# Patient Record
Sex: FEMALE | Race: WHITE | Employment: OTHER | URBAN - METROPOLITAN AREA
[De-identification: names, ages, dates, MRNs, and addresses within clinical notes are randomized per-mention and may not be internally consistent; named-entity substitution may affect disease eponyms.]

---

## 2017-01-11 ENCOUNTER — APPOINTMENT (OUTPATIENT)
Dept: CT IMAGING | Age: 82
End: 2017-01-11
Attending: EMERGENCY MEDICINE
Payer: MEDICARE

## 2017-01-11 ENCOUNTER — APPOINTMENT (OUTPATIENT)
Dept: GENERAL RADIOLOGY | Age: 82
End: 2017-01-11
Attending: EMERGENCY MEDICINE
Payer: MEDICARE

## 2017-01-11 ENCOUNTER — HOSPITAL ENCOUNTER (EMERGENCY)
Age: 82
Discharge: HOME | End: 2017-01-12
Attending: EMERGENCY MEDICINE | Admitting: EMERGENCY MEDICINE
Payer: MEDICARE
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
## 2017-01-11 DIAGNOSIS — S50.312A ABRASION OF LEFT ELBOW, INITIAL ENCOUNTER: ICD-10-CM

## 2017-01-11 DIAGNOSIS — S70.12XA CONTUSION OF LEFT HIP AND THIGH, INITIAL ENCOUNTER: ICD-10-CM

## 2017-01-11 DIAGNOSIS — S70.02XA CONTUSION OF LEFT HIP AND THIGH, INITIAL ENCOUNTER: ICD-10-CM

## 2017-01-11 DIAGNOSIS — S09.90XA HEAD INJURY, INITIAL ENCOUNTER: ICD-10-CM

## 2017-01-11 LAB
ALBUMIN SERPL BCP-MCNC: 3.4 G/DL (ref 3.5–5)
ALBUMIN/GLOB SERPL: 1 {RATIO} (ref 1.1–2.2)
ALP SERPL-CCNC: 95 U/L (ref 45–117)
ALT SERPL-CCNC: 24 U/L (ref 12–78)
ANION GAP BLD CALC-SCNC: 12 MMOL/L (ref 5–15)
APPEARANCE UR: CLEAR
APTT PPP: 23.8 SEC (ref 22.1–32.5)
AST SERPL W P-5'-P-CCNC: 13 U/L (ref 15–37)
BACTERIA URNS QL MICRO: NEGATIVE /HPF
BASOPHILS # BLD AUTO: 0 K/UL (ref 0–0.1)
BASOPHILS # BLD: 0 % (ref 0–1)
BILIRUB SERPL-MCNC: 0.4 MG/DL (ref 0.2–1)
BILIRUB UR QL: NEGATIVE
BUN SERPL-MCNC: 28 MG/DL (ref 6–20)
BUN/CREAT SERPL: 28 (ref 12–20)
CALCIUM SERPL-MCNC: 9 MG/DL (ref 8.5–10.1)
CHLORIDE SERPL-SCNC: 102 MMOL/L (ref 97–108)
CK MB CFR SERPL CALC: 3.3 % (ref 0–2.5)
CK MB SERPL-MCNC: 1.9 NG/ML (ref 5–25)
CK SERPL-CCNC: 57 U/L (ref 26–192)
CO2 SERPL-SCNC: 26 MMOL/L (ref 21–32)
COLOR UR: NORMAL
CREAT SERPL-MCNC: 0.99 MG/DL (ref 0.55–1.02)
EOSINOPHIL # BLD: 0.2 K/UL (ref 0–0.4)
EOSINOPHIL NFR BLD: 1 % (ref 0–7)
EPITH CASTS URNS QL MICRO: NORMAL /LPF
ERYTHROCYTE [DISTWIDTH] IN BLOOD BY AUTOMATED COUNT: 14.8 % (ref 11.5–14.5)
GLOBULIN SER CALC-MCNC: 3.3 G/DL (ref 2–4)
GLUCOSE SERPL-MCNC: 144 MG/DL (ref 65–100)
GLUCOSE UR STRIP.AUTO-MCNC: NEGATIVE MG/DL
HCT VFR BLD AUTO: 47.4 % (ref 35–47)
HGB BLD-MCNC: 15.5 G/DL (ref 11.5–16)
HGB UR QL STRIP: NEGATIVE
HYALINE CASTS URNS QL MICRO: NORMAL /LPF (ref 0–5)
INR PPP: 1 (ref 0.9–1.1)
KETONES UR QL STRIP.AUTO: NEGATIVE MG/DL
LEUKOCYTE ESTERASE UR QL STRIP.AUTO: NEGATIVE
LYMPHOCYTES # BLD AUTO: 15 % (ref 12–49)
LYMPHOCYTES # BLD: 1.5 K/UL (ref 0.8–3.5)
MAGNESIUM SERPL-MCNC: 2.2 MG/DL (ref 1.6–2.4)
MCH RBC QN AUTO: 29.1 PG (ref 26–34)
MCHC RBC AUTO-ENTMCNC: 32.7 G/DL (ref 30–36.5)
MCV RBC AUTO: 88.9 FL (ref 80–99)
MONOCYTES # BLD: 0.9 K/UL (ref 0–1)
MONOCYTES NFR BLD AUTO: 9 % (ref 5–13)
NEUTS SEG # BLD: 7.8 K/UL (ref 1.8–8)
NEUTS SEG NFR BLD AUTO: 75 % (ref 32–75)
NITRITE UR QL STRIP.AUTO: NEGATIVE
PH UR STRIP: 5.5 [PH] (ref 5–8)
PHOSPHATE SERPL-MCNC: 3 MG/DL (ref 2.6–4.7)
PLATELET # BLD AUTO: 276 K/UL (ref 150–400)
POTASSIUM SERPL-SCNC: 4.4 MMOL/L (ref 3.5–5.1)
PROT SERPL-MCNC: 6.7 G/DL (ref 6.4–8.2)
PROT UR STRIP-MCNC: NEGATIVE MG/DL
PROTHROMBIN TIME: 9.9 SEC (ref 9–11.1)
RBC # BLD AUTO: 5.33 M/UL (ref 3.8–5.2)
RBC #/AREA URNS HPF: NORMAL /HPF (ref 0–5)
SODIUM SERPL-SCNC: 140 MMOL/L (ref 136–145)
SP GR UR REFRACTOMETRY: 1.02 (ref 1–1.03)
THERAPEUTIC RANGE,PTTT: NORMAL SECS (ref 58–77)
UA: UC IF INDICATED,UAUC: NORMAL
UROBILINOGEN UR QL STRIP.AUTO: 0.2 EU/DL (ref 0.2–1)
WBC # BLD AUTO: 10.4 K/UL (ref 3.6–11)
WBC URNS QL MICRO: NORMAL /HPF (ref 0–4)

## 2017-01-11 PROCEDURE — 74011000250 HC RX REV CODE- 250: Performed by: EMERGENCY MEDICINE

## 2017-01-11 PROCEDURE — 87040 BLOOD CULTURE FOR BACTERIA: CPT | Performed by: EMERGENCY MEDICINE

## 2017-01-11 PROCEDURE — 99285 EMERGENCY DEPT VISIT HI MDM: CPT

## 2017-01-11 PROCEDURE — 71020 XR CHEST PA LAT: CPT

## 2017-01-11 PROCEDURE — 82550 ASSAY OF CK (CPK): CPT | Performed by: EMERGENCY MEDICINE

## 2017-01-11 PROCEDURE — 84100 ASSAY OF PHOSPHORUS: CPT | Performed by: EMERGENCY MEDICINE

## 2017-01-11 PROCEDURE — 80053 COMPREHEN METABOLIC PANEL: CPT | Performed by: EMERGENCY MEDICINE

## 2017-01-11 PROCEDURE — 51701 INSERT BLADDER CATHETER: CPT

## 2017-01-11 PROCEDURE — 81001 URINALYSIS AUTO W/SCOPE: CPT | Performed by: EMERGENCY MEDICINE

## 2017-01-11 PROCEDURE — 83735 ASSAY OF MAGNESIUM: CPT | Performed by: EMERGENCY MEDICINE

## 2017-01-11 PROCEDURE — 72125 CT NECK SPINE W/O DYE: CPT

## 2017-01-11 PROCEDURE — 36415 COLL VENOUS BLD VENIPUNCTURE: CPT | Performed by: EMERGENCY MEDICINE

## 2017-01-11 PROCEDURE — 93005 ELECTROCARDIOGRAM TRACING: CPT

## 2017-01-11 PROCEDURE — 70450 CT HEAD/BRAIN W/O DYE: CPT

## 2017-01-11 PROCEDURE — 74011250637 HC RX REV CODE- 250/637: Performed by: EMERGENCY MEDICINE

## 2017-01-11 PROCEDURE — 85730 THROMBOPLASTIN TIME PARTIAL: CPT | Performed by: EMERGENCY MEDICINE

## 2017-01-11 PROCEDURE — 85025 COMPLETE CBC W/AUTO DIFF WBC: CPT | Performed by: EMERGENCY MEDICINE

## 2017-01-11 PROCEDURE — 94762 N-INVAS EAR/PLS OXIMTRY CONT: CPT

## 2017-01-11 PROCEDURE — 73502 X-RAY EXAM HIP UNI 2-3 VIEWS: CPT

## 2017-01-11 PROCEDURE — 85610 PROTHROMBIN TIME: CPT | Performed by: EMERGENCY MEDICINE

## 2017-01-11 PROCEDURE — 77030011943

## 2017-01-11 PROCEDURE — 72192 CT PELVIS W/O DYE: CPT

## 2017-01-11 RX ORDER — CEPHALEXIN 500 MG/1
500 CAPSULE ORAL AS NEEDED
COMMUNITY
End: 2018-08-22

## 2017-01-11 RX ORDER — BISMUTH SUBSALICYLATE 262 MG
1 TABLET,CHEWABLE ORAL DAILY
COMMUNITY

## 2017-01-11 RX ORDER — LOSARTAN POTASSIUM 50 MG/1
50 TABLET ORAL DAILY
COMMUNITY

## 2017-01-11 RX ORDER — MEMANTINE HYDROCHLORIDE 10 MG/1
10 TABLET ORAL 2 TIMES DAILY
COMMUNITY

## 2017-01-11 RX ORDER — BACITRACIN 500 UNIT/G
1 PACKET (EA) TOPICAL
Status: COMPLETED | OUTPATIENT
Start: 2017-01-11 | End: 2017-01-11

## 2017-01-11 RX ORDER — METHYLPREDNISOLONE 4 MG/1
4 TABLET ORAL
COMMUNITY
End: 2018-08-22

## 2017-01-11 RX ORDER — GLUCOSAMINE SULFATE 1500 MG
2000 POWDER IN PACKET (EA) ORAL DAILY
COMMUNITY
End: 2020-06-09

## 2017-01-11 RX ORDER — ACETAMINOPHEN 500 MG
1000 TABLET ORAL
Status: COMPLETED | OUTPATIENT
Start: 2017-01-11 | End: 2017-01-11

## 2017-01-11 RX ORDER — FERROUS SULFATE, DRIED 160(50) MG
1 TABLET, EXTENDED RELEASE ORAL
COMMUNITY

## 2017-01-11 RX ADMIN — BACITRACIN ZINC 1 PACKET: 500 OINTMENT TOPICAL at 22:51

## 2017-01-11 RX ADMIN — ACETAMINOPHEN 1000 MG: 500 TABLET, FILM COATED ORAL at 22:51

## 2017-01-12 VITALS
RESPIRATION RATE: 15 BRPM | SYSTOLIC BLOOD PRESSURE: 138 MMHG | OXYGEN SATURATION: 99 % | DIASTOLIC BLOOD PRESSURE: 76 MMHG | HEIGHT: 64 IN | TEMPERATURE: 98 F | WEIGHT: 150 LBS | BODY MASS INDEX: 25.61 KG/M2 | HEART RATE: 98 BPM

## 2017-01-12 LAB
ATRIAL RATE: 92 BPM
CALCULATED P AXIS, ECG09: 52 DEGREES
CALCULATED R AXIS, ECG10: -10 DEGREES
CALCULATED T AXIS, ECG11: 58 DEGREES
DIAGNOSIS, 93000: NORMAL
P-R INTERVAL, ECG05: 156 MS
Q-T INTERVAL, ECG07: 362 MS
QRS DURATION, ECG06: 80 MS
QTC CALCULATION (BEZET), ECG08: 447 MS
VENTRICULAR RATE, ECG03: 92 BPM

## 2017-01-12 RX ORDER — ACETAMINOPHEN 500 MG
1000 TABLET ORAL
Qty: 30 TAB | Refills: 0 | Status: SHIPPED | OUTPATIENT
Start: 2017-01-12 | End: 2018-08-22

## 2017-01-12 NOTE — ED NOTES
Patient discharged by MD. Results and discharge instructions reviewed with the patient by MD.  Patient verbalizes understanding. Patient discharged home, stable, ambulatory, in no acute distress.

## 2017-01-12 NOTE — ED PROVIDER NOTES
HPI Comments: 80 y.o. female with past medical history significant for hypercholesterolemia, osteoporosis, IBS, OA, L-THR, falls, and dementia who presents from [facility] assisted living via EMS with chief complaint of GLF. Pt was brought to the ED via EMS after a nurse found her on the floor at her living facility ~30-40 minutes ago. Pt was on the floor for an unknown period of time. Pt presents with a L-elbow laceration, L-hip pain, and a rash over both arms. Pt normally walks with a cane, but she reports that she is able to walk without any assistance at times. Per daughter, pt is currently taking Prednisone. Pt's daughter states that she also had a fall in 09/2016, at which point she cracked a rib and began using a cane intermittently. Pt denies experiencing any HA, back pain, neck pain, CP, abdominal pain, nausea, or vomiting. There are no other acute medical concerns at this time. PCP: Carlos Canas MD    Note written by Brain Oglesby, as dictated by Yazmin Kemp MD 10:24 PM      The history is provided by the patient, a relative and a caregiver. Past Medical History:   Diagnosis Date    Hypercholesteremia 5/27/2010    IBS (irritable bowel syndrome) 5/27/2010    OA (osteoarthritis) 5/27/2010    Osteoporosis 5/27/2010       History reviewed. No pertinent past surgical history. History reviewed. No pertinent family history. Social History     Social History    Marital status:      Spouse name: N/A    Number of children: N/A    Years of education: N/A     Occupational History    Not on file.      Social History Main Topics    Smoking status: Not on file    Smokeless tobacco: Not on file    Alcohol use Not on file    Drug use: Not on file    Sexual activity: Not on file     Other Topics Concern    Not on file     Social History Narrative         ALLERGIES: Aricept [donepezil] and Flexeril [cyclobenzaprine]    Review of Systems   Cardiovascular: Negative for chest pain. Gastrointestinal: Negative for abdominal pain, nausea and vomiting. Musculoskeletal: Negative for back pain and neck pain. +L-hip pain   Skin: Positive for rash (Both arms) and wound (L-elbow laceration). Neurological: Negative for headaches. All other systems reviewed and are negative. Vitals:    01/11/17 2222   Weight: 68 kg (150 lb)   Height: 5' 4\" (1.626 m)            Physical Exam   Nursing note and vitals reviewed. CONSTITUTIONAL: Well-appearing; well-nourished; in no apparent distress  HEAD: Normocephalic; atraumatic  EYES: PERRL; EOM intact; conjunctiva and sclera are clear bilaterally. ENT: No rhinorrhea; normal pharynx with no tonsillar hypertrophy; mucous membranes pink/moist, no erythema, no exudate. NECK: Supple; non-tender; no cervical lymphadenopathy  CARD: Normal S1, S2; no murmurs, rubs, or gallops. Regular rate and rhythm. RESP: Normal respiratory effort; breath sounds clear and equal bilaterally; no wheezes, rhonchi, or rales. ABD: Normal bowel sounds; non-distended; non-tender; no palpable organomegaly, no masses, no bruits. Back Exam: Normal inspection; no vertebral point tenderness, no CVA tenderness. Normal range of motion. EXT: Normal ROM in all four extremities; Left hip tenderness to palpation; no swelling or deformity; distal pulses are normal, no edema. SKIN: Warm; dry; no rash. NEURO:Alert and oriented x 3, coherent, PRISCILLA-XII grossly intact, sensory and motor are non-focal.        MDM  Number of Diagnoses or Management Options  Abrasion of left elbow, initial encounter:   Contusion of left hip and thigh, initial encounter:   Fall, initial encounter:   Head injury, initial encounter:   Diagnosis management comments: Assessment: elderly female, with fall versus syncope. Rule out head injury,ACS, cardiac dysrhythmia, electrolyte abnormality, infection, abrasion left elbow,  head and left hip injury.       Plan: EKG/ lab/ IV fluid/ chest x-ray/ left hip x-ray/ CT scan of the head, cervical spine, and pelvis/ analgesia/ Monitor and Reevaluate. Amount and/or Complexity of Data Reviewed  Clinical lab tests: ordered and reviewed  Tests in the radiology section of CPT®: ordered and reviewed  Tests in the medicine section of CPT®: reviewed and ordered  Discussion of test results with the performing providers: yes  Decide to obtain previous medical records or to obtain history from someone other than the patient: yes  Obtain history from someone other than the patient: yes  Review and summarize past medical records: yes  Discuss the patient with other providers: yes  Independent visualization of images, tracings, or specimens: yes    Risk of Complications, Morbidity, and/or Mortality  Presenting problems: moderate  Diagnostic procedures: moderate  Management options: moderate      ED Course       Procedures     ED EKG interpretation:  Rhythm: normal sinus rhythm; and regular . Rate (approx.): 92; Axis: left axis deviation; P wave: normal; QRS interval: normal ; ST/T wave: non-specific changes; in  Lead: Diffusely; Other findings: abnormal ekg. This EKG was interpreted by Nataly Orozco MD,ED Provider. XRAY INTERPRETATION (ED MD)  Chest Xray  No acute process seen. Normal heart size. No bony abnormalities. No infiltrate. Nataly Orozco MD 11:06 PM      XRAY INTERPRETATION (ED MD)  Xray of Left hip shows left sacral fracture with possible left parasymphyseal  superior and inferior ramus fractures; No subluxation/dislocation. No STS. Nataly Orozco MD 11:06 PM    Progress Note:   Pt has been reexamined by Nataly Orozco MD. Pt is feeling much better. Symptoms have improved. All available results have been reviewed with pt and any available family. The findings of possible sacral and pelvic fracture identified on the x-ray was not evaluated by the CT. Patient has no acute fracture at this time. The patient ambulated without difficulty.  She denies any further discomfort. Pt understands sx, dx, and tx in ED. Care plan has been outlined and questions have been answered. Pt is ready to go home. Will send home on fall/ right hip contusion/ head injury/ left elbow abrasion instruction. Outpatient referral with PCP as needed. Written by Ludy Koroma MD,8:06 AM    .   .

## 2017-01-12 NOTE — DISCHARGE INSTRUCTIONS
Scrapes (Abrasions): Care Instructions  Your Care Instructions  Scrapes (abrasions) are wounds where your skin has been rubbed or torn off. Most scrapes do not go deep into the skin, but some may remove several layers of skin. Scrapes usually don't bleed much, but they may ooze pinkish fluid. Scrapes on the head or face may appear worse than they are. They may bleed a lot because of the good blood supply to this area. Most scrapes heal well and may not need a bandage. They usually heal within 3 to 7 days. A large, deep scrape may take 1 to 2 weeks or longer to heal. A scab may form on some scrapes. Follow-up care is a key part of your treatment and safety. Be sure to make and go to all appointments, and call your doctor if you are having problems. It's also a good idea to know your test results and keep a list of the medicines you take. How can you care for yourself at home? · If your doctor told you how to care for your wound, follow your doctor's instructions. If you did not get instructions, follow this general advice:  ¨ Wash the scrape with clean water 2 times a day. Don't use hydrogen peroxide or alcohol, which can slow healing. ¨ You may cover the scrape with a thin layer of petroleum jelly, such as Vaseline, and a nonstick bandage. ¨ Apply more petroleum jelly and replace the bandage as needed. · Prop up the injured area on a pillow anytime you sit or lie down during the next 3 days. Try to keep it above the level of your heart. This will help reduce swelling. · Be safe with medicines. Take pain medicines exactly as directed. ¨ If the doctor gave you a prescription medicine for pain, take it as prescribed. ¨ If you are not taking a prescription pain medicine, ask your doctor if you can take an over-the-counter medicine. When should you call for help?   Call your doctor now or seek immediate medical care if:  · You have signs of infection, such as:  ¨ Increased pain, swelling, warmth, or redness around the scrape. ¨ Red streaks leading from the scrape. ¨ Pus draining from the scrape. ¨ A fever. · The scrape starts to bleed, and blood soaks through the bandage. Oozing small amounts of blood is normal.  Watch closely for changes in your health, and be sure to contact your doctor if the scrape is not getting better each day. Where can you learn more? Go to http://kamryn-mamadou.info/. Enter A374 in the search box to learn more about \"Scrapes (Abrasions): Care Instructions. \"  Current as of: May 27, 2016  Content Version: 11.1  © 3704-5991 [employer]. Care instructions adapted under license by [employer] (which disclaims liability or warranty for this information). If you have questions about a medical condition or this instruction, always ask your healthcare professional. [place] [address] any warranty or liability for your use of this information. We hope that we have addressed all of your medical concerns. The examination and treatment you received in the Emergency Department were for an emergent problem and were not intended as complete care. It is important that you follow up with your healthcare provider(s) for ongoing care. If your symptoms worsen or do not improve as expected, and you are unable to reach your usual health care provider(s), you should return to the Emergency Department. Today's healthcare is undergoing tremendous change, and patient satisfaction surveys are one of the many tools to assess the quality of medical care. You may receive a survey from the [employer] organization regarding your experience in the Emergency Department. I hope that your experience has been completely positive, particularly the medical care that I provided. As such, please participate in the survey; anything less than excellent does not meet my expectations or intentions.         [address] [facility] participate in nationally recognized quality of care measures. If your blood pressure is greater than 120/80, as reported below, we urge that you seek medical care to address the potential of high blood pressure, commonly known as hypertension. Hypertension can be hereditary or can be caused by certain medical conditions, pain, stress, or \"white coat syndrome. \"       Please make an appointment with your health care provider(s) for follow up of your Emergency Department visit. VITALS:   Patient Vitals for the past 8 hrs:   Temp Pulse Resp BP SpO2   01/11/17 2222 97.5 °F (36.4 °C) 94 18 140/89 96 %          Thank you for allowing us to provide you with medical care today. We realize that you have many choices for your emergency care needs. Please choose us in the future for any continued health care needs. Hi Brooks MD    [address] [facility].   Office: [phone]            Recent Results (from the past 24 hour(s))   CK W/ CKMB & INDEX    Collection Time: 01/11/17 11:07 PM   Result Value Ref Range    CK 57 26 - 192 U/L    CK - MB 1.9 <3.6 NG/ML    CK-MB Index 3.3 (H) 0 - 2.5     METABOLIC PANEL, COMPREHENSIVE    Collection Time: 01/11/17 11:07 PM   Result Value Ref Range    Sodium 140 136 - 145 mmol/L    Potassium 4.4 3.5 - 5.1 mmol/L    Chloride 102 97 - 108 mmol/L    CO2 26 21 - 32 mmol/L    Anion gap 12 5 - 15 mmol/L    Glucose 144 (H) 65 - 100 mg/dL    BUN 28 (H) 6 - 20 MG/DL    Creatinine 0.99 0.55 - 1.02 MG/DL    BUN/Creatinine ratio 28 (H) 12 - 20      GFR est AA >60 >60 ml/min/1.73m2    GFR est non-AA 53 (L) >60 ml/min/1.73m2    Calcium 9.0 8.5 - 10.1 MG/DL    Bilirubin, total 0.4 0.2 - 1.0 MG/DL    ALT 24 12 - 78 U/L    AST 13 (L) 15 - 37 U/L    Alk.  phosphatase 95 45 - 117 U/L    Protein, total 6.7 6.4 - 8.2 g/dL    Albumin 3.4 (L) 3.5 - 5.0 g/dL    Globulin 3.3 2.0 - 4.0 g/dL    A-G Ratio 1.0 (L) 1.1 - 2.2     CBC WITH AUTOMATED DIFF    Collection Time: 01/11/17 11:07 PM   Result Value Ref Range    WBC 10.4 3.6 - 11.0 K/uL    RBC 5.33 (H) 3.80 - 5.20 M/uL    HGB 15.5 11.5 - 16.0 g/dL    HCT 47.4 (H) 35.0 - 47.0 %    MCV 88.9 80.0 - 99.0 FL    MCH 29.1 26.0 - 34.0 PG    MCHC 32.7 30.0 - 36.5 g/dL    RDW 14.8 (H) 11.5 - 14.5 %    PLATELET 486 122 - 209 K/uL    NEUTROPHILS 75 32 - 75 %    LYMPHOCYTES 15 12 - 49 %    MONOCYTES 9 5 - 13 %    EOSINOPHILS 1 0 - 7 %    BASOPHILS 0 0 - 1 %    ABS. NEUTROPHILS 7.8 1.8 - 8.0 K/UL    ABS. LYMPHOCYTES 1.5 0.8 - 3.5 K/UL    ABS. MONOCYTES 0.9 0.0 - 1.0 K/UL    ABS. EOSINOPHILS 0.2 0.0 - 0.4 K/UL    ABS. BASOPHILS 0.0 0.0 - 0.1 K/UL   PROTHROMBIN TIME + INR    Collection Time: 01/11/17 11:07 PM   Result Value Ref Range    INR 1.0 0.9 - 1.1      Prothrombin time 9.9 9.0 - 11.1 sec   PTT    Collection Time: 01/11/17 11:07 PM   Result Value Ref Range    aPTT 23.8 22.1 - 32.5 sec    aPTT, therapeutic range     58.0 - 77.0 SECS   MAGNESIUM    Collection Time: 01/11/17 11:07 PM   Result Value Ref Range    Magnesium 2.2 1.6 - 2.4 mg/dL   PHOSPHORUS    Collection Time: 01/11/17 11:07 PM   Result Value Ref Range    Phosphorus 3.0 2.6 - 4.7 MG/DL   URINALYSIS W/ REFLEX CULTURE    Collection Time: 01/11/17 11:07 PM   Result Value Ref Range    Color YELLOW/STRAW      Appearance CLEAR CLEAR      Specific gravity 1.024 1.003 - 1.030      pH (UA) 5.5 5.0 - 8.0      Protein NEGATIVE  NEG mg/dL    Glucose NEGATIVE  NEG mg/dL    Ketone NEGATIVE  NEG mg/dL    Bilirubin NEGATIVE  NEG      Blood NEGATIVE  NEG      Urobilinogen 0.2 0.2 - 1.0 EU/dL    Nitrites NEGATIVE  NEG      Leukocyte Esterase NEGATIVE  NEG      WBC 0-4 0 - 4 /hpf    RBC 0-5 0 - 5 /hpf    Epithelial cells FEW FEW /lpf    Bacteria NEGATIVE  NEG /hpf    UA:UC IF INDICATED CULTURE NOT INDICATED BY UA RESULT CNI      Hyaline Cast 0-2 0 - 5 /lpf       Xr Chest Pa Lat    Result Date: 1/11/2017  EXAM:  XR CHEST PA LAT INDICATION:  Trauma.   Unwitnessed GLF and left hip pain. Pt was able to ambulate on scene with some difficulty bearing weight. Pt denies LOC COMPARISON:  None. FINDINGS: A portable AP radiograph of the chest was obtained at 22:44 hours. The patient is on a cardiac monitor. The lungs are clear with no pneumothorax. The cardiac and mediastinal contours and pulmonary vascularity are normal. Atherosclerotic calcifications affect the aortic arch and the thoracic aorta is tortuous. The chest wall structures and visualized upper abdomen show no acute findings with incidental note of degenerative spine and shoulder changes as well as diffuse osteopenia. IMPRESSION: No acute findings. Xr Hip Lt W Or Wo Pelv 2-3 Vws    Result Date: 1/11/2017  EXAM:  XR HIP LT W OR WO PELV 2-3 VWS INDICATION:   Trauma. Unwitnessed GLF and left hip pain. Pt was able to ambulate on scene with some difficulty bearing weight. Pt denies LOC COMPARISON: None. FINDINGS: An AP view of the pelvis and a frogleg lateral view of the left hip demonstrate suspected left sacral fracture and possible left parasymphyseal superior and inferior pubic rami fractures. A left hip arthroplasty prosthesis is in position and appears intact, align, and well-positioned relative to bone. No evident right sided hip fractures are seen. There is diffuse osteopenia. Lower lumbar degenerative spine changes are noted. Soft tissues appear grossly unremarkable. IMPRESSION:  Suspect left sacral fracture with possible left parasymphyseal superior and inferior ramus fractures. Consider CT for further evaluation. Ct Head Wo Cont    Result Date: 1/11/2017  EXAM:  CT head without contrast INDICATION: Fall. COMPARISON: None. TECHNIQUE: Axial noncontrast head CT from foramen magnum to vertex. Coronal and sagittal reformatted images were obtained. CT dose reduction was achieved through use of a standardized protocol tailored for this examination and automatic exposure control for dose modulation. Adaptive statistical iterative reconstruction (ASIR) was utilized. FINDINGS:  There is diffuse age-related parenchymal volume loss. The ventricles and sulci are age-appropriate without hydrocephalus. There is no mass effect or midline shift. There is no intracranial hemorrhage or extra-axial fluid collection. Areas of low attenuation in the periventricular white matter most likely represent age-indeterminate microvascular ischemic changes. The basal cisterns are patent. There is intracranial atherosclerosis. The osseous structures are intact. The visualized paranasal sinuses and mastoid air cells are clear. IMPRESSION: 1. There is no acute intracranial hemorrhage. 2. There is age-indeterminate microvascular ischemic disease in the periventricular white matter. Ct Spine Cerv Wo Cont    Result Date: 1/11/2017  EXAM:  CT C-spine without contrast INDICATION: Fall. COMPARISON: None. TECHNIQUE: Thin section axial noncontrast CT of the cervical spine with coronal and sagittal reformats. CT dose reduction was achieved through use of a standardized protocol tailored for this examination and automatic exposure control for dose modulation. FINDINGS: There is no acute fracture or subluxation. Vertebral body heights are maintained. There is multilevel intervertebral disc space narrowing with bony ankylosis of the anterior and posterior elements at C3-4. There is no spinal canal stenosis. There is bilateral foraminal stenosis that is most prominent at C4-5 and C5-6. The paraspinal soft tissues are unremarkable. A multinodular goiter is noted. The visualized lung apices are unremarkable. IMPRESSION: There is no acute fracture or subluxation. Multilevel degenerative changes. Ct Pelv Wo Cont    Result Date: 1/11/2017  EXAM:  CT pelvis without contrast INDICATION: Fall. COMPARISON: None. TECHNIQUE: Helical CT of the pelvis  without contrast. Coronal and sagittal reformats are performed.  CT dose reduction was achieved through use of a standardized protocol tailored for this examination and automatic exposure control for dose modulation. Adaptive statistical iterative reconstruction (ASIR) was utilized. FINDINGS: There is no acute fracture. There is chronic deformity of the left inferior pubic ramus. A left hip prosthesis is partially visualized but intact with normal alignment. There is diffuse osteopenia. The urinary bladder is grossly normal. There is no pelvic mass. There is no free fluid or free air. There are no dilated bowel loops. There is aortoiliac atherosclerosis. IMPRESSION: There is no acute fracture. Learning About a Closed Head Injury  What is a closed head injury? A closed head injury happens when your head gets hit hard. The strong force of the blow causes your brain to shake in your skull. This movement can cause the brain to bruise, swell, or tear. Sometimes nerves or blood vessels also get damaged. This can cause bleeding in or around the brain. A concussion is a type of closed head injury. What are the symptoms? If you have a mild concussion, you may have a mild headache or feel \"not quite right. \" These symptoms are common. They usually go away over a few days to 4 weeks. But sometimes after a concussion, you feel like you can't function as well as before the injury. And you have new symptoms. This is called postconcussive syndrome. You may:  · Find it harder to solve problems, think, concentrate, or remember. · Have headaches. · Have changes in your sleep patterns, such as not being able to sleep or sleeping all the time. · Have changes in your personality. · Not be interested in your usual activities. · Feel angry or anxious without a clear reason. · Lose your sense of taste or smell. · Be dizzy, lightheaded, or unsteady. It may be hard to stand or walk. How is a closed head injury treated? Any person who may have a concussion needs to see a doctor.  Some people have to stay in the hospital to be watched. Others can go home safely. If you go home, follow your doctor's instructions. He or she will tell you if you need someone to watch you closely for the next 24 hours or longer. Rest is the best treatment. Get plenty of sleep at night. And try to rest during the day. · Avoid activities that are physically or mentally demanding. These include housework, exercise, and schoolwork. And don't play video games, send text messages, or use the computer. You may need to change your school or work schedule to be able to avoid these activities. · Ask your doctor when it's okay to drive, ride a bike, or operate machinery. · Take an over-the-counter pain medicine, such as acetaminophen (Tylenol), ibuprofen (Advil, Motrin), or naproxen (Aleve). Be safe with medicines. Read and follow all instructions on the label. · Check with your doctor before you use any other medicines for pain. · Do not drink alcohol or use illegal drugs. They can slow recovery. They can also increase your risk of getting a second head injury. Follow-up care is a key part of your treatment and safety. Be sure to make and go to all appointments, and call your doctor if you are having problems. It's also a good idea to know your test results and keep a list of the medicines you take. Where can you learn more? Go to http://kamryn-mamadou.info/. Enter E235 in the search box to learn more about \"Learning About a Closed Head Injury. \"  Current as of: April 22, 2016  Content Version: 11.1  © 5433-7162 [employer]. Care instructions adapted under license by [employer] (which disclaims liability or warranty for this information). If you have questions about a medical condition or this instruction, always ask your healthcare professional. [address] any warranty or liability for your use of this information.

## 2017-01-12 NOTE — ED TRIAGE NOTES
TRIAGE: Pt arrives via EMS from [facility] with c/o unwitnessed GLF and left hip pain. Pt was able to ambulate on scene with some difficulty bearing weight.  Pt denies LOC

## 2017-01-12 NOTE — ED NOTES
Bedside verbal shift change report received from Abril Mulligan, [facility]. Reviewed patient status, lab results and medical plan. Will continue to monitor patient.

## 2017-01-13 NOTE — CALL BACK NOTE
[facility] Emergency Department Follow Up Call Record    Discharged to : Home/Family Home/Home Health/Skilled Facility/Rehab/Assisted Living/Other__Krystian Merino_____  1) Did you receive your discharge instructions? YES   Nursing reports they did receive the discharge instructions from ED. 2) Do you understand them? YES         3) Are you able to follow them? YES          If NO, what can I clarify for you? 4) Do you understand your diagnosis? YES         5) Do you know which symptoms should prompt you to call the doctor? YES  Nursing reports they have contacted this patients PCP because of patient complaint of not being able to weight bear on Left hip. She typically uses a cane to ambulate. Plans from the facility to have her come back to ED for re-evaluation of the Left hip. 6) Were you able to fill and  any medications that were prescribed? YES     7) You were prescribed __tylenol_________for ___pain_________________. Common side effects of this medication are____rash________________. This is not a complete list so please review the forms given from the pharmacy for a complete list.      8) Are there any questions about your medications? NO            Have you scheduled any recommended doctors appointments (specialty, PCP)YES  If NO, what barriers are you encountering (transportation/lost contact info/cost/  didnt think necessary/no PCP  9) If discharged with Home Health, has the agency contacted you to schedule visit? NO  10) Is there anyone available to help you at home (meals, errands, transportation    monitoring) (adult children, neighbors, private duty companions) YES    11) Are you on a special diet? NO         If YES, do you understand the requirements for this diet? Education provided? 12) If presented with cough, bronchitis, COPD, asthma, is it ok to ask that the   respiratory disease management educator call you?  NOT APPLICABLE      13)  A) If presented with fall, were you issued an assistive device in the ED    Are you using? YES   Patient utilizes cane. B) If given RX for device, have you obtained? NOT APPLICABLE       If NO, barriers? C) Therapist recommended: NO   Are you able to implement the suggestions? NO        If NO, barriers to implementation? D) Are you having any difficulties with mobility inside your home?     (steps, bed, tub)YES   If YES, ask if the [facility] PT can contact patient and good time and number?  14)  At the end of your discharge instructions, there is information about accessing [facility] SERVICES, have you had a chance to review those? NO         Do you have any questions about signing up for this service? We encourage our patients to be active participants in their healthcare and this site is one of the ways to do that. It will allow you to access parts of your medical record, email your doctors office, schedule appointments, and request medications refills . 15) Are there any other questions that I can answer for you regarding    your Emergency department visit?  NO             Estimated Call Time:____10:12 AM  _______________ Date/Time:_______________

## 2017-01-17 LAB
BACTERIA SPEC CULT: NORMAL
SERVICE CMNT-IMP: NORMAL

## 2018-08-22 ENCOUNTER — APPOINTMENT (OUTPATIENT)
Dept: CT IMAGING | Age: 83
DRG: 206 | End: 2018-08-22
Attending: NURSE PRACTITIONER
Payer: MEDICARE

## 2018-08-22 ENCOUNTER — APPOINTMENT (OUTPATIENT)
Dept: GENERAL RADIOLOGY | Age: 83
DRG: 206 | End: 2018-08-22
Attending: NURSE PRACTITIONER
Payer: MEDICARE

## 2018-08-22 ENCOUNTER — HOSPITAL ENCOUNTER (INPATIENT)
Age: 83
LOS: 3 days | Discharge: HOME HEALTH | DRG: 206 | End: 2018-08-26
Attending: EMERGENCY MEDICINE | Admitting: HOSPITALIST
Payer: MEDICARE
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
## 2018-08-22 DIAGNOSIS — R09.02 HYPOXIA: Primary | ICD-10-CM

## 2018-08-22 PROBLEM — R29.6 UNWITNESSED FALL: Status: ACTIVE | Noted: 2018-08-22

## 2018-08-22 LAB
ALBUMIN SERPL-MCNC: 3.2 G/DL (ref 3.5–5)
ALBUMIN/GLOB SERPL: 0.8 {RATIO} (ref 1.1–2.2)
ALP SERPL-CCNC: 125 U/L (ref 45–117)
ALT SERPL-CCNC: 24 U/L (ref 12–78)
ANION GAP SERPL CALC-SCNC: 10 MMOL/L (ref 5–15)
APPEARANCE UR: ABNORMAL
AST SERPL-CCNC: 21 U/L (ref 15–37)
ATRIAL RATE: 100 BPM
BACTERIA URNS QL MICRO: ABNORMAL /HPF
BASOPHILS # BLD: 0 K/UL (ref 0–0.1)
BASOPHILS NFR BLD: 0 % (ref 0–1)
BILIRUB SERPL-MCNC: 0.7 MG/DL (ref 0.2–1)
BILIRUB UR QL: NEGATIVE
BUN SERPL-MCNC: 25 MG/DL (ref 6–20)
BUN/CREAT SERPL: 19 (ref 12–20)
CALCIUM SERPL-MCNC: 9.9 MG/DL (ref 8.5–10.1)
CALCULATED P AXIS, ECG09: 39 DEGREES
CALCULATED R AXIS, ECG10: -41 DEGREES
CALCULATED T AXIS, ECG11: 28 DEGREES
CHLORIDE SERPL-SCNC: 103 MMOL/L (ref 97–108)
CK SERPL-CCNC: 185 U/L (ref 26–192)
CO2 SERPL-SCNC: 23 MMOL/L (ref 21–32)
COLOR UR: ABNORMAL
COMMENT, HOLDF: NORMAL
CREAT SERPL-MCNC: 1.29 MG/DL (ref 0.55–1.02)
DIAGNOSIS, 93000: NORMAL
DIFFERENTIAL METHOD BLD: ABNORMAL
EOSINOPHIL # BLD: 0 K/UL (ref 0–0.4)
EOSINOPHIL NFR BLD: 0 % (ref 0–7)
EPITH CASTS URNS QL MICRO: ABNORMAL /LPF
ERYTHROCYTE [DISTWIDTH] IN BLOOD BY AUTOMATED COUNT: 13.6 % (ref 11.5–14.5)
GLOBULIN SER CALC-MCNC: 3.9 G/DL (ref 2–4)
GLUCOSE SERPL-MCNC: 134 MG/DL (ref 65–100)
GLUCOSE UR STRIP.AUTO-MCNC: NEGATIVE MG/DL
HCT VFR BLD AUTO: 45.2 % (ref 35–47)
HGB BLD-MCNC: 14.5 G/DL (ref 11.5–16)
HGB UR QL STRIP: NEGATIVE
IMM GRANULOCYTES # BLD: 0.1 K/UL (ref 0–0.04)
IMM GRANULOCYTES NFR BLD AUTO: 1 % (ref 0–0.5)
KETONES UR QL STRIP.AUTO: NEGATIVE MG/DL
LACTATE SERPL-SCNC: 0.8 MMOL/L (ref 0.4–2)
LEUKOCYTE ESTERASE UR QL STRIP.AUTO: ABNORMAL
LYMPHOCYTES # BLD: 2 K/UL (ref 0.8–3.5)
LYMPHOCYTES NFR BLD: 14 % (ref 12–49)
MAGNESIUM SERPL-MCNC: 2 MG/DL (ref 1.6–2.4)
MCH RBC QN AUTO: 30.2 PG (ref 26–34)
MCHC RBC AUTO-ENTMCNC: 32.1 G/DL (ref 30–36.5)
MCV RBC AUTO: 94.2 FL (ref 80–99)
MONOCYTES # BLD: 1.4 K/UL (ref 0–1)
MONOCYTES NFR BLD: 10 % (ref 5–13)
NEUTS SEG # BLD: 10.5 K/UL (ref 1.8–8)
NEUTS SEG NFR BLD: 75 % (ref 32–75)
NITRITE UR QL STRIP.AUTO: POSITIVE
NRBC # BLD: 0 K/UL (ref 0–0.01)
NRBC BLD-RTO: 0 PER 100 WBC
P-R INTERVAL, ECG05: 152 MS
PH UR STRIP: 6 [PH] (ref 5–8)
PLATELET # BLD AUTO: 232 K/UL (ref 150–400)
PMV BLD AUTO: 12.1 FL (ref 8.9–12.9)
POTASSIUM SERPL-SCNC: 4.5 MMOL/L (ref 3.5–5.1)
PROT SERPL-MCNC: 7.1 G/DL (ref 6.4–8.2)
PROT UR STRIP-MCNC: NEGATIVE MG/DL
Q-T INTERVAL, ECG07: 350 MS
QRS DURATION, ECG06: 82 MS
QTC CALCULATION (BEZET), ECG08: 451 MS
RBC # BLD AUTO: 4.8 M/UL (ref 3.8–5.2)
RBC #/AREA URNS HPF: ABNORMAL /HPF (ref 0–5)
SAMPLES BEING HELD,HOLD: NORMAL
SODIUM SERPL-SCNC: 136 MMOL/L (ref 136–145)
SP GR UR REFRACTOMETRY: 1.02 (ref 1–1.03)
TROPONIN I SERPL-MCNC: <0.05 NG/ML
UR CULT HOLD, URHOLD: NORMAL
UROBILINOGEN UR QL STRIP.AUTO: 0.2 EU/DL (ref 0.2–1)
VENTRICULAR RATE, ECG03: 100 BPM
WBC # BLD AUTO: 14 K/UL (ref 3.6–11)
WBC URNS QL MICRO: ABNORMAL /HPF (ref 0–4)

## 2018-08-22 PROCEDURE — 73590 X-RAY EXAM OF LOWER LEG: CPT

## 2018-08-22 PROCEDURE — 72125 CT NECK SPINE W/O DYE: CPT

## 2018-08-22 PROCEDURE — G8979 MOBILITY GOAL STATUS: HCPCS

## 2018-08-22 PROCEDURE — 74011250637 HC RX REV CODE- 250/637: Performed by: HOSPITALIST

## 2018-08-22 PROCEDURE — 71046 X-RAY EXAM CHEST 2 VIEWS: CPT

## 2018-08-22 PROCEDURE — 80053 COMPREHEN METABOLIC PANEL: CPT | Performed by: NURSE PRACTITIONER

## 2018-08-22 PROCEDURE — 71250 CT THORAX DX C-: CPT

## 2018-08-22 PROCEDURE — G8978 MOBILITY CURRENT STATUS: HCPCS

## 2018-08-22 PROCEDURE — 82550 ASSAY OF CK (CPK): CPT | Performed by: NURSE PRACTITIONER

## 2018-08-22 PROCEDURE — 83605 ASSAY OF LACTIC ACID: CPT | Performed by: NURSE PRACTITIONER

## 2018-08-22 PROCEDURE — 83735 ASSAY OF MAGNESIUM: CPT | Performed by: NURSE PRACTITIONER

## 2018-08-22 PROCEDURE — 87086 URINE CULTURE/COLONY COUNT: CPT | Performed by: HOSPITALIST

## 2018-08-22 PROCEDURE — 70450 CT HEAD/BRAIN W/O DYE: CPT

## 2018-08-22 PROCEDURE — 73521 X-RAY EXAM HIPS BI 2 VIEWS: CPT

## 2018-08-22 PROCEDURE — 99285 EMERGENCY DEPT VISIT HI MDM: CPT

## 2018-08-22 PROCEDURE — 36415 COLL VENOUS BLD VENIPUNCTURE: CPT | Performed by: NURSE PRACTITIONER

## 2018-08-22 PROCEDURE — 96360 HYDRATION IV INFUSION INIT: CPT

## 2018-08-22 PROCEDURE — 87186 SC STD MICRODIL/AGAR DIL: CPT | Performed by: HOSPITALIST

## 2018-08-22 PROCEDURE — 87077 CULTURE AEROBIC IDENTIFY: CPT | Performed by: HOSPITALIST

## 2018-08-22 PROCEDURE — 74011250636 HC RX REV CODE- 250/636: Performed by: NURSE PRACTITIONER

## 2018-08-22 PROCEDURE — 99218 HC RM OBSERVATION: CPT

## 2018-08-22 PROCEDURE — 97116 GAIT TRAINING THERAPY: CPT

## 2018-08-22 PROCEDURE — 81001 URINALYSIS AUTO W/SCOPE: CPT | Performed by: NURSE PRACTITIONER

## 2018-08-22 PROCEDURE — 84484 ASSAY OF TROPONIN QUANT: CPT | Performed by: NURSE PRACTITIONER

## 2018-08-22 PROCEDURE — 74011250636 HC RX REV CODE- 250/636: Performed by: HOSPITALIST

## 2018-08-22 PROCEDURE — 93005 ELECTROCARDIOGRAM TRACING: CPT

## 2018-08-22 PROCEDURE — 97161 PT EVAL LOW COMPLEX 20 MIN: CPT

## 2018-08-22 PROCEDURE — 85025 COMPLETE CBC W/AUTO DIFF WBC: CPT | Performed by: NURSE PRACTITIONER

## 2018-08-22 PROCEDURE — 97530 THERAPEUTIC ACTIVITIES: CPT

## 2018-08-22 PROCEDURE — G8980 MOBILITY D/C STATUS: HCPCS

## 2018-08-22 RX ORDER — ACETAMINOPHEN 325 MG/1
650 TABLET ORAL 2 TIMES DAILY
COMMUNITY

## 2018-08-22 RX ORDER — ACETAMINOPHEN 500 MG
1000 TABLET ORAL
Status: DISCONTINUED | OUTPATIENT
Start: 2018-08-22 | End: 2018-08-26 | Stop reason: HOSPADM

## 2018-08-22 RX ORDER — MEMANTINE HYDROCHLORIDE 10 MG/1
10 TABLET ORAL 2 TIMES DAILY
Status: DISCONTINUED | OUTPATIENT
Start: 2018-08-22 | End: 2018-08-26 | Stop reason: HOSPADM

## 2018-08-22 RX ORDER — HEPARIN SODIUM 5000 [USP'U]/ML
5000 INJECTION, SOLUTION INTRAVENOUS; SUBCUTANEOUS EVERY 8 HOURS
Status: DISCONTINUED | OUTPATIENT
Start: 2018-08-22 | End: 2018-08-26 | Stop reason: HOSPADM

## 2018-08-22 RX ORDER — GUAIFENESIN/DEXTROMETHORPHAN 100-10MG/5
5 SYRUP ORAL
Status: DISCONTINUED | OUTPATIENT
Start: 2018-08-22 | End: 2018-08-26 | Stop reason: HOSPADM

## 2018-08-22 RX ORDER — SODIUM CHLORIDE 9 MG/ML
75 INJECTION, SOLUTION INTRAVENOUS CONTINUOUS
Status: DISCONTINUED | OUTPATIENT
Start: 2018-08-22 | End: 2018-08-23

## 2018-08-22 RX ORDER — SODIUM CHLORIDE 0.9 % (FLUSH) 0.9 %
5-10 SYRINGE (ML) INJECTION AS NEEDED
Status: DISCONTINUED | OUTPATIENT
Start: 2018-08-22 | End: 2018-08-26 | Stop reason: HOSPADM

## 2018-08-22 RX ORDER — SODIUM CHLORIDE 0.9 % (FLUSH) 0.9 %
5-10 SYRINGE (ML) INJECTION EVERY 8 HOURS
Status: DISCONTINUED | OUTPATIENT
Start: 2018-08-22 | End: 2018-08-26 | Stop reason: HOSPADM

## 2018-08-22 RX ADMIN — HEPARIN SODIUM 5000 UNITS: 5000 INJECTION, SOLUTION INTRAVENOUS; SUBCUTANEOUS at 21:55

## 2018-08-22 RX ADMIN — Medication 10 ML: at 21:56

## 2018-08-22 RX ADMIN — SODIUM CHLORIDE 500 ML: 900 INJECTION, SOLUTION INTRAVENOUS at 18:04

## 2018-08-22 RX ADMIN — MEMANTINE 10 MG: 10 TABLET ORAL at 21:55

## 2018-08-22 RX ADMIN — SODIUM CHLORIDE 75 ML/HR: 900 INJECTION, SOLUTION INTRAVENOUS at 21:57

## 2018-08-22 NOTE — PROGRESS NOTES
TRANSFER - IN REPORT:    Verbal report received from Barry(name) on Sidanne mariee Sailors  being received from ED(unit) for routine progression of care      Report consisted of patients Situation, Background, Assessment and   Recommendations(SBAR). Information from the following report(s) ED Summary was reviewed with the receiving nurse. Opportunity for questions and clarification was provided. Assessment completed upon patients arrival to unit and care assumed.

## 2018-08-22 NOTE — IP AVS SNAPSHOT
1111 [facility] [address] [place] [address] 
[phone] Patient: Jian Khalil MRN: [id number] :10/14/1929 A check gurpreet indicates which time of day the medication should be taken. My Medications CONTINUE taking these medications Instructions Each Dose to Equal  
 Morning Noon Evening Bedtime  
 acetaminophen 325 mg tablet Commonly known as:  TYLENOL Your last dose was: Your next dose is: Take 650 mg by mouth daily. @1700  
 650 mg  
    
   
   
   
  
 losartan 50 mg tablet Commonly known as:  COZAAR Your last dose was: Your next dose is: Take 50 mg by mouth daily. 50 mg  
    
   
   
   
  
 multivitamin tablet Commonly known as:  ONE A DAY Your last dose was: Your next dose is: Take 1 Tab by mouth daily. 1 Tab NAMENDA 10 mg tablet Generic drug:  memantine Your last dose was: Your next dose is: Take 10 mg by mouth two (2) times a day. 10 mg Os-Sriram 500+D 500 mg(1,250mg) -200 unit per tablet Generic drug:  calcium-vitamin D Your last dose was: Your next dose is: Take 1 Tab by mouth daily (with breakfast). 1 Tab VITAMIN D3 1,000 unit Cap Generic drug:  cholecalciferol Your last dose was: Your next dose is: Take 2,000 Units by mouth daily. 2000 Units

## 2018-08-22 NOTE — SENIOR SERVICES NOTE
physical Therapy Emergency Department EVALUATION/DISCHARGE  Patient: Marky Smith (17 y.o. female)  Date: 8/22/2018  Primary Diagnosis: Post Fall        Precautions: Fall     ASSESSMENT :  Based on the objective data described below, the patient presents with baseline confusion/dementia, generalized weakness and impaired balance. Patient tolerated ambulation with RW well. Required assistance for task due to memory deficits and intermittent assist with RW. Increased assistance for commode transfer secondary to low height surface. Patients daughter present and reports patient with difficulty on low sitting surfaces at baseline. Daughter reports patient has supervision and assistance at [facility] assisted living. Her daughter states that she has assistance for ADLs and mobility and states she appears to be at baseline during mobility with PT. Did note patient to have a cough and mild wheezing when instructed to inhale deeply. No dyspnea noted during ambulation. Difficult to obtain SpO2 reading, inconsistent reading and poor pleth, attempted multiple fingers, ear and devices. Unsure of supplemental O2 needs. From a mobility standpoint, patient is safe to return home, continued assistance/supervision from staff and with no additional therapy needs indicated. Attempted to obtain urine sample for nursing but sample contaminated with stool. Nurse informed. Rounded with ED Nurse Practitioner, Sophie Blanton NP about above findings. Further acute physical therapy is not indicated at this time.      PLAN :  Discharge Recommendations:   [x]   Home to [address] ([facility])  []   Skilled nursing facility  []   Admission to hospital with rehab likely needed  []   Inpatient rehab referral  []   Outpatient physical therapy referral  []   Other:    Further Equipment Recommendations for Discharge: None, patient has needed equipment   []   Rolling walker with 5\" wheels  []   Crutches   []   Cane   [] Wheelchair   []   Other:     COMMUNICATION/EDUCATION:   Communication/Collaboration:  [x]   Fall prevention education was provided and the patient/caregiver indicated understanding. [x]   Patient/family have participated as able and agree with findings and recommendations. []   Patient is unable to participate in plan of care at this time. Findings and recommendations were discussed with: ED Nurse Practicioner and Registered Nurse     SUBJECTIVE:   Patient stated I would like to go home.     OBJECTIVE DATA SUMMARY:   HISTORY:    Past Medical History:   Diagnosis Date    Hypercholesteremia 5/27/2010    IBS (irritable bowel syndrome) 5/27/2010    OA (osteoarthritis) 5/27/2010    Osteoporosis 5/27/2010   History reviewed. No pertinent surgical history. Prior Level of Function/Home Situation: Prior supervision to assistance for all mobility and ADLs, use of RW at baseline. Lives in assisted living memory care unit. Supportive and involved daughter. EXAMINATION/PRESENTATION/DECISION MAKING:   Critical Behavior:   Alert     Hearing: Auditory  Auditory Impairment: None    Range Of Motion:  AROM: Within functional limits                       Strength:    Strength: Generally decreased, functional                    Tone & Sensation:   Tone: Normal              Sensation: Intact     Functional Mobility:  Bed Mobility:  Rolling: Supervision; Additional time  Supine to Sit: Contact guard assistance; Additional time  Sit to Supine: Minimum assistance; Additional time  Scooting: Supervision; Additional time  Transfers:  Sit to Stand: Contact guard assistance;Minimum assistance (Min A from lower commode surface)  Stand to Sit: Contact guard assistance;Minimum assistance (Min A to lower commode surface)                       Balance:   Sitting: Intact  Standing: Impaired  Standing - Static: Good  Standing - Dynamic : Fair (good with support of RW and supervision due to confusion)  Ambulation/Gait Training:  Distance (ft): 85 Feet (ft)  Assistive Device: Walker, rolling;Gait belt  Ambulation - Level of Assistance: Contact guard assistance;Minimal assistance (occasional Min A for walker management)        Gait Abnormalities: Decreased step clearance; Path deviations (initermittent confusion with path/task during ambulation)              Speed/Casandra: Pace decreased (<100 feet/min)  Step Length: Left shortened;Right shortened                  Slow, required intermittent assist with walker management and reorientation to task. Special Tests:  Barthel Index:    Bathin  Bladder: 5  Bowels: 10  Groomin  Dressin  Feeding: 10  Mobility: 10  Stairs: 5  Toilet Use: 5  Transfer (Bed to Chair and Back): 10  Total: 60     Barthel and G-code impairment scale:  Percentage of impairment CH  0% CI  1-19% CJ  20-39% CK  40-59% CL  60-79% CM  80-99% CN  100%   Barthel Score 0-100 100 99-80 79-60 59-40 20-39 1-19   0   Barthel Score 0-20 20 17-19 13-16 9-12 5-8 1-4 0      The Barthel ADL Index: Guidelines  1. The index should be used as a record of what a patient does, not as a record of what a patient could do. 2. The main aim is to establish degree of independence from any help, physical or verbal, however minor and for whatever reason. 3. The need for supervision renders the patient not independent. 4. A patient's performance should be established using the best available evidence. Asking the patient, friends/relatives and nurses are the usual sources, but direct observation and common sense are also important. However direct testing is not needed. 5. Usually the patient's performance over the preceding 24-48 hours is important, but occasionally longer periods will be relevant. 6. Middle categories imply that the patient supplies over 50 per cent of the effort. 7. Use of aids to be independent is allowed. Lauryn Ascencio., Barthel, D.W. ([id number]). Functional evaluation: the Barthel Index. [address] [facility] (14)2.   [facility], TAMERA LAWRENCE. Ron Garcia., Tre Lemons., Vaibhav Barr (1999). Measuring the change indisability after inpatient rehabilitation; comparison of the responsiveness of the Barthel Index and Functional [place] Measure. Journal of Neurology, Neurosurgery, and Psychiatry, 66(4), 800-899. MORRO De Dios, BENJA Colin, & Alanis Fitzpatrick M.A. (2004.) Assessment of post-stroke quality of life in cost-effectiveness studies: The usefulness of the Barthel Index and the EuroQoL-5D. Quality of Life Research, 13, 427-43        In compliance with CMSs Claims Based Outcome Reporting, the following G-code set was chosen for this patient based on their primary functional limitation being treated: The outcome measure chosen to determine the severity of the functional limitation was the Barthel Index with a score of 60/100 which was correlated with the impairment scale.     ? Mobility - Walking and Moving Around:     - CURRENT STATUS: CJ - 20%-39% impaired, limited or restricted    - GOAL STATUS: CJ - 20%-39% impaired, limited or restricted    - D/C STATUS:  CJ - 20%-39% impaired, limited or restricted    Physical Therapy Evaluation Charge Determination   History Examination Presentation Decision-Making   LOW Complexity : Zero comorbidities / personal factors that will impact the outcome / POC LOW Complexity : 1-2 Standardized tests and measures addressing body structure, function, activity limitation and / or participation in recreation  LOW Complexity : Stable, uncomplicated  Other outcome measures Barthel Index  LOW       Based on the above components, the patient evaluation is determined to be of the following complexity level: LOW     Pain:  Pain Scale 1: Numeric (0 - 10)  Pain Intensity 1: 0              Activity Tolerance:   Unable to obtain accurate SpO2 reading   After treatment:   []         Patient left in no apparent distress sitting up in chair  [x]         Patient left in no apparent distress in bed  [x]         Call bell left within reach  [x]         Nursing notified  [x]         Caregiver present  []         Bed alarm activated    Thank you for this referral.  Dedrick Garcia, PT, DPT   Time Calculation: 34 mins

## 2018-08-22 NOTE — IP AVS SNAPSHOT
1111 [facility] [address] [place], [place] [id number] Patient: Puja Kelly MRN: [id number] :10/14/1929 About your hospitalization You were admitted on:  2018 You last received care in the:  [facility] 2N MED SURG You were discharged on:  2018 Why you were hospitalized Your primary diagnosis was:  Unwitnessed Fall Your diagnoses also included:  Hypoxia, Fall Follow-up Information Follow up With Details Comments Contact Info Regan Goodson MD In 1 week discharge follow up  [address] [place] [address] [facility] [address] 
[phone] Discharge Orders None A check gurpreet indicates which time of day the medication should be taken. My Medications CONTINUE taking these medications Instructions Each Dose to Equal  
 Morning Noon Evening Bedtime  
 acetaminophen 325 mg tablet Commonly known as:  TYLENOL Your last dose was: Your next dose is: Take 650 mg by mouth daily. @1700  
 650 mg  
    
   
   
   
  
 losartan 50 mg tablet Commonly known as:  COZAAR Your last dose was: Your next dose is: Take 50 mg by mouth daily. 50 mg  
    
   
   
   
  
 multivitamin tablet Commonly known as:  ONE A DAY Your last dose was: Your next dose is: Take 1 Tab by mouth daily. 1 Tab NAMENDA 10 mg tablet Generic drug:  memantine Your last dose was: Your next dose is: Take 10 mg by mouth two (2) times a day. 10 mg Os-Sriram 500+D 500 mg(1,250mg) -200 unit per tablet Generic drug:  calcium-vitamin D Your last dose was: Your next dose is: Take 1 Tab by mouth daily (with breakfast). 1 Tab VITAMIN D3 1,000 unit Cap Generic drug:  cholecalciferol Your last dose was: Your next dose is: Take 2,000 Units by mouth daily. 2000 Units Discharge Instructions Please bring this form with you to show your primary care provider at your follow-up appointment. Primary care provider:  Dr. Rick Roper MD 
 
Discharging provider:  Raphael Swanson MD 
 
You have been admitted to the hospital with the following diagnoses: · Hypoxia · Fall FOLLOW-UP CARE RECOMMENDATIONS: 
 
APPOINTMENTS: 
Follow-up Information Follow up With Details Comments Contact Info Rick Roper MD In 1 week discharge follow up  [address] [place] [address] [id number] [phone] FOLLOW-UP TESTS recommended: none PENDING TEST RESULTS: 
At the time of your discharge the following test results are still pending: none Please make sure you review these results with your outpatient follow-up provider(s). SYMPTOMS to watch for: chest pain, shortness of breath, fever, chills, nausea, vomiting, diarrhea, change in mentation, falling, weakness, bleeding. DIET/what to eat:  Resume previous diet ACTIVITY:  Activity as tolerated and PT/OT per Home Health 
 
WOUND CARE: NONE 
 
EQUIPMENT needed:  NONE What to do if new or unexpected symptoms occur? If you experience any of the above symptoms (or should other concerns or questions arise after discharge) please call your primary care physician. Return to the emergency room if you cannot get hold of your doctor. · It is very important that you keep your follow-up appointment(s). · Please bring discharge papers, medication list (and/or medication bottles) to your follow-up appointments for review by your outpatient provider(s). · Please check the list of medications and be sure it includes every medication (even non-prescription medications) that your provider wants you to take. · It is important that you take the medication exactly as they are prescribed. · Keep your medication in the bottles provided by the pharmacist and keep a list of the medication names, dosages, and times to be taken in your wallet. · Do not take other medications without consulting your doctor. · If you have any questions about your medications or other instructions, please talk to your nurse or care provider before you leave the hospital. 
 
I understand that if any problems occur once I am at home I am to contact my physician. These instructions were explained to me and I had the opportunity to ask questions. ACO Transitions of Care Introducing [employer] [address] offers a voluntary care coordination program to provide high quality service and care to [facility] fee-for-service beneficiaries. Alfred Dyer was designed to help you enhance your health and well-being through the following services: ? Transitions of Care  support for individuals who are transitioning from one care setting to another (example: Hospital to home). ? Chronic and Complex Care Coordination  support for individuals and caregivers of those with serious or chronic illnesses or with more than one chronic (ongoing) condition and those who take a number of different medications. If you meet specific medical criteria, a [address] [place] [address] Rd may call you directly to coordinate your care with your primary care physician and your other care providers. For questions about the [facility] programs, please, contact your physicians office. For general questions or additional information about Accountable Care Organizations: 
Please visit www.medicare.gov/acos. html or call 1-800-MEDICARE (6-904.664.2462) TTY users should call 4-202.654.2747. Jonah Announcement  We are excited to announce that we are making your provider's discharge notes available to you in [employer]. You will see these notes when they are completed and signed by the physician that discharged you from your recent hospital stay. If you have any questions or concerns about any information you see in [employer], please call the Health Information Department where you were seen or reach out to your Primary Care Provider for more information about your plan of care. Introducing hospitals & HEALTH SERVICES! Emily Calixto introduces [employer] patient portal. Now you can access parts of your medical record, email your doctor's office, and request medication refills online. 1. In your internet browser, go to [email or web address] 2. Click on the First Time User? Click Here link in the Sign In box. You will see the New Member Sign Up page. 3. Enter your [employer] Access Code exactly as it appears below. You will not need to use this code after youve completed the sign-up process. If you do not sign up before the expiration date, you must request a new code. · [employer] Access Code: [email or web address] Expires: 11/20/2018 11:54 AM 
 
4. Enter the last four digits of your Social Security Number (xxxx) and Date of Birth (mm/dd/yyyy) as indicated and click Submit. You will be taken to the next sign-up page. 5. Create a [employer] ID. This will be your [employer] login ID and cannot be changed, so think of one that is secure and easy to remember. 6. Create a [employer] password. You can change your password at any time. 7. Enter your Password Reset Question and Answer. This can be used at a later time if you forget your password. 8. Enter your e-mail address. You will receive e-mail notification when new information is available in [address] 9. Click Sign Up. You can now view and download portions of your medical record. 10. Click the Download Summary menu link to download a portable copy of your medical information. If you have questions, please visit the Frequently Asked Questions section of the MyChart website. Remember, [employer] is NOT to be used for urgent needs. For medical emergencies, dial 911. Now available from your iPhone and Android! Introducing Wilfrid Millan As a [employer] patient, I wanted to make you aware of our electronic visit tool called Wilfrid Millan. [employer] 24/7 allows you to connect within minutes with a medical provider 24 hours a day, seven days a week via a mobile device or tablet or logging into a secure website from your computer. You can access Wilfrid Millan from anywhere in the [place]. A virtual visit might be right for you when you have a simple condition and feel like you just dont want to get out of bed, or cant get away from work for an appointment, when your regular [employer] provider is not available (evenings, weekends or holidays), or when youre out of town and need minor care. Electronic visits cost only $49 and if the [employer] 24/7 provider determines a prescription is needed to treat your condition, one can be electronically transmitted to a nearby pharmacy*. Please take a moment to enroll today if you have not already done so. The enrollment process is free and takes just a few minutes. To enroll, please download the [employer] 24/7 rigo to your tablet or phone, or visit [email or web address] to enroll on your computer. And, as an [address] [place] [address] patient with a [employer] account, the results of your visits will be scanned into your electronic medical record and your primary care provider will be able to view the scanned results. We urge you to continue to see your regular [employer] provider for your ongoing medical care.   And while your primary care provider may not be the one available when you seek a Wilfrid Millan virtual visit, the peace of mind you get from getting a real diagnosis real time can be priceless. For more information on Wilfrid Millan, view our Frequently Asked Questions (FAQs) at [email or web address]. Sincerely, 
 
Hayde Arizmendi MD 
Chief Medical Officer [address] *:  certain medications cannot be prescribed via Wilfrid Millan Providers Seen During Your Hospitalization Provider Specialty Primary office phone Yue Cooper MD Emergency Medicine [phone] Namrata Petty MD Internal Medicine [phone] Rayne Araiza MD Internal Medicine [phone] Your Primary Care Physician (PCP) Primary Care Physician Office Phone Office Fax Krissy Mesa [phone] You are allergic to the following Allergen Reactions Aricept (Donepezil) Other (comments) GI upset Flexeril (Cyclobenzaprine) Other (comments) delirium Recent Documentation OB Status Postmenopausal       
  
  
Emergency Contacts Name Discharge Info Relation Home Work Mobile Jacinda Karimi DISCHARGE CAREGIVER [3] Child [2] [phone] Patient Belongings The following personal items are in your possession at time of discharge: 
  Dental Appliances: None  Visual Aid: None      Home Medications: None   Jewelry: None  Clothing: At bedside    Other Valuables: None Please provide this summary of care documentation to your next provider. Signatures-by signing, you are acknowledging that this After Visit Summary has been reviewed with you and you have received a copy. Patient Signature:  ____________________________________________________________ Date:  ____________________________________________________________  
  
Yair Bradley Provider Signature:  ____________________________________________________________ Date:  ____________________________________________________________

## 2018-08-22 NOTE — PROGRESS NOTES
Admission Medication Reconciliation:    Information obtained from:  [address]    Comments/Recommendations: Updated PTA meds/reviewed patient's allergies. 1)  Spoke with family, who states that the patient's medications are administered by RN at [facility]. Obtained medication information from [facility], who states that patient was given her morning medications today. 2)  Medication changes (since last review): Added  -None    Adjusted  -Tylenol 500mg 2 tabs Q6h prn pain --> 650mg once daily @1700    Removed  -Cephalexin 500mg (2 entries)  -Medrol darrick 4mg       Allergies:  Aricept [donepezil] and Flexeril [cyclobenzaprine]    Significant PMH/Disease States:   Past Medical History:   Diagnosis Date    Hypercholesteremia 5/27/2010    IBS (irritable bowel syndrome) 5/27/2010    OA (osteoarthritis) 5/27/2010    Osteoporosis 5/27/2010       Chief Complaint for this Admission:    Chief Complaint   Patient presents with   Manda Fuentes       Prior to Admission Medications:   Prior to Admission Medications   Prescriptions Last Dose Informant Patient Reported? Taking?   acetaminophen (TYLENOL) 325 mg tablet   Yes Yes   Sig: Take 650 mg by mouth daily. Duong@Coub   calcium-vitamin D (OS-LUCÍA 500+D) 500 mg(1,250mg) -200 unit per tablet 8/22/2018 at 0900  Yes Yes   Sig: Take 1 Tab by mouth daily (with breakfast). cholecalciferol (VITAMIN D3) 1,000 unit cap 8/22/2018 at 0900  Yes Yes   Sig: Take 2,000 Units by mouth daily. losartan (COZAAR) 50 mg tablet 8/22/2018 at 0900  Yes Yes   Sig: Take 50 mg by mouth daily. memantine (NAMENDA) 10 mg tablet 8/22/2018 at 0900  Yes Yes   Sig: Take 10 mg by mouth two (2) times a day. multivitamin (ONE A DAY) tablet 8/22/2018 at 0900  Yes Yes   Sig: Take 1 Tab by mouth daily.       Facility-Administered Medications: None

## 2018-08-22 NOTE — ED PROVIDER NOTES
HPI Comments: 80 y.o. female with past medical history significant for IBS and dementia who presents from [facility] via EMS s/p GLF with chief complaint of cough. Pt's daughter reports pt was found at 0600 this morning near the end of her bed on the floor. Fall was unwitnessed, and pt's daughter states that no one is sure of how long she was down for. Pt states she does not remembering falling. Pt's daughter states that after fall this morning, pt was experiencing RUDOLPH and was coughing. Pt's daughter reports pt uses walker with ambulation. Pt's daughter reports that pt's last fall was \"4 or 5 months ago\" and that pt is not on blood thinners. Pt's daughter reports pt's last BP reading was 100/70. Pt's daughter reports pt has had R hip replaced. Pt's daughter reports history of dementia. Pt denies any chest pain, abdominal pain, or back pain. There are no other acute medical concerns at this time. Full history, physical exam, and ROS unable to be obtained due to:  dementia. PCP: Chintan Mathis MD    Note written by Ashanti Goodrich, as dictated by Gurmeet Moore NP 1:32 PM    The history is provided by the patient and a relative (Daughter). No  was used. Past Medical History:   Diagnosis Date    Hypercholesteremia 5/27/2010    IBS (irritable bowel syndrome) 5/27/2010    OA (osteoarthritis) 5/27/2010    Osteoporosis 5/27/2010       History reviewed. No pertinent surgical history. History reviewed. No pertinent family history. Social History     Social History    Marital status:      Spouse name: N/A    Number of children: N/A    Years of education: N/A     Occupational History    Not on file.      Social History Main Topics    Smoking status: Not on file    Smokeless tobacco: Not on file    Alcohol use Not on file    Drug use: Not on file    Sexual activity: Not on file     Other Topics Concern    Not on file     Social History Narrative ALLERGIES: Aricept [donepezil] and Flexeril [cyclobenzaprine]    Review of Systems   Unable to perform ROS: Dementia       Vitals:    08/22/18 1154 08/22/18 1157   BP: 121/70    Pulse: 96    Resp: 16    Temp: 97.6 °F (36.4 °C)    SpO2: 90% 95%            Physical Exam   Constitutional: She appears well-developed and well-nourished. No distress. HENT:   Head: Normocephalic and atraumatic. Right Ear: External ear normal. No hemotympanum. Left Ear: External ear normal. No hemotympanum. Nose: Nose normal.   No hemotympanum. Eyes: Conjunctivae and EOM are normal. Pupils are equal, round, and reactive to light. Neck: Normal range of motion. Neck supple. No thyromegaly present. Cardiovascular: Normal rate, regular rhythm, normal heart sounds and intact distal pulses. No murmur heard. Pulmonary/Chest: Effort normal and breath sounds normal. No respiratory distress. She exhibits no tenderness. Abdominal: Soft. Bowel sounds are normal. She exhibits no distension and no mass. There is no tenderness. Musculoskeletal: Normal range of motion. She exhibits no edema. Cervical back: She exhibits no deformity. Thoracic back: She exhibits no deformity. Lumbar back: She exhibits no deformity. Left lower leg: She exhibits tenderness. Left tib fib tenderness. Full ROM in both hips; pt can abduct and adduct. 5/5 strength in upper extremities. No midline tenderness. No step offs or deformities to the spine. Lymphadenopathy:     She has no cervical adenopathy. Neurological: She is alert. She has normal strength. No sensory deficit. Pt at neurological baseline. 5/5 sensation in upper extremities. Skin: Skin is warm and dry. No ecchymosis and no rash noted. No erythema. Psychiatric: She has a normal mood and affect. Her behavior is normal. Judgment and thought content normal.   Nursing note and vitals reviewed.      Note written by Ashanti Estrada, as dictated by Jobe Essex, NP 1:32 PM    MDM  Number of Diagnoses or Management Options  Fall, initial encounter:   Hypoxia:   Diagnosis management comments: Patient is a non-toxic appearing patient in no acute distress. Presents for an unwitnessed fall with an unknown down time. Patient with intermittent hypoxia and oxygen saturations going down around 88%. Requiring supplemental oxygen     CBC, CMP, lactic acid, CK, UA, magnesium, CT head, CT cervical spine, CXR, EKG, XR left tib/fib  CT chest w/o contrast Cardiomegaly, chronic pleural thickening left side. Vessel tortuosity. Bibasilar chronic infiltrates. Attending Abraham Campos MD examined the patient  Plan: admission to the hospital         ED Course   Attending Abraham Campos MD examined the patient   Jobe Essex, NP    Patient found to have oxygen saturation of 89% on room with pulse ox her her finger. Fingers feel cool, pulse ox moved to ear and pulse oximetry on ear is 94-95% on room air   Jobe Essex, NP    ED EKG interpretation:  Rhythm: normal sinus rhythm and right atrial enlargement; and regular . Rate (approx.): 100; Axis: left axis deviation; P wave: normal; QRS interval: normal ; ST/T wave: non-specific changes  Christine Hernandez NP      Procedures    6:14 PM  Pt's sats dropped to 87% on room air while in bed after ambulation. Plan to admit. CONSULT NOTE:  6:15 PM Jobe Essex, NP communicated with Dr. Zachary Kenny, Consult for Hospitalist via [facility] Text. Discussed available diagnostic tests and clinical findings. He will see and admit the pt. Chest CT with cardiomegaly, chronic pleural thickening left side. Vessel tortuosity. Bibasilar chronic infiltrates.     Recent Results (from the past 12 hour(s))   EKG, 12 LEAD, INITIAL    Collection Time: 08/22/18 12:27 PM   Result Value Ref Range    Ventricular Rate 100 BPM    Atrial Rate 100 BPM    P-R Interval 152 ms    QRS Duration 82 ms    Q-T Interval 350 ms    QTC Calculation (Bezet) 451 ms    Calculated P Axis 39 degrees    Calculated R Axis -41 degrees    Calculated T Axis 28 degrees    Diagnosis       Normal sinus rhythm  Right atrial enlargement  Left axis deviation  Inferior infarct , age undetermined  Anterolateral infarct , age undetermined  When compared with ECG of 11-JAN-2017 22:58,  Anterior infarct is now present  Anterolateral infarct is now present  Inferior infarct is now present  Confirmed by Malachi Prescott MD, Richie Aldana ([id number]) on 8/22/2018 1:41:02 PM     CBC WITH AUTOMATED DIFF    Collection Time: 08/22/18 12:38 PM   Result Value Ref Range    WBC 14.0 (H) 3.6 - 11.0 K/uL    RBC 4.80 3.80 - 5.20 M/uL    HGB 14.5 11.5 - 16.0 g/dL    HCT 45.2 35.0 - 47.0 %    MCV 94.2 80.0 - 99.0 FL    MCH 30.2 26.0 - 34.0 PG    MCHC 32.1 30.0 - 36.5 g/dL    RDW 13.6 11.5 - 14.5 %    PLATELET 686 483 - 639 K/uL    MPV 12.1 8.9 - 12.9 FL    NRBC 0.0 0  WBC    ABSOLUTE NRBC 0.00 0.00 - 0.01 K/uL    NEUTROPHILS 75 32 - 75 %    LYMPHOCYTES 14 12 - 49 %    MONOCYTES 10 5 - 13 %    EOSINOPHILS 0 0 - 7 %    BASOPHILS 0 0 - 1 %    IMMATURE GRANULOCYTES 1 (H) 0.0 - 0.5 %    ABS. NEUTROPHILS 10.5 (H) 1.8 - 8.0 K/UL    ABS. LYMPHOCYTES 2.0 0.8 - 3.5 K/UL    ABS. MONOCYTES 1.4 (H) 0.0 - 1.0 K/UL    ABS. EOSINOPHILS 0.0 0.0 - 0.4 K/UL    ABS. BASOPHILS 0.0 0.0 - 0.1 K/UL    ABS. IMM. GRANS. 0.1 (H) 0.00 - 0.04 K/UL    DF AUTOMATED     METABOLIC PANEL, COMPREHENSIVE    Collection Time: 08/22/18 12:38 PM   Result Value Ref Range    Sodium 136 136 - 145 mmol/L    Potassium 4.5 3.5 - 5.1 mmol/L    Chloride 103 97 - 108 mmol/L    CO2 23 21 - 32 mmol/L    Anion gap 10 5 - 15 mmol/L    Glucose 134 (H) 65 - 100 mg/dL    BUN 25 (H) 6 - 20 MG/DL    Creatinine 1.29 (H) 0.55 - 1.02 MG/DL    BUN/Creatinine ratio 19 12 - 20      GFR est AA 47 (L) >60 ml/min/1.73m2    GFR est non-AA 39 (L) >60 ml/min/1.73m2    Calcium 9.9 8.5 - 10.1 MG/DL    Bilirubin, total 0.7 0.2 - 1.0 MG/DL    ALT (SGPT) 24 12 - 78 U/L    AST (SGOT) 21 15 - 37 U/L    Alk. phosphatase 125 (H) 45 - 117 U/L    Protein, total 7.1 6.4 - 8.2 g/dL    Albumin 3.2 (L) 3.5 - 5.0 g/dL    Globulin 3.9 2.0 - 4.0 g/dL    A-G Ratio 0.8 (L) 1.1 - 2.2     MAGNESIUM    Collection Time: 08/22/18 12:38 PM   Result Value Ref Range    Magnesium 2.0 1.6 - 2.4 mg/dL   SAMPLES BEING HELD    Collection Time: 08/22/18 12:38 PM   Result Value Ref Range    SAMPLES BEING HELD 1RED,1BLUE     COMMENT        Add-on orders for these samples will be processed based on acceptable specimen integrity and analyte stability, which may vary by analyte. TROPONIN I    Collection Time: 08/22/18 12:38 PM   Result Value Ref Range    Troponin-I, Qt. <0.05 <0.05 ng/mL   CK    Collection Time: 08/22/18 12:38 PM   Result Value Ref Range     26 - 192 U/L   LACTIC ACID    Collection Time: 08/22/18  4:08 PM   Result Value Ref Range    Lactic acid 0.8 0.4 - 2.0 MMOL/L   URINALYSIS W/MICROSCOPIC    Collection Time: 08/22/18  5:41 PM   Result Value Ref Range    Color YELLOW/STRAW      Appearance CLOUDY (A) CLEAR      Specific gravity 1.018 1.003 - 1.030      pH (UA) 6.0 5.0 - 8.0      Protein NEGATIVE  NEG mg/dL    Glucose NEGATIVE  NEG mg/dL    Ketone NEGATIVE  NEG mg/dL    Bilirubin NEGATIVE  NEG      Blood NEGATIVE  NEG      Urobilinogen 0.2 0.2 - 1.0 EU/dL    Nitrites POSITIVE (A) NEG      Leukocyte Esterase MODERATE (A) NEG      WBC 10-20 0 - 4 /hpf    RBC 0-5 0 - 5 /hpf    Epithelial cells FEW FEW /lpf    Bacteria 1+ (A) NEG /hpf   URINE CULTURE HOLD SAMPLE    Collection Time: 08/22/18  5:41 PM   Result Value Ref Range    Urine culture hold        URINE ON HOLD IN MICROBIOLOGY DEPT FOR 3 DAYS. IF UNPRESERVED URINE IS SUBMITTED, IT CANNOT BE USED FOR ADDITIONAL TESTING AFTER 24 HRS, RECOLLECTION WILL BE REQUIRED.

## 2018-08-22 NOTE — ED NOTES
TRANSFER - OUT REPORT:    Verbal report given to Alva(name) on Nichole Aldana  being transferred to [facility](unit) for routine progression of care       Report consisted of patients Situation, Background, Assessment and   Recommendations(SBAR). Information from the following report(s) SBAR was reviewed with the receiving nurse. Lines:   Peripheral IV 08/22/18 Right Wrist (Active)        Opportunity for questions and clarification was provided.       Patient transported with:   [facility]

## 2018-08-22 NOTE — ED TRIAGE NOTES
Pt arrived via EMS from [facility] for unwitnessed fall, O2 sats 87% on RA- improved to 99% on 4L NC, denies SOB. Pt denies pain, does not remember fall. Hx of Alzheimer's, daughter reports that patient is at baseline.

## 2018-08-22 NOTE — ED NOTES
Attempted to call report twice to 2N. Was told they will call back when ready for report. Pt resting comfortably.

## 2018-08-22 NOTE — ED NOTES
Attempted to obtain urine sample.   Patient was able to urinate, but also had bowel movement and sample was contaminated

## 2018-08-22 NOTE — ED NOTES
11:51 AM  I have evaluated the patient as the Provider in Triage. I have reviewed Her vital signs and the triage nurse assessment. I have talked with the patient and any available family and advised that I am the provider in triage and have ordered the appropriate study to initiate their work up based on the clinical presentation during my assessment. I have advised that the patient will be accommodated in the Main ED as soon as possible. I have also requested to contact the triage nurse or myself immediately if the patient experiences any changes in their condition during this brief waiting period. CC: Fall/found down at memory care at [facility]. Last known well time was yesterday evening. Patient doesn't recall falling. O2 sats 87% after fall. Facility placed her on 4L O2 before EMS arrived. She was 99% and EMS dropped her to 2L. Daughter states she is at her baseline mental status.      Will obtain CT Head, labs, EKG and CXR    Gwendolyn Hernandez NP

## 2018-08-22 NOTE — PROGRESS NOTES
Senior Services Consult/Case Management    Reason for Admission:   [facility]          5/0/1 [address] for utilizing home health:      Anticipate discharge back to [facility]. Likelihood of Readmission:  Low                         Transition of Care Plan:                  Wiliam Orozco is an 80year old female to [facility] [place] after fall at [facility]. Baseline dementia, daughter at bedside. ED workup:  CXR, CT of head, hip xray, tib/fib xray. Senior Services Physical Therapist evaluated patient. CT of chest ordered - CXR in definitive about possible lesion. Verified face sheet and demographics. [facility]    NOKValetrish Karimi/daughter [phone]    ACP:  On File/POA    Care Management Interventions  PCP Verified by CM: Yes  Palliative Care Criteria Met (RRAT>21 & CHF Dx)?: No  Transition of Care Consult (CM Consult): Discharge Planning ([address] Score >=2, fall at facility, ground level fall)  MyChart Signup: No  Discharge Durable Medical Equipment: No (Rolling Walker at baseline)  Health Maintenance Reviewed: Yes  Physical Therapy Consult: Yes (Senior Services Evaluated patient.)  Occupational Therapy Consult: No  Speech Therapy Consult: No  Current Support Network: Assisted Living [facility]/Memory Care - daughter at bedside.)  Plan discussed with Pt/Family/Caregiver: Yes (Met with patient and daughter in ED. Agreeable with discharge plan back to [address] after testing completed Ms. Jeffrey Grove will be discharged back to [facility]. Ms. Jeffrey Grove was alert but unable to participate in much of the conversation due to cognition. Daughter stated on multiple occasions she was at her baseline and does much better in familiar environment.       Isiah Santos, RN, BSN, [place]  ED Care Management  [phone]
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
## 2018-08-23 LAB
ANION GAP SERPL CALC-SCNC: 10 MMOL/L (ref 5–15)
BUN SERPL-MCNC: 19 MG/DL (ref 6–20)
BUN/CREAT SERPL: 22 (ref 12–20)
CALCIUM SERPL-MCNC: 8.6 MG/DL (ref 8.5–10.1)
CHLORIDE SERPL-SCNC: 107 MMOL/L (ref 97–108)
CO2 SERPL-SCNC: 22 MMOL/L (ref 21–32)
CREAT SERPL-MCNC: 0.86 MG/DL (ref 0.55–1.02)
GLUCOSE SERPL-MCNC: 116 MG/DL (ref 65–100)
POTASSIUM SERPL-SCNC: 4.3 MMOL/L (ref 3.5–5.1)
SODIUM SERPL-SCNC: 139 MMOL/L (ref 136–145)

## 2018-08-23 PROCEDURE — 97530 THERAPEUTIC ACTIVITIES: CPT

## 2018-08-23 PROCEDURE — 65270000029 HC RM PRIVATE

## 2018-08-23 PROCEDURE — 99218 HC RM OBSERVATION: CPT

## 2018-08-23 PROCEDURE — 80048 BASIC METABOLIC PNL TOTAL CA: CPT | Performed by: HOSPITALIST

## 2018-08-23 PROCEDURE — C8929 TTE W OR WO FOL WCON,DOPPLER: HCPCS

## 2018-08-23 PROCEDURE — 74011000258 HC RX REV CODE- 258: Performed by: HOSPITALIST

## 2018-08-23 PROCEDURE — 97164 PT RE-EVAL EST PLAN CARE: CPT

## 2018-08-23 PROCEDURE — 97116 GAIT TRAINING THERAPY: CPT

## 2018-08-23 PROCEDURE — 74011000250 HC RX REV CODE- 250: Performed by: HOSPITALIST

## 2018-08-23 PROCEDURE — 74011250636 HC RX REV CODE- 250/636: Performed by: HOSPITALIST

## 2018-08-23 PROCEDURE — 74011250637 HC RX REV CODE- 250/637: Performed by: HOSPITALIST

## 2018-08-23 PROCEDURE — 36415 COLL VENOUS BLD VENIPUNCTURE: CPT | Performed by: HOSPITALIST

## 2018-08-23 RX ORDER — LOSARTAN POTASSIUM 50 MG/1
50 TABLET ORAL DAILY
Status: DISCONTINUED | OUTPATIENT
Start: 2018-08-23 | End: 2018-08-26 | Stop reason: HOSPADM

## 2018-08-23 RX ORDER — SODIUM CHLORIDE 0.9 % (FLUSH) 0.9 %
20 SYRINGE (ML) INJECTION
Status: COMPLETED | OUTPATIENT
Start: 2018-08-23 | End: 2018-08-23

## 2018-08-23 RX ADMIN — Medication 10 ML: at 05:50

## 2018-08-23 RX ADMIN — Medication 20 ML: at 17:05

## 2018-08-23 RX ADMIN — HEPARIN SODIUM 5000 UNITS: 5000 INJECTION, SOLUTION INTRAVENOUS; SUBCUTANEOUS at 05:49

## 2018-08-23 RX ADMIN — LOSARTAN POTASSIUM 50 MG: 50 TABLET ORAL at 12:01

## 2018-08-23 RX ADMIN — SODIUM CHLORIDE 1.5 ML: 9 INJECTION INTRAMUSCULAR; INTRAVENOUS; SUBCUTANEOUS at 17:04

## 2018-08-23 RX ADMIN — HEPARIN SODIUM 5000 UNITS: 5000 INJECTION, SOLUTION INTRAVENOUS; SUBCUTANEOUS at 15:47

## 2018-08-23 RX ADMIN — CEFTRIAXONE 1 G: 1 INJECTION, POWDER, FOR SOLUTION INTRAMUSCULAR; INTRAVENOUS at 12:07

## 2018-08-23 RX ADMIN — MEMANTINE 10 MG: 10 TABLET ORAL at 09:52

## 2018-08-23 RX ADMIN — MEMANTINE 10 MG: 10 TABLET ORAL at 18:27

## 2018-08-23 NOTE — PROGRESS NOTES
Problem: Mobility Impaired (Adult and Pediatric)  Goal: *Acute Goals and Plan of Care (Insert Text)  Physical Therapy Goals  Initiated 8/23/2018  1. Patient will move from supine to sit and sit to supine  and roll side to side in bed with modified independence within 7 day(s). 2.  Patient will transfer from bed to chair and chair to bed with supervision/set-up using the least restrictive device within 7 day(s). 3.  Patient will perform sit to stand with supervision/set-up within 7 day(s). 4.  Patient will ambulate with supervision/set-up for 200 feet with the least restrictive device within 7 day(s). physical Therapy TREATMENT  Patient: Kierra Velasquez (21 y.o. female)  Date: 8/23/2018  Diagnosis: Hypoxia Unwitnessed fall       Precautions:    Chart, physical therapy assessment, plan of care and goals were reviewed. ASSESSMENT:  Pt cleared by RN and received supine in bed. Of note pt was eval and discharged from the ED yesterday however admitted for observation. Pt from memory unit and confused yet very pleasant. Required orientation multiple times during session. Overall pt requiring Min A - CGA for functional mobility. Ambulated on RA and pt desaturated to 83% with HR 112bpm. No s/s of distress however audile wheezing and increased RR. Replaced 2L/min and saturations back to 90%. Left up in chair at end of session with breakfast tray and chair alarm activated. Pt remains appropriate for discharge back to [facility] with supervision for all mobility. Pulse Oximetry Assessment    90% at rest on room air  83% while ambulating on room air  93% at rest on 2LPM  90% while ambulating on 2LPM    Progression toward goals:  [x]    Improving appropriately and progressing toward goals  []    Improving slowly and progressing toward goals  []    Not making progress toward goals and plan of care will be adjusted     PLAN:  Patient continues to benefit from skilled intervention to address the above impairments.   Continue treatment per established plan of care. Discharge Recommendations:  KERRI  Further Equipment Recommendations for Discharge:  None at this time     SUBJECTIVE:   Patient stated My  is on the other side of the room. (he was not in the room)    OBJECTIVE DATA SUMMARY:   Critical Behavior:  Neurologic State: Alert, Confused  Orientation Level: Disoriented to place, Disoriented to situation, Disoriented to time        Functional Mobility Training:  Bed Mobility:  Rolling: Contact guard assistance  Supine to Sit: Contact guard assistance              Transfers:  Sit to Stand: Minimum assistance  Stand to Sit: Contact guard assistance                             Balance:  Sitting: Intact  Standing: Impaired  Standing - Static: Good  Standing - Dynamic : Fair  Ambulation/Gait Training:  Distance (ft): 115 Feet (ft)  Assistive Device: Walker, rolling;Gait belt  Ambulation - Level of Assistance: Contact guard assistance        Gait Abnormalities: Decreased step clearance; Path deviations              Speed/Casandra: Pace decreased (<100 feet/min)  Step Length: Right shortened;Left shortened                       Activity Tolerance:   Good. O2 dropping on RA  Please refer to the flowsheet for vital signs taken during this treatment.   After treatment:   [x]    Patient left in no apparent distress sitting up in chair  []    Patient left in no apparent distress in bed  [x]    Call bell left within reach  [x]    Nursing notified  []    Caregiver present  []    Bed alarm activated    COMMUNICATION/COLLABORATION:   The patients plan of care was discussed with: Registered Nurse    Christal Guzmán, PT   Time Calculation: 30 mins

## 2018-08-23 NOTE — PHYSICIAN ADVISORY
Letter of Status Determination:   Recommend hospitalization status upgraded from   OBSERVATION  to INPATIENT  Status     Pt Name:  Everett Fitzgerald   MR#   [address] [facility] # [id number] /  [id number]  Payor:  Solders / Plan: [address] / Product Type: Medicare /    ELOISA#  [id number]   [address] [place] [address]  210/01  @ [facility]   Hospitalization date  8/22/2018 11:38 AM   Current Attending Physician  Sujatha Brannon MD   Principal diagnosis  Unwitnessed fall      Clinicals  80 y.o. y.o  female hospitalized with above diagnosis   The pt suffers from dementia and other chronic illnesses. She is noted to have persistent hypoxia with activities. Etiology not totally clear. It appears that she is going to need further workup of her abnormal lung imaging. She is receiving appropriate care and supportive therapies. [place] ([facility] criteria   Does  NOT apply    STATUS DETERMINATION  This patient is at high risk of adverse events and deterioration based on documented clinical data, comorbid conditions and current acute care course. Ms. Everett Fitzgerald is expected to meet Inpatient Admission status criteria in accordance with CMS regulation Section 43 .3. Specifically, due to medical necessity the patient's stay is expected to exceed Two Midnights. It is our recommendation that this patient's hospitalization status should be upgraded from  OBSERVATION to INPATIENT status. The final decision of the patient's hospitalization status depends on the attending physician's judgment.          Additional comments     Payor: Fer [employer] / Plan: [address] / Product Type: Medicare /         Nan Catalan MD MPH [id number]   President Medical Staff, [address] [facility]  [phone]        [phone]    .

## 2018-08-23 NOTE — PROGRESS NOTES
Problem: Falls - Risk of  Goal: *Absence of Falls  Document Giancarlo Fall Risk and appropriate interventions in the flowsheet. Outcome: Progressing Towards Goal  Fall Risk Interventions:  Mobility Interventions: Patient to call before getting OOB    Mentation Interventions:  Toileting rounds         Elimination Interventions: Call light in reach    History of Falls Interventions: Room close to nurse's station

## 2018-08-23 NOTE — PROGRESS NOTES
Hospitalist Progress Note              Casper Wall MD.                                                             Cell: [phone]                               NAME:  Wiliam Orozco  :  10/14/1929  MRN:  [id number]  Date of Service:  2018    Summary: 80 y.o. female who presented on 2018 with fall and hypoxia       Assessment/Plan:  Presumed mechanical fall:   Head CT scan neg. Continue PT/OT    Hypoxemia: Pulse oximetry assessment by PT  90% at rest on room air  83% while ambulating on room air  93% at rest on 2LPM  90% while ambulating on 2LPM  - Chronic pleural thickening left side with bibasilar chronic infiltrates on CT scan of the chest.  - obtain 2 d echo to evaluate EF. Less likely due to PE. Wells score is low   - Incentive spirometry. Abnormal urinalysis: positive nitrites, leukocyte est, pyuria and bacteriuria. She is not a reliable historian. Daughter states patient has been more sleepy than usual. We will empirically treat as a UTI/Cystitis. F/u on urine cx    Renal insufficiency with cr on admission of 1.29. Baseline 0.9  Hydrated with NS IVF overnight. D/c IVF and encourage generous fluid hydration  Can resume losartan       Dementia without behavioral disturbance  On namenda    Essential HTN; benign  Resume home losartan    Leucocytosis:   Cough: Probably due to bronchitis  Started on rocephin. Continue robitussin prn  Repeat CBC     Code status: DNR  DVT prophylaxsis: heparin   Dispo: Interval History/Subjective:    F/u for possible fall and hypoxia. No acute overnight event    Worked with physical therapy. Oxygen assessment as listed above  No new complaints. Still coughing. Appears to be having coryza      Review of Systems:  Pertinent items are noted in HPI. Objective:     VITALS:   Last 24hrs VS reviewed since prior progress note.  Most recent are:  Visit Vitals    BP (!) 157/91 (BP 1 Location: Right arm, BP Patient Position: At rest)    Pulse 98    Temp 98 °F (36.7 °C)    Resp 16    SpO2 92%       Intake/Output Summary (Last 24 hours) at 08/23/18 1032  Last data filed at 08/23/18 1046   Gross per 24 hour   Intake              240 ml   Output                0 ml   Net              240 ml        PHYSICAL EXAM:  General: No acute distress, cooperative, pleasant   EENT: EOMI. Anicteric sclerae. Oral mucous moist, oropharynx benign  Resp: CTA bilaterally. Bibasal crackles. No accessory muscle use  CV: Regular rhythm, normal rate, no murmurs, gallops, rubs  GI: Soft, non distended, non tender. normoactive bowel sounds, no hepatosplenomegaly Extremities: No edema, warm, 2+ pulses throughout  Neurologic: Moves all extremities. Awake and Alert. Oriented to name, CN II-XII grossly intact  Psych: Good insight. Not anxious nor agitated. Skin: Good Turgor, no rashes or ulcers    Lab Data Personally Reviewed: (see below)     Medications list Personally Reviewed:  x YES  NO     _______________________________________________________________________  Care Plan discussed with:  Patient/Family and Nurse    Total NON critical care TIME:  30 minutes    Evalina Castleman, MD     Procedures: see electronic medical records for all procedures/Xrays and details which were not copied into this note but were reviewed prior to creation of Plan. LABS:  Recent Labs      08/22/18   1238   WBC  14.0*   HGB  14.5   HCT  45.2   PLT  232     Recent Labs      08/22/18   1238   NA  136   K  4.5   CL  103   CO2  23   BUN  25*   CREA  1.29*   GLU  134*   CA  9.9   MG  2.0     Recent Labs      08/22/18   1238   SGOT  21   ALT  24   AP  125*   TBILI  0.7   TP  7.1   ALB  3.2*   GLOB  3.9     No results for input(s): INR, PTP, APTT in the last 72 hours. No lab exists for component: INREXT   No results for input(s): FE, TIBC, PSAT, FERR in the last 72 hours.    No results found for: FOL, RBCF   No results for input(s): PH, PCO2, PO2 in the last 72 hours.   Recent Labs      08/22/18   1238   CPK  185   TROIQ  <0.05     Lab Results   Component Value Date/Time    Cholesterol, total 153 02/23/2010 08:30 AM    HDL Cholesterol 54 02/23/2010 08:30 AM    LDL, calculated 88 02/23/2010 08:30 AM    Triglyceride 55 02/23/2010 08:30 AM    CHOL/HDL Ratio 2.8 02/23/2010 08:30 AM     No results found for: [facility]  Lab Results   Component Value Date/Time    Color YELLOW/STRAW 08/22/2018 05:41 PM    Appearance CLOUDY (A) 08/22/2018 05:41 PM    Specific gravity 1.018 08/22/2018 05:41 PM    pH (UA) 6.0 08/22/2018 05:41 PM    Protein NEGATIVE  08/22/2018 05:41 PM    Glucose NEGATIVE  08/22/2018 05:41 PM    Ketone NEGATIVE  08/22/2018 05:41 PM    Bilirubin NEGATIVE  08/22/2018 05:41 PM    Urobilinogen 0.2 08/22/2018 05:41 PM    Nitrites POSITIVE (A) 08/22/2018 05:41 PM    Leukocyte Esterase MODERATE (A) 08/22/2018 05:41 PM    Epithelial cells FEW 08/22/2018 05:41 PM    Bacteria 1+ (A) 08/22/2018 05:41 PM    WBC 10-20 08/22/2018 05:41 PM    RBC 0-5 08/22/2018 05:41 PM

## 2018-08-23 NOTE — PROGRESS NOTES
Problem: Mobility Impaired (Adult and Pediatric)  Goal: *Acute Goals and Plan of Care (Insert Text)  Physical Therapy Goals  Initiated 8/23/2018  1. Patient will move from supine to sit and sit to supine  and roll side to side in bed with modified independence within 7 day(s). 2.  Patient will transfer from bed to chair and chair to bed with supervision/set-up using the least restrictive device within 7 day(s). 3.  Patient will perform sit to stand with supervision/set-up within 7 day(s). 4.  Patient will ambulate with supervision/set-up for 200 feet with the least restrictive device within 7 day(s). physical Therapy REEVALUATION  Patient: Harini Salmon (48 y.o. female)  Date: 8/23/2018  Primary Diagnosis: Hypoxia        Precautions:      Chart, physical therapy assessment, plan of care and goals were reviewed. ASSESSMENT :  Pt cleared by RN and received supine in bed. Of note pt was eval and discharged from the ED yesterday however admitted for observation. Pt from memory unit and confused yet very pleasant. Required orientation multiple times during session. Overall pt requiring Min A - CGA for functional mobility. Ambulated on RA and pt desaturated to 83% with HR 112bpm. No s/s of distress however audile wheezing and increased RR. Replaced 2L/min and saturations back to 90%. Left up in chair at end of session with breakfast tray and chair alarm activated. Pt remains appropriate for discharge back to [facility] with supervision for all mobility.     Pulse Oximetry Assessment     90% at rest on room air  83% while ambulating on room air  93% at rest on 2LPM  90% while ambulating on 2LPM.    Patient will benefit from skilled intervention to address the above impairments.   Patients rehabilitation potential is considered to be Good  Factors which may influence rehabilitation potential include:   []           None noted  [x]           Mental ability/status  [x]           Medical condition  [] Home/family situation and support systems  []           Safety awareness  []           Pain tolerance/management  []           Other:      PLAN :  Recommendations and Planned Interventions:  [x]             Bed Mobility Training             [x]      Neuromuscular Re-Education  [x]             Transfer Training                   []      Orthotic/Prosthetic Training  [x]             Gait Training                         []      Modalities  [x]             Therapeutic Exercises           []      Edema Management/Control  [x]             Therapeutic Activities            [x]      Patient and Family Training/Education  []             Other (comment):  Frequency/Duration: Patient will be followed by physical therapy 5 times a week to address goals. Discharge Recommendations: intermediate  Further Equipment Recommendations for Discharge: None     SUBJECTIVE:   Patient stated My  is behind the curtain. ( is not present)    OBJECTIVE DATA SUMMARY:     Past Medical History:   Diagnosis Date    Hypercholesteremia 5/27/2010    IBS (irritable bowel syndrome) 5/27/2010    OA (osteoarthritis) 5/27/2010    Osteoporosis 5/27/2010   History reviewed. No pertinent surgical history.   Hospital course since last seen and reason for reevaluation: eval and discharge from the ED and then admitted to the hospital  Critical Behavior:  Neurologic State: Alert, Confused  Orientation Level: Disoriented to place, Disoriented to situation, Disoriented to time        Skin:    Strength:    Strength: Generally decreased, functional                      Tone & Sensation:   Tone: Normal              Sensation: Intact               Range Of Motion:  AROM: Within functional limits                       Coordination:  Coordination: Generally decreased, functional    Functional Mobility:  Bed Mobility:  Rolling: Contact guard assistance  Supine to Sit: Contact guard assistance        Transfers:  Sit to Stand: Minimum assistance  Stand to Sit: Contact guard assistance                    Balance:   Sitting: Intact  Standing: Impaired  Standing - Static: Good  Standing - Dynamic : Fair  Ambulation/Gait Training:  Distance (ft): 115 Feet (ft)  Assistive Device: Walker, rolling;Gait belt  Ambulation - Level of Assistance: Contact guard assistance        Gait Abnormalities: Decreased step clearance; Path deviations              Speed/Casandra: Pace decreased (<100 feet/min)  Step Length: Right shortened;Left shortened                    Therapeutic Exercises:       Functional Measure:  Barthel Index:    Bathin  Bladder: 5  Bowels: 10  Groomin  Dressin  Feeding: 10  Mobility: 10  Stairs: 5  Toilet Use: 5  Transfer (Bed to Chair and Back): 10  Total: 60       Barthel and G-code impairment scale:  Percentage of impairment CH  0% CI  1-19% CJ  20-39% CK  40-59% CL  60-79% CM  80-99% CN  100%   Barthel Score 0-100 100 99-80 79-60 59-40 20-39 1-19   0   Barthel Score 0-20 20 17-19 13-16 9-12 5-8 1-4 0      The Barthel ADL Index: Guidelines  1. The index should be used as a record of what a patient does, not as a record of what a patient could do. 2. The main aim is to establish degree of independence from any help, physical or verbal, however minor and for whatever reason. 3. The need for supervision renders the patient not independent. 4. A patient's performance should be established using the best available evidence. Asking the patient, friends/relatives and nurses are the usual sources, but direct observation and common sense are also important. However direct testing is not needed. 5. Usually the patient's performance over the preceding 24-48 hours is important, but occasionally longer periods will be relevant. 6. Middle categories imply that the patient supplies over 50 per cent of the effort. 7. Use of aids to be independent is allowed. Kyle Mai., Barthel, D.W. (1210). Functional evaluation: the Barthel Index. [facility] (14)2.   Mamadou Alicia anshu Rhoderick Romberg, J.J.M.F, Baljit Vargas., Hellen Eddy., Sarah Anderson. (1999). Measuring the change indisability after inpatient rehabilitation; comparison of the responsiveness of the Barthel Index and Functional [place] Measure. Journal of Neurology, Neurosurgery, and Psychiatry, 66(4), 408-721. MORRO Cote, BENJA Colin, & Nunu Alexander M.A. (2004.) Assessment of post-stroke quality of life in cost-effectiveness studies: The usefulness of the Barthel Index and the EuroQoL-5D. Quality of Life Research, 13, 305-37         G codes: In compliance with CMSs Claims Based Outcome Reporting, the following G-code set was chosen for this patient based on their primary functional limitation being treated: The outcome measure chosen to determine the severity of the functional limitation was the Barhtel with a score of 60/100 which was correlated with the impairment scale. ? Mobility - Walking and Moving Around:     - CURRENT STATUS: CJ - 20%-39% impaired, limited or restricted    - GOAL STATUS: CI - 1%-19% impaired, limited or restricted    - D/C STATUS:  ---------------To be determined---------------     Pain:                    Activity Tolerance:   Good. Drop in O2  Please refer to the flowsheet for vital signs taken during this treatment. After treatment:   [x]  Patient left in no apparent distress sitting up in chair  []  Patient left in no apparent distress in bed  [x]  Call bell left within reach  [x]  Nursing notified  []  Caregiver present  [x]  Bed alarm activated    COMMUNICATION/EDUCATION:   The patients plan of care was discussed with: Registered Nurse. [x]  Fall prevention education was provided and the patient/caregiver indicated understanding. [x]  Patient/family have participated as able in goal setting and plan of care. [x]  Patient/family agree to work toward stated goals and plan of care.   []  Patient understands intent and goals of therapy, but is neutral about his/her participation. []  Patient is unable to participate in goal setting and plan of care.     Thank you for this referral.  Charley Velez, PT   Time Calculation: 30 mins

## 2018-08-23 NOTE — H&P
[address]   HISTORY AND PHYSICAL      Patricia Soto  MR#: [id number]  : 10/14/1929  ACCOUNT #: [de-identified]   ADMIT DATE: 2018    CHIEF COMPLAINT:  Unable to obtain. SOURCE OF INFORMATION:  Documentation and daughter. HISTORY OF PRESENT ILLNESS:  The patient is an 25-year-old  female who is a resident of [facility] assisted living facility at the memory care unit. She presented to the emergency department via EMS on account of an unwitnessed fall. At about 6:00 p.m. this evening, she was found sitting at the edge of the bed. The patient cannot tell me the reason why she is in the Emergency Department due to underlying dementia. Her daughter states that she was called and told that the patient was found on the floor. She was also found to have low oxygen saturations in the upper 80s on room air. So based on this, she was sent to the Emergency Department to be checked out. Patient currently does not have any complaints, but she is noted to be coughing. As per daughter, she says she has been coughing some and today, the cough seems to have been worse. The cough is nonproductive. There is no associated fever, chills or rigors. No nausea, vomiting or abdominal pain. No shortness of breath. Here in the emergency department, the patient's oxygen saturation is stable at 98% on room air. She also had a CT scan of the chest without contrast done which showed cardiomegaly, chronic pleural thickening left side, vessel tortuosity, bibasilar chronic infiltrates. She had a CT scan of the head, which shows no acute changes. CT cervical spine was also unremarkable. RECENT HOSPITALIZATION:  None. PAST MEDICAL HISTORY:  History of Alzheimer dementia. HOME MEDICATIONS:  She is on calcium.   She is on acetaminophen 650 mg daily, calcium, vitamin D 1 tablet by mouth daily, cholecalciferol 2000 units by mouth daily, losartan 50 mg by mouth daily, memantine 10 mg by mouth 2 times a day, multivitamin 1 tablet by mouth daily. ALLERGIES:  ARICEPT, GI UPSET; FLEXERIL, REACTION DELIRIUM. FAMILY HISTORY:  Unable to obtain. SOCIAL HISTORY:  She is , current resident of  [facility] living facility at the memory care unit. She has 1 daughter who is the medical power of . Her name is Shaista Das with phone #[phone]. Patient is a DO NOT RESUSCITATE. REVIEW OF SYSTEMS:  A 12-point review of system was obtained and this was found to be negative apart from what was stated in the body of the history and physical.    PHYSICAL EXAMINATION:  TRIAGE VITAL SIGNS:  Temperature is 97.6, pulse 96 beats per minute, blood pressure 121/70, respirations 16, oxygen saturation 98% on room air. GENERAL:  She is lying on the bed. She does not appear to be in any distress. She looks her stated age. HEENT:  Atraumatic, normocephalic. Pupils are equal, round and reactive to light and accommodation. Extraocular muscles are intact. Oral mucosa is moist.  NECK:  Supple, no jugular venous distention. RESPIRATION:  She has good air entry bilaterally with some fine crackling sounds in the left lower lungs zone, but otherwise no wheezing or rales heard. CARDIOVASCULAR:  S1 and S2 had a regular rate and rhythm. No gallops or murmurs. ABDOMEN:  Soft, nontender, nondistended. EXTREMITIES:  No pitting pedal edema, +2 dorsalis pedal pulses. NEUROLOGIC:  She is alert, awake and oriented to her name, but not to place or to time. She moves all extremities. LABORATORY EVALUATION:  WBC is 14.0, hemoglobin 14.5, platelets 887. Urinalysis is positive for nitrites, leukocyte esterase moderate. The rest of the urinalysis is pending. Chemistry:  Sodium of 136, potassium 4.5, chloride 103, bicarbonate 23, glucose 134, BUN 25, creatinine 1.29, troponin less than 0.05. CT scan of the chest as mentioned above.   Chest x-ray shows possible new pulmonary parenchymal process at the left lung base. ASSESSMENT AND PLAN:  The patient is an 77-year-old female who was brought to the emergency department on account of weakness, fall. 1.  Weakness, fall. She does not appear to have any bruising or trauma. Head CT scan is unremarkable. EKG also shows normal sinus rhythm with right atrial enlargement with nonspecific T-wave changes. Troponin is negative. She appears stable. Suspect due to mechanical fall. We will offer supportive care for now. We will consult PT, OT for evaluation. 2.  Acute kidney injury with creatinine of 1.29, baseline around 0.9. Suspect due to prerenal azotemia. We will gently hydrate with normal saline intravenously and repeat basic metabolic panel in a.m. 3.  Cough. This is nonproductive. No leukocytosis or any evidence of infiltrates on CT scan of the chest.  The CT scan of the chest shows bibasilar chronic infiltrates. We will hold any antibiotics. We will order and try anti tussive as needed for cough. We will monitor this closely. 4.  Alzheimer's dementia without any behavioral disturbance. She is on Namenda, which we will resume. 5.  We will resume home medications as appropriate. 6.  Deep venous thrombosis prophylaxis, heparin. DISPOSITION:  The patient will be admitted to the medical floor on observation for routine progression of care.       MD JAEVD Maier/MN  D: 08/22/2018 18:46     T: 08/22/2018 22:07  JOB #: [id number]

## 2018-08-23 NOTE — PROGRESS NOTES
Primary Nurse Severo Holm, RN and Eliz Lowry RN performed a dual skin assessment on this patient No impairment noted  Karl score is 16

## 2018-08-23 NOTE — PROGRESS NOTES
CM talked to [phone], [phone], at [facility]-( [phone]) where patient resides in the memory unit [phone] regarding patient returning to the facility when discharged. Patient can return per [facility]-- Cm explained that patient is requiring 2 liters 02. The provider there is Chela Gil [phone]    As requested, CM faxed ( [phone])  H/P  Therapy evaluation and progress note today indicating that patient will need home 02 and SATS. [facility] will let Cm know if order is needed for home 02 prior to patient being discharged. Patient may be discharged tomorrow. CM will secure home 02 order with diagnosis if [facility] needs it. Patient will need ambulance transport back to [facility] with 2 liters 02. [facility] will be at work all day Friday 8/24/18. Contact is patient's daughter-- Aroldo Matute [phone] 3:40 pm   Phoebe called CM-- 02 order will be needed for home 02 .prior to patient being discharged. The order and SATS (done today) and progress note indicating patient in agreement with home 02 needs to be faxed to [facility] at [phone]. She will take it to the memory unit and 02 will be ordered from Chela Gil    If patient returns over the weekend-- CM needs to call the memory unit-- [phone] and fax the order, SATS and progress note to the nurse [phone]. Discharge date not determined. .. The SATS will need to be done again after two days so if patient goes home tomorrow or Saturday the SATS are valid but if she is discharged Sunday, they will have to be done again.

## 2018-08-23 NOTE — PROGRESS NOTES
Problem: Falls - Risk of  Goal: *Absence of Falls  Document Giancarlo Fall Risk and appropriate interventions in the flowsheet.    Outcome: Progressing Towards Goal  Fall Risk Interventions:  Mobility Interventions: Bed/chair exit alarm    Mentation Interventions: Bed/chair exit alarm         Elimination Interventions: Bed/chair exit alarm    History of Falls Interventions: Bed/chair exit alarm

## 2018-08-23 NOTE — PROGRESS NOTES
Spiritual Care Partner Volunteer visited patient in room 210/01 on 8.23.18. Documented by: : Rev. Kvng Cazares.  Mickey Christianson; [facility], to contact [phone] call: [phone]

## 2018-08-24 LAB
ANION GAP SERPL CALC-SCNC: 10 MMOL/L (ref 5–15)
BASOPHILS # BLD: 0 K/UL (ref 0–0.1)
BASOPHILS NFR BLD: 1 % (ref 0–1)
BUN SERPL-MCNC: 13 MG/DL (ref 6–20)
BUN/CREAT SERPL: 15 (ref 12–20)
CALCIUM SERPL-MCNC: 9 MG/DL (ref 8.5–10.1)
CHLORIDE SERPL-SCNC: 108 MMOL/L (ref 97–108)
CO2 SERPL-SCNC: 23 MMOL/L (ref 21–32)
CREAT SERPL-MCNC: 0.84 MG/DL (ref 0.55–1.02)
DIFFERENTIAL METHOD BLD: NORMAL
EOSINOPHIL # BLD: 0.4 K/UL (ref 0–0.4)
EOSINOPHIL NFR BLD: 5 % (ref 0–7)
ERYTHROCYTE [DISTWIDTH] IN BLOOD BY AUTOMATED COUNT: 13.4 % (ref 11.5–14.5)
GLUCOSE SERPL-MCNC: 121 MG/DL (ref 65–100)
HCT VFR BLD AUTO: 40.3 % (ref 35–47)
HGB BLD-MCNC: 12.9 G/DL (ref 11.5–16)
IMM GRANULOCYTES # BLD: 0 K/UL (ref 0–0.04)
IMM GRANULOCYTES NFR BLD AUTO: 0 % (ref 0–0.5)
LYMPHOCYTES # BLD: 1.5 K/UL (ref 0.8–3.5)
LYMPHOCYTES NFR BLD: 17 % (ref 12–49)
MCH RBC QN AUTO: 30.1 PG (ref 26–34)
MCHC RBC AUTO-ENTMCNC: 32 G/DL (ref 30–36.5)
MCV RBC AUTO: 94.2 FL (ref 80–99)
MONOCYTES # BLD: 0.7 K/UL (ref 0–1)
MONOCYTES NFR BLD: 8 % (ref 5–13)
NEUTS SEG # BLD: 6.1 K/UL (ref 1.8–8)
NEUTS SEG NFR BLD: 70 % (ref 32–75)
NRBC # BLD: 0 K/UL (ref 0–0.01)
NRBC BLD-RTO: 0 PER 100 WBC
PLATELET # BLD AUTO: 237 K/UL (ref 150–400)
PMV BLD AUTO: 11.6 FL (ref 8.9–12.9)
POTASSIUM SERPL-SCNC: 4.2 MMOL/L (ref 3.5–5.1)
RBC # BLD AUTO: 4.28 M/UL (ref 3.8–5.2)
SODIUM SERPL-SCNC: 141 MMOL/L (ref 136–145)
WBC # BLD AUTO: 8.8 K/UL (ref 3.6–11)

## 2018-08-24 PROCEDURE — 74011250637 HC RX REV CODE- 250/637: Performed by: HOSPITALIST

## 2018-08-24 PROCEDURE — 36415 COLL VENOUS BLD VENIPUNCTURE: CPT | Performed by: HOSPITALIST

## 2018-08-24 PROCEDURE — 74011250636 HC RX REV CODE- 250/636: Performed by: HOSPITALIST

## 2018-08-24 PROCEDURE — 80048 BASIC METABOLIC PNL TOTAL CA: CPT | Performed by: HOSPITALIST

## 2018-08-24 PROCEDURE — 74011000258 HC RX REV CODE- 258: Performed by: HOSPITALIST

## 2018-08-24 PROCEDURE — 77010033678 HC OXYGEN DAILY

## 2018-08-24 PROCEDURE — 97116 GAIT TRAINING THERAPY: CPT

## 2018-08-24 PROCEDURE — 65270000029 HC RM PRIVATE

## 2018-08-24 PROCEDURE — 85025 COMPLETE CBC W/AUTO DIFF WBC: CPT | Performed by: HOSPITALIST

## 2018-08-24 RX ADMIN — CEFTRIAXONE 1 G: 1 INJECTION, POWDER, FOR SOLUTION INTRAMUSCULAR; INTRAVENOUS at 11:52

## 2018-08-24 RX ADMIN — Medication 10 ML: at 00:29

## 2018-08-24 RX ADMIN — MEMANTINE 10 MG: 10 TABLET ORAL at 08:49

## 2018-08-24 RX ADMIN — Medication 10 ML: at 06:59

## 2018-08-24 RX ADMIN — HEPARIN SODIUM 5000 UNITS: 5000 INJECTION, SOLUTION INTRAVENOUS; SUBCUTANEOUS at 21:39

## 2018-08-24 RX ADMIN — Medication 10 ML: at 21:39

## 2018-08-24 RX ADMIN — HEPARIN SODIUM 5000 UNITS: 5000 INJECTION, SOLUTION INTRAVENOUS; SUBCUTANEOUS at 14:30

## 2018-08-24 RX ADMIN — MEMANTINE 10 MG: 10 TABLET ORAL at 17:26

## 2018-08-24 RX ADMIN — Medication 10 ML: at 14:30

## 2018-08-24 RX ADMIN — LOSARTAN POTASSIUM 50 MG: 50 TABLET ORAL at 08:49

## 2018-08-24 RX ADMIN — HEPARIN SODIUM 5000 UNITS: 5000 INJECTION, SOLUTION INTRAVENOUS; SUBCUTANEOUS at 06:59

## 2018-08-24 NOTE — PROGRESS NOTES
Hospitalist Progress Note              Trip Pablo MD.                                                             Cell: [phone]                               NAME:  Tyson Kimble  :  10/14/1929  MRN:  [id number]  Date of Service:  2018    Summary: 80 y.o. female who presented on 2018 with fall and hypoxia       Assessment/Plan:  Presumed mechanical fall:   Head CT scan neg. Continue PT/OT     Hypoxemia: She is still requiring 2 liters of oxygen to maintain sat > 90% on ambulation. Continue incentive spirometry  Oxygen sat stable at rest.  Echo normal    Abnormal urinalysis: positive nitrites, leukocyte est, pyuria and bacteriuria. She is not a reliable historian. Daughter states patient has been more sleepy than usual. We will empirically treat as a UTI/Cystitis. Urine cx growing GNR > 100,000. Continue rocephin    Renal insufficiency with cr on admission of 1.29. Baseline 0.9  Hydrated with NS IVF overnight. D/c IVF and encourage generous fluid hydration  Can resume losartan  Resolved. Dementia without behavioral disturbance  On namenda    Essential HTN; benign  Continue losartan. BP stable    Leucocytosis:   Cough: Probably due to bronchitis  Started on rocephin. Continue robitussin prn  Resolving. Wbc now wnl. Code status: DNR  DVT prophylaxsis: heparin   Dispo: Likely back to [facility] over the next 1-2 days. Interval History/Subjective:    F/u for possible fall and hypoxia. No acute overnight event  No new compalints      Review of Systems:  Pertinent items are noted in HPI. Objective:     VITALS:   Last 24hrs VS reviewed since prior progress note.  Most recent are:  Visit Vitals    /77 (BP 1 Location: Right arm, BP Patient Position: At rest)    Pulse 84    Temp 98.3 °F (36.8 °C)    Resp 18    SpO2 96%       Intake/Output Summary (Last 24 hours) at 18 1501  Last data filed at 18 1255   Gross per 24 hour   Intake              720 ml   Output                0 ml   Net              720 ml        PHYSICAL EXAM:  General: No acute distress, cooperative, pleasant   EENT: EOMI. Anicteric sclerae. Oral mucous moist, oropharynx benign  Resp: CTA bilaterally. Bibasal crackles. No accessory muscle use  CV: Regular rhythm, normal rate, no murmurs, gallops, rubs  GI: Soft, non distended, non tender. normoactive bowel sounds, no hepatosplenomegaly Extremities: No edema, warm, 2+ pulses throughout  Neurologic: Moves all extremities. Awake and Alert. Oriented to name, CN II-XII grossly intact  Psych: Good insight. Not anxious nor agitated. Skin: Good Turgor, no rashes or ulcers    Lab Data Personally Reviewed: (see below)     Medications list Personally Reviewed:  x YES  NO     _______________________________________________________________________  Care Plan discussed with:  Patient/Family and Nurse    Total NON critical care TIME:  30 minutes    Mansoor Hawley MD     Procedures: see electronic medical records for all procedures/Xrays and details which were not copied into this note but were reviewed prior to creation of Plan. LABS:  Recent Labs      08/24/18   0429  08/22/18   1238   WBC  8.8  14.0*   HGB  12.9  14.5   HCT  40.3  45.2   PLT  237  232     Recent Labs      08/24/18   0429  08/23/18   1551  08/22/18   1238   NA  141  139  136   K  4.2  4.3  4.5   CL  108  107  103   CO2  23  22  23   BUN  13  19  25*   CREA  0.84  0.86  1.29*   GLU  121*  116*  134*   CA  9.0  8.6  9.9   MG   --    --   2.0     Recent Labs      08/22/18   1238   SGOT  21   ALT  24   AP  125*   TBILI  0.7   TP  7.1   ALB  3.2*   GLOB  3.9     No results for input(s): INR, PTP, APTT in the last 72 hours. No lab exists for component: INREXT, INREXT   No results for input(s): FE, TIBC, PSAT, FERR in the last 72 hours. No results found for: FOL, RBCF   No results for input(s): PH, PCO2, PO2 in the last 72 hours.   Recent Labs 08/22/18   1238   CPK  185   TROIQ  <0.05     Lab Results   Component Value Date/Time    Cholesterol, total 153 02/23/2010 08:30 AM    HDL Cholesterol 54 02/23/2010 08:30 AM    LDL, calculated 88 02/23/2010 08:30 AM    Triglyceride 55 02/23/2010 08:30 AM    CHOL/HDL Ratio 2.8 02/23/2010 08:30 AM     No results found for: Mónica Garcia  Lab Results   Component Value Date/Time    Color YELLOW/STRAW 08/22/2018 05:41 PM    Appearance CLOUDY (A) 08/22/2018 05:41 PM    Specific gravity 1.018 08/22/2018 05:41 PM    pH (UA) 6.0 08/22/2018 05:41 PM    Protein NEGATIVE  08/22/2018 05:41 PM    Glucose NEGATIVE  08/22/2018 05:41 PM    Ketone NEGATIVE  08/22/2018 05:41 PM    Bilirubin NEGATIVE  08/22/2018 05:41 PM    Urobilinogen 0.2 08/22/2018 05:41 PM    Nitrites POSITIVE (A) 08/22/2018 05:41 PM    Leukocyte Esterase MODERATE (A) 08/22/2018 05:41 PM    Epithelial cells FEW 08/22/2018 05:41 PM    Bacteria 1+ (A) 08/22/2018 05:41 PM    WBC 10-20 08/22/2018 05:41 PM    RBC 0-5 08/22/2018 05:41 PM

## 2018-08-24 NOTE — PROGRESS NOTES
NUTRITION       Nutrition screening referral was triggered based on results obtained during nursing admission assessment for \"unsure wt loss. \"    The patient's chart was reviewed and nutrition assessment is not indicated at this time. No measured weight available this admit, not reports of wt loss or poor appetite PTA. Appetite has been good per documentation. Patient Vitals for the past 100 hrs:   % Diet Eaten   08/23/18 1913 75 %   08/23/18 0924 95 %     Will liberalize diet based on age and will add Ensure Compact TID (660kcal, 27g protein) just to maximize intake. Plan to see patient for rescreen as indicated. Thank you.      Bette Ray RD

## 2018-08-24 NOTE — PROGRESS NOTES
Problem: Mobility Impaired (Adult and Pediatric)  Goal: *Acute Goals and Plan of Care (Insert Text)  Physical Therapy Goals  Initiated 8/23/2018  1. Patient will move from supine to sit and sit to supine  and roll side to side in bed with modified independence within 7 day(s). 2.  Patient will transfer from bed to chair and chair to bed with supervision/set-up using the least restrictive device within 7 day(s). 3.  Patient will perform sit to stand with supervision/set-up within 7 day(s). 4.  Patient will ambulate with supervision/set-up for 200 feet with the least restrictive device within 7 day(s). physical Therapy TREATMENT  Patient: Rocío Santos (25 y.o. female)  Date: 8/24/2018  Diagnosis: Hypoxia  Fall Unwitnessed fall       Precautions:    Chart, physical therapy assessment, plan of care and goals were reviewed. ASSESSMENT:  Pt received asleep in recliner chair w/ LE's reclined. Once pt awakened, pt agreeable to PT. Pt received on RA w/ NC on, Sats 94%. Pt SBA for sit<>stand transfers and completed gait x80 FT total ([address]). Pt O2 Sats decreasing to 86% on RA, 2L NC reapplied. O2 94% on 2L at rest w/ PLB. 93% SpO2 during amb. Rest breaks x2 during gait. Noted when pt inhaling through nose (PLB), pt beginning to cough (dry), onset of SOB upon completion of gait. Pt returned to chair at bedside w/ all items in reach. Remained on 2L/min (98% at rest). RN aware. Pulse Oximetry: At rest (RA): 94%  During Amb (RA): 86%    At rest (2L/Min): 94%  During Amb (2L): 93%    Post activity (at rest, 2 L/min): 98%    Progression toward goals:  []    Improving appropriately and progressing toward goals  [x]    Improving slowly and progressing toward goals  []    Not making progress toward goals and plan of care will be adjusted     PLAN:  Patient continues to benefit from skilled intervention to address the above impairments. Continue treatment per established plan of care.   Discharge Recommendations: [facility] w/ HH[facility]  Further Equipment Recommendations for Discharge:  None (per pt , she owns/uses cane; per CM note 8/22 pt uses RW at baseline)     SUBJECTIVE:   Patient stated I think I'll turn back now, I don't want to go any further and crack my head.     OBJECTIVE DATA SUMMARY:   Critical Behavior:  Neurologic State: Alert  Orientation Level: Oriented to person, Oriented to time, Disoriented to situation, Disoriented to place  Cognition: Follows commands, Memory loss, Poor safety awareness     Functional Mobility Training:  Bed Mobility:     Supine to Sit:  (NT- received in sitting in recliner chair,LE's reclined)  Sit to Supine:  (pt returned to chair at bedside)           Transfers:  Sit to Stand: Stand-by assistance  Stand to Sit: Stand-by assistance (minimal eccentric control (somewhat \"plopped\" when sit))                             Balance:  Sitting: Intact  Standing: Impaired  Standing - Static: Good  Standing - Dynamic : Fair  Ambulation/Gait Training:  Distance (ft): 80 Feet (ft)  Assistive Device: Gait belt;Walker, rolling  Ambulation - Level of Assistance: Contact guard assistance        Gait Abnormalities: Decreased step clearance;Shuffling gait; Step to gait              Speed/Casandra: Pace decreased (<100 feet/min); Shuffled  Step Length: Left shortened;Right shortened                         Therapeutic Exercises:   PLB  Pain:  Pain Scale 1: Numeric (0 - 10)  Pain Intensity 1: 0              Activity Tolerance:   Limited by fatigue and endurance. Please refer to the flowsheet for vital signs taken during this treatment.   After treatment:   [x]    Patient left in no apparent distress sitting up in chair  []    Patient left in no apparent distress in bed  [x]    Call bell left within reach  [x]    Nursing notified  []    Caregiver present  [x]    Chair alarm activated    COMMUNICATION/COLLABORATION:   The patients plan of care was discussed with: Registered Nurse and Care Management    Flynn Pugh Means,PTA   Time Calculation: 24 mins

## 2018-08-24 NOTE — PROGRESS NOTES
Therapy did new SATS today incase patient is discharged Sunday-- see 8/23/18 CM note (current stats valid through Saturday 8/25/18)   Dr Mary Avlarez will write order for 02 with diagnosis if patient still requires 02 at discharge. CM will fax new SATS  (if Sunday) ,  02 order and progress note indicating patient in agreement with home 02 to [facility] 2nd floor (memory unit)  Fax [phone]  Tel [phone] if patient is discharged over the weekend.

## 2018-08-24 NOTE — PROGRESS NOTES
Verbal shift change report given to Aileen Bishop RN (oncoming nurse) by Warren Haji RN (offgoing nurse). Report included the following information SBAR, Kardex, Intake/Output and MAR.

## 2018-08-25 LAB
BACTERIA SPEC CULT: ABNORMAL
CC UR VC: ABNORMAL
SERVICE CMNT-IMP: ABNORMAL

## 2018-08-25 PROCEDURE — 94760 N-INVAS EAR/PLS OXIMETRY 1: CPT

## 2018-08-25 PROCEDURE — 74011000258 HC RX REV CODE- 258: Performed by: HOSPITALIST

## 2018-08-25 PROCEDURE — 74011250636 HC RX REV CODE- 250/636: Performed by: HOSPITALIST

## 2018-08-25 PROCEDURE — 65270000029 HC RM PRIVATE

## 2018-08-25 PROCEDURE — 74011250637 HC RX REV CODE- 250/637: Performed by: HOSPITALIST

## 2018-08-25 RX ADMIN — MEMANTINE 10 MG: 10 TABLET ORAL at 17:00

## 2018-08-25 RX ADMIN — HEPARIN SODIUM 5000 UNITS: 5000 INJECTION, SOLUTION INTRAVENOUS; SUBCUTANEOUS at 06:45

## 2018-08-25 RX ADMIN — MEMANTINE 10 MG: 10 TABLET ORAL at 08:40

## 2018-08-25 RX ADMIN — HEPARIN SODIUM 5000 UNITS: 5000 INJECTION, SOLUTION INTRAVENOUS; SUBCUTANEOUS at 14:09

## 2018-08-25 RX ADMIN — CEFTRIAXONE 1 G: 1 INJECTION, POWDER, FOR SOLUTION INTRAMUSCULAR; INTRAVENOUS at 10:41

## 2018-08-25 RX ADMIN — LOSARTAN POTASSIUM 50 MG: 50 TABLET ORAL at 08:41

## 2018-08-25 RX ADMIN — Medication 10 ML: at 14:00

## 2018-08-25 RX ADMIN — HEPARIN SODIUM 5000 UNITS: 5000 INJECTION, SOLUTION INTRAVENOUS; SUBCUTANEOUS at 23:16

## 2018-08-25 NOTE — PROGRESS NOTES
Problem: Pressure Injury - Risk of  Goal: *Prevention of pressure injury  Document Karl Scale and appropriate interventions in the flowsheet.    Outcome: Progressing Towards Goal  Pressure Injury Interventions:  Sensory Interventions: Assess changes in LOC    Moisture Interventions: Absorbent underpads    Activity Interventions: Increase time out of bed, Pressure redistribution bed/mattress(bed type)    Mobility Interventions: Pressure redistribution bed/mattress (bed type)    Nutrition Interventions: Document food/fluid/supplement intake    Friction and Shear Interventions: Apply protective barrier, creams and emollients, Feet elevated on foot rest, Foam dressings/transparent film/skin sealants, HOB 30 degrees or less

## 2018-08-25 NOTE — PROGRESS NOTES
Faculty or Preceptor Review of Student Work    8/25/2018  - Shift times - 8730 to 1700    The student documentation of patient care for Marky Courser has been reviewed and approved. All medications have been administered under the direct supervision of the faculty or preceptor.     Dede Lopez RN

## 2018-08-25 NOTE — PROGRESS NOTES
Hospitalist Progress Note              Sylvain Ramirez MD.                                                             Cell: [phone]                               NAME:  Christiana Callchristopher  :  10/14/1929  MRN:  [id number]  Date of Service:  2018    Summary: 80 y.o. female who presented on 2018 with fall and hypoxia       Assessment/Plan:  Presumed mechanical fall:   Head CT scan neg. Continue PT/OT     Hypoxemia: She is still requiring 2 liters of oxygen to maintain sat > 90% on ambulation. Continue incentive spirometry  Oxygen sat stable at rest.  Echo normal    Abnormal urinalysis: positive nitrites, leukocyte est, pyuria and bacteriuria. She is not a reliable historian. Daughter states patient has been more sleepy than usual. We will empirically treat as a UTI/Cystitis. E coli UTI > 100,000. Continue rocephin last dose 27    Renal insufficiency with cr on admission of 1.29. Baseline 0.9  Hydrated with NS IVF overnight. D/c IVF and encourage generous fluid hydration  Can resume losartan  Resolved. Dementia without behavioral disturbance  On namenda. Pleasantly confused    Essential HTN; benign  Continue losartan. BP stable    Leucocytosis:   Cough: Probably due to bronchitis  Started on rocephin. Continue robitussin prn  Resolving. Wbc now wnl. Code status: DNR  DVT prophylaxsis: heparin   Dispo: Likely back to [facility]/prior living arrangement. Hopefully she will not need home oxygen. D/c on monday         Interval History/Subjective:    F/u for possible fall and hypoxia. No acute overnight event  No new compalints. Confused      Review of Systems:  Pertinent items are noted in HPI. Objective:     VITALS:   Last 24hrs VS reviewed since prior progress note.  Most recent are:  Visit Vitals    /81    Pulse 95    Temp 98.1 °F (36.7 °C)    Resp 22    SpO2 94%       Intake/Output Summary (Last 24 hours) at 18 1640  Last data filed at 08/25/18 1259   Gross per 24 hour   Intake              670 ml   Output                1 ml   Net              669 ml        PHYSICAL EXAM:  General: No acute distress, cooperative, pleasant   EENT: EOMI. Anicteric sclerae. Oral mucous moist, oropharynx benign  Resp: CTA bilaterally. Bibasal crackles. No accessory muscle use  CV: Regular rhythm, normal rate, no murmurs, gallops, rubs  GI: Soft, non distended, non tender. normoactive bowel sounds, no hepatosplenomegaly Extremities: No edema, warm, 2+ pulses throughout  Neurologic: Moves all extremities. Awake and Alert. Oriented to name, CN II-XII grossly intact  Psych: Good insight. Not anxious nor agitated. Skin: Good Turgor, no rashes or ulcers    Lab Data Personally Reviewed: (see below)     Medications list Personally Reviewed:  x YES  NO     _______________________________________________________________________  Care Plan discussed with:  Patient/Family and Nurse    Total NON critical care TIME:  30 minutes    Sherif Watson MD     Procedures: see electronic medical records for all procedures/Xrays and details which were not copied into this note but were reviewed prior to creation of Plan. LABS:  Recent Labs      08/24/18   0429   WBC  8.8   HGB  12.9   HCT  40.3   PLT  237     Recent Labs      08/24/18   0429  08/23/18   1551   NA  141  139   K  4.2  4.3   CL  108  107   CO2  23  22   BUN  13  19   CREA  0.84  0.86   GLU  121*  116*   CA  9.0  8.6     No results for input(s): SGOT, GPT, ALT, AP, TBIL, TBILI, TP, ALB, GLOB, GGT, AML, LPSE in the last 72 hours. No lab exists for component: AMYP, HLPSE  No results for input(s): INR, PTP, APTT in the last 72 hours. No lab exists for component: INREXT, INREXT   No results for input(s): FE, TIBC, PSAT, FERR in the last 72 hours. No results found for: FOL, RBCF   No results for input(s): PH, PCO2, PO2 in the last 72 hours.   No results for input(s): CPK, CKNDX, TROIQ in the last 72 hours.    No lab exists for component: CPKMB  Lab Results   Component Value Date/Time    Cholesterol, total 153 02/23/2010 08:30 AM    HDL Cholesterol 54 02/23/2010 08:30 AM    LDL, calculated 88 02/23/2010 08:30 AM    Triglyceride 55 02/23/2010 08:30 AM    CHOL/HDL Ratio 2.8 02/23/2010 08:30 AM     No results found for: Casey Graf  Lab Results   Component Value Date/Time    Color YELLOW/STRAW 08/22/2018 05:41 PM    Appearance CLOUDY (A) 08/22/2018 05:41 PM    Specific gravity 1.018 08/22/2018 05:41 PM    pH (UA) 6.0 08/22/2018 05:41 PM    Protein NEGATIVE  08/22/2018 05:41 PM    Glucose NEGATIVE  08/22/2018 05:41 PM    Ketone NEGATIVE  08/22/2018 05:41 PM    Bilirubin NEGATIVE  08/22/2018 05:41 PM    Urobilinogen 0.2 08/22/2018 05:41 PM    Nitrites POSITIVE (A) 08/22/2018 05:41 PM    Leukocyte Esterase MODERATE (A) 08/22/2018 05:41 PM    Epithelial cells FEW 08/22/2018 05:41 PM    Bacteria 1+ (A) 08/22/2018 05:41 PM    WBC 10-20 08/22/2018 05:41 PM    RBC 0-5 08/22/2018 05:41 PM

## 2018-08-25 NOTE — PROGRESS NOTES
08/25/18 1256 08/25/18 1259   Vital Signs   Pulse (Heart Rate) (!) 112  (While walking without O2.) 97  (After 5 mins of recovery)   O2 Sat (%) (!) 83 %  (While walking without O2.) 94 %  (After 5 mins of recovery)       Patient walked in hallway with nursing student. Patient's O2 SAT dropped to 83% on RA. O2 came back up to 94% after sitting back down on RA. Will continue to work with patient, using oxygen while walking.

## 2018-08-25 NOTE — PROGRESS NOTES
Bedside shift change report given to Eliana Restrepo (oncoming nurse) by Ashley Bucio RN (offgoing nurse). Report included the following information SBAR and Kardex.

## 2018-08-25 NOTE — PROGRESS NOTES
2:30 PM: Patient walked around hallway on RA. O2 Sat dropped to 88%. 2:35PM Put patient on 1L of O2 and O2 Sat came up to 91%. 2:40PM:Bumped patient's O2 to 2L and O2 came up to 94%. 2:45 PM: Patient sitting back in chair on RA. O2 at 97%.

## 2018-08-25 NOTE — PROGRESS NOTES
Problem: Falls - Risk of  Goal: *Absence of Falls  Document Giancarlo Fall Risk and appropriate interventions in the flowsheet.    Outcome: Progressing Towards Goal  Fall Risk Interventions:  Mobility Interventions: Bed/chair exit alarm, Communicate number of staff needed for ambulation/transfer, Patient to call before getting OOB, Utilize walker, cane, or other assistive device    Mentation Interventions: Bed/chair exit alarm, Door open when patient unattended, More frequent rounding, Reorient patient, Room close to nurse's station    Medication Interventions: Bed/chair exit alarm, Patient to call before getting OOB    Elimination Interventions: Call light in reach, Bed/chair exit alarm, Patient to call for help with toileting needs    History of Falls Interventions: Bed/chair exit alarm, Door open when patient unattended, Room close to nurse's station

## 2018-08-25 NOTE — PROGRESS NOTES
Problem: Falls - Risk of  Goal: *Absence of Falls  Document Giancarlo Fall Risk and appropriate interventions in the flowsheet.    Outcome: Progressing Towards Goal  Fall Risk Interventions:  Mobility Interventions: Patient to call before getting OOB    Mentation Interventions: Adequate sleep, hydration, pain control    Medication Interventions: Patient to call before getting OOB    Elimination Interventions: Call light in reach, Patient to call for help with toileting needs    History of Falls Interventions: Bed/chair exit alarm

## 2018-08-25 NOTE — PROGRESS NOTES
Bedside and Verbal shift change report given to Bull Be RN (oncoming nurse) by Seldon Shone, Rn (offgoing nurse). Report included the following information SBAR, Kardex, Intake/Output, MAR and Recent Results.

## 2018-08-26 VITALS
OXYGEN SATURATION: 93 % | TEMPERATURE: 98.6 F | BODY MASS INDEX: 28.27 KG/M2 | RESPIRATION RATE: 18 BRPM | HEART RATE: 91 BPM | SYSTOLIC BLOOD PRESSURE: 160 MMHG | WEIGHT: 164.68 LBS | DIASTOLIC BLOOD PRESSURE: 81 MMHG

## 2018-08-26 PROCEDURE — 74011250637 HC RX REV CODE- 250/637: Performed by: HOSPITALIST

## 2018-08-26 PROCEDURE — 74011000258 HC RX REV CODE- 258: Performed by: HOSPITALIST

## 2018-08-26 PROCEDURE — 74011250636 HC RX REV CODE- 250/636: Performed by: HOSPITALIST

## 2018-08-26 RX ADMIN — Medication 10 ML: at 06:51

## 2018-08-26 RX ADMIN — LOSARTAN POTASSIUM 50 MG: 50 TABLET ORAL at 09:00

## 2018-08-26 RX ADMIN — CEFTRIAXONE 1 G: 1 INJECTION, POWDER, FOR SOLUTION INTRAMUSCULAR; INTRAVENOUS at 10:58

## 2018-08-26 RX ADMIN — HEPARIN SODIUM 5000 UNITS: 5000 INJECTION, SOLUTION INTRAVENOUS; SUBCUTANEOUS at 13:09

## 2018-08-26 RX ADMIN — MEMANTINE 10 MG: 10 TABLET ORAL at 09:19

## 2018-08-26 RX ADMIN — Medication 10 ML: at 13:09

## 2018-08-26 RX ADMIN — HEPARIN SODIUM 5000 UNITS: 5000 INJECTION, SOLUTION INTRAVENOUS; SUBCUTANEOUS at 06:51

## 2018-08-26 NOTE — PROGRESS NOTES
Faculty or Preceptor Review of Student Work    8/26/2018  - Shift times - 0730 to 36    The student documentation of patient care for Tyson Kimble has been reviewed and approved. All medications have been administered under the direct supervision of the faculty or preceptor.     Any Goel RN

## 2018-08-26 NOTE — PROGRESS NOTES
Problem: Pressure Injury - Risk of  Goal: *Prevention of pressure injury  Document Karl Scale and appropriate interventions in the flowsheet. Outcome: Progressing Towards Goal  Pressure Injury Interventions:  Sensory Interventions: Assess changes in LOC, Turn and reposition approx.  every two hours (pillows and wedges if needed)    Moisture Interventions: Absorbent underpads    Activity Interventions: Increase time out of bed, Pressure redistribution bed/mattress(bed type)    Mobility Interventions: Pressure redistribution bed/mattress (bed type)    Nutrition Interventions: Document food/fluid/supplement intake    Friction and Shear Interventions: Apply protective barrier, creams and emollients, Feet elevated on foot rest, Foam dressings/transparent film/skin sealants, HOB 30 degrees or less

## 2018-08-26 NOTE — PROGRESS NOTES
Problem: Falls - Risk of  Goal: *Absence of Falls  Document Giancarlo Fall Risk and appropriate interventions in the flowsheet.    Outcome: Progressing Towards Goal  Fall Risk Interventions:  Mobility Interventions: Bed/chair exit alarm    Mentation Interventions: Bed/chair exit alarm    Medication Interventions: Bed/chair exit alarm    Elimination Interventions: Call light in reach    History of Falls Interventions: Bed/chair exit alarm

## 2018-08-26 NOTE — PROGRESS NOTES
Problem: Falls - Risk of  Goal: *Absence of Falls  Document Giancarlo Fall Risk and appropriate interventions in the flowsheet.    Outcome: Progressing Towards Goal  Fall Risk Interventions:  Mobility Interventions: Communicate number of staff needed for ambulation/transfer, Patient to call before getting OOB, Bed/chair exit alarm    Mentation Interventions: Bed/chair exit alarm, Door open when patient unattended, More frequent rounding, Room close to nurse's station    Medication Interventions: Bed/chair exit alarm    Elimination Interventions: Call light in reach, Bed/chair exit alarm, Patient to call for help with toileting needs    History of Falls Interventions: Bed/chair exit alarm, Door open when patient unattended, Room close to nurse's station

## 2018-08-26 NOTE — PROGRESS NOTES
Bedside shift change report given to Laila Monaco (oncoming nurse) by Amelia Trevino RN (offgoing nurse). Report included the following information SBAR and Kardex.

## 2018-08-26 NOTE — PROGRESS NOTES
Patient is ready for discharge back to [facility] today. Writer spoke with Nursing Supervisor, Venecia Reveles @ ext [phone] to advise of patients return. Writer also faxed 48 hour vitals and discharge summary. Patients bedside nurse also called and spoke with Venecia Reveles who had additional questions after receiving fax. Ambulance transportation will be arranged for this patient with [facility] due to patient being confused. Monique Chan LCSW [phone]    Addendum: 2:25 PM This CM arranged ambulance via [facility]. Requested first available. They will transport patient at 5:00 PM. Updated patient's nurse.    SHUN Merrill

## 2018-08-26 NOTE — PROGRESS NOTES
9:45AM: patient walked down hallway without O2 on and remained at 93% for the full 15 minutes ambulating.

## 2018-08-26 NOTE — PROGRESS NOTES
Bedside and Verbal shift change report given to [place] (oncoming nurse) by Marium Conn RN (offgoing nurse). Report included the following information SBAR, Kardex, Intake/Output, MAR and Recent Results.

## 2018-08-26 NOTE — PROGRESS NOTES
TRANSFER - OUT REPORT:    Verbal report given to [address] Sa[address]riley (name) on Giovannyshakira Linares  being transferred to [facility] DR. SHANE Stevens(unit) for routine progression of care       Report consisted of patients Situation, Background, Assessment and   Recommendations(SBAR). Information from the following report(s) SBAR, Kardex, Intake/Output, MAR and Recent Results was reviewed with the receiving nurse. Lines:       Opportunity for questions and clarification was provided.       Patient transported with:

## 2018-08-26 NOTE — DISCHARGE SUMMARY
Discharge Summary     Patient:  Melissa Valderrama       MRN: [id number]       YOB: 1929       Age: 80 y.o. Date of admission:  8/22/2018    Date of discharge:  8/26/2018    Primary care provider: Dr. Josse Luna MD    Admitting provider:  Zach Espinoza MD    Discharging provider:  Sasha Helm MD - [phone]  If unavailable, call [phone] and ask the  to page the triage hospitalist.    Consultations  · IP CONSULT TO HOSPITALIST    Procedures  · * No surgery found *    Discharge destination: HOME with [facility]. The patient is stable for discharge. Admission diagnosis  · Hypoxia  · Fall    Current Discharge Medication List      CONTINUE these medications which have NOT CHANGED    Details   acetaminophen (TYLENOL) 325 mg tablet Take 650 mg by mouth daily. @1700      calcium-vitamin D (OS-LUCÍA 500+D) 500 mg(1,250mg) -200 unit per tablet Take 1 Tab by mouth daily (with breakfast). multivitamin (ONE A DAY) tablet Take 1 Tab by mouth daily. losartan (COZAAR) 50 mg tablet Take 50 mg by mouth daily. cholecalciferol (VITAMIN D3) 1,000 unit cap Take 2,000 Units by mouth daily. memantine (NAMENDA) 10 mg tablet Take 10 mg by mouth two (2) times a day. Follow-up Information     Follow up With Details Comments [address], MD In 1 week discharge follow up  [address] [place], [place] [address]  [phone]            Final discharge diagnoses and brief hospital course  Please also refer to the admission H&P for details on the presenting problem. 80 y.o. female who presented on 8/22/2018 with fall and hypoxia    Presumed mechanical fall:   Head CT scan neg.    Home health PT     Hypoxemia   Likely related to Atelectasis   Continue incentive spirometry  Weaned off O2  Echo normal     Abnormal urinalysis: positive nitrites, leukocyte est, pyuria and bacteriuria. She is not a reliable historian. Daughter states patient has been more sleepy than usual. We will empirically treat as a UTI/Cystitis. E coli UTI > 100,000. Continue s/p Rocephin tx      Renal insufficiency with cr on admission of 1.29. Baseline 0.9  Hydrated with NS IVF overnight. D/c IVF and encourage generous fluid hydration  Resume Losartan  Resolved.      Dementia without behavioral disturbance  On namenda. Pleasantly confused     Essential HTN; benign  Continue losartan. BP stable     Leucocytosis:   Cough: Probably due to bronchitis  Completed Rocephin. Resolving. Wbc now wnl. FOLLOW-UP TESTS recommended: none     PENDING TEST RESULTS:  At the time of your discharge the following test results are still pending: none  Please make sure you review these results with your outpatient follow-up provider(s).     SYMPTOMS to watch for: chest pain, shortness of breath, fever, chills, nausea, vomiting, diarrhea, change in mentation, falling, weakness, bleeding.     DIET/what to eat:  Resume previous diet     ACTIVITY:  Activity as tolerated and PT/OT per Home Health     WOUND CARE: NONE     EQUIPMENT needed:  NONE       Physical examination at discharge  Visit Vitals    /81 (BP 1 Location: Right arm, BP Patient Position: At rest)    Pulse 81    Temp 98.9 °F (37.2 °C)    Resp 18    SpO2 93%     Pleasantly confused  PERRLA  EOMI  Lung: CTA  CVS: RRR  Abd: soft NT ND  Ext: no edema    Pertinent imaging studies:  CT chest:  IMPRESSION   impression: Cardiomegaly, chronic pleural thickening left side. Vessel  tortuosity. Bibasilar chronic infiltrates.       Recent Labs      08/24/18   0429   WBC  8.8   HGB  12.9   HCT  40.3   PLT  237     Recent Labs      08/24/18   0429  08/23/18   1551   NA  141  139   K  4.2  4.3   CL  108  107   CO2  23  22   BUN  13  19   CREA  0.84  0.86   GLU  121*  116*   CA  9.0  8.6     No results for input(s): SGOT, GPT, AP, TBIL, TP, ALB, GLOB, GGT, AML, LPSE in the last 72 hours. No lab exists for component: AMYP, HLPSE  No results for input(s): INR, PTP, APTT in the last 72 hours. No lab exists for component: INREXT   No results for input(s): FE, TIBC, PSAT, FERR in the last 72 hours. No results for input(s): PH, PCO2, PO2 in the last 72 hours. No results for input(s): CPK, CKMB in the last 72 hours. No lab exists for component: TROPONINI  No components found for: [place]    Chronic Diagnoses:    Problem List as of 8/26/2018  Date Reviewed: 12/21/2011          Codes Class Noted - Resolved    Fall ICD-10-CM: W19. Weyman Hamper  ICD-9-CM: E888.9  8/23/2018 - Present        * (Principal)Unwitnessed fall ICD-10-CM: R29.6  ICD-9-CM: Arian Chars  8/22/2018 - Present        Hypoxia ICD-10-CM: R09.02  ICD-9-CM: 799.02  8/22/2018 - Present        Hypercholesteremia ICD-10-CM: E78.00  ICD-9-CM: 272.0  5/27/2010 - Present    Overview Signed 5/27/2010 12:56 PM by Cristina Tuttle     1980's             Osteoporosis ICD-10-CM: M81.0  ICD-9-CM: 733.00  5/27/2010 - Present        IBS (irritable bowel syndrome) ICD-10-CM: K58.9  ICD-9-CM: 564.1  5/27/2010 - Present        OA (osteoarthritis) ICD-10-CM: M19.90  ICD-9-CM: 715.90  5/27/2010 - Present              Time spent on discharge related activities today greater than 30 minutes.       Signed:  Davin Preston MD                 Hospitalist, Internal Medicine      Cc: Gonsalo Mcfadden MD

## 2018-12-15 ENCOUNTER — APPOINTMENT (OUTPATIENT)
Dept: CT IMAGING | Age: 83
DRG: 175 | End: 2018-12-15
Attending: EMERGENCY MEDICINE
Payer: MEDICARE

## 2018-12-15 ENCOUNTER — APPOINTMENT (OUTPATIENT)
Dept: GENERAL RADIOLOGY | Age: 83
DRG: 175 | End: 2018-12-15
Attending: HOSPITALIST
Payer: MEDICARE

## 2018-12-15 ENCOUNTER — HOSPITAL ENCOUNTER (INPATIENT)
Age: 83
LOS: 6 days | Discharge: HOME | DRG: 175 | End: 2018-12-21
Attending: EMERGENCY MEDICINE | Admitting: HOSPITALIST
Payer: MEDICARE

## 2018-12-15 DIAGNOSIS — S09.90XA INJURY OF HEAD, INITIAL ENCOUNTER: ICD-10-CM

## 2018-12-15 DIAGNOSIS — N39.0 URINARY TRACT INFECTION WITHOUT HEMATURIA, SITE UNSPECIFIED: Primary | ICD-10-CM

## 2018-12-15 DIAGNOSIS — Z71.89 ENCOUNTER FOR ANTICOAGULATION DISCUSSION AND COUNSELING: ICD-10-CM

## 2018-12-15 DIAGNOSIS — G30.1 LATE ONSET ALZHEIMER'S DISEASE WITHOUT BEHAVIORAL DISTURBANCE (HCC): ICD-10-CM

## 2018-12-15 DIAGNOSIS — R09.02 HYPOXIA: ICD-10-CM
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
## 2018-12-15 DIAGNOSIS — Z71.89 GOALS OF CARE, COUNSELING/DISCUSSION: ICD-10-CM

## 2018-12-15 DIAGNOSIS — F05 DELIRIUM DUE TO ANOTHER MEDICAL CONDITION, ACUTE, HYPOACTIVE: ICD-10-CM

## 2018-12-15 DIAGNOSIS — F02.80 LATE ONSET ALZHEIMER'S DISEASE WITHOUT BEHAVIORAL DISTURBANCE (HCC): ICD-10-CM

## 2018-12-15 PROBLEM — R55 SYNCOPE: Status: ACTIVE | Noted: 2018-12-15

## 2018-12-15 PROBLEM — N17.9 AKI (ACUTE KIDNEY INJURY) (HCC): Status: ACTIVE | Noted: 2018-12-15

## 2018-12-15 LAB
ALBUMIN SERPL-MCNC: 3.3 G/DL (ref 3.5–5)
ALBUMIN/GLOB SERPL: 1 {RATIO} (ref 1.1–2.2)
ALP SERPL-CCNC: 116 U/L (ref 45–117)
ALT SERPL-CCNC: 25 U/L (ref 12–78)
ANION GAP SERPL CALC-SCNC: 8 MMOL/L (ref 5–15)
APPEARANCE UR: ABNORMAL
APTT PPP: 26.6 SEC (ref 22.1–32)
AST SERPL-CCNC: 18 U/L (ref 15–37)
BACTERIA URNS QL MICRO: ABNORMAL /HPF
BASOPHILS # BLD: 0 K/UL (ref 0–0.1)
BASOPHILS NFR BLD: 0 % (ref 0–1)
BILIRUB SERPL-MCNC: 0.5 MG/DL (ref 0.2–1)
BILIRUB UR QL: NEGATIVE
BUN SERPL-MCNC: 28 MG/DL (ref 6–20)
BUN/CREAT SERPL: 22 (ref 12–20)
CALCIUM SERPL-MCNC: 9.4 MG/DL (ref 8.5–10.1)
CHLORIDE SERPL-SCNC: 104 MMOL/L (ref 97–108)
CO2 SERPL-SCNC: 27 MMOL/L (ref 21–32)
COLOR UR: ABNORMAL
COMMENT, HOLDF: NORMAL
CREAT SERPL-MCNC: 1.27 MG/DL (ref 0.55–1.02)
DIFFERENTIAL METHOD BLD: ABNORMAL
EOSINOPHIL # BLD: 0.3 K/UL (ref 0–0.4)
EOSINOPHIL NFR BLD: 3 % (ref 0–7)
EPITH CASTS URNS QL MICRO: ABNORMAL /LPF
ERYTHROCYTE [DISTWIDTH] IN BLOOD BY AUTOMATED COUNT: 14.4 % (ref 11.5–14.5)
GLOBULIN SER CALC-MCNC: 3.4 G/DL (ref 2–4)
GLUCOSE SERPL-MCNC: 129 MG/DL (ref 65–100)
GLUCOSE UR STRIP.AUTO-MCNC: NEGATIVE MG/DL
HCT VFR BLD AUTO: 47.7 % (ref 35–47)
HGB BLD-MCNC: 14.7 G/DL (ref 11.5–16)
HGB UR QL STRIP: NEGATIVE
IMM GRANULOCYTES # BLD: 0.1 K/UL (ref 0–0.04)
IMM GRANULOCYTES NFR BLD AUTO: 1 % (ref 0–0.5)
INR PPP: 1 (ref 0.9–1.1)
KETONES UR QL STRIP.AUTO: NEGATIVE MG/DL
LACTATE BLD-SCNC: 2.03 MMOL/L (ref 0.4–2)
LACTATE SERPL-SCNC: 1.7 MMOL/L (ref 0.4–2)
LEUKOCYTE ESTERASE UR QL STRIP.AUTO: ABNORMAL
LYMPHOCYTES # BLD: 2.8 K/UL (ref 0.8–3.5)
LYMPHOCYTES NFR BLD: 29 % (ref 12–49)
MCH RBC QN AUTO: 29.1 PG (ref 26–34)
MCHC RBC AUTO-ENTMCNC: 30.8 G/DL (ref 30–36.5)
MCV RBC AUTO: 94.5 FL (ref 80–99)
MONOCYTES # BLD: 0.9 K/UL (ref 0–1)
MONOCYTES NFR BLD: 9 % (ref 5–13)
NEUTS SEG # BLD: 5.7 K/UL (ref 1.8–8)
NEUTS SEG NFR BLD: 59 % (ref 32–75)
NITRITE UR QL STRIP.AUTO: POSITIVE
NRBC # BLD: 0 K/UL (ref 0–0.01)
NRBC BLD-RTO: 0 PER 100 WBC
PH UR STRIP: 5 [PH] (ref 5–8)
PLATELET # BLD AUTO: 200 K/UL (ref 150–400)
PMV BLD AUTO: 11.9 FL (ref 8.9–12.9)
POTASSIUM SERPL-SCNC: 4.5 MMOL/L (ref 3.5–5.1)
PROT SERPL-MCNC: 6.7 G/DL (ref 6.4–8.2)
PROT UR STRIP-MCNC: NEGATIVE MG/DL
PROTHROMBIN TIME: 10.4 SEC (ref 9–11.1)
RBC # BLD AUTO: 5.05 M/UL (ref 3.8–5.2)
RBC #/AREA URNS HPF: ABNORMAL /HPF (ref 0–5)
SAMPLES BEING HELD,HOLD: NORMAL
SODIUM SERPL-SCNC: 139 MMOL/L (ref 136–145)
SP GR UR REFRACTOMETRY: 1.02 (ref 1–1.03)
THERAPEUTIC RANGE,PTTT: NORMAL SECS (ref 58–77)
UR CULT HOLD, URHOLD: NORMAL
UROBILINOGEN UR QL STRIP.AUTO: 0.2 EU/DL (ref 0.2–1)
WBC # BLD AUTO: 9.7 K/UL (ref 3.6–11)
WBC URNS QL MICRO: >100 /HPF (ref 0–4)

## 2018-12-15 PROCEDURE — 65660000000 HC RM CCU STEPDOWN

## 2018-12-15 PROCEDURE — 87077 CULTURE AEROBIC IDENTIFY: CPT

## 2018-12-15 PROCEDURE — 99285 EMERGENCY DEPT VISIT HI MDM: CPT

## 2018-12-15 PROCEDURE — 93005 ELECTROCARDIOGRAM TRACING: CPT

## 2018-12-15 PROCEDURE — 87040 BLOOD CULTURE FOR BACTERIA: CPT

## 2018-12-15 PROCEDURE — 85730 THROMBOPLASTIN TIME PARTIAL: CPT

## 2018-12-15 PROCEDURE — 74011250636 HC RX REV CODE- 250/636: Performed by: EMERGENCY MEDICINE

## 2018-12-15 PROCEDURE — 87086 URINE CULTURE/COLONY COUNT: CPT

## 2018-12-15 PROCEDURE — 36415 COLL VENOUS BLD VENIPUNCTURE: CPT

## 2018-12-15 PROCEDURE — 83605 ASSAY OF LACTIC ACID: CPT

## 2018-12-15 PROCEDURE — 74011000258 HC RX REV CODE- 258: Performed by: EMERGENCY MEDICINE

## 2018-12-15 PROCEDURE — 74011250637 HC RX REV CODE- 250/637: Performed by: HOSPITALIST

## 2018-12-15 PROCEDURE — 74011250636 HC RX REV CODE- 250/636: Performed by: HOSPITALIST

## 2018-12-15 PROCEDURE — 85025 COMPLETE CBC W/AUTO DIFF WBC: CPT

## 2018-12-15 PROCEDURE — 71046 X-RAY EXAM CHEST 2 VIEWS: CPT

## 2018-12-15 PROCEDURE — 85610 PROTHROMBIN TIME: CPT

## 2018-12-15 PROCEDURE — P9612 CATHETERIZE FOR URINE SPEC: HCPCS

## 2018-12-15 PROCEDURE — 80053 COMPREHEN METABOLIC PANEL: CPT

## 2018-12-15 PROCEDURE — 87186 SC STD MICRODIL/AGAR DIL: CPT

## 2018-12-15 PROCEDURE — 81001 URINALYSIS AUTO W/SCOPE: CPT

## 2018-12-15 PROCEDURE — 77030011943

## 2018-12-15 PROCEDURE — 70450 CT HEAD/BRAIN W/O DYE: CPT

## 2018-12-15 RX ORDER — MEMANTINE HYDROCHLORIDE 10 MG/1
10 TABLET ORAL 2 TIMES DAILY
Status: DISCONTINUED | OUTPATIENT
Start: 2018-12-15 | End: 2018-12-21 | Stop reason: HOSPADM

## 2018-12-15 RX ORDER — DOCUSATE SODIUM 100 MG/1
100 CAPSULE, LIQUID FILLED ORAL 2 TIMES DAILY
Status: DISCONTINUED | OUTPATIENT
Start: 2018-12-15 | End: 2018-12-21 | Stop reason: HOSPADM

## 2018-12-15 RX ORDER — SODIUM CHLORIDE 0.9 % (FLUSH) 0.9 %
5-10 SYRINGE (ML) INJECTION AS NEEDED
Status: DISCONTINUED | OUTPATIENT
Start: 2018-12-15 | End: 2018-12-21 | Stop reason: HOSPADM

## 2018-12-15 RX ORDER — ONDANSETRON 2 MG/ML
4 INJECTION INTRAMUSCULAR; INTRAVENOUS
Status: DISCONTINUED | OUTPATIENT
Start: 2018-12-15 | End: 2018-12-21 | Stop reason: HOSPADM

## 2018-12-15 RX ORDER — SODIUM CHLORIDE 9 MG/ML
75 INJECTION, SOLUTION INTRAVENOUS CONTINUOUS
Status: DISCONTINUED | OUTPATIENT
Start: 2018-12-15 | End: 2018-12-17

## 2018-12-15 RX ORDER — IPRATROPIUM BROMIDE AND ALBUTEROL SULFATE 2.5; .5 MG/3ML; MG/3ML
3 SOLUTION RESPIRATORY (INHALATION)
COMMUNITY
End: 2020-06-09

## 2018-12-15 RX ORDER — GUAIFENESIN 600 MG/1
600 TABLET, EXTENDED RELEASE ORAL
COMMUNITY

## 2018-12-15 RX ORDER — FERROUS SULFATE, DRIED 160(50) MG
1 TABLET, EXTENDED RELEASE ORAL
Status: DISCONTINUED | OUTPATIENT
Start: 2018-12-16 | End: 2018-12-21 | Stop reason: HOSPADM

## 2018-12-15 RX ORDER — LOSARTAN POTASSIUM 50 MG/1
50 TABLET ORAL DAILY
Status: DISCONTINUED | OUTPATIENT
Start: 2018-12-16 | End: 2018-12-21 | Stop reason: HOSPADM

## 2018-12-15 RX ORDER — ACETAMINOPHEN 325 MG/1
650 TABLET ORAL
Status: DISCONTINUED | OUTPATIENT
Start: 2018-12-15 | End: 2018-12-21 | Stop reason: HOSPADM

## 2018-12-15 RX ORDER — ACETAMINOPHEN 500 MG
500 TABLET ORAL
COMMUNITY

## 2018-12-15 RX ORDER — DIPHENHYDRAMINE HCL 25 MG
25 CAPSULE ORAL
Status: DISCONTINUED | OUTPATIENT
Start: 2018-12-15 | End: 2018-12-18

## 2018-12-15 RX ORDER — MELATONIN
1000 DAILY
Status: DISCONTINUED | OUTPATIENT
Start: 2018-12-16 | End: 2018-12-21 | Stop reason: HOSPADM

## 2018-12-15 RX ORDER — SODIUM CHLORIDE 9 MG/ML
500 INJECTION, SOLUTION INTRAVENOUS CONTINUOUS
Status: DISPENSED | OUTPATIENT
Start: 2018-12-15 | End: 2018-12-15

## 2018-12-15 RX ORDER — TRAMADOL HYDROCHLORIDE 50 MG/1
50 TABLET ORAL
COMMUNITY
End: 2020-06-09

## 2018-12-15 RX ORDER — SODIUM CHLORIDE 0.9 % (FLUSH) 0.9 %
5-10 SYRINGE (ML) INJECTION EVERY 8 HOURS
Status: DISCONTINUED | OUTPATIENT
Start: 2018-12-15 | End: 2018-12-21 | Stop reason: HOSPADM

## 2018-12-15 RX ADMIN — SODIUM CHLORIDE 500 ML/HR: 900 INJECTION, SOLUTION INTRAVENOUS at 17:15

## 2018-12-15 RX ADMIN — SODIUM CHLORIDE 75 ML/HR: 900 INJECTION, SOLUTION INTRAVENOUS at 15:41

## 2018-12-15 RX ADMIN — Medication 10 ML: at 22:00

## 2018-12-15 RX ADMIN — CEFTRIAXONE 1 G: 1 INJECTION, POWDER, FOR SOLUTION INTRAMUSCULAR; INTRAVENOUS at 16:27

## 2018-12-15 RX ADMIN — MEMANTINE 10 MG: 10 TABLET ORAL at 21:39

## 2018-12-15 NOTE — H&P
History & Physical    Primary Care Provider: Carlos Molina MD  Source of Information: Patient, ED note and daughter     History of Presenting Illness:   Mary Shin is a 80 y.o. female who presents with fall and hypoxia. 80 y.o. female with past medical history significant for osteoarthritis, IBS, and dementia who presents from [address] [place] [address] via EMS with chief complaint of a fall. Per EMS, pt had a witnessed syncopal fall and hit her head as a result. EMS reported the pt's SpO2 was in the low 80's on room air and improved to low 90's on 2L via NC. Pt states she does not remember falling or hitting her head, but suspects she may have slipped on something. Of note, according to facility notes, pt was recently diagnosed with hypoxemia and wears O2 PRN. Pt denies any fever, chills, nausea, vomiting, changes in appetite, dysuria, or frequency. denied chest pain        Review of Systems:  Limited due to her poor historian and dementia     Past Medical History:   Diagnosis Date    Hypercholesteremia 5/27/2010    IBS (irritable bowel syndrome) 5/27/2010    OA (osteoarthritis) 5/27/2010    Osteoporosis 5/27/2010      History reviewed. No pertinent surgical history. Prior to Admission medications    Medication Sig Start Date End Date Taking? Authorizing Provider   acetaminophen (TYLENOL) 325 mg tablet Take 650 mg by mouth daily. @3658    Provider, Historical   calcium-vitamin D (OS-LUCÍA 500+D) 500 mg(1,250mg) -200 unit per tablet Take 1 Tab by mouth daily (with breakfast). Kendall Ojeda MD   multivitamin (ONE A DAY) tablet Take 1 Tab by mouth daily. Kendall Ojeda MD   losartan (COZAAR) 50 mg tablet Take 50 mg by mouth daily. Kendall Ojeda MD   cholecalciferol (VITAMIN D3) 1,000 unit cap Take 2,000 Units by mouth daily. Kendall Ojeda MD   memantine (NAMENDA) 10 mg tablet Take 10 mg by mouth two (2) times a day.     Kendall Ojeda MD     Allergies Allergen Reactions    Aricept [Donepezil] Other (comments)     GI upset    Flexeril [Cyclobenzaprine] Other (comments)     delirium      History reviewed. No pertinent family history. SOCIAL HISTORY:  Patient resides:  Independently    Assisted Living x   SNF    With family care       Smoking history:   None x   Former    Chronic      Alcohol history:   None x   Social    Chronic      Ambulates:   Independently    w/cane x   w/walker    w/wc    CODE STATUS:  DNR x   Full    Other      Objective:     Physical Exam:     Visit Vitals  /75 (BP 1 Location: Left arm, BP Patient Position: At rest;Supine)   Pulse (!) 112   Temp 97.9 °F (36.6 °C)   SpO2 91%    O2 Flow Rate (L/min): 4 l/min O2 Device: Room air    General:  Alert, cooperative, no distress, appears stated age. Head:  Normocephalic, without obvious abnormality, atraumatic. Eyes:  Conjunctivae/corneas clear. PERRL, EOMs intact. Nose: Nares normal. Septum midline. Mucosa normal. No drainage or sinus tenderness. Throat: Lips, mucosa, and tongue normal. Teeth and gums normal.   Neck: Supple, symmetrical, trachea midline, no adenopathy, thyroid: no enlargement/tenderness/nodules, no carotid bruit and no JVD. Back:   Symmetric, no curvature. ROM normal. No CVA tenderness. Lungs:   Clear to auscultation bilaterally. Chest wall:  No tenderness or deformity. Heart:  Regular rate and rhythm, S1, S2 normal, no murmur, click, rub or gallop. Abdomen:   Soft, non-tender. Bowel sounds normal. No masses,  No organomegaly. Extremities: Extremities normal, atraumatic, no cyanosis or edema. no calf tenderness    Pulses: 2+ and symmetric all extremities. Skin: Skin color, texture, turgor normal. No rashes or lesions   Neurologic: CNII-XII intact. No focal deficits. O to name and place.           EKG:  Sinus tachycardia      Data Review:     Recent Days:  Recent Labs     12/15/18  1400   WBC 9.7   HGB 14.7   HCT 47.7*        Recent Labs 12/15/18  1400      K 4.5      CO2 27   *   BUN 28*   CREA 1.27*   CA 9.4   ALB 3.3*   SGOT 18   ALT 25   INR 1.0     No results for input(s): PH, PCO2, PO2, HCO3, FIO2 in the last 72 hours. 24 Hour Results:  Recent Results (from the past 24 hour(s))   EKG, 12 LEAD, INITIAL    Collection Time: 12/15/18  1:25 PM   Result Value Ref Range    Ventricular Rate 110 BPM    Atrial Rate 110 BPM    P-R Interval 176 ms    QRS Duration 134 ms    Q-T Interval 336 ms    QTC Calculation (Bezet) 454 ms    Calculated P Axis 44 degrees    Calculated R Axis -12 degrees    Calculated T Axis 1 degrees    Diagnosis       Sinus tachycardia  Right bundle branch block  Inferior infarct (cited on or before 22-AUG-2018)  Anterolateral infarct (cited on or before 22-AUG-2018)  When compared with ECG of 22-AUG-2018 12:27,  Right bundle branch block is now present  Questionable change in initial forces of Lateral leads     SAMPLES BEING HELD    Collection Time: 12/15/18  1:31 PM   Result Value Ref Range    SAMPLES BEING HELD 1RED,1BLUE,1LAV,1PST     COMMENT        Add-on orders for these samples will be processed based on acceptable specimen integrity and analyte stability, which may vary by analyte. CBC WITH AUTOMATED DIFF    Collection Time: 12/15/18  2:00 PM   Result Value Ref Range    WBC 9.7 3.6 - 11.0 K/uL    RBC 5.05 3.80 - 5.20 M/uL    HGB 14.7 11.5 - 16.0 g/dL    HCT 47.7 (H) 35.0 - 47.0 %    MCV 94.5 80.0 - 99.0 FL    MCH 29.1 26.0 - 34.0 PG    MCHC 30.8 30.0 - 36.5 g/dL    RDW 14.4 11.5 - 14.5 %    PLATELET 324 308 - 140 K/uL    MPV 11.9 8.9 - 12.9 FL    NRBC 0.0 0  WBC    ABSOLUTE NRBC 0.00 0.00 - 0.01 K/uL    NEUTROPHILS 59 32 - 75 %    LYMPHOCYTES 29 12 - 49 %    MONOCYTES 9 5 - 13 %    EOSINOPHILS 3 0 - 7 %    BASOPHILS 0 0 - 1 %    IMMATURE GRANULOCYTES 1 (H) 0.0 - 0.5 %    ABS. NEUTROPHILS 5.7 1.8 - 8.0 K/UL    ABS. LYMPHOCYTES 2.8 0.8 - 3.5 K/UL    ABS. MONOCYTES 0.9 0.0 - 1.0 K/UL    ABS. EOSINOPHILS 0.3 0.0 - 0.4 K/UL    ABS. BASOPHILS 0.0 0.0 - 0.1 K/UL    ABS. IMM. GRANS. 0.1 (H) 0.00 - 0.04 K/UL    DF AUTOMATED     METABOLIC PANEL, COMPREHENSIVE    Collection Time: 12/15/18  2:00 PM   Result Value Ref Range    Sodium 139 136 - 145 mmol/L    Potassium 4.5 3.5 - 5.1 mmol/L    Chloride 104 97 - 108 mmol/L    CO2 27 21 - 32 mmol/L    Anion gap 8 5 - 15 mmol/L    Glucose 129 (H) 65 - 100 mg/dL    BUN 28 (H) 6 - 20 MG/DL    Creatinine 1.27 (H) 0.55 - 1.02 MG/DL    BUN/Creatinine ratio 22 (H) 12 - 20      GFR est AA 48 (L) >60 ml/min/1.73m2    GFR est non-AA 40 (L) >60 ml/min/1.73m2    Calcium 9.4 8.5 - 10.1 MG/DL    Bilirubin, total 0.5 0.2 - 1.0 MG/DL    ALT (SGPT) 25 12 - 78 U/L    AST (SGOT) 18 15 - 37 U/L    Alk.  phosphatase 116 45 - 117 U/L    Protein, total 6.7 6.4 - 8.2 g/dL    Albumin 3.3 (L) 3.5 - 5.0 g/dL    Globulin 3.4 2.0 - 4.0 g/dL    A-G Ratio 1.0 (L) 1.1 - 2.2     PROTHROMBIN TIME + INR    Collection Time: 12/15/18  2:00 PM   Result Value Ref Range    INR 1.0 0.9 - 1.1      Prothrombin time 10.4 9.0 - 11.1 sec   PTT    Collection Time: 12/15/18  2:00 PM   Result Value Ref Range    aPTT 26.6 22.1 - 32.0 sec    aPTT, therapeutic range     58.0 - 77.0 SECS   URINALYSIS W/ RFLX MICROSCOPIC    Collection Time: 12/15/18  2:41 PM   Result Value Ref Range    Color YELLOW/STRAW      Appearance CLOUDY (A) CLEAR      Specific gravity 1.025 1.003 - 1.030      pH (UA) 5.0 5.0 - 8.0      Protein NEGATIVE  NEG mg/dL    Glucose NEGATIVE  NEG mg/dL    Ketone NEGATIVE  NEG mg/dL    Bilirubin NEGATIVE  NEG      Blood NEGATIVE  NEG      Urobilinogen 0.2 0.2 - 1.0 EU/dL    Nitrites POSITIVE (A) NEG      Leukocyte Esterase MODERATE (A) NEG      WBC >100 (H) 0 - 4 /hpf    RBC 0-5 0 - 5 /hpf    Epithelial cells FEW FEW /lpf    Bacteria 4+ (A) NEG /hpf   URINE CULTURE HOLD SAMPLE    Collection Time: 12/15/18  2:41 PM   Result Value Ref Range    Urine culture hold        URINE ON HOLD IN MICROBIOLOGY DEPT FOR 3 DAYS. IF UNPRESERVED URINE IS SUBMITTED, IT CANNOT BE USED FOR ADDITIONAL TESTING AFTER 24 HRS, RECOLLECTION WILL BE REQUIRED. Imaging:     Assessment:     Principal Problem:    Syncope (12/15/2018)    Active Problems:    Hypoxia (8/22/2018)      UTI (urinary tract infection) (12/15/2018)      KOLBY (acute kidney injury) ([facility] [address] (12/15/2018)           Plan:     1. Syncope: possible due to hypoxia, head CT no acute changes. May have component of dehydration, UTI. Would like to r/o PE since pt is hypoxia and sinus tachycardia. Will adm to tele, ivf, check orthostatic vitals, V/Q scan to r/o PE. Had Echo this year, normal EF. 2. Acute on Chronic hypoxia resp failure: pt was found to hypoxia and had work up including chest CT in Aug this year. Her acute hypoxia this time possible due to not wearing Ox. Her  Chest CT in 8/22: . There is some  pleural thickening on the left without definite pleural effusion no pericardial  effusion. . There is bibasilar chronic interstitial findings. Her hypoxia may be due to the chronic interstitial changes. But will follow V/Q scan and repeat CXR. She will need 24 hours O2.   3. Sinus tachycardia: monitor for now, start IVF. 4. UTI: rocephin and IVF. 5. CKD stage 3: Cr worse than baseline. Follow renal function after IVF.    6. Dementia: watch for acute delirium in hospital.        Signed By: Jolanta Crooks MD     December 15, 2018

## 2018-12-15 NOTE — PROGRESS NOTES
TRANSFER - IN REPORT:    Verbal report received from raciel rodriguez(name) on Kamilla Arteaga  being received from ed(unit) for routine progression of care      Report consisted of patients Situation, Background, Assessment and   Recommendations(SBAR). Information from the following report(s) Kardex, ED Summary, Procedure Summary, Intake/Output, MAR and Cardiac Rhythm st was reviewed with the receiving nurse. Opportunity for questions and clarification was provided. Assessment completed upon patients arrival to unit and care assumed.

## 2018-12-15 NOTE — ED PROVIDER NOTES
80 y.o. female with past medical history significant for osteoarthritis, IBS, and dementia who presents from [facility] via EMS with chief complaint of a fall. Per EMS, pt had a witnessed syncopal fall and hit her head as a result. EMS reported the pt's SpO2 was in the low 80's on room air and improved to low 90's on 2L via NC. Pt states she does not remember falling or hitting her head, but suspects she may have slipped on something. Of note, according to facility notes, pt was recently diagnosed with hypoxemia and wears O2 PRN at home. Pt denies any fever, chills, nausea, vomiting, changes in appetite, dysuria, or frequency. There are no other acute medical concerns at this time. Social hx - Tobacco use: none, Alcohol Use: none    PCP: Jessy Collet, MD    Note written by Ashanti Boyer, as dictated by Natalia Shin MD 1:21 PM.          The history is provided by the patient and the EMS personnel. No  was used. Past Medical History:   Diagnosis Date    Hypercholesteremia 5/27/2010    IBS (irritable bowel syndrome) 5/27/2010    OA (osteoarthritis) 5/27/2010    Osteoporosis 5/27/2010       History reviewed. No pertinent surgical history. History reviewed. No pertinent family history.     Social History     Socioeconomic History    Marital status:      Spouse name: Not on file    Number of children: Not on file    Years of education: Not on file    Highest education level: Not on file   Social Needs    Financial resource strain: Not on file    Food insecurity - worry: Not on file    Food insecurity - inability: Not on file    Transportation needs - medical: Not on file   [employer] needs - non-medical: Not on file   Occupational History    Not on file   Tobacco Use    Smoking status: Not on file   Substance and Sexual Activity    Alcohol use: Not on file    Drug use: Not on file    Sexual activity: Not on file   Other Topics Concern    Not on file   Social History Narrative    Not on file         ALLERGIES: Aricept [donepezil] and Flexeril [cyclobenzaprine]    Review of Systems   Constitutional: Negative for appetite change, chills and fever. Respiratory: Negative for shortness of breath. Gastrointestinal: Negative for nausea and vomiting. Genitourinary: Negative for dysuria and frequency. Neurological: Positive for syncope. All other systems reviewed and are negative. Vitals:    12/15/18 1320 12/15/18 1327   Pulse: (!) 109    Temp: 97.9 °F (36.6 °C)    SpO2: (!) 87% 91%            Physical Exam   Constitutional: She is oriented to person, place, and time. She appears well-developed and well-nourished. No distress. HENT:   Head: Normocephalic and atraumatic. Nose: Nose normal.   Mouth/Throat: Oropharynx is clear and moist.   No hematomas on the head. Eyes: Conjunctivae and EOM are normal. Pupils are equal, round, and reactive to light. No scleral icterus. Neck: Normal range of motion. Neck supple. No JVD present. No tracheal deviation present. No thyromegaly present. No carotid bruits noted. Cardiovascular: Normal rate, regular rhythm, normal heart sounds and intact distal pulses. Exam reveals no gallop and no friction rub. No murmur heard. Pulmonary/Chest: Effort normal and breath sounds normal. No respiratory distress. She has no wheezes. She has no rales. She exhibits no tenderness. Clear lung sounds, but O2 sats in the 80's. Abdominal: Soft. Bowel sounds are normal. She exhibits no distension and no mass. There is no tenderness. There is no rebound and no guarding. Musculoskeletal: Normal range of motion. She exhibits no edema or tenderness. No peripheral edema. Lymphadenopathy:     She has no cervical adenopathy. Neurological: She is alert and oriented to person, place, and time. She has normal reflexes. No cranial nerve deficit.  Coordination normal. Skin: Skin is warm and dry. No rash noted. No erythema. Psychiatric: She has a normal mood and affect. Her behavior is normal. Judgment and thought content normal.   Nursing note and vitals reviewed. Note written by Ashanti Christopher, as dictated by Blaine Gonzalez MD 1:21 PM.    MDM  Number of Diagnoses or Management Options  Fall, initial encounter: new and requires workup  Injury of head, initial encounter: new and requires workup  Urinary tract infection without hematuria, site unspecified: new and requires workup     Amount and/or Complexity of Data Reviewed  Clinical lab tests: ordered and reviewed  Tests in the radiology section of CPT®: ordered and reviewed  Decide to obtain previous medical records or to obtain history from someone other than the patient: yes  Obtain history from someone other than the patient: yes  Review and summarize past medical records: yes  Discuss the patient with other providers: yes  Independent visualization of images, tracings, or specimens: yes    Risk of Complications, Morbidity, and/or Mortality  Presenting problems: high  Diagnostic procedures: high  Management options: high    Patient Progress  Patient progress: stable         Procedures    The patient fell and was actually knocked unconscious. She does not remember her hitting the floor. She appears to have a urinary tract infection. We'll consider admission, IV antibiotics and fluids. Head CT did not show any acute process. No other acute abnormality is noted. ED MD EKG interpretation: NSR with rate 110 BPM. No ectopy noted. LAD noted. RBBB. No ectopy and no acute ischemic changes noted.  Elena Garnica MD

## 2018-12-15 NOTE — ED TRIAGE NOTES
Arrived via EMS from [facility]. Had a witnessed syncopal episode, hitting head. No obvious injury. Patient is POSITIVE for loss of consciousness,. ST low 100s. Patient in [de-identified] on room air, placed on 2L. Wears 02 PRN.

## 2018-12-15 NOTE — ED NOTES
Verbal report given to Jordan Fernandez RN(name) on Deanne Talley being transferred to [facility](unit) for routine progression of care    Report consisted of patient's Situation, Background, Assessment and Recommendations (SBAR)    Information from the following report(s)  SBAR, ED Summary, MAR, Recent Results and Cardiac Rhythm ST was reviewed with the receiving nurse. Opportunity for questions and clarification was provided.     Patient transported with:  Monitor  Registered Nurse    Last Filed Values:  Temp: 97.6 °F (36.4 °C) (12/15/18 1600)  Pulse (Heart Rate): (!) 115 (12/15/18 1600)  Resp Rate: 25 (12/15/18 1600)  O2 Sat (%): 92 % (12/15/18 1600)  BP: 147/80 (12/15/18 1600)  MAP (Monitor): 95 (12/15/18 1600)  MAP (Calculated): 93 (12/15/18 1427)  Level of Consciousness: Alert (12/15/18 1600)      Lab Results   Component Value Date/Time    WBC 9.7 12/15/2018 02:00 PM       Repeat LA:  Time Due 2019    Blood Cultures Drawn:  yes    Fluid Resuscitation:  Total needed 500cc, Status infusing    All Antibiotics Started:  yes, Dose Due NA    VS x 2 post-fluid resuscitation:   no  Fluids not done     Vasopressor Infusion:  no NO    Provider Reassessment needed and notified:  yes , Due 2019    Additional Interventions/Comments:

## 2018-12-15 NOTE — PROGRESS NOTES
Admission Medication Reconciliation:    Information obtained from:  [facility] medication list    Comments    Used medication list provided from [facility] to update. 1)  Medication changes (since last review): Added  -Duoneb  -Mucinex  -Tramadol  -benadryl cream         Allergies:  Aricept [donepezil] and Flexeril [cyclobenzaprine]    Significant PMH/Disease States:   Past Medical History:   Diagnosis Date    Hypercholesteremia 5/27/2010    IBS (irritable bowel syndrome) 5/27/2010    OA (osteoarthritis) 5/27/2010    Osteoporosis 5/27/2010       Chief Complaint for this Admission:    Chief Complaint   Patient presents with   Jaun Moody       Prior to Admission Medications:   Prior to Admission Medications   Prescriptions Last Dose Informant Patient Reported? Taking?   acetaminophen (TYLENOL) 325 mg tablet   Yes Yes   Sig: Take 650 mg by mouth two (2) times a day. acetaminophen (TYLENOL) 500 mg tablet   Yes Yes   Sig: Take 500 mg by mouth two (2) times daily as needed for Pain. albuterol-ipratropium (DUO-NEB) 2.5 mg-0.5 mg/3 ml nebu   Yes Yes   Sig: 3 mL by Nebulization route three (3) times daily as needed. calcium-vitamin D (OS-LUCÍA 500+D) 500 mg(1,250mg) -200 unit per tablet   Yes Yes   Sig: Take 1 Tab by mouth daily (with breakfast). cholecalciferol (VITAMIN D3) 1,000 unit cap   Yes Yes   Sig: Take 2,000 Units by mouth daily. diphenhydrAMINE-zinc acetate 1%-0.1% (BENADRYL ITCH STOPPING) topical cream   Yes Yes   Sig: Apply  to affected area daily as needed for Itching. guaiFENesin ER (MUCINEX) 600 mg ER tablet   Yes Yes   Sig: Take 600 mg by mouth two (2) times daily as needed for Congestion. losartan (COZAAR) 50 mg tablet   Yes Yes   Sig: Take 50 mg by mouth daily. memantine (NAMENDA) 10 mg tablet   Yes Yes   Sig: Take 10 mg by mouth two (2) times a day. multivitamin (ONE A DAY) tablet   Yes Yes   Sig: Take 1 Tab by mouth daily.    traMADol (ULTRAM) 50 mg tablet   Yes Yes   Sig: Take 50 mg by mouth every six (6) hours as needed for Pain.       Facility-Administered Medications: None

## 2018-12-16 ENCOUNTER — APPOINTMENT (OUTPATIENT)
Dept: CT IMAGING | Age: 83
DRG: 175 | End: 2018-12-16
Attending: HOSPITALIST
Payer: MEDICARE

## 2018-12-16 LAB
ALBUMIN SERPL-MCNC: 3 G/DL (ref 3.5–5)
ALBUMIN/GLOB SERPL: 0.9 {RATIO} (ref 1.1–2.2)
ALP SERPL-CCNC: 102 U/L (ref 45–117)
ALT SERPL-CCNC: 20 U/L (ref 12–78)
ANION GAP SERPL CALC-SCNC: 8 MMOL/L (ref 5–15)
AST SERPL-CCNC: 15 U/L (ref 15–37)
ATRIAL RATE: 110 BPM
BASOPHILS # BLD: 0 K/UL (ref 0–0.1)
BASOPHILS NFR BLD: 0 % (ref 0–1)
BILIRUB SERPL-MCNC: 0.5 MG/DL (ref 0.2–1)
BUN SERPL-MCNC: 28 MG/DL (ref 6–20)
BUN/CREAT SERPL: 24 (ref 12–20)
CALCIUM SERPL-MCNC: 9.4 MG/DL (ref 8.5–10.1)
CALCULATED P AXIS, ECG09: 44 DEGREES
CALCULATED R AXIS, ECG10: -12 DEGREES
CALCULATED T AXIS, ECG11: 1 DEGREES
CHLORIDE SERPL-SCNC: 109 MMOL/L (ref 97–108)
CO2 SERPL-SCNC: 21 MMOL/L (ref 21–32)
CREAT SERPL-MCNC: 1.16 MG/DL (ref 0.55–1.02)
DIAGNOSIS, 93000: NORMAL
DIFFERENTIAL METHOD BLD: ABNORMAL
EOSINOPHIL # BLD: 0 K/UL (ref 0–0.4)
EOSINOPHIL NFR BLD: 0 % (ref 0–7)
ERYTHROCYTE [DISTWIDTH] IN BLOOD BY AUTOMATED COUNT: 14.4 % (ref 11.5–14.5)
GLOBULIN SER CALC-MCNC: 3.3 G/DL (ref 2–4)
GLUCOSE SERPL-MCNC: 162 MG/DL (ref 65–100)
HCT VFR BLD AUTO: 42.2 % (ref 35–47)
HGB BLD-MCNC: 13.2 G/DL (ref 11.5–16)
IMM GRANULOCYTES # BLD: 0 K/UL (ref 0–0.04)
IMM GRANULOCYTES NFR BLD AUTO: 0 % (ref 0–0.5)
LYMPHOCYTES # BLD: 1.4 K/UL (ref 0.8–3.5)
LYMPHOCYTES NFR BLD: 13 % (ref 12–49)
MAGNESIUM SERPL-MCNC: 2.1 MG/DL (ref 1.6–2.4)
MCH RBC QN AUTO: 28.9 PG (ref 26–34)
MCHC RBC AUTO-ENTMCNC: 31.3 G/DL (ref 30–36.5)
MCV RBC AUTO: 92.5 FL (ref 80–99)
MONOCYTES # BLD: 0.5 K/UL (ref 0–1)
MONOCYTES NFR BLD: 5 % (ref 5–13)
NEUTS SEG # BLD: 9 K/UL (ref 1.8–8)
NEUTS SEG NFR BLD: 82 % (ref 32–75)
NRBC # BLD: 0 K/UL (ref 0–0.01)
NRBC BLD-RTO: 0 PER 100 WBC
P-R INTERVAL, ECG05: 176 MS
PHOSPHATE SERPL-MCNC: 3.1 MG/DL (ref 2.6–4.7)
PLATELET # BLD AUTO: 172 K/UL (ref 150–400)
PMV BLD AUTO: 11.2 FL (ref 8.9–12.9)
POTASSIUM SERPL-SCNC: 4.8 MMOL/L (ref 3.5–5.1)
PROT SERPL-MCNC: 6.3 G/DL (ref 6.4–8.2)
Q-T INTERVAL, ECG07: 336 MS
QRS DURATION, ECG06: 134 MS
QTC CALCULATION (BEZET), ECG08: 454 MS
RBC # BLD AUTO: 4.56 M/UL (ref 3.8–5.2)
SODIUM SERPL-SCNC: 138 MMOL/L (ref 136–145)
TSH SERPL DL<=0.05 MIU/L-ACNC: 0.33 UIU/ML (ref 0.36–3.74)
VENTRICULAR RATE, ECG03: 110 BPM
WBC # BLD AUTO: 11 K/UL (ref 3.6–11)

## 2018-12-16 PROCEDURE — 97116 GAIT TRAINING THERAPY: CPT

## 2018-12-16 PROCEDURE — 94760 N-INVAS EAR/PLS OXIMETRY 1: CPT

## 2018-12-16 PROCEDURE — 80053 COMPREHEN METABOLIC PANEL: CPT

## 2018-12-16 PROCEDURE — 83735 ASSAY OF MAGNESIUM: CPT

## 2018-12-16 PROCEDURE — 77010033678 HC OXYGEN DAILY

## 2018-12-16 PROCEDURE — 71275 CT ANGIOGRAPHY CHEST: CPT

## 2018-12-16 PROCEDURE — 74011000258 HC RX REV CODE- 258: Performed by: INTERNAL MEDICINE

## 2018-12-16 PROCEDURE — 36415 COLL VENOUS BLD VENIPUNCTURE: CPT

## 2018-12-16 PROCEDURE — 74011250637 HC RX REV CODE- 250/637: Performed by: HOSPITALIST

## 2018-12-16 PROCEDURE — 74011636320 HC RX REV CODE- 636/320: Performed by: INTERNAL MEDICINE

## 2018-12-16 PROCEDURE — 84443 ASSAY THYROID STIM HORMONE: CPT

## 2018-12-16 PROCEDURE — 74011000258 HC RX REV CODE- 258: Performed by: HOSPITALIST

## 2018-12-16 PROCEDURE — 65610000006 HC RM INTENSIVE CARE

## 2018-12-16 PROCEDURE — 74011250636 HC RX REV CODE- 250/636: Performed by: HOSPITALIST

## 2018-12-16 PROCEDURE — 97161 PT EVAL LOW COMPLEX 20 MIN: CPT

## 2018-12-16 PROCEDURE — 84100 ASSAY OF PHOSPHORUS: CPT

## 2018-12-16 PROCEDURE — 85025 COMPLETE CBC W/AUTO DIFF WBC: CPT

## 2018-12-16 RX ORDER — HEPARIN SODIUM 10000 [USP'U]/100ML
18-36 INJECTION, SOLUTION INTRAVENOUS
Status: DISCONTINUED | OUTPATIENT
Start: 2018-12-16 | End: 2018-12-16 | Stop reason: SDUPTHER

## 2018-12-16 RX ORDER — HEPARIN SODIUM 10000 [USP'U]/100ML
18-36 INJECTION, SOLUTION INTRAVENOUS
Status: DISCONTINUED | OUTPATIENT
Start: 2018-12-16 | End: 2018-12-20

## 2018-12-16 RX ORDER — HEPARIN SODIUM 5000 [USP'U]/ML
80 INJECTION, SOLUTION INTRAVENOUS; SUBCUTANEOUS ONCE
Status: COMPLETED | OUTPATIENT
Start: 2018-12-16 | End: 2018-12-16

## 2018-12-16 RX ORDER — SODIUM CHLORIDE 0.9 % (FLUSH) 0.9 %
10 SYRINGE (ML) INJECTION
Status: COMPLETED | OUTPATIENT
Start: 2018-12-16 | End: 2018-12-16

## 2018-12-16 RX ADMIN — IOPAMIDOL 100 ML: 755 INJECTION, SOLUTION INTRAVENOUS at 17:00

## 2018-12-16 RX ADMIN — CEFTRIAXONE 1 G: 1 INJECTION, POWDER, FOR SOLUTION INTRAMUSCULAR; INTRAVENOUS at 17:37

## 2018-12-16 RX ADMIN — LOSARTAN POTASSIUM 50 MG: 50 TABLET ORAL at 09:34

## 2018-12-16 RX ADMIN — SODIUM CHLORIDE 100 ML: 900 INJECTION, SOLUTION INTRAVENOUS at 17:00

## 2018-12-16 RX ADMIN — DOCUSATE SODIUM 100 MG: 100 CAPSULE, LIQUID FILLED ORAL at 17:38

## 2018-12-16 RX ADMIN — VITAMIN D, TAB 1000IU (100/BT) 1000 UNITS: 25 TAB at 09:34

## 2018-12-16 RX ADMIN — DOCUSATE SODIUM 100 MG: 100 CAPSULE, LIQUID FILLED ORAL at 09:34

## 2018-12-16 RX ADMIN — HEPARIN SODIUM AND DEXTROSE 18 UNITS/KG/HR: 10000; 5 INJECTION INTRAVENOUS at 18:22

## 2018-12-16 RX ADMIN — Medication 10 ML: at 14:00

## 2018-12-16 RX ADMIN — Medication 10 ML: at 22:19

## 2018-12-16 RX ADMIN — Medication 10 ML: at 17:00

## 2018-12-16 RX ADMIN — HEPARIN SODIUM 6500 UNITS: 5000 INJECTION INTRAVENOUS; SUBCUTANEOUS at 18:19

## 2018-12-16 RX ADMIN — CALCIUM CARBONATE-VITAMIN D TAB 500 MG-200 UNIT 1 TABLET: 500-200 TAB at 09:34

## 2018-12-16 RX ADMIN — MEMANTINE 10 MG: 10 TABLET ORAL at 17:45

## 2018-12-16 RX ADMIN — MEMANTINE 10 MG: 10 TABLET ORAL at 09:33

## 2018-12-16 RX ADMIN — DIPHENHYDRAMINE HYDROCHLORIDE 25 MG: 25 CAPSULE ORAL at 22:19

## 2018-12-16 NOTE — PROGRESS NOTES
Problem: Mobility Impaired (Adult and Pediatric)  Goal: *Acute Goals and Plan of Care (Insert Text)  Physical Therapy Goals  Initiated 12/16/2018  1. Patient will move from supine to sit and sit to supine  in bed with supervision/set-up within 7 day(s). 2.  Patient will transfer from bed to chair and chair to bed with supervision/set-up using the least restrictive device within 7 day(s). 3.  Patient will perform sit to stand with supervision/set-up within 7 day(s). 4.  Patient will ambulate with minimal assistance/contact guard assist for 100 feet with the least restrictive device within 7 day(s). physical Therapy EVALUATION (delayed entry)  Patient: Monica Mendoza (88 y.o. female)  Date: 12/16/2018  Primary Diagnosis: Hypoxia  Precautions:   Fall, Bed Alarm(Dementia, memory)    ASSESSMENT :  Based on the objective data described below, the patient presents with generally decreased strength, balance, coordination, and overall functional mobility, likely near her baseline status (?). Pt is a poor historian 2* memory and cognitive deficits. She remains appropriate for D/C back to 24/7 care at SNF once medically stable. Pt pleasant and agreeable to bed mobility, transfers, gait, and back to bed with meal set-up this morning. She remained 93% on 2 L NC (which she is supposed to be on at home per chart). BP stable supine to sit and no complaints of dizziness/lightheadedness with activity. Pt used a walker this date and recommend OOB to chair for all meals and walking to/from bathroom for toileting with assist x1 and DME during her acute stay to prevent further circulatory and pulmonary complications. Pt left in NAD with sitter and RN present. Patient will benefit from skilled intervention to address the above impairments.   Patients rehabilitation potential is considered to be Fair  Factors which may influence rehabilitation potential include:   []         None noted  [x]         Mental ability/status  []         Medical condition  []         Home/family situation and support systems  []         Safety awareness  []         Pain tolerance/management  []         Other:      PLAN :  Recommendations and Planned Interventions:  [x]           Bed Mobility Training             []    Neuromuscular Re-Education  [x]           Transfer Training                   []    Orthotic/Prosthetic Training  [x]           Gait Training                         []    Modalities  [x]           Therapeutic Exercises           []    Edema Management/Control  [x]           Therapeutic Activities            [x]    Patient and Family Training/Education  []           Other (comment):    Frequency/Duration: Patient will be followed by physical therapy  3 times a week to address goals. Discharge Recommendations: Skilled Nursing Facility  Further Equipment Recommendations for Discharge: RW     SUBJECTIVE:   Patient stated Nothing like this has ever happened to me before, I mean, I have had a stomach ache, but never a fall. .. Pt does not recall fall, just what others have told her. She repeats herself throughout session, minute to minute. OBJECTIVE DATA SUMMARY:   HISTORY:    Past Medical History:   Diagnosis Date    Hypercholesteremia 5/27/2010    IBS (irritable bowel syndrome) 5/27/2010    OA (osteoarthritis) 5/27/2010    Osteoporosis 5/27/2010   History reviewed. No pertinent surgical history.   Prior Level of Function/Home Situation: unknown  Personal factors and/or comorbidities impacting plan of care: memory deficits    Home Situation  Home Environment: [address] [place] [address] Name: [facility]  One/Two Story Residence: One story  Living Alone: No  Support Systems: Family member(s), Skilled nursing facility  Patient Expects to be Discharged to[de-identified] Skilled nursing facility  Current DME Used/Available at Home: (unknown)    EXAMINATION/PRESENTATION/DECISION MAKING:   Critical Behavior:  Neurologic State: Alert, Confused  Orientation Level: Oriented to person  Cognition: Follows commands, Impaired decision making, Memory loss, Poor safety awareness  Safety/Judgement: Fall prevention, Decreased insight into deficits, Decreased awareness of need for safety, Decreased awareness of need for assistance, Decreased awareness of environment  Hearing: Auditory  Auditory Impairment: None  Range Of Motion:  AROM: Generally decreased, functional                       Strength:    Strength: Generally decreased, functional                    Tone & Sensation:   Tone: Normal                              Coordination:  Coordination: Generally decreased, functional  Functional Mobility:  Bed Mobility:     Supine to Sit: Contact guard assistance; Additional time(HOB elevated and rail used)  Sit to Supine: Minimum assistance; Additional time  Scooting: Contact guard assistance; Additional time;Minimum assistance  Transfers:  Sit to Stand: Minimum assistance; Additional time(cues to push from EOB support-multiple trials for success)  Stand to Sit: Minimum assistance; Additional time(cues for alignment, reach back and controlled descent)                       Balance:   Sitting: Intact  Standing: Impaired; With support  Standing - Static: Fair  Standing - Dynamic : Fair  Ambulation/Gait Training:  Distance (ft): 12 Feet (ft)(x2)  Assistive Device: Gait belt;Walker, rolling  Ambulation - Level of Assistance: Minimal assistance; Additional time     Gait Description (WDL): Exceptions to WDL  Gait Abnormalities: Decreased step clearance; Path deviations              Speed/Casandra: Slow;Pace decreased (<100 feet/min)  Step Length: Right shortened;Left shortened  Functional Measure:  Barthel Index:    Bathin  Bladder: 5  Bowels: 5  Groomin  Dressin  Feedin  Mobility: 5  Stairs: 0  Toilet Use: 5  Transfer (Bed to Chair and Back): 10  Total: 40       Barthel and G-code impairment scale:  Percentage of impairment CH  0% CI  1-19% CJ  20-39% CK  40-59% CL  60-79% CM  80-99% CN  100%   Barthel Score 0-100 100 99-80 79-60 59-40 20-39 1-19   0   Barthel Score 0-20 20 17-19 13-16 9-12 5-8 1-4 0      The Barthel ADL Index: Guidelines  1. The index should be used as a record of what a patient does, not as a record of what a patient could do. 2. The main aim is to establish degree of independence from any help, physical or verbal, however minor and for whatever reason. 3. The need for supervision renders the patient not independent. 4. A patient's performance should be established using the best available evidence. Asking the patient, friends/relatives and nurses are the usual sources, but direct observation and common sense are also important. However direct testing is not needed. 5. Usually the patient's performance over the preceding 24-48 hours is important, but occasionally longer periods will be relevant. 6. Middle categories imply that the patient supplies over 50 per cent of the effort. 7. Use of aids to be independent is allowed. Jessica Ferrera., Barthel, DJoseW. (6169). Functional evaluation: the Barthel Index. [address] [facility] [employer] (14)2. Shelvy Roses anshu [place], MAYI.F, Walker Shankar.Erma., [place], [address] [place] [address] (1999). Measuring the change indisability after inpatient rehabilitation; comparison of the responsiveness of the Barthel Index and Functional [place] Measure. Journal of Neurology, Neurosurgery, and Psychiatry, 66(4), 195-761. Marques Chou, N.J.A, BENJA Colin, & Gary Garvin MDOMITILA. (2004.) Assessment of post-stroke quality of life in cost-effectiveness studies: The usefulness of the Barthel Index and the EuroQoL-5D. Quality of Life Research, 13, 871-29         G codes: In compliance with CMSs Claims Based Outcome Reporting, the following G-code set was chosen for this patient based on their primary functional limitation being treated:     The outcome measure chosen to determine the severity of the functional limitation was the Barthel with a score of 40/100 which was correlated with the impairment scale. ? Mobility - Walking and Moving Around:     - CURRENT STATUS: CK - 40%-59% impaired, limited or restricted    - GOAL STATUS: CJ - 20%-39% impaired, limited or restricted    - D/C STATUS:  ---------------To be determined---------------     Pain:  Pain Scale 1: Numeric (0 - 10)  Pain Intensity 1: 0  Activity Tolerance:   VSS on 2 L NC  Please refer to the flowsheet for vital signs taken during this treatment. After treatment:   []         Patient left in no apparent distress sitting up in chair  [x]         Patient left in no apparent distress in bed  [x]         Call bell left within reach  [x]         Nursing notified  [x]         Sitter present  []         Bed alarm activated    COMMUNICATION/EDUCATION:   The patients plan of care was discussed with: Registered Nurse. [x]         Fall prevention education was provided and the patient/caregiver indicated understanding. [x]         Patient/family have participated as able in goal setting and plan of care. [x]         Patient/family agree to work toward stated goals and plan of care. []         Patient understands intent and goals of therapy, but is neutral about his/her participation. []         Patient is unable to participate in goal setting and plan of care.     Thank you for this referral.  Lela Verma   Time Calculation: 23 mins

## 2018-12-16 NOTE — PROGRESS NOTES
1700 patient returns to unit from ct with swollen right lesa wrapped with coban removed iv;s and called pharmacy ice pack applied and elevate      1800 dr Chintan Milton on unit radiology called with positive pulm embolus. Orders received       1900 TRANSFER - OUT REPORT:    Verbal report given to sandra bray(name) on Johnathan Lo  being transferred to icu(unit) for urgent transfer       Report consisted of patients Situation, Background, Assessment and   Recommendations(SBAR). Information from the following report(s) Kardex, ED Summary, Procedure Summary, Intake/Output, MAR, Recent Results and Cardiac Rhythm nsr was reviewed with the receiving nurse. Lines:   Peripheral IV 12/16/18 Anterior;Left;Lower Arm (Active)        Opportunity for questions and clarification was provided. Patient transported with:     Waiting for room to be cleaned   Registered Nurse

## 2018-12-16 NOTE — PROGRESS NOTES
Hospitalist Progress Note  Shannon Qureshi MD  Answering service: [phone] OR [phone] from in house phone  Cell:       Date of Service:  2018  NAME:  Johnathan Lo  :  10/14/1929  MRN:  [id number]      Admission Summary:   80 y.o. female with past medical history significant for osteoarthritis, IBS, and dementia who presents from [address] [place] [address] via EMS with chief complaint of a fall. Per EMS, pt had a witnessed syncopal fall and hit her head as a result. EMS reported the pt's SpO2 was in the low 80's on room air and improved to low 90's on 2L via NC. Pt states she does not remember falling or hitting her head, but suspects she may have slipped on something. Of note, according to facility notes, pt was recently diagnosed with hypoxemia and wears O2 PRN. Pt denies any fever, chills, nausea, vomiting, changes in appetite, dysuria, or frequency.  denied chest pain     Interval history / Subjective:     Feels good. Denies sob. Keeps on taking Oxygen. Assessment & Plan:     1. Syncope head CT no acute changes. May have component of dehydration, UTI. 2. 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 Pending this admission. Started on Heparin drip with bolus. Transfer to higher level of care. Pulmonology consulted. Hemodynamically stable. Called her daughter and left message with her on voice mail. 2. Acute on Chronic hypoxia resp failure: pt was found to be hypoxic and had work up including chest CT in Aug this year. Her acute hypoxia at that  time possible due to not wearing Ox. Her Chest CT in : showed pleural thickening on the left without definite pleural effusion no pericardial effusion. . There is bibasilar chronic interstitial findings. Her hypoxia may be due to the chronic interstitial changes. But will follow V/Q scan and repeat CXR. She will need 24 hours O2. She is not orthostatic. 3. Sinus tachycardia: monitor for now, start IVF. 4. UTI: rocephin and IVF. 5. CKD stage 3: Cr worse than baseline. Follow renal function  Getting IV fluids. 6. Dementia:   7. Consult PT to make sure she is safe to walk  Code status: full  DVT prophylaxis: Lovenox    Care Plan discussed with: Patient/Family  Disposition: Home w/Family and TBD     Hospital Problems  Date Reviewed: 12/21/2011          Codes Class Noted POA    * (Principal) Syncope ICD-10-CM: R55  ICD-9-CM: 780.2  12/15/2018 Yes        UTI (urinary tract infection) ICD-10-CM: N39.0  ICD-9-CM: 599.0  12/15/2018 Unknown        KOLBY (acute kidney injury) ([facility] [address] ICD-10-CM: N17.9  ICD-9-CM: 584.9  12/15/2018 Unknown        Hypoxia ICD-10-CM: R09.02  ICD-9-CM: 799.02  8/22/2018 Yes                Review of Systems:   A comprehensive review of systems was negative except for that written in the HPI. Vital Signs:    Last 24hrs VS reviewed since prior progress note. Most recent are:  Visit Vitals  /71 (BP 1 Location: Left arm, BP Patient Position: Sitting)   Pulse 100   Temp 97.6 °F (36.4 °C)   Resp 14   Ht 5' 4\" (1.626 m)   Wt 81 kg (178 lb 9.2 oz)   SpO2 93%   BMI 30.65 kg/m²         Intake/Output Summary (Last 24 hours) at 12/16/2018 1100  Last data filed at 12/15/2018 1437  Gross per 24 hour   Intake --   Output 100 ml   Net -100 ml        Physical Examination:             Constitutional:  No acute distress, cooperative, pleasant    ENT:  Oral mucous moist, oropharynx benign. Neck supple,    Resp:  CTA bilaterally. No wheezing/rhonchi/rales. No accessory muscle use   CV:  Regular rhythm, normal rate, no murmurs, gallops, rubs    GI:  Soft, non distended, non tender. normoactive bowel sounds, no hepatosplenomegaly     Musculoskeletal:  No edema, warm, 2+ pulses throughout    Neurologic:  Moves all extremities.   AAOx3, CN II-XII reviewed     Skin:  Good turgor, no rashes or ulcers       Data Review:    Review and/or order of clinical lab test      Labs:     Recent Labs     12/16/18  0420 12/15/18  1400   WBC 11.0 9.7   HGB 13.2 14.7   HCT 42.2 47.7*    200     Recent Labs     12/16/18  0420 12/15/18  1400    139   K 4.8 4.5   * 104   CO2 21 27   BUN 28* 28*   CREA 1.16* 1.27*   * 129*   CA 9.4 9.4   MG 2.1  --    PHOS 3.1  --      Recent Labs     12/16/18  0420 12/15/18  1400   SGOT 15 18   ALT 20 25    116   TBILI 0.5 0.5   TP 6.3* 6.7   ALB 3.0* 3.3*   GLOB 3.3 3.4     Recent Labs     12/15/18  1400   INR 1.0   PTP 10.4   APTT 26.6      No results for input(s): FE, TIBC, PSAT, FERR in the last 72 hours. No results found for: FOL, RBCF   No results for input(s): PH, PCO2, PO2 in the last 72 hours. No results for input(s): CPK, CKNDX, TROIQ in the last 72 hours.     No lab exists for component: CPKMB  Lab Results   Component Value Date/Time    Cholesterol, total 153 02/23/2010 08:30 AM    HDL Cholesterol 54 02/23/2010 08:30 AM    LDL, calculated 88 02/23/2010 08:30 AM    Triglyceride 55 02/23/2010 08:30 AM    CHOL/HDL Ratio 2.8 02/23/2010 08:30 AM     No results found for: [facility]  Lab Results   Component Value Date/Time    Color YELLOW/STRAW 12/15/2018 02:41 PM    Appearance CLOUDY (A) 12/15/2018 02:41 PM    Specific gravity 1.025 12/15/2018 02:41 PM    pH (UA) 5.0 12/15/2018 02:41 PM    Protein NEGATIVE  12/15/2018 02:41 PM    Glucose NEGATIVE  12/15/2018 02:41 PM    Ketone NEGATIVE  12/15/2018 02:41 PM    Bilirubin NEGATIVE  12/15/2018 02:41 PM    Urobilinogen 0.2 12/15/2018 02:41 PM    Nitrites POSITIVE (A) 12/15/2018 02:41 PM    Leukocyte Esterase MODERATE (A) 12/15/2018 02:41 PM    Epithelial cells FEW 12/15/2018 02:41 PM    Bacteria 4+ (A) 12/15/2018 02:41 PM    WBC >100 (H) 12/15/2018 02:41 PM    RBC 0-5 12/15/2018 02:41 PM         Medications Reviewed:     Current Facility-Administered Medications   Medication Dose Route Frequency    0.9% sodium chloride infusion  75 mL/hr IntraVENous CONTINUOUS    cefTRIAXone (ROCEPHIN) 1 g in 0.9% sodium chloride (MBP/ADV) 50 mL  1 g IntraVENous Q24H    losartan (COZAAR) tablet 50 mg  50 mg Oral DAILY    memantine (NAMENDA) tablet 10 mg  10 mg Oral BID    cholecalciferol (VITAMIN D3) tablet 1,000 Units  1,000 Units Oral DAILY    calcium-vitamin D (OS-LUCÍA) 500 mg-200 unit tablet  1 Tab Oral DAILY WITH BREAKFAST    sodium chloride (NS) flush 5-10 mL  5-10 mL IntraVENous Q8H    sodium chloride (NS) flush 5-10 mL  5-10 mL IntraVENous PRN    acetaminophen (TYLENOL) tablet 650 mg  650 mg Oral Q4H PRN    diphenhydrAMINE (BENADRYL) capsule 25 mg  25 mg Oral Q4H PRN    docusate sodium (COLACE) capsule 100 mg  100 mg Oral BID    ondansetron (ZOFRAN) injection 4 mg  4 mg IntraVENous Q4H PRN     ______________________________________________________________________  EXPECTED LENGTH OF STAY: - - -  ACTUAL LENGTH OF STAY:          1                 Martínez Escalera MD

## 2018-12-17 LAB
ANION GAP SERPL CALC-SCNC: 4 MMOL/L (ref 5–15)
APTT PPP: 123.9 SEC (ref 22.1–32)
APTT PPP: 35 SEC (ref 22.1–32)
APTT PPP: 49.8 SEC (ref 22.1–32)
APTT PPP: 94 SEC (ref 22.1–32)
BACTERIA SPEC CULT: ABNORMAL
BUN SERPL-MCNC: 26 MG/DL (ref 6–20)
BUN/CREAT SERPL: 22 (ref 12–20)
CALCIUM SERPL-MCNC: 9 MG/DL (ref 8.5–10.1)
CC UR VC: ABNORMAL
CHLORIDE SERPL-SCNC: 109 MMOL/L (ref 97–108)
CO2 SERPL-SCNC: 26 MMOL/L (ref 21–32)
CREAT SERPL-MCNC: 1.16 MG/DL (ref 0.55–1.02)
ERYTHROCYTE [DISTWIDTH] IN BLOOD BY AUTOMATED COUNT: 14.4 % (ref 11.5–14.5)
GLUCOSE SERPL-MCNC: 140 MG/DL (ref 65–100)
HCT VFR BLD AUTO: 42.8 % (ref 35–47)
HGB BLD-MCNC: 13.2 G/DL (ref 11.5–16)
MCH RBC QN AUTO: 28.9 PG (ref 26–34)
MCHC RBC AUTO-ENTMCNC: 30.8 G/DL (ref 30–36.5)
MCV RBC AUTO: 93.9 FL (ref 80–99)
NRBC # BLD: 0 K/UL (ref 0–0.01)
NRBC BLD-RTO: 0 PER 100 WBC
PLATELET # BLD AUTO: 149 K/UL (ref 150–400)
PMV BLD AUTO: 11.7 FL (ref 8.9–12.9)
POTASSIUM SERPL-SCNC: 4.6 MMOL/L (ref 3.5–5.1)
RBC # BLD AUTO: 4.56 M/UL (ref 3.8–5.2)
SERVICE CMNT-IMP: ABNORMAL
SODIUM SERPL-SCNC: 139 MMOL/L (ref 136–145)
THERAPEUTIC RANGE,PTTT: ABNORMAL SECS (ref 58–77)
WBC # BLD AUTO: 10.6 K/UL (ref 3.6–11)

## 2018-12-17 PROCEDURE — G8988 SELF CARE GOAL STATUS: HCPCS

## 2018-12-17 PROCEDURE — 65660000000 HC RM CCU STEPDOWN

## 2018-12-17 PROCEDURE — 80048 BASIC METABOLIC PNL TOTAL CA: CPT

## 2018-12-17 PROCEDURE — 97165 OT EVAL LOW COMPLEX 30 MIN: CPT

## 2018-12-17 PROCEDURE — 74011250637 HC RX REV CODE- 250/637: Performed by: INTERNAL MEDICINE

## 2018-12-17 PROCEDURE — 74011250636 HC RX REV CODE- 250/636: Performed by: HOSPITALIST

## 2018-12-17 PROCEDURE — 93306 TTE W/DOPPLER COMPLETE: CPT

## 2018-12-17 PROCEDURE — 77010033678 HC OXYGEN DAILY

## 2018-12-17 PROCEDURE — 85730 THROMBOPLASTIN TIME PARTIAL: CPT

## 2018-12-17 PROCEDURE — 36415 COLL VENOUS BLD VENIPUNCTURE: CPT

## 2018-12-17 PROCEDURE — 97535 SELF CARE MNGMENT TRAINING: CPT

## 2018-12-17 PROCEDURE — G8987 SELF CARE CURRENT STATUS: HCPCS

## 2018-12-17 PROCEDURE — 74011250637 HC RX REV CODE- 250/637: Performed by: HOSPITALIST

## 2018-12-17 PROCEDURE — 74011000258 HC RX REV CODE- 258: Performed by: HOSPITALIST

## 2018-12-17 PROCEDURE — 85027 COMPLETE CBC AUTOMATED: CPT

## 2018-12-17 RX ORDER — METOPROLOL TARTRATE 25 MG/1
25 TABLET, FILM COATED ORAL 2 TIMES DAILY
Status: DISCONTINUED | OUTPATIENT
Start: 2018-12-17 | End: 2018-12-21 | Stop reason: HOSPADM

## 2018-12-17 RX ORDER — HEPARIN SODIUM 5000 [USP'U]/ML
3200 INJECTION, SOLUTION INTRAVENOUS; SUBCUTANEOUS ONCE
Status: COMPLETED | OUTPATIENT
Start: 2018-12-17 | End: 2018-12-17

## 2018-12-17 RX ADMIN — Medication 10 ML: at 21:29

## 2018-12-17 RX ADMIN — MEMANTINE 10 MG: 10 TABLET ORAL at 08:34

## 2018-12-17 RX ADMIN — Medication 10 ML: at 14:30

## 2018-12-17 RX ADMIN — HEPARIN SODIUM AND DEXTROSE 14 UNITS/KG/HR: 10000; 5 INJECTION INTRAVENOUS at 23:34

## 2018-12-17 RX ADMIN — CEFTRIAXONE 1 G: 1 INJECTION, POWDER, FOR SOLUTION INTRAMUSCULAR; INTRAVENOUS at 15:55

## 2018-12-17 RX ADMIN — MEMANTINE 10 MG: 10 TABLET ORAL at 17:39

## 2018-12-17 RX ADMIN — LOSARTAN POTASSIUM 50 MG: 50 TABLET ORAL at 08:34

## 2018-12-17 RX ADMIN — DOCUSATE SODIUM 100 MG: 100 CAPSULE, LIQUID FILLED ORAL at 08:34

## 2018-12-17 RX ADMIN — SODIUM CHLORIDE 75 ML/HR: 900 INJECTION, SOLUTION INTRAVENOUS at 02:25

## 2018-12-17 RX ADMIN — HEPARIN SODIUM 3200 UNITS: 5000 INJECTION INTRAVENOUS; SUBCUTANEOUS at 23:41

## 2018-12-17 RX ADMIN — VITAMIN D, TAB 1000IU (100/BT) 1000 UNITS: 25 TAB at 08:34

## 2018-12-17 RX ADMIN — DOCUSATE SODIUM 100 MG: 100 CAPSULE, LIQUID FILLED ORAL at 17:39

## 2018-12-17 RX ADMIN — CALCIUM CARBONATE-VITAMIN D TAB 500 MG-200 UNIT 1 TABLET: 500-200 TAB at 08:34

## 2018-12-17 RX ADMIN — METOPROLOL TARTRATE 25 MG: 25 TABLET ORAL at 21:28

## 2018-12-17 RX ADMIN — METOPROLOL TARTRATE 25 MG: 25 TABLET ORAL at 11:00

## 2018-12-17 NOTE — PROGRESS NOTES
Problem: Self Care Deficits Care Plan (Adult)  Goal: *Acute Goals and Plan of Care (Insert Text)  Occupational Therapy Goals  Initiated 12/17/2018  1. Patient will perform upper body dressing with supervision/set-up within 7 day(s). 2.  Patient will perform grooming standing with supervision/set-up within 7 day(s). 3.  Patient will perform lower body dressing with minimal assistance/contact guard assist within 7 day(s). 4.  Patient will perform toilet transfers with supervision/set-up within 7 day(s). 5.  Patient will perform all aspects of toileting minimal assistance within 7 day(s). 6.  Patient will participate in upper extremity therapeutic exercise/activities with supervision/set-up for 5 minutes within 7 day(s). 7.  Patient will utilize energy conservation techniques during functional activities with verbal cues within 7 day(s). Occupational Therapy EVALUATION  Patient: Skyler Johnson (89 y.o. female)  Date: 12/17/2018  Primary Diagnosis: Hypoxia       Precautions:  Fall, Bed Alarm    ASSESSMENT :  Based on the objective data described below, the patient presents with largely supervision-MOD A for ADL transfers and tasks secondary to the following performance deficits including but not limited to: generalized weakness, confusion, decreased endurance (4L NC O2 and RUDOLPH with activity), fatigue, decreased functional mobility, decreased safety awareness, decreased problem-solving, decreased attention. Limited evaluation today secondary to patient with decreased activity tolerance, irritability, and elevated BP following supine > sit transfer (163//96) and -109 bpm during session (RN notified). Per patient report, PTA she lived at [address] [place] [address] but sometimes needed assisted living-patient poor historian at this time. Anticipate need for SNF rehab vs. Transition back to [address] [place] [address].      Patient will benefit from skilled intervention to address the above impairments. Patients rehabilitation potential is considered to be Good  Factors which may influence rehabilitation potential include:   [x]             None noted  []             Mental ability/status  []             Medical condition  []             Home/family situation and support systems  []             Safety awareness  []             Pain tolerance/management  []             Other:      PLAN :  Recommendations and Planned Interventions:  [x]               Self Care Training                  [x]        Therapeutic Activities  [x]               Functional Mobility Training    [x]        Cognitive Retraining  [x]               Therapeutic Exercises           [x]        Endurance Activities  [x]               Balance Training                   [x]        Neuromuscular Re-Education  []               Visual/Perceptual Training     [x]   Home Safety Training  [x]               Patient Education                 [x]        Family Training/Education  []               Other (comment):    Frequency/Duration: Patient will be followed by occupational therapy 3 times a week to address goals. Discharge Recommendations: Skilled Nursing Facility  Further Equipment Recommendations for Discharge: TBD     SUBJECTIVE:   Patient stated I am 80years old there is only so much I can do.     OBJECTIVE DATA SUMMARY:   HISTORY:   Past Medical History:   Diagnosis Date    Hypercholesteremia 5/27/2010    IBS (irritable bowel syndrome) 5/27/2010    OA (osteoarthritis) 5/27/2010    Osteoporosis 5/27/2010   History reviewed. No pertinent surgical history. Prior Level of Function/Environment/Context: PTA, patient lived at [facility] (ILF vs. long term-patient poor historian). Patient reports she was IND for bathing and dressing and uses a tub/shower.    Expanded or extensive additional review of patient history:     Home Situation  Home Environment: [address] Name: [facility]  One/Two Story Residence: One story  Living Alone: Yes  Support Systems: Family member(s)  Patient Expects to be Discharged to[de-identified] Assisted living  Current DME Used/Available at Home: (unknown)  Tub or Shower Type: Tub/Shower combination(per patient report however questionable)    Hand dominance: Right    EXAMINATION OF PERFORMANCE DEFICITS:  Cognitive/Behavioral Status:  Neurologic State: Confused  Orientation Level: Oriented to person;Disoriented to place; Disoriented to situation;Disoriented to time  Cognition: Poor safety awareness;Decreased attention/concentration  Perception: Appears intact  Perseveration: Perseverates during conversation  Safety/Judgement: Decreased insight into deficits; Decreased awareness of need for safety;Decreased awareness of need for assistance;Decreased awareness of environment    Skin: IV in RUE; all other exposed areas grossly intact    Hearing: Auditory  Auditory Impairment: None      Range of Motion:  BUE  AROM: Generally decreased, functional                         Strength:  BUE  Strength: Generally decreased, functional                Coordination:  Coordination: Generally decreased, functional  Fine Motor Skills-Upper: Left Impaired;Right Impaired    Gross Motor Skills-Upper: Left Intact; Right Intact    Tone & Sensation:  BUE  Tone: Normal                         Balance:  Sitting: Impaired; Without support  Sitting - Static: Fair (occasional)  Sitting - Dynamic: Fair (occasional)    Functional Mobility and Transfers for ADLs:  Bed Mobility:  Rolling: Minimum assistance  Supine to Sit: Minimum assistance;Assist x1;Additional time; Adaptive equipment(bed rails)  Sit to Supine: Minimum assistance;Assist x1;Additional time  Scooting: Minimum assistance;Assist x1;Additional time    Transfers:       ADL Assessment:  Feeding: Supervision(infer based on FM coordination/cognition)    Oral Facial Hygiene/Grooming: Supervision;Setup(infer based on FM coordination and decreased mobility)    Bathing:  Moderate assistance(infer based on BLE dexterity and balance)    Upper Body Dressing: Moderate assistance(infer based on sitting balance and problem-solving)    Lower Body Dressing: Moderate assistance(socks)    Toileting: Total assistance(infer based on balance/cognition)                ADL Intervention and task modifications:     Patient sat EOB for about 5 minutes to participate in LB dressing task with MOD A to maintain sitting balance and patient performing tailor sit. Patient returning to bed following t/f secondary to patient reports of fatigue. Lower Body Dressing Assistance  Socks: Moderate assistance  Leg Crossed Method Used: Yes  Position Performed: Seated edge of bed  Cues: Physical assistance;Verbal cues provided;Doff;Don         Cognitive Retraining  Safety/Judgement: Decreased insight into deficits; Decreased awareness of need for safety;Decreased awareness of need for assistance;Decreased awareness of environment    Functional Measure:  Barthel Index:    Bathin  Bladder: 5  Bowels: 5  Groomin  Dressin  Feedin  Mobility: 5  Stairs: 0  Toilet Use: 0  Transfer (Bed to Chair and Back): 0  Total: 30       Barthel and G-code impairment scale:  Percentage of impairment CH  0% CI  1-19% CJ  20-39% CK  40-59% CL  60-79% CM  80-99% CN  100%   Barthel Score 0-100 100 99-80 79-60 59-40 20-39 1-19   0   Barthel Score 0-20 20 17-19 13-16 9-12 5-8 1-4 0      The Barthel ADL Index: Guidelines  1. The index should be used as a record of what a patient does, not as a record of what a patient could do. 2. The main aim is to establish degree of independence from any help, physical or verbal, however minor and for whatever reason. 3. The need for supervision renders the patient not independent. 4. A patient's performance should be established using the best available evidence.  Asking the patient, friends/relatives and nurses are the usual sources, but direct observation and common sense are also important. However direct testing is not needed. 5. Usually the patient's performance over the preceding 24-48 hours is important, but occasionally longer periods will be relevant. 6. Middle categories imply that the patient supplies over 50 per cent of the effort. 7. Use of aids to be independent is allowed. Casper Bitter., Barthel, D.W. (7256). Functional evaluation: the Barthel Index. [address] [facility] (14)2. [facility] Center[facility] Scott anshu JOSE RAUL Wheeler, Brandin Lo., Nicanor Singh., [place], [address] [place] [address] (1999). Measuring the change indisability after inpatient rehabilitation; comparison of the responsiveness of the Barthel Index and Functional [place] Measure. Journal of Neurology, Neurosurgery, and Psychiatry, 66(4), 255-983. MORRO Mercedes, BENJA Colin, & Thuan Lawrence M.A. (2004.) Assessment of post-stroke quality of life in cost-effectiveness studies: The usefulness of the Barthel Index and the EuroQoL-5D. Quality of Life Research, 13, 185-75       G codes: In compliance with CMSs Claims Based Outcome Reporting, the following G-code set was chosen for this patient based on their primary functional limitation being treated: The outcome measure chosen to determine the severity of the functional limitation was the Barthel Index with a score of 30/100 which was correlated with the impairment scale. ?  Self Care:     - CURRENT STATUS: CL - 60%-79% impaired, limited or restricted    - GOAL STATUS: CJ - 20%-39% impaired, limited or restricted    - D/C STATUS:  ---------------To be determined---------------     Occupational Therapy Evaluation Charge Determination   History Examination Decision-Making   LOW Complexity : Brief history review  MEDIUM Complexity : 3-5 performance deficits relating to physical, cognitive , or psychosocial skils that result in activity limitations and / or participation restrictions MEDIUM Complexity : Patient may present with comorbidities that affect occupational performnce. Miniml to moderate modification of tasks or assistance (eg, physical or verbal ) with assesment(s) is necessary to enable patient to complete evaluation       Based on the above components, the patient evaluation is determined to be of the following complexity level: LOW   Pain:  Pain Scale 1: Numeric (0 - 10)  Pain Intensity 1: 0              Activity Tolerance:   Elevated BP (see assessment)  Please refer to the flowsheet for vital signs taken during this treatment. After treatment:   [] Patient left in no apparent distress sitting up in chair  [x] Patient left in no apparent distress in bed  [x] Call bell left within reach  [x] Nursing notified  [x] Sitter present  [x] Bed alarm activated    COMMUNICATION/EDUCATION:   The patients plan of care was discussed with: Physical Therapist and Registered Nurse.  [] Home safety education was provided and the patient/caregiver indicated understanding. [] Patient/family have participated as able in goal setting and plan of care. [] Patient/family agree to work toward stated goals and plan of care. [] Patient understands intent and goals of therapy, but is neutral about his/her participation. [x] Patient is unable to participate in goal setting and plan of care. This patients plan of care is appropriate for delegation to [facility].     Thank you for this referral.  Surekha Egan  Time Calculation: 23 mins

## 2018-12-17 NOTE — PROGRESS NOTES
Attempted Initial Spiritual Assessment in ICU 10. Unable to assess patients needs. No family present at this time. Consulted with Pt sitter. Pt seemed confused. Pt was asking questions that were difficult to answer.  could not make a Spiritual Assessment at this time.  offered continued prayer and spiritual Support. Chaplains will follow as able and/or as needed.       Radha Newberry  Intern, MDiv  [phone])

## 2018-12-17 NOTE — PROGRESS NOTES
1940   Room is now ready/ transfer report has been received from 3N RN, critical care transport currently trying to obtain PIV site on this patient who will be arriving to Room 10 ICU after this occurs. 1955  Patient arrived from [facility] with critical care transport team member, previous RN and sitter. 2135  Daughter updated at 2135  Right AC continues to be swollen, patient refuses to have ice on the area any further,  Sitter at bedside    2315  Left AC bandage changed from blood saturation replaced with gauze and compression wrap. Sitter at bedside    0030  Patient refuses to have blood drawn for scheduled PTT for heparin gtt, will continue to attempt completion of lab. She is insisting that we call her  downstairs among other and continues to be alert to self only as her baseline. Sitter at bedside    0248  Left AC bandage changed from blood saturation replaced with gauze and compression wrap. Sitter at bedside    EOS  Bedside and Verbal shift change report given to oncoming nurse. Report included the following information SBAR, Kardex, Intake/Output, MAR and Recent Results.

## 2018-12-17 NOTE — PROGRESS NOTES
Hospitalist Progress Note  Jason Winslow MD  Answering service: [phone] OR [phone] from in house phone  Cell:       Date of Service:  2018  NAME:  Gokul Burgos  :  10/14/1929  MRN:  [id number]      Admission Summary:   80 y.o. female with past medical history significant for osteoarthritis, IBS, and dementia who presents from [address] [place] [address] via EMS with chief complaint of a fall. Per EMS, pt had a witnessed syncopal fall and hit her head as a result. EMS reported the pt's SpO2 was in the low 80's on room air and improved to low 90's on 2L via NC. Pt states she does not remember falling or hitting her head, but suspects she may have slipped on something. Of note, according to facility notes, pt was recently diagnosed with hypoxemia and wears O2 PRN. Pt denies any fever, chills, nausea, vomiting, changes in appetite, dysuria, or frequency.  denied chest pain     Interval history / Subjective:     Feels good. Denies sob. Keeps on taking Oxygen. Assessment & Plan:     1. Syncope head CT no acute changes. May have component of dehydration, UTI. 2. 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  - pending repeat ECHO report   - Started on Heparin drip with bolus. - cont for now  - will d/w pulmonary for further MGMT   - Called her daughter and left message with her on voice mail. - pt will not be a good candidate for any type of NOAC or coumadin due to multiple falls and dementia     2. Acute on Chronic hypoxia resp failure  Possibly due to above    3. Sinus tachycardia: monitor for now, start IVF. 4. UTI: rocephin and IVF. C/S GNR > 100k     5. CKD stage 3: Cr worse than baseline. Follow renal function  Getting IV fluids. 6. Dementia: without behavioral disturbbance    7.  Consult PT to make sure she is safe to walk    Code status: DDNR 12/17/18  scanned on file  DVT prophylaxis: Lovenox    Care Plan discussed with: Patient/Family  Disposition: Home w/Family and TBD     Hospital Problems  Date Reviewed: 12/21/2011          Codes Class Noted POA    * (Principal) Syncope ICD-10-CM: R55  ICD-9-CM: 780.2  12/15/2018 Yes        UTI (urinary tract infection) ICD-10-CM: N39.0  ICD-9-CM: 599.0  12/15/2018 Unknown        KOLBY (acute kidney injury) ([facility] [address] ICD-10-CM: N17.9  ICD-9-CM: 584.9  12/15/2018 Unknown        Hypoxia ICD-10-CM: R09.02  ICD-9-CM: 799.02  8/22/2018 Yes                Review of Systems:   A comprehensive review of systems was negative except for that written in the HPI. Vital Signs:    Last 24hrs VS reviewed since prior progress note. Most recent are:  Visit Vitals  /81   Pulse 91   Temp 97.9 °F (36.6 °C)   Resp 23   Ht 5' 4\" (1.626 m)   Wt 81 kg (178 lb 9.2 oz)   SpO2 99%   BMI 30.65 kg/m²         Intake/Output Summary (Last 24 hours) at 12/17/2018 1256  Last data filed at 12/17/2018 1200  Gross per 24 hour   Intake 3452.94 ml   Output 200 ml   Net 3252.94 ml        Physical Examination:             Constitutional:  No acute distress, cooperative,    ENT:  Oral mucous moist,    Resp:  CTA bilaterally. CV:  Regular rhythm, normal rate,    GI:  Soft, non distended, non tender bs=    Musculoskeletal:  No edema, warm, 2+ pulses throughout    Neurologic:  Moves all extremities. AAOx0,            Data Review:    Review and/or order of clinical lab test    Xr Chest Pa Lat    Result Date: 12/15/2018  Impression: Unchanged cardiomegaly. Ct Head Wo Cont    Result Date: 12/15/2018  IMPRESSION: No evidence of acute process. 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 The findings were called to Dr. Ching Aguirre on 12/16/2018 at 17:58 by myself.   [address] [facility]: Recent Labs     12/17/18  0257 12/16/18  0420   WBC 10.6 11.0   HGB 13.2 13.2   HCT 42.8 42.2   * 172     Recent Labs     12/17/18  0257 12/16/18  0420 12/15/18  1400    138 139   K 4.6 4.8 4.5   * 109* 104   CO2 26 21 27   BUN 26* 28* 28*   CREA 1.16* 1.16* 1.27*   * 162* 129*   CA 9.0 9.4 9.4   MG  --  2.1  --    PHOS  --  3.1  --      Recent Labs     12/16/18  0420 12/15/18  1400   SGOT 15 18   ALT 20 25    116   TBILI 0.5 0.5   TP 6.3* 6.7   ALB 3.0* 3.3*   GLOB 3.3 3.4     Recent Labs     12/17/18  0714 12/17/18 0257 12/15/18  1400   INR  --   --  1.0   PTP  --   --  10.4   APTT 35.0* 123.9* 26.6      No results for input(s): FE, TIBC, PSAT, FERR in the last 72 hours. No results found for: FOL, RBCF   No results for input(s): PH, PCO2, PO2 in the last 72 hours. No results for input(s): CPK, CKNDX, TROIQ in the last 72 hours.     No lab exists for component: CPKMB  Lab Results   Component Value Date/Time    Cholesterol, total 153 02/23/2010 08:30 AM    HDL Cholesterol 54 02/23/2010 08:30 AM    LDL, calculated 88 02/23/2010 08:30 AM    Triglyceride 55 02/23/2010 08:30 AM    CHOL/HDL Ratio 2.8 02/23/2010 08:30 AM     No results found for: Leah Pittman  Lab Results   Component Value Date/Time    Color YELLOW/STRAW 12/15/2018 02:41 PM    Appearance CLOUDY (A) 12/15/2018 02:41 PM    Specific gravity 1.025 12/15/2018 02:41 PM    pH (UA) 5.0 12/15/2018 02:41 PM    Protein NEGATIVE  12/15/2018 02:41 PM    Glucose NEGATIVE  12/15/2018 02:41 PM    Ketone NEGATIVE  12/15/2018 02:41 PM    Bilirubin NEGATIVE  12/15/2018 02:41 PM    Urobilinogen 0.2 12/15/2018 02:41 PM    Nitrites POSITIVE (A) 12/15/2018 02:41 PM    Leukocyte Esterase MODERATE (A) 12/15/2018 02:41 PM    Epithelial cells FEW 12/15/2018 02:41 PM    Bacteria 4+ (A) 12/15/2018 02:41 PM    WBC >100 (H) 12/15/2018 02:41 PM    RBC 0-5 12/15/2018 02:41 PM         Medications Reviewed:     Current Facility-Administered Medications Medication Dose Route Frequency    metoprolol tartrate (LOPRESSOR) tablet 25 mg  25 mg Oral BID    heparin 25,000 units in D5W 250 ml infusion  18-36 Units/kg/hr IntraVENous TITRATE    cefTRIAXone (ROCEPHIN) 1 g in 0.9% sodium chloride (MBP/ADV) 50 mL  1 g IntraVENous Q24H    losartan (COZAAR) tablet 50 mg  50 mg Oral DAILY    memantine (NAMENDA) tablet 10 mg  10 mg Oral BID    cholecalciferol (VITAMIN D3) tablet 1,000 Units  1,000 Units Oral DAILY    calcium-vitamin D (OS-LUCÍA) 500 mg-200 unit tablet  1 Tab Oral DAILY WITH BREAKFAST    sodium chloride (NS) flush 5-10 mL  5-10 mL IntraVENous Q8H    sodium chloride (NS) flush 5-10 mL  5-10 mL IntraVENous PRN    acetaminophen (TYLENOL) tablet 650 mg  650 mg Oral Q4H PRN    diphenhydrAMINE (BENADRYL) capsule 25 mg  25 mg Oral Q4H PRN    docusate sodium (COLACE) capsule 100 mg  100 mg Oral BID    ondansetron (ZOFRAN) injection 4 mg  4 mg IntraVENous Q4H PRN     ______________________________________________________________________  EXPECTED LENGTH OF STAY: 4d 7h  ACTUAL LENGTH OF STAY:          2                 Eloy Benitez MD

## 2018-12-17 NOTE — WOUND CARE
Wound Consult:  New Patient Visit. Chart reviewed. Consulted for rash under breast.  Spoke with patients nurse,  Jesse Pisano and in with sitter who assisted in repositioning patient up in bed. Patient is resting on a SPORT bed. She denies pain; she has some dementia; talked with her for a while before attempting to assess and she was fine with us assessing her skin and providing care. Assessment:  Breasts - skin intact under breasts, no odor; minimally pink. No rash in groin/pannus. No discoloration to buttocks, sacrum or heels. Little area of redness on left neck; patient tells me area itches and she is scratching some at area. Treatment:  Applied some aloe vesta ointment to neck. Skin Care Recommendations:  1. Minimize friction/shear: minimize layers of linen/pads under patient. 2. Off load pressure/reposition: continue to turn and reposition approximately every 2 hours; float heels as needed. 3. Manage Moisture - keep skin folds dry; incontinence skin care as needed;patient tells me she knows when she needs to go to bathroom. 4. Continue to monitor nutrition, pain, and skin risk scale, and skin assessment. Plan:  Spoke with Dr. Sydney Phipps at bedside regarding findings and obtained orders for treatment. Please re-consult as needed.   Stephen Contreras, [address] [facility] Office [phone]  Pager [phone]

## 2018-12-17 NOTE — PROGRESS NOTES
PULMONARY/CRITICAL CARE/SLEEP MEDICINE    Physician Consultation Note    Name: Mahesh Silverman   : 10/14/1929   MRN: [id number]   Date: 2018      Subjective:       Medical records and data reviewed. Seen and examined. Awake. Says she does not have pain and is comfortable and does not need treatment with pills or medications. Echo is pending. Hemodynamically stable    Review of Systems:     A comprehensive 12 system review of systems was negative except for as documented in HPI    Assessment:   1. Pulmonary embolism, large volume. The patient is completely stable from a hemodynamic standpoint. She does not have tachycardia or tachypnea. Her blood pressure is more than satisfactory. Therefore she does not require the use of thrombolytic agents at this time. Intravenous heparin is the appropriate therapy. 2.  Dementia. 3.  Chronic kidney disease. 4.  Recent ground level fall secondary to syncope from PE  5. History of irritable bowel syndrome. 6.  Minimal chronic interstitial lung disease. Recommendations: On IV heparin  Fall leading to admission appears to have been a syncopal episode- chart does not mention history of recurrent falls that would increase risks with OACs-- reconfirm and would suggest reversible OAC like warfarin for treayment of PE  BP control  On home O2  Follow echo but risks of consideration of alternate treatment of submassive PE outweight benefits in this elderly patient with dementia  Transfer to tele  D/W RN      Active Problem List:     Problem List  Date Reviewed: 2011          Codes Class    * (Principal) Syncope ICD-10-CM: R55  ICD-9-CM: 780.2         UTI (urinary tract infection) ICD-10-CM: N39.0  ICD-9-CM: 599.0         KOLBY (acute kidney injury) ([facility] [address] ICD-10-CM: N17.9  ICD-9-CM: 029. 9         Fall ICD-10-CM: [address]  ICD-9-CM: E888.9         Unwitnessed fall ICD-10-CM: R29.6  ICD-9-CM: KLH5001         Hypoxia ICD-10-CM: R09.02  ICD-9-CM: 799.02 Hypercholesteremia ICD-10-CM: E78.00  ICD-9-CM: 272.0     Overview Signed 5/27/2010 12:56 PM by Sameera Brar     1980's             Osteoporosis ICD-10-CM: M81.0  ICD-9-CM: 733.00         IBS (irritable bowel syndrome) ICD-10-CM: K58.9  ICD-9-CM: 564.1         OA (osteoarthritis) ICD-10-CM: M19.90  ICD-9-CM: 715.90               Past Medical History:      has a past medical history of Hypercholesteremia, IBS (irritable bowel syndrome), OA (osteoarthritis), and Osteoporosis. Past Surgical History:      has no past surgical history on file. Home Medications:     Prior to Admission medications    Medication Sig Start Date End Date Taking? Authorizing Provider   albuterol-ipratropium (DUO-NEB) 2.5 mg-0.5 mg/3 ml nebu 3 mL by Nebulization route three (3) times daily as needed. Yes Provider, Historical   traMADol (ULTRAM) 50 mg tablet Take 50 mg by mouth every six (6) hours as needed for Pain. Yes Provider, Historical   guaiFENesin ER (MUCINEX) 600 mg ER tablet Take 600 mg by mouth two (2) times daily as needed for Congestion. Yes Provider, Historical   diphenhydrAMINE-zinc acetate 1%-0.1% (BENADRYL ITCH STOPPING) topical cream Apply  to affected area daily as needed for Itching. Yes Provider, Historical   acetaminophen (TYLENOL) 500 mg tablet Take 500 mg by mouth two (2) times daily as needed for Pain. Yes Provider, Historical   acetaminophen (TYLENOL) 325 mg tablet Take 650 mg by mouth two (2) times a day. Yes Provider, Historical   calcium-vitamin D (OS-LUCÍA 500+D) 500 mg(1,250mg) -200 unit per tablet Take 1 Tab by mouth daily (with breakfast). Yes Other, MD Kendall   multivitamin (ONE A DAY) tablet Take 1 Tab by mouth daily. Yes Other, MD Kendall   losartan (COZAAR) 50 mg tablet Take 50 mg by mouth daily. Yes Rusty, MD Kendall   cholecalciferol (VITAMIN D3) 1,000 unit cap Take 2,000 Units by mouth daily. Yes Other, MD Kendall   memantine (NAMENDA) 10 mg tablet Take 10 mg by mouth two (2) times a day. Yes Other, MD Kendall       Allergies/Social/Family History: Allergies   Allergen Reactions    Aricept [Donepezil] Other (comments)     GI upset    Flexeril [Cyclobenzaprine] Other (comments)     delirium      Social History     Tobacco Use    Smoking status: Not on file   Substance Use Topics    Alcohol use: Not on file      History reviewed. No pertinent family history. Objective:   Vital Signs:  Visit Vitals  /81   Pulse 91   Temp 97.9 °F (36.6 °C)   Resp 23   Ht 5' 4\" (1.626 m)   Wt 81 kg (178 lb 9.2 oz)   SpO2 99%   BMI 30.65 kg/m²    O2 Flow Rate (L/min): 2 l/min O2 Device: Nasal cannula Temp (24hrs), Av.9 °F (36.6 °C), Min:97.7 °F (36.5 °C), Max:98.1 °F (36.7 °C)           Intake/Output:     Intake/Output Summary (Last 24 hours) at 2018 1256  Last data filed at 2018 1200  Gross per 24 hour   Intake 3452.94 ml   Output 200 ml   Net 3252.94 ml       Physical Exam:   General:  Alert, cooperative, no distress, appears stated age. Head:  Normocephalic, without obvious abnormality, atraumatic. Eyes:  Conjunctivae/corneas clear. PERRL   Neck: Supple, symmetrical, no adenopathy, no carotid bruit and no JVD. Lungs:   Clear to auscultation bilaterally. Chest wall:  No tenderness or deformity. Heart:  Regular rate and rhythm, S1-S2 normal, no murmur, no click, rub or gallop. Abdomen:   Soft, non-tender. Bowel sounds present. No masses,  No organomegaly. Extremities: Atraumatic, no cyanosis or edema.    Pulses: Palpable   Skin: No rashes or lesions   Neurologic: Grossly nonfocal         LABS AND  DATA: Personally reviewed  Recent Labs     18  0257 18  0420   WBC 10.6 11.0   HGB 13.2 13.2   HCT 42.8 42.2   * 172     Recent Labs     18  0257 18  0420    138   K 4.6 4.8   * 109*   CO2 26 21   BUN 26* 28*   CREA 1.16* 1.16*   * 162*   CA 9.0 9.4   MG  --  2.1   PHOS  --  3.1     Recent Labs     18  0420 12/15/18  1400 SGOT 15 18    116   TP 6.3* 6.7   ALB 3.0* 3.3*   GLOB 3.3 3.4     Recent Labs     12/17/18  0714 12/17/18  0257 12/15/18  1400   INR  --   --  1.0   PTP  --   --  10.4   APTT 35.0* 123.9* 26.6      No results for input(s): PHI, PCO2I, PO2I, FIO2I in the last 72 hours. No results for input(s): CPK, CKMB, TROIQ, BNPP in the last 72 hours. MEDS: Reviewed    Chest Imaging: personally reviewed and report checked    Tele- reviewed    Medical decision making:   I have reviewed the flowsheet and previous day's notes  Patient has acute or chronic illness that poses a threat to life or bodily function  Review and order of Clinical lab tests  Review and Order of Radiology tests  Independent visualization of Image        Thank you for allowing me to participate in this patient's care.     Cari Chaparro MD      [facility]

## 2018-12-17 NOTE — PROGRESS NOTES
TRANSFER - IN REPORT:    Verbal report received from Romie(name) on Nichole Navarro  being received from ICU(unit) for routine progression of care      Report consisted of patients Situation, Background, Assessment and   Recommendations(SBAR). Information from the following report(s) SBAR, MAR and Cardiac Rhythm Normal Sinus was reviewed with the receiving nurse. Opportunity for questions and clarification was provided. Assessment completed upon patients arrival to unit and care assumed.

## 2018-12-17 NOTE — CONSULTS
[address]    Maye Waters  MR#: [id number]  : 10/14/1929  ACCOUNT #: [de-identified]   DATE OF SERVICE: 2018    CONSULTING PHYSICIAN: Buck Quiroz MD    REASON FOR CONSULTATION:  Pulmonary embolism. HISTORY OF PRESENT ILLNESS:  This 57-year-old female was admitted yesterday following a ground level fall. She was found to be hypoxic. She had a CT angiogram today demonstrating a large volume of pulmonary thromboemboli. She has been started on heparin. She is in the intensive care unit. Pulmonary consultation is requested. She has dementia. She is very pleasant but her short-term memory is extremely poor. She denies chest pain or shortness of breath. She denies leg pain or swelling. Her history is highly unreliable because of her memory deficit. PAST MEDICAL HISTORY:  Illnesses:  1. Hypercholesterolemia. 2.  Irritable bowel syndrome. 3.  Osteoarthritis. MEDICATIONS:  Prior to admission, losartan 50 daily, cholecalciferol 2000  daily, Namenda 10 b.i.d. MEDICATION ALLERGIES:  Aricept, Flexeril. SOCIAL HISTORY:  She lives in a nursing facility. HABITS:  Tobacco:  None. Alcohol:  None. REVIEW OF SYSTEMS:  This is severely limited by her memory deficit. She denies all symptoms other than a stuffy nose. PHYSICAL EXAMINATION:  VITAL SIGNS:  Afebrile, heart rate 96, blood pressure 120/80, respirations 18 unlabored. HEENT:  Pupils reactive. Conjunctivae are clear. Pharynx clear. NECK:  Without masses. LUNGS:  A few crackles are heard in the bases. The lung fields are otherwise clear. HEART:  Regular without neck vein distention; no murmur. ABDOMEN:  Soft, nontender, nondistended. EXTREMITIES:  Without clubbing, cyanosis or edema. CHEST:  Expansion equal.  Trachea midline. SKIN:  Warm and dry. LABORATORY DATA:  WBC 11.0, hemoglobin 13.2, platelets 315. Potassium 4.8, BUN 28, creatinine 1. 16.  CT scan of the chest: A large volume of clot is noted in both lungs and a saddle embolus is identified. ASSESSMENT:  1.  Pulmonary embolism, large volume. The patient is completely stable from a hemodynamic standpoint. She does not have tachycardia or tachypnea. Her blood pressure is more than satisfactory. Therefore she does not require the use of thrombolytic agents at this time. Intravenous heparin is the appropriate therapy. 2.  Dementia. 3.  Chronic kidney disease. 4.  Recent ground level fall. 5.  History of irritable bowel syndrome. 6.  Minimal chronic interstitial lung disease. PLAN:  1. Heparin. 2.  Oxygen. 3.  Pulmonary toilet. 4.  DVT and GI bleed prophylaxis. 5.  Glycemic control. 6.  ICU protocols. Thank you for this consultation.       MD KARENA Hernandez / [place]  D: 12/16/2018 20:36     T: 12/16/2018 21:04  JOB #: [id number]

## 2018-12-17 NOTE — PROGRESS NOTES
Physical Therapy  12/17/2018    CTA yesterday showing \"Large saddle pulmonary embolus at the bifurcation of the pulmonary artery with evidence of right heart strain. \" Pt started on Heparin for PE and last aPTT was 35. Per protocol for acute DVT safe therapeutic levels are 58-92. Not yet stable for therapy continuation. Pt also pleasantly confused(baseline) and stating she can't do anything at this time as she is supposed to be \"going to dinner with [employer]\". Sitter present in room. Will follow up tomorrow for continued therapy.     Thank Josette Kyle, PT, DPT

## 2018-12-17 NOTE — CDMP QUERY
Documentation of Acute Renal injury is noted in the progress notes. The patient also has a documented diagnosis of chronic kidney disease-stage 3 (GFR = 30-59). Base on this, the patient may  not meet RIFLE criteria (BSV approved) to support this diagnosis. If you are using another criteria to support this diagnosis, please document this in your progress note. Otherwise, please document in the progress notes the clinical indicators that support this diagnosis or state that the diagnosis has been ruled out. RIFLE  (BSV Approved)    RISK:  Increased SCr x 1.5 or GFR decrease > 25% (within 7 days)    INJURY:  Increased SCr x 2.0 or GFR decreased > 50%    FAILURE:  Increased SCr x 3.0 or GFR decrease > 75% or SCr >4.0 mg/dL or acute increase >0.5 mg/dL    LOSS:  Persistent acute renal failure = complete loss of kidney function > 4 weeks    END STAGE:  End stage of kidney disease > 3 months      AKIN    STAGE  1:  Increase in SCr >/= 0.3 mg/dL or >/= 150% to 200% (1.5 to 2-fold) from baseline (within 48 hours)    STAGE  2:  Increase in SCr to more than 200% to 300% (>2-3 fold) from baseline    STAGE  3:  Increase in SCr to more than 300% (>3-fold) from baseline or SCr >/= 4.0 mg/dL with an acute increase of at least 0.5 mg/dL or initiation of renal replacement therapy      KDIGO    STAGE  1:  Increase in SCr by >/= 0.3 mg/dL within 48 hours or increase in SCr 1.5 to 1.9 times baseline which is known or presumed to have occurred within the prior 7 days    STAGE  2:  Increase in SCr to 2.0 to 2.9 times baseline    STAGE  3:  Increase in SCr to 3.0 times baseline or increase in SCr to >/= baseline or increase in SCr to >/= 4.0 mg/dL or initiation of renal replacement therapy      Please clarify and document your clinical opinion in the progress notes and discharge summary including the definitive and/or presumptive diagnosis, (suspected or probable), related to the above clinical findings.  Please include clinical findings supporting your diagnosis.     Thank you,     Walker Riggs RN, BSN, [facility], [address] [place]  [phone]

## 2018-12-18 LAB
APTT PPP: 67.4 SEC (ref 22.1–32)
APTT PPP: 76.3 SEC (ref 22.1–32)
APTT PPP: 83.5 SEC (ref 22.1–32)
THERAPEUTIC RANGE,PTTT: ABNORMAL SECS (ref 58–77)

## 2018-12-18 PROCEDURE — 85730 THROMBOPLASTIN TIME PARTIAL: CPT

## 2018-12-18 PROCEDURE — 36415 COLL VENOUS BLD VENIPUNCTURE: CPT

## 2018-12-18 PROCEDURE — 65270000029 HC RM PRIVATE

## 2018-12-18 PROCEDURE — 74011250637 HC RX REV CODE- 250/637: Performed by: HOSPITALIST

## 2018-12-18 PROCEDURE — 74011000258 HC RX REV CODE- 258: Performed by: HOSPITALIST

## 2018-12-18 PROCEDURE — 74011250637 HC RX REV CODE- 250/637: Performed by: INTERNAL MEDICINE

## 2018-12-18 PROCEDURE — 74011250636 HC RX REV CODE- 250/636: Performed by: HOSPITALIST

## 2018-12-18 PROCEDURE — 51798 US URINE CAPACITY MEASURE: CPT

## 2018-12-18 RX ORDER — HALOPERIDOL 2 MG/1
1 TABLET ORAL
Status: DISCONTINUED | OUTPATIENT
Start: 2018-12-18 | End: 2018-12-21 | Stop reason: HOSPADM

## 2018-12-18 RX ADMIN — HEPARIN SODIUM AND DEXTROSE 12 UNITS/KG/HR: 10000; 5 INJECTION INTRAVENOUS at 20:02

## 2018-12-18 RX ADMIN — CEFTRIAXONE 1 G: 1 INJECTION, POWDER, FOR SOLUTION INTRAMUSCULAR; INTRAVENOUS at 16:04

## 2018-12-18 RX ADMIN — Medication 5 ML: at 06:00

## 2018-12-18 RX ADMIN — MEMANTINE 10 MG: 10 TABLET ORAL at 17:28

## 2018-12-18 RX ADMIN — HEPARIN SODIUM AND DEXTROSE 12 UNITS/KG/HR: 10000; 5 INJECTION INTRAVENOUS at 12:03

## 2018-12-18 RX ADMIN — Medication 10 ML: at 15:59

## 2018-12-18 RX ADMIN — DOCUSATE SODIUM 100 MG: 100 CAPSULE, LIQUID FILLED ORAL at 08:26

## 2018-12-18 RX ADMIN — METOPROLOL TARTRATE 25 MG: 25 TABLET ORAL at 21:00

## 2018-12-18 RX ADMIN — Medication 10 ML: at 15:21

## 2018-12-18 RX ADMIN — Medication 10 ML: at 21:31

## 2018-12-18 RX ADMIN — VITAMIN D, TAB 1000IU (100/BT) 1000 UNITS: 25 TAB at 08:26

## 2018-12-18 RX ADMIN — LOSARTAN POTASSIUM 50 MG: 50 TABLET ORAL at 08:26

## 2018-12-18 RX ADMIN — METOPROLOL TARTRATE 25 MG: 25 TABLET ORAL at 08:26

## 2018-12-18 RX ADMIN — MEMANTINE 10 MG: 10 TABLET ORAL at 08:29

## 2018-12-18 RX ADMIN — CALCIUM CARBONATE-VITAMIN D TAB 500 MG-200 UNIT 1 TABLET: 500-200 TAB at 08:26

## 2018-12-18 NOTE — PROGRESS NOTES
Reason for Admission:   Hypoxia                   RRAT Score:       12              Plan for utilizing home health:      Patient resides at the [facility]; patient has been at [facility] for 12-13 years                    Likelihood of Readmission:  Moderate; patient had similar episode in 2017                         Transition of Care Plan:            Patient has a UTI and remains confused. Per daughter, the patient is oriented to person, place, and situation at baseline. Also at baseline, patient has issues with memory of time, including short term and long term. Daughter stated that she is the patient's only child, and the patient's spouse has been  40+ years. Patient has bathing and dressing assistance (laying out clothing and redirecting) but can feed self. Pharmacy preference is [facility]. Patient has PT, OT, wound care, and palliative care consults.       NOTE: [place] contact: Kiesha Luther, [facility] : [phone]          Care Management Interventions  PCP Verified by CM: Yes(Patient is followed by Dr. Claudeen Nares, MD at [facility])  [address] [place]: No  Discharge Durable Medical Equipment: No(Has DME of walker, raised toilet seat, shower stool, and PRN O2)  Health Maintenance Reviewed: Yes  Physical Therapy Consult: Yes  Occupational Therapy Consult: Yes  Speech Therapy Consult: No  Current Support Network: [address] [place] [address], Assisted Living(L[facility] at [facility], [facility])  Confirm Follow Up Transport: Other (see comment)(Will require BLS transport)  Plan discussed with Pt/Family/Caregiver: Yes(CM met with patient's daughter, Morris Miller)  Freedom of Choice Offered: Yes(Return to [employer])  [address] Provided?: No  Discharge Location  Discharge Placement: Long Term Care(Secured Memory Care Unit at [facility])    CRM: Ra Lepe, MPH,  Андрейas Lili; Z: [phone]

## 2018-12-18 NOTE — PROGRESS NOTES
0530 aPTT lab drawn at therapeutic value, 76.3, no changes needed. Bedside and Verbal shift change report given to Johnny Ca (oncoming nurse) by Morales Gutiérrez (offgoing nurse).  Report included the following information SBAR, Kardex, Intake/Output, MAR and Cardiac Rhythm NSR

## 2018-12-18 NOTE — PROGRESS NOTES
Hospitalist Progress Note  Shauna Jean MD  Answering service: [phone] OR [phone] from in house phone        Date of Service:  2018  NAME:  Piyush Farah  :  10/14/1929  MRN:  [id number]      Admission Summary:   80 y.o. female with past medical history significant for osteoarthritis, IBS, and dementia who presents from [address] [place] [address] via EMS with chief complaint of a fall. Per EMS, pt had a witnessed syncopal fall and hit her head as a result. EMS reported the pt's SpO2 was in the low 80's on room air and improved to low 90's on 2L via NC. Pt states she does not remember falling or hitting her head, but suspects she may have slipped on something. Of note, according to facility notes, pt was recently diagnosed with hypoxemia and wears O2 PRN. Pt denies any fever, chills, nausea, vomiting, changes in appetite, dysuria, or frequency.  denied chest pain     Interval history / Subjective:    no new changes on heparin drip spoke to daughter      Assessment & Plan:     1. Syncope head CT no acute changes. May have component of dehydration, UTI. 2. 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  - pending repeat ECHO report   - Started on Heparin drip with bolus. - cont for now  - will d/w pulmonary for further MGMT   - Called her daughter and left message with her on voice mail. - pt will not be a good candidate for any type of NOAC or coumadin due to multiple falls and dementia     2. Acute on Chronic hypoxia resp failure  Possibly due to above    3. Sinus tachycardia: monitor for now, start IVF. 4. UTI: rocephin and IVF. C/S GNR > 100k     5. CKD stage 3: Cr worse than baseline. Follow renal function  Getting IV fluids. 6. Dementia: without behavioral disturbbance    7.  Consult PT to make sure she is safe to walk    Code status: DDNR 12/17/18  scanned on file d/w daughter   DVT prophylaxis: Lovenox    Care Plan discussed with: Patient/Family  Disposition: Home w/Family and TBD she is from a memory unit  Now need eval if she need O2 can she be off O2? That will decide if she can go back to her place or health care side      Hospital Problems  Date Reviewed: 12/21/2011          Codes Class Noted POA    * (Principal) Syncope ICD-10-CM: R55  ICD-9-CM: 780.2  12/15/2018 Yes        UTI (urinary tract infection) ICD-10-CM: N39.0  ICD-9-CM: 599.0  12/15/2018 Unknown        KOLBY (acute kidney injury) ([facility] [address] ICD-10-CM: N17.9  ICD-9-CM: 584.9  12/15/2018 Unknown        Hypoxia ICD-10-CM: R09.02  ICD-9-CM: 799.02  8/22/2018 Yes                Review of Systems:   A comprehensive review of systems was negative except for that written in the HPI. Vital Signs:    Last 24hrs VS reviewed since prior progress note. Most recent are:  Visit Vitals  BP (!) 154/94 (BP 1 Location: Left arm, BP Patient Position: At rest)   Pulse 93   Temp 97.9 °F (36.6 °C)   Resp 17   Ht 5' 4\" (1.626 m)   Wt 72.7 kg (160 lb 4.4 oz)   SpO2 92%   BMI 27.51 kg/m²         Intake/Output Summary (Last 24 hours) at 12/18/2018 1059  Last data filed at 12/18/2018 0847  Gross per 24 hour   Intake 844.61 ml   Output 900 ml   Net -55.39 ml        Physical Examination:             Constitutional:  No acute distress, cooperative,    ENT:  Oral mucous moist,    Resp:  CTA bilaterally. CV:  Regular rhythm, normal rate,    GI:  Soft, non distended, non tender bs=    Musculoskeletal:  No edema, warm, 2+ pulses throughout    Neurologic:  Moves all extremities. AAOx0,            Data Review:    Review and/or order of clinical lab test    Xr Chest Pa Lat    Result Date: 12/15/2018  Impression: Unchanged cardiomegaly. Ct Head Wo Cont    Result Date: 12/15/2018  IMPRESSION: No evidence of acute process.      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 The findings were called to Dr. Mary Keller on 12/16/2018 at 17:58 by myself. [phone]      Labs:     Recent Labs     12/17/18  0257 12/16/18  0420   WBC 10.6 11.0   HGB 13.2 13.2   HCT 42.8 42.2   * 172     Recent Labs     12/17/18  0257 12/16/18  0420 12/15/18  1400    138 139   K 4.6 4.8 4.5   * 109* 104   CO2 26 21 27   BUN 26* 28* 28*   CREA 1.16* 1.16* 1.27*   * 162* 129*   CA 9.0 9.4 9.4   MG  --  2.1  --    PHOS  --  3.1  --      Recent Labs     12/16/18  0420 12/15/18  1400   SGOT 15 18   ALT 20 25    116   TBILI 0.5 0.5   TP 6.3* 6.7   ALB 3.0* 3.3*   GLOB 3.3 3.4     Recent Labs     12/18/18  0453 12/17/18  2130 12/17/18  1405  12/15/18  1400   INR  --   --   --   --  1.0   PTP  --   --   --   --  10.4   APTT 76.3* 49.8* 94.0*   < > 26.6    < > = values in this interval not displayed. No results for input(s): FE, TIBC, PSAT, FERR in the last 72 hours. No results found for: FOL, RBCF   No results for input(s): PH, PCO2, PO2 in the last 72 hours. No results for input(s): CPK, CKNDX, TROIQ in the last 72 hours.     No lab exists for component: CPKMB  Lab Results   Component Value Date/Time    Cholesterol, total 153 02/23/2010 08:30 AM    HDL Cholesterol 54 02/23/2010 08:30 AM    LDL, calculated 88 02/23/2010 08:30 AM    Triglyceride 55 02/23/2010 08:30 AM    CHOL/HDL Ratio 2.8 02/23/2010 08:30 AM     No results found for: [facility]  Lab Results   Component Value Date/Time    Color YELLOW/STRAW 12/15/2018 02:41 PM    Appearance CLOUDY (A) 12/15/2018 02:41 PM    Specific gravity 1.025 12/15/2018 02:41 PM    pH (UA) 5.0 12/15/2018 02:41 PM    Protein NEGATIVE  12/15/2018 02:41 PM    Glucose NEGATIVE  12/15/2018 02:41 PM    Ketone NEGATIVE  12/15/2018 02:41 PM    Bilirubin NEGATIVE  12/15/2018 02:41 PM    Urobilinogen 0.2 12/15/2018 02:41 PM    Nitrites POSITIVE (A) 12/15/2018 02:41 PM    Leukocyte Esterase MODERATE (A) 12/15/2018 02:41 PM    Epithelial cells FEW 12/15/2018 02:41 PM    Bacteria 4+ (A) 12/15/2018 02:41 PM    WBC >100 (H) 12/15/2018 02:41 PM    RBC 0-5 12/15/2018 02:41 PM         Medications Reviewed:     Current Facility-Administered Medications   Medication Dose Route Frequency    metoprolol tartrate (LOPRESSOR) tablet 25 mg  25 mg Oral BID    heparin 25,000 units in D5W 250 ml infusion  18-36 Units/kg/hr IntraVENous TITRATE    cefTRIAXone (ROCEPHIN) 1 g in 0.9% sodium chloride (MBP/ADV) 50 mL  1 g IntraVENous Q24H    losartan (COZAAR) tablet 50 mg  50 mg Oral DAILY    memantine (NAMENDA) tablet 10 mg  10 mg Oral BID    cholecalciferol (VITAMIN D3) tablet 1,000 Units  1,000 Units Oral DAILY    calcium-vitamin D (OS-LUCÍA) 500 mg-200 unit tablet  1 Tab Oral DAILY WITH BREAKFAST    sodium chloride (NS) flush 5-10 mL  5-10 mL IntraVENous Q8H    sodium chloride (NS) flush 5-10 mL  5-10 mL IntraVENous PRN    acetaminophen (TYLENOL) tablet 650 mg  650 mg Oral Q4H PRN    diphenhydrAMINE (BENADRYL) capsule 25 mg  25 mg Oral Q4H PRN    docusate sodium (COLACE) capsule 100 mg  100 mg Oral BID    ondansetron (ZOFRAN) injection 4 mg  4 mg IntraVENous Q4H PRN     ______________________________________________________________________  EXPECTED LENGTH OF STAY: 4d 7h  ACTUAL LENGTH OF STAY:          3                 Leonardo Gannon MD

## 2018-12-18 NOTE — PROGRESS NOTES
Physical Therapy  12.18.18    Chart reviewed. Attempted to see patient for PT session, however daughter present requesting to allow patient to sleep as she had not slept in the last 48 hours. Honored daughter's request. F/u tomorrow. Thank you.     Joann Carrero, PT, DPT

## 2018-12-18 NOTE — PROGRESS NOTES
Bedside and Verbal shift change report given to [facility] (oncoming nurse) by Michael Catalan (offgoing nurse). Report included the following information SBAR and Cardiac Rhythm SN.

## 2018-12-18 NOTE — PROGRESS NOTES
TRANSFER - OUT REPORT:    Verbal report given to Britt(name) on Diaz Talley  being transferred to 6 E(unit) for routine progression of care       Report consisted of patients Situation, Background, Assessment and   Recommendations(SBAR). Information from the following report(s) SBAR, MAR and Cardiac Rhythm Normal Sinus was reviewed with the receiving nurse. Lines:   Peripheral IV 12/16/18 Anterior;Left;Lower Arm (Active)   Site Assessment Clean, dry, & intact 12/18/2018 12:00 PM   Phlebitis Assessment 0 12/18/2018 12:00 PM   Infiltration Assessment 0 12/18/2018 12:00 PM   Dressing Status Clean, dry, & intact 12/18/2018 12:00 PM   Dressing Type Tape;Transparent 12/18/2018 12:00 PM   Hub Color/Line Status Blue; Infusing 12/18/2018 12:00 PM   Alcohol Cap Used Yes 12/18/2018 12:00 PM       Peripheral IV 12/17/18 Right Forearm (Active)   Site Assessment Clean, dry, & intact 12/18/2018 12:00 PM   Phlebitis Assessment 0 12/18/2018 12:00 PM   Infiltration Assessment 0 12/18/2018 12:00 PM   Dressing Status Clean, dry, & intact 12/18/2018 12:00 PM   Dressing Type Tape;Transparent 12/18/2018 12:00 PM   Hub Color/Line Status Blue;Capped 12/18/2018 12:00 PM   Action Taken Open ports on tubing capped 12/17/2018  5:02 PM   Alcohol Cap Used Yes 12/18/2018 12:00 PM        Opportunity for questions and clarification was provided.       Patient transported with:   Registered Nurse

## 2018-12-18 NOTE — PROGRESS NOTES
Problem: Falls - Risk of  Goal: *Absence of Falls  Document Giancarlo Fall Risk and appropriate interventions in the flowsheet. Outcome: Resolved/Met Date Met: 12/17/18  Fall Risk Interventions:  Mobility Interventions: Communicate number of staff needed for ambulation/transfer    Mentation Interventions: Family/sitter at bedside    Medication Interventions: Evaluate medications/consider consulting pharmacy    Elimination Interventions: Call light in reach    History of Falls Interventions: Door open when patient unattended        Problem: Pressure Injury - Risk of  Goal: *Prevention of pressure injury  Document Karl Scale and appropriate interventions in the flowsheet.   Outcome: Progressing Towards Goal  Pressure Injury Interventions:  Sensory Interventions: Assess changes in LOC    Moisture Interventions: Check for incontinence Q2 hours and as needed    Activity Interventions: Pressure redistribution bed/mattress(bed type)    Mobility Interventions: HOB 30 degrees or less    Nutrition Interventions: Document food/fluid/supplement intake    Friction and Shear Interventions: HOB 30 degrees or less

## 2018-12-18 NOTE — ACP (ADVANCE CARE PLANNING)
Advance Care Planning Note    Name: Julia Pickett  YOB: 1929  MRN: [id number]  Admission Date: 12/15/2018  1:13 PM    Date of discussion: 12/18/2018    Active Diagnoses:    Hospital Problems  Date Reviewed: 12/21/2011          Codes Class Noted POA    * (Principal) Syncope ICD-10-CM: R55  ICD-9-CM: 780.2  12/15/2018 Yes        UTI (urinary tract infection) ICD-10-CM: N39.0  ICD-9-CM: 599.0  12/15/2018 Unknown        KOLBY (acute kidney injury) [facility]) ICD-10-CM: N17.9  ICD-9-CM: 584.9  12/15/2018 Unknown        Hypoxia ICD-10-CM: R09.02  ICD-9-CM: 799.02  8/22/2018 Yes               These active diagnoses are of sufficient risk that focused discussion on advance care planning is indicated in order to allow the patient to thoughtfully consider personal goals of care, and if situations arise that prevent the ability to personally give input, to ensure appropriate representation of their personal desires for different levels and aggressiveness of care. Discussion:     Persons present and participating in discussion: Julia PickettLeila MD,    Discussion:called daughter and updated and discussed her situations and alternative of current care. She is not a good candidate for NOAC or coumadin for her dementia issues, also getting more confused with acute issues    She requested some time and wish to speak to pall care as well she remains DNR    Time Spent:     Total time spent face-to-face in education and discussion: 20  minutes.      Leila Marcum MD  12/18/2018  10:57 AM

## 2018-12-18 NOTE — PROGRESS NOTES
Blood was sent to the lab at 11h00 am for the aPTT. The result was 83.5 the heparin was decreased 2 unit/kg from 14 unit/kg to 12 unit/kg.

## 2018-12-18 NOTE — PROGRESS NOTES
PULMONARY/CRITICAL CARE/SLEEP MEDICINE    Physician Consultation Note    Name: Yecenia Randhawa   : 10/14/1929   MRN: [id number]   Date: 2018      Subjective:       Medical records and data reviewed. Seen and examined. Awake. Says she does not have pain and is comfortable and does not need treatment with pills or medications. Echo is pending. Hemodynamically stable    Review of Systems:     A comprehensive 12 system review of systems was negative except for as documented in HPI    Assessment:   1. Pulmonary embolism, large volume. The patient is completely stable from a hemodynamic standpoint. She does not have tachycardia or tachypnea. Her blood pressure is more than satisfactory. Therefore she does not require the use of thrombolytic agents at this time. Intravenous heparin is the appropriate therapy. 2.  Dementia. 3.  Chronic kidney disease. 4.  Recent ground level fall secondary to syncope from PE  5. History of irritable bowel syndrome. 6.  Minimal chronic interstitial lung disease. 7. Recurrent falls       Recommendations: On IV heparin  Fall leading to admission appears to have been a syncopal episode-recurrent falls would increase risks with OACs-- reconfirm and would suggest reversible OAC like warfarin for treayment of PE  BP control  On home O2  Echo pending  Palliative care? Active Problem List:     Problem List  Date Reviewed: 2011          Codes Class    * (Principal) Syncope ICD-10-CM: R55  ICD-9-CM: 780.2         UTI (urinary tract infection) ICD-10-CM: N39.0  ICD-9-CM: 599.0         [facility] ICD-10-CM: N17.9  ICD-9-CM: 788. 9         Fall ICD-10-CM: [address]  ICD-9-CM: E888.9         Unwitnessed fall ICD-10-CM: R29.6  ICD-9-CM: [id number]         Hypoxia ICD-10-CM: R09.02  ICD-9-CM: 799.02         Hypercholesteremia ICD-10-CM: E78.00  ICD-9-CM: 272.0     Overview Signed 2010 12:56 PM by Sameera Brar     1128'R             Osteoporosis ICD-10-CM: M81.0  ICD-9-CM: 733.00         IBS (irritable bowel syndrome) ICD-10-CM: K58.9  ICD-9-CM: 564.1         OA (osteoarthritis) ICD-10-CM: M19.90  ICD-9-CM: 715.90               Past Medical History:      has a past medical history of Hypercholesteremia, IBS (irritable bowel syndrome), OA (osteoarthritis), and Osteoporosis. Past Surgical History:      has no past surgical history on file. Home Medications:     Prior to Admission medications    Medication Sig Start Date End Date Taking? Authorizing Provider   albuterol-ipratropium (DUO-NEB) 2.5 mg-0.5 mg/3 ml nebu 3 mL by Nebulization route three (3) times daily as needed. Yes Provider, Historical   traMADol (ULTRAM) 50 mg tablet Take 50 mg by mouth every six (6) hours as needed for Pain. Yes Provider, Historical   guaiFENesin ER (MUCINEX) 600 mg ER tablet Take 600 mg by mouth two (2) times daily as needed for Congestion. Yes Provider, Historical   diphenhydrAMINE-zinc acetate 1%-0.1% (BENADRYL ITCH STOPPING) topical cream Apply  to affected area daily as needed for Itching. Yes Provider, Historical   acetaminophen (TYLENOL) 500 mg tablet Take 500 mg by mouth two (2) times daily as needed for Pain. Yes Provider, Historical   acetaminophen (TYLENOL) 325 mg tablet Take 650 mg by mouth two (2) times a day. Yes Provider, Historical   calcium-vitamin D (OS-LUCÍA 500+D) 500 mg(1,250mg) -200 unit per tablet Take 1 Tab by mouth daily (with breakfast). Yes Other, MD Kendall   multivitamin (ONE A DAY) tablet Take 1 Tab by mouth daily. Yes Other, MD Kendall   losartan (COZAAR) 50 mg tablet Take 50 mg by mouth daily. Yes Rusty, MD Kendall   cholecalciferol (VITAMIN D3) 1,000 unit cap Take 2,000 Units by mouth daily. Yes Other, MD Kendall   memantine (NAMENDA) 10 mg tablet Take 10 mg by mouth two (2) times a day. Yes Other, MD Kendall       Allergies/Social/Family History:      Allergies   Allergen Reactions    Aricept [Donepezil] Other (comments)     GI upset    Flexeril [Cyclobenzaprine] Other (comments)     delirium      Social History     Tobacco Use    Smoking status: Not on file   Substance Use Topics    Alcohol use: Not on file      History reviewed. No pertinent family history. Objective:   Vital Signs:  Visit Vitals  BP (!) 154/94 (BP 1 Location: Left arm, BP Patient Position: At rest)   Pulse 93   Temp 97.9 °F (36.6 °C)   Resp 17   Ht 5' 4\" (1.626 m)   Wt 72.7 kg (160 lb 4.4 oz)   SpO2 92%   BMI 27.51 kg/m²    O2 Flow Rate (L/min): 2 l/min O2 Device: Nasal cannula Temp (24hrs), Av.1 °F (36.7 °C), Min:97.8 °F (36.6 °C), Max:98.7 °F (37.1 °C)           Intake/Output:     Intake/Output Summary (Last 24 hours) at 2018 1029  Last data filed at 2018 0847  Gross per 24 hour   Intake 844.61 ml   Output 900 ml   Net -55.39 ml       Physical Exam:   General:  Alert, cooperative, no distress, appears stated age. Head:  Normocephalic, without obvious abnormality, atraumatic. Eyes:  Conjunctivae/corneas clear. PERRL   Neck: Supple, symmetrical, no adenopathy, no carotid bruit and no JVD. Lungs:   Clear to auscultation bilaterally. Chest wall:  No tenderness or deformity. Heart:  Regular rate and rhythm, S1-S2 normal, no murmur, no click, rub or gallop. Abdomen:   Soft, non-tender. Bowel sounds present. No masses,  No organomegaly. Extremities: Atraumatic, no cyanosis or edema.    Pulses: Palpable   Skin: No rashes or lesions   Neurologic: Grossly nonfocal         LABS AND  DATA: Personally reviewed  Recent Labs     18  0257 18  0420   WBC 10.6 11.0   HGB 13.2 13.2   HCT 42.8 42.2   * 172     Recent Labs     18  0257 18  0420    138   K 4.6 4.8   * 109*   CO2 26 21   BUN 26* 28*   CREA 1.16* 1.16*   * 162*   CA 9.0 9.4   MG  --  2.1   PHOS  --  3.1     Recent Labs     18  0420 12/15/18  1400   SGOT 15 18    116   TP 6.3* 6.7   ALB 3.0* 3.3*   GLOB 3.3 3.4     Recent Labs     12/18/18  0453 12/17/18  2130  12/15/18  1400   INR  --   --   --  1.0   PTP  --   --   --  10.4   APTT 76.3* 49.8*   < > 26.6    < > = values in this interval not displayed. No results for input(s): PHI, PCO2I, PO2I, FIO2I in the last 72 hours. No results for input(s): CPK, CKMB, TROIQ, BNPP in the last 72 hours. MEDS: Reviewed    Chest Imaging: personally reviewed and report checked    Tele- reviewed    Medical decision making:   I have reviewed the flowsheet and previous day's notes  Patient has acute or chronic illness that poses a threat to life or bodily function  Review and order of Clinical lab tests  Review and Order of Radiology tests  Independent visualization of Image        Thank you for allowing me to participate in this patient's care.     MARGARITA Early      [facility]

## 2018-12-18 NOTE — PROGRESS NOTES
TRANSFER - IN REPORT:    Verbal report received from Jeremiah(name) on Del   being received from 3N(unit) for routine progression of care      Report consisted of patients Situation, Background, Assessment and   Recommendations(SBAR). Information from the following report(s) SBAR, Kardex, Intake/Output, MAR, Recent Results and Med Rec Status was reviewed with the receiving nurse. Opportunity for questions and clarification was provided. Assessment completed upon patients arrival to unit and care assumed.

## 2018-12-19 LAB
APTT PPP: 60.4 SEC (ref 22.1–32)
APTT PPP: 62.7 SEC (ref 22.1–32)
BASOPHILS # BLD: 0 K/UL (ref 0–0.1)
BASOPHILS NFR BLD: 0 % (ref 0–1)
DIFFERENTIAL METHOD BLD: ABNORMAL
EOSINOPHIL # BLD: 0.5 K/UL (ref 0–0.4)
EOSINOPHIL NFR BLD: 6 % (ref 0–7)
ERYTHROCYTE [DISTWIDTH] IN BLOOD BY AUTOMATED COUNT: 14.3 % (ref 11.5–14.5)
HCT VFR BLD AUTO: 38.6 % (ref 35–47)
HGB BLD-MCNC: 12.2 G/DL (ref 11.5–16)
IMM GRANULOCYTES # BLD: 0 K/UL (ref 0–0.04)
IMM GRANULOCYTES NFR BLD AUTO: 0 % (ref 0–0.5)
LYMPHOCYTES # BLD: 2.1 K/UL (ref 0.8–3.5)
LYMPHOCYTES NFR BLD: 23 % (ref 12–49)
MCH RBC QN AUTO: 29.5 PG (ref 26–34)
MCHC RBC AUTO-ENTMCNC: 31.6 G/DL (ref 30–36.5)
MCV RBC AUTO: 93.2 FL (ref 80–99)
MONOCYTES # BLD: 0.8 K/UL (ref 0–1)
MONOCYTES NFR BLD: 9 % (ref 5–13)
NEUTS SEG # BLD: 5.7 K/UL (ref 1.8–8)
NEUTS SEG NFR BLD: 62 % (ref 32–75)
NRBC # BLD: 0 K/UL (ref 0–0.01)
NRBC BLD-RTO: 0 PER 100 WBC
PLATELET # BLD AUTO: 156 K/UL (ref 150–400)
PMV BLD AUTO: 12.1 FL (ref 8.9–12.9)
RBC # BLD AUTO: 4.14 M/UL (ref 3.8–5.2)
THERAPEUTIC RANGE,PTTT: ABNORMAL SECS (ref 58–77)
THERAPEUTIC RANGE,PTTT: ABNORMAL SECS (ref 58–77)
WBC # BLD AUTO: 9.3 K/UL (ref 3.6–11)

## 2018-12-19 PROCEDURE — 74011250637 HC RX REV CODE- 250/637: Performed by: HOSPITALIST

## 2018-12-19 PROCEDURE — 74011250637 HC RX REV CODE- 250/637: Performed by: INTERNAL MEDICINE

## 2018-12-19 PROCEDURE — 74011250636 HC RX REV CODE- 250/636: Performed by: HOSPITALIST

## 2018-12-19 PROCEDURE — 97116 GAIT TRAINING THERAPY: CPT

## 2018-12-19 PROCEDURE — 85730 THROMBOPLASTIN TIME PARTIAL: CPT

## 2018-12-19 PROCEDURE — 65270000029 HC RM PRIVATE

## 2018-12-19 PROCEDURE — 36415 COLL VENOUS BLD VENIPUNCTURE: CPT

## 2018-12-19 PROCEDURE — 77010033678 HC OXYGEN DAILY

## 2018-12-19 PROCEDURE — 74011000258 HC RX REV CODE- 258: Performed by: HOSPITALIST

## 2018-12-19 PROCEDURE — 85025 COMPLETE CBC W/AUTO DIFF WBC: CPT

## 2018-12-19 RX ADMIN — DOCUSATE SODIUM 100 MG: 100 CAPSULE, LIQUID FILLED ORAL at 10:45

## 2018-12-19 RX ADMIN — METOPROLOL TARTRATE 25 MG: 25 TABLET ORAL at 22:58

## 2018-12-19 RX ADMIN — CEFTRIAXONE 1 G: 1 INJECTION, POWDER, FOR SOLUTION INTRAMUSCULAR; INTRAVENOUS at 15:20

## 2018-12-19 RX ADMIN — CALCIUM CARBONATE-VITAMIN D TAB 500 MG-200 UNIT 1 TABLET: 500-200 TAB at 06:04

## 2018-12-19 RX ADMIN — Medication 10 ML: at 06:05

## 2018-12-19 RX ADMIN — VITAMIN D, TAB 1000IU (100/BT) 1000 UNITS: 25 TAB at 10:45

## 2018-12-19 RX ADMIN — METOPROLOL TARTRATE 25 MG: 25 TABLET ORAL at 10:51

## 2018-12-19 RX ADMIN — Medication 10 ML: at 14:03

## 2018-12-19 RX ADMIN — MEMANTINE 10 MG: 10 TABLET ORAL at 10:45

## 2018-12-19 RX ADMIN — HEPARIN SODIUM AND DEXTROSE 12 UNITS/KG/HR: 10000; 5 INJECTION INTRAVENOUS at 23:33

## 2018-12-19 RX ADMIN — Medication 10 ML: at 23:09

## 2018-12-19 RX ADMIN — DOCUSATE SODIUM 100 MG: 100 CAPSULE, LIQUID FILLED ORAL at 19:47

## 2018-12-19 RX ADMIN — MEMANTINE 10 MG: 10 TABLET ORAL at 19:49

## 2018-12-19 RX ADMIN — LOSARTAN POTASSIUM 50 MG: 50 TABLET ORAL at 10:45

## 2018-12-19 NOTE — PROGRESS NOTES
Hospitalist Progress Note  Padma Ward MD  Answering service: [phone] OR [phone] from in house phone        Date of Service:  2018  NAME:  Navya Cota  :  10/14/1929  MRN:  [id number]      Admission Summary:   80 y.o. female with past medical history significant for osteoarthritis, IBS, and dementia who presents from [address] [place] [address] via EMS with chief complaint of a fall. Per EMS, pt had a witnessed syncopal fall and hit her head as a result. EMS reported the pt's SpO2 was in the low 80's on room air and improved to low 90's on 2L via NC. Pt states she does not remember falling or hitting her head, but suspects she may have slipped on something. Of note, according to facility notes, pt was recently diagnosed with hypoxemia and wears O2 PRN. Pt denies any fever, chills, nausea, vomiting, changes in appetite, dysuria, or frequency.  denied chest pain     Interval history / Subjective:    no new changes on heparin drip spoke to daughter   Family meeting Thursday DNR      Assessment & Plan:     1. Syncope head CT no acute changes. May have component of dehydration, UTI. She has baseline dementia lives in a memory unit     2. 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  - pending repeat ECHO report   - Started on Heparin drip with bolus. - cont for now  - will d/w pulmonary for further MGMT   - Called her daughter and left message with her on voice mail. - pt will not be a good candidate for any type of NOAC or coumadin due to multiple falls and dementia   - D/w daughter and pall care     2. Acute on Chronic hypoxia resp failure  Possibly due to above    3. Sinus tachycardia: monitor for now, start IVF. 4. UTI: rocephin and IVF. C/S GNR > 100k klebsiella    5.  CKD stage 3: Cr worse than baseline. Follow renal function  Getting IV fluids. 6. Dementia: without behavioral disturbbance    7. Consult PT to make sure she is safe to walk    Code status: DDNR 12/17/18  scanned on file d/w daughter   DVT prophylaxis: Lovenox    Care Plan discussed with: Patient/Family  Disposition: Home w/Family and TBD she is from a memory unit  Family meeting [address] and possible d/c there after     Hospital Problems  Date Reviewed: 12/21/2011          Codes Class Noted POA    * (Principal) Syncope ICD-10-CM: R55  ICD-9-CM: 780.2  12/15/2018 Yes        UTI (urinary tract infection) ICD-10-CM: N39.0  ICD-9-CM: 599.0  12/15/2018 Unknown        KOLBY (acute kidney injury) ([facility] ICD-10-CM: N17.9  ICD-9-CM: 584.9  12/15/2018 Unknown        Hypoxia ICD-10-CM: R09.02  ICD-9-CM: 799.02  8/22/2018 Yes                Review of Systems:   A comprehensive review of systems was negative except for that written in the HPI. Vital Signs:    Last 24hrs VS reviewed since prior progress note. Most recent are:  Visit Vitals  /70 (BP 1 Location: Right arm, BP Patient Position: At rest)   Pulse 74   Temp 98.1 °F (36.7 °C)   Resp 20   Ht 5' 4\" (1.626 m)   Wt 72.7 kg (160 lb 4.4 oz)   SpO2 94%   BMI 27.51 kg/m²         Intake/Output Summary (Last 24 hours) at 12/19/2018 1426  Last data filed at 12/19/2018 0605  Gross per 24 hour   Intake 170 ml   Output 200 ml   Net -30 ml        Physical Examination:             Constitutional:  No acute distress, cooperative,    ENT:  Oral mucous moist,    Resp:  CTA bilaterally. CV:  Regular rhythm, normal rate,    GI:  Soft, non distended, non tender bs=    Musculoskeletal:  No edema, warm, 2+ pulses throughout    Neurologic:  Moves all extremities. AAOx0,            Data Review:    Review and/or order of clinical lab test    Xr Chest Pa Lat    Result Date: 12/15/2018  Impression: Unchanged cardiomegaly. Ct Head Wo Cont    Result Date: 12/15/2018  IMPRESSION: No evidence of acute process. 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 The findings were called to Dr. Gianfranco Tanner on 12/16/2018 at 17:58 by myself. [address]:     Recent Labs     12/19/18  0606 12/17/18  0257   WBC 9.3 10.6   HGB 12.2 13.2   HCT 38.6 42.8    149*     Recent Labs     12/17/18  0257      K 4.6   *   CO2 26   BUN 26*   CREA 1.16*   *   CA 9.0     No results for input(s): SGOT, GPT, ALT, AP, TBIL, TBILI, TP, ALB, GLOB, GGT, AML, LPSE in the last 72 hours. No lab exists for component: AMYP, HLPSE  Recent Labs     12/19/18  0606 12/18/18  2342 12/18/18  1757   APTT 60.4* 62.7* 67.4*      No results for input(s): FE, TIBC, PSAT, FERR in the last 72 hours. No results found for: FOL, RBCF   No results for input(s): PH, PCO2, PO2 in the last 72 hours. No results for input(s): CPK, CKNDX, TROIQ in the last 72 hours.     No lab exists for component: CPKMB  Lab Results   Component Value Date/Time    Cholesterol, total 153 02/23/2010 08:30 AM    HDL Cholesterol 54 02/23/2010 08:30 AM    LDL, calculated 88 02/23/2010 08:30 AM    Triglyceride 55 02/23/2010 08:30 AM    CHOL/HDL Ratio 2.8 02/23/2010 08:30 AM     No results found for: Ramila Hernandez  Lab Results   Component Value Date/Time    Color YELLOW/STRAW 12/15/2018 02:41 PM    Appearance CLOUDY (A) 12/15/2018 02:41 PM    Specific gravity 1.025 12/15/2018 02:41 PM    pH (UA) 5.0 12/15/2018 02:41 PM    Protein NEGATIVE  12/15/2018 02:41 PM    Glucose NEGATIVE  12/15/2018 02:41 PM    Ketone NEGATIVE  12/15/2018 02:41 PM    Bilirubin NEGATIVE  12/15/2018 02:41 PM    Urobilinogen 0.2 12/15/2018 02:41 PM    Nitrites POSITIVE (A) 12/15/2018 02:41 PM    Leukocyte Esterase MODERATE (A) 12/15/2018 02:41 PM    Epithelial cells FEW 12/15/2018 02:41 PM    Bacteria 4+ (A) 12/15/2018 02:41 PM    WBC >100 (H) 12/15/2018 02:41 PM    RBC 0-5 12/15/2018 02:41 PM         Medications Reviewed:     Current Facility-Administered Medications   Medication Dose Route Frequency    haloperidol (HALDOL) tablet 1 mg  1 mg Oral Q4H PRN    metoprolol tartrate (LOPRESSOR) tablet 25 mg  25 mg Oral BID    heparin 25,000 units in D5W 250 ml infusion  18-36 Units/kg/hr IntraVENous TITRATE    cefTRIAXone (ROCEPHIN) 1 g in 0.9% sodium chloride (MBP/ADV) 50 mL  1 g IntraVENous Q24H    losartan (COZAAR) tablet 50 mg  50 mg Oral DAILY    memantine (NAMENDA) tablet 10 mg  10 mg Oral BID    cholecalciferol (VITAMIN D3) tablet 1,000 Units  1,000 Units Oral DAILY    calcium-vitamin D (OS-LUCÍA) 500 mg-200 unit tablet  1 Tab Oral DAILY WITH BREAKFAST    sodium chloride (NS) flush 5-10 mL  5-10 mL IntraVENous Q8H    sodium chloride (NS) flush 5-10 mL  5-10 mL IntraVENous PRN    acetaminophen (TYLENOL) tablet 650 mg  650 mg Oral Q4H PRN    docusate sodium (COLACE) capsule 100 mg  100 mg Oral BID    ondansetron (ZOFRAN) injection 4 mg  4 mg IntraVENous Q4H PRN     ______________________________________________________________________  EXPECTED LENGTH OF STAY: 4d 7h  ACTUAL LENGTH OF STAY:          4                 Wanda Garay MD

## 2018-12-19 NOTE — PROGRESS NOTES
Bedside and Verbal shift change report given to Lydia Mendez (oncoming nurse) by Adrien Chou (offgoing nurse). Report included the following information SBAR.

## 2018-12-19 NOTE — CONSULTS
Palliative Medicine Consult  Jose: [phone] ([phone])    Patient Name: Ludivina Ryan  YOB: 1929    Date of Initial Consult: 12/18/18  Reason for Consult: Care decisions  Requesting Provider: Hospitalist - Dr. Rupa Abreu  Primary Care Physician: Catalina Howard MD     SUMMARY:   Ludivina Ryan is a 80 y.o. with a past history of moderate alzheimer's dementia (stage 5), osteoarthritis, remote hip replacement and htn who was admitted on 12/15/2018 from her memory care unit after a syncopal episode and acute hypoxia. Workup revealed a large saddle PE with concern for right heart strain by CT of which echo did not confirm. Hemodynamics have remained stable. She was initiated on a heparin gtt. Due to stable hemodynamics embolectomy was not considered. Hospital stay complicated by delirium and Klebsiella UTI. Current medical issues leading to Palliative Medicine involvement include:  New diagnosis of large PE in setting of recent fall in August (required hospitalization), alzheimer's dementia and advanced age, in hospital delirium. Social Hx:  -    - one daughter; Dayron Kramer  - moved to memory care unit at [facility] one year ago       [address] [place] [address] Dr:   1. Delirium  2. Alzheimer's dementia  3. Hypoxia  4. Large burden PE   5. Goals of care discussions       PLAN:   1. Delirium superimposed on dementia:  Improved today. - Appears to be at baseline status; confused but w/o agitation.    - did not need Haldol overnight  - continue to avoid precipitating meds including antihistamines, benzos. - minimize invasive / frequent interventions as much as possible    2. Large volume saddle PE:  - stable on 3L O2  - remains on heparin gtt  - Has-Bled score is 1 (for age); placing her at low risk for major bleeding risk. (keep in mind this was validated for pt's with afib - not thromboembolism).     - Would be reasonable to consider anticoagulation with NOAC given relative good health; stable mood and lack of agitation or routine falls prior to admission. I think burden of warfarin administration which would necessitate frequent INR checks and dietary monitoring may prove difficult. 3. Reviewed \"living will\" - which directs that no life prolonging measures be performed if terminal.    4. Daughter Timmy has general power of , not medical, but is also NOK and therefore this also defaults to her. [facility] has signed a DDNR for her in past.    6. Timmy hopeful she can return to [facility] for skilled care as understands memory care unit cannot handle her current needs. 7. Pt amenable to working with PT today and quite alert. Await assessments. 8. Plan meeting with Timmy on Thursday to discuss progress and care plan. 9. Communicated plan of care with: Palliative IDT        GOALS OF CARE / TREATMENT PREFERENCES:     GOALS OF CARE:  Patient/Health Care Proxy Stated Goals: Other (comment)(no heroic life prolonging measures if death imment, otherwise we are continuing both supportive care and continuing current interventions. )      TREATMENT PREFERENCES:   Code Status: DNR    Advance Care Planning:  [x] The [facility] Interdisciplinary Team has updated the ACP Navigator with [address] and Patient Capacity    Primary Decision [address] ([address]): Fabian Hastings  Relationship to patient:  Daughter  Phone number:  [phone]  [] Named in a scanned document   [x] Legal Next of Kin  [] Guardian         Other Instructions:  Per AMD- no life prolonging measures if terminal condition        Other:    As far as possible, the palliative care team has discussed with patient / health care proxy about goals of care / treatment preferences for patient.            HISTORY:     History obtained from:  Daughter, chart review    CHIEF COMPLAINT: confusion    HPI/SUBJECTIVE:    The patient is:   [x] Verbal and participatory  [] Non-participatory due to:      No overnight issues/events. She is alert and interactive today - but confused. Can't state where she is, why or if daughter here this a.m. Asks \"what do I do now? \"  States - \"I will do whatever you need me to do. \"    Very pleasant. Clinical Pain Assessment (nonverbal scale for severity on nonverbal patients):   Clinical Pain Assessment  Severity: 0          Duration: for how long has pt been experiencing pain (e.g., 2 days, 1 month, years)  Frequency: how often pain is an issue (e.g., several times per day, once every few days, constant)     FUNCTIONAL ASSESSMENT:     Palliative Performance Scale (PPS):          PSYCHOSOCIAL/SPIRITUAL SCREENING:     Palliative IDT has assessed this patient for cultural preferences / practices and a referral made as appropriate to needs (Cultural Services, Patient Advocacy, Ethics, etc.)    Any spiritual / Mu-ism concerns:  [] Yes /  [x] No    Caregiver Burnout:  [] Yes /  [x] No /  [] No Caregiver Present      Anticipatory grief assessment:   [x] Normal  / [] Maladaptive       ESAS Anxiety: Anxiety: 0    ESAS Depression:          REVIEW OF SYSTEMS:     Positive and pertinent negative findings in ROS are noted above in HPI. The following systems were [] reviewed / [x] unable to be reviewed as noted in HPI  Other findings are noted below. Systems: constitutional, ears/nose/mouth/throat, respiratory, gastrointestinal, genitourinary, musculoskeletal, integumentary, neurologic, psychiatric, endocrine. Positive findings noted below. Modified ESAS Completed by: provider           Pain: 0   Anxiety: 0 Nausea: 0                 Stool Occurrence(s): 1        PHYSICAL EXAM:     From RN flowsheet:  Wt Readings from Last 3 Encounters:   12/18/18 72.7 kg (160 lb 4.4 oz)   08/26/18 74.7 kg (164 lb 10.9 oz)   01/11/17 68 kg (150 lb)     Blood pressure 128/81, pulse 79, temperature 98.8 °F (37.1 °C), resp. rate 22, height 5' 4\" (1.626 m), weight 72.7 kg (160 lb 4.4 oz), SpO2 94 %.     Pain Scale 1: Numeric (0 - 10)  Pain Intensity 1: 0                 Last bowel movement, if known:  today    Constitutional: elderly  female; lying in bed, NAD  Respiratory: breathing not labored, symmetric  Gastrointestinal: soft non-tender, +bowel sounds  Musculoskeletal: no deformity, no tenderness to palpation  Skin: warm, dry  Neurologic/Psych: very alert. calm, cooperative, oriented to self only, responds to simple questions appropriately, can follow simple commands. No agitation. HISTORY:     Principal Problem:    Syncope (12/15/2018)    Active Problems:    Hypoxia (8/22/2018)      UTI (urinary tract infection) (12/15/2018)      KOLBY (acute kidney injury) ([facility] [address] (12/15/2018)      Past Medical History:   Diagnosis Date    Hypercholesteremia 5/27/2010    IBS (irritable bowel syndrome) 5/27/2010    OA (osteoarthritis) 5/27/2010    Osteoporosis 5/27/2010      History reviewed. No pertinent surgical history. History reviewed. No pertinent family history. History reviewed, no pertinent family history.   Social History     Tobacco Use    Smoking status: Not on file   Substance Use Topics    Alcohol use: Not on file     Allergies   Allergen Reactions    Aricept [Donepezil] Other (comments)     GI upset    Flexeril [Cyclobenzaprine] Other (comments)     delirium      Current Facility-Administered Medications   Medication Dose Route Frequency    haloperidol (HALDOL) tablet 1 mg  1 mg Oral Q4H PRN    metoprolol tartrate (LOPRESSOR) tablet 25 mg  25 mg Oral BID    heparin 25,000 units in D5W 250 ml infusion  18-36 Units/kg/hr IntraVENous TITRATE    cefTRIAXone (ROCEPHIN) 1 g in 0.9% sodium chloride (MBP/ADV) 50 mL  1 g IntraVENous Q24H    losartan (COZAAR) tablet 50 mg  50 mg Oral DAILY    memantine (NAMENDA) tablet 10 mg  10 mg Oral BID    cholecalciferol (VITAMIN D3) tablet 1,000 Units  1,000 Units Oral DAILY    calcium-vitamin D (OS-LUCÍA) 500 mg-200 unit tablet  1 Tab Oral DAILY WITH BREAKFAST  sodium chloride (NS) flush 5-10 mL  5-10 mL IntraVENous Q8H    sodium chloride (NS) flush 5-10 mL  5-10 mL IntraVENous PRN    acetaminophen (TYLENOL) tablet 650 mg  650 mg Oral Q4H PRN    docusate sodium (COLACE) capsule 100 mg  100 mg Oral BID    ondansetron (ZOFRAN) injection 4 mg  4 mg IntraVENous Q4H PRN          LAB AND IMAGING FINDINGS:     Lab Results   Component Value Date/Time    WBC 9.3 12/19/2018 06:06 AM    HGB 12.2 12/19/2018 06:06 AM    PLATELET 201 64/91/9589 06:06 AM     Lab Results   Component Value Date/Time    Sodium 139 12/17/2018 02:57 AM    Potassium 4.6 12/17/2018 02:57 AM    Chloride 109 (H) 12/17/2018 02:57 AM    CO2 26 12/17/2018 02:57 AM    BUN 26 (H) 12/17/2018 02:57 AM    Creatinine 1.16 (H) 12/17/2018 02:57 AM    Calcium 9.0 12/17/2018 02:57 AM    Magnesium 2.1 12/16/2018 04:20 AM    Phosphorus 3.1 12/16/2018 04:20 AM      Lab Results   Component Value Date/Time    AST (SGOT) 15 12/16/2018 04:20 AM    Alk. phosphatase 102 12/16/2018 04:20 AM    Protein, total 6.3 (L) 12/16/2018 04:20 AM    Albumin 3.0 (L) 12/16/2018 04:20 AM    Globulin 3.3 12/16/2018 04:20 AM     Lab Results   Component Value Date/Time    INR 1.0 12/15/2018 02:00 PM    Prothrombin time 10.4 12/15/2018 02:00 PM    aPTT 60.4 (H) 12/19/2018 06:06 AM      No results found for: IRON, FE, TIBC, IBCT, PSAT, FERR   No results found for: PH, PCO2, PO2  No components found for: [place]   Lab Results   Component Value Date/Time     08/22/2018 12:38 PM    CK - MB 1.9 01/11/2017 11:07 PM                Total time:    Counseling / coordination time, spent as noted above:   > 50% counseling / coordination? Prolonged service was provided for  []30 min   []75 min in face to face time in the presence of the patient, spent as noted above. Time Start:   Time End:   Note: this can only be billed with 50636 (initial) or 55239 (follow up). If multiple start / stop times, list each separately.

## 2018-12-19 NOTE — PROGRESS NOTES
PULMONARY/CRITICAL CARE/SLEEP MEDICINE    Physician Consultation Note    Name: Chelo Solis   : 10/14/1929   MRN: [id number]   Date: 2018      Subjective:       Medical records and data reviewed. Seen and examined. Awake. Says she does not have pain and is comfortable and does not need treatment with pills or medications. Echo is pending. Hemodynamically stable    Review of Systems:     A comprehensive 12 system review of systems was negative except for as documented in HPI    Assessment:   1. Pulmonary embolism, large volume. The patient is completely stable from a hemodynamic standpoint. She does not have tachycardia or tachypnea. Her blood pressure is more than satisfactory. Therefore she does not require the use of thrombolytic agents at this time. Intravenous heparin is the appropriate therapy. 2.  Dementia. 3.  Chronic kidney disease. 4.  Recent ground level fall secondary to syncope from PE  5. History of irritable bowel syndrome. 6.  Minimal chronic interstitial lung disease. 7. Recurrent falls       Recommendations: On IV heparin  Fall leading to admission appears to have been a syncopal episode-recurrent falls would increase risks with OACs--   On home O2  Echo: RV without notable strain  IVC filter might be a consideration   Palliative care involvement noted   We will be available again to see if needed         Active Problem List:     Problem List  Date Reviewed: 2011          Codes Class    * (Principal) Syncope ICD-10-CM: R55  ICD-9-CM: 780.2         UTI (urinary tract infection) ICD-10-CM: N39.0  ICD-9-CM: 599.0         [facility]) [facility] ICD-10-CM: N17.9  ICD-9-CM: 224. 9         Fall ICD-10-CM: [address]  ICD-9-CM: E888.9         Unwitnessed fall ICD-10-CM: R29.6  ICD-9-CM: ZYG5019         Hypoxia ICD-10-CM: R09.02  ICD-9-CM: 799.02         Hypercholesteremia ICD-10-CM: E78.00  ICD-9-CM: 272.0     Overview Signed 2010 12:56 PM by Teto Boo 1980's             Osteoporosis ICD-10-CM: M81.0  ICD-9-CM: 733.00         IBS (irritable bowel syndrome) ICD-10-CM: K58.9  ICD-9-CM: 564.1         OA (osteoarthritis) ICD-10-CM: M19.90  ICD-9-CM: 715.90               Past Medical History:      has a past medical history of Hypercholesteremia, IBS (irritable bowel syndrome), OA (osteoarthritis), and Osteoporosis. Past Surgical History:      has no past surgical history on file. Home Medications:     Prior to Admission medications    Medication Sig Start Date End Date Taking? Authorizing Provider   albuterol-ipratropium (DUO-NEB) 2.5 mg-0.5 mg/3 ml nebu 3 mL by Nebulization route three (3) times daily as needed. Yes Provider, Historical   traMADol (ULTRAM) 50 mg tablet Take 50 mg by mouth every six (6) hours as needed for Pain. Yes Provider, Historical   guaiFENesin ER (MUCINEX) 600 mg ER tablet Take 600 mg by mouth two (2) times daily as needed for Congestion. Yes Provider, Historical   diphenhydrAMINE-zinc acetate 1%-0.1% (BENADRYL ITCH STOPPING) topical cream Apply  to affected area daily as needed for Itching. Yes Provider, Historical   acetaminophen (TYLENOL) 500 mg tablet Take 500 mg by mouth two (2) times daily as needed for Pain. Yes Provider, Historical   acetaminophen (TYLENOL) 325 mg tablet Take 650 mg by mouth two (2) times a day. Yes Provider, Historical   calcium-vitamin D (OS-LUCÍA 500+D) 500 mg(1,250mg) -200 unit per tablet Take 1 Tab by mouth daily (with breakfast). Yes Other, MD Kendall   multivitamin (ONE A DAY) tablet Take 1 Tab by mouth daily. Yes Other, MD Kendall   losartan (COZAAR) 50 mg tablet Take 50 mg by mouth daily. Yes Rusty, MD Kendall   cholecalciferol (VITAMIN D3) 1,000 unit cap Take 2,000 Units by mouth daily. Yes Other, MD Kendall   memantine (NAMENDA) 10 mg tablet Take 10 mg by mouth two (2) times a day. Yes Other, MD Kendall       Allergies/Social/Family History:      Allergies   Allergen Reactions    Aricept [Donepezil] Other (comments)     GI upset    Flexeril [Cyclobenzaprine] Other (comments)     delirium      Social History     Tobacco Use    Smoking status: Not on file   Substance Use Topics    Alcohol use: Not on file      History reviewed. No pertinent family history. Objective:   Vital Signs:  Visit Vitals  /81 (BP 1 Location: Right arm, BP Patient Position: At rest)   Pulse 79   Temp 98.8 °F (37.1 °C)   Resp 22   Ht 5' 4\" (1.626 m)   Wt 72.7 kg (160 lb 4.4 oz)   SpO2 94%   BMI 27.51 kg/m²    O2 Flow Rate (L/min): 3 l/min O2 Device: Nasal cannula Temp (24hrs), Av °F (36.7 °C), Min:97.4 °F (36.3 °C), Max:98.8 °F (37.1 °C)           Intake/Output:     Intake/Output Summary (Last 24 hours) at 2018 0916  Last data filed at 2018 7526  Gross per 24 hour   Intake 470 ml   Output 200 ml   Net 270 ml       Physical Exam:   General:  Alert, cooperative, no distress, appears stated age. Head:  Normocephalic, without obvious abnormality, atraumatic. Eyes:  Conjunctivae/corneas clear. PERRL   Neck: Supple, symmetrical, no adenopathy, no carotid bruit and no JVD. Lungs:   Clear to auscultation bilaterally. Chest wall:  No tenderness or deformity. Heart:  Regular rate and rhythm, S1-S2 normal, no murmur, no click, rub or gallop. Abdomen:   Soft, non-tender. Bowel sounds present. No masses,  No organomegaly. Extremities: Atraumatic, no cyanosis or edema. Pulses: Palpable   Skin: No rashes or lesions   Neurologic: Grossly nonfocal         LABS AND  DATA: Personally reviewed  Recent Labs     18  0606 18  0257   WBC 9.3 10.6   HGB 12.2 13.2   HCT 38.6 42.8    149*     Recent Labs     18  0257      K 4.6   *   CO2 26   BUN 26*   CREA 1.16*   *   CA 9.0     No results for input(s): SGOT, GPT, AP, TBIL, TP, ALB, GLOB, AML, LPSE in the last 72 hours.     No lab exists for component: AMYP  Recent Labs     18  0606 18  2342   APTT 60.4* 62.7*      No results for input(s): PHI, PCO2I, PO2I, FIO2I in the last 72 hours. No results for input(s): CPK, CKMB, TROIQ, BNPP in the last 72 hours. MEDS: Reviewed    Chest Imaging: personally reviewed and report checked    Tele- reviewed    Medical decision making:   I have reviewed the flowsheet and previous day's notes  Patient has acute or chronic illness that poses a threat to life or bodily function  Review and order of Clinical lab tests  Review and Order of Radiology tests  Independent visualization of Image        Thank you for allowing me to participate in this patient's care.     MARGARITA Pablo      [facility]

## 2018-12-19 NOTE — PROGRESS NOTES
Problem: Mobility Impaired (Adult and Pediatric)  Goal: *Acute Goals and Plan of Care (Insert Text)  Physical Therapy Goals  Initiated 12/16/2018  1. Patient will move from supine to sit and sit to supine  in bed with supervision/set-up within 7 day(s). 2.  Patient will transfer from bed to chair and chair to bed with supervision/set-up using the least restrictive device within 7 day(s). 3.  Patient will perform sit to stand with supervision/set-up within 7 day(s). 4.  Patient will ambulate with minimal assistance/contact guard assist for 100 feet with the least restrictive device within 7 day(s). physical Therapy TREATMENT  Patient: Mahesh Silverman (51 y.o. female)  Date: 12/19/2018  Diagnosis: Hypoxia Syncope      Precautions: Fall, Bed Alarm  Chart, physical therapy assessment, plan of care and goals were reviewed. ASSESSMENT:  Patient received in chair and most agreeable to working with therapy. Moving well with sit to stand and ambulated with slow and steady gait to richards and back to chair. She did have a few incidents of decreased balance from her fear of falling but overall  Improving. More alert and interactive with appropriate conversation but definitely confused. Anticipate returning to facility memory care unit.    educated on PLB as her oxygen saturations drop to 83 with gait on 3 liters. Recommend up to chair for all meals. Progression toward goals:  []    Improving appropriately and progressing toward goals  [x]    Improving slowly and progressing toward goals  []    Not making progress toward goals and plan of care will be adjusted     PLAN:  Patient continues to benefit from skilled intervention to address the above impairments. Continue treatment per established plan of care. Discharge Recommendations:  Back to Memory care  Further Equipment Recommendations for Discharge:  none     SUBJECTIVE:   Patient stated Will someone check on me. Brenda Menon    OBJECTIVE DATA SUMMARY:   Critical Behavior:  Neurologic State: Alert, Confused  Orientation Level: Oriented to person, Disoriented to time, Disoriented to place, Disoriented to situation  Cognition: Memory loss, Follows commands, Poor safety awareness  Safety/Judgement: Decreased insight into deficits, Decreased awareness of need for safety, Decreased awareness of need for assistance, Decreased awareness of environment  Functional Mobility Training:     Transfers:  Sit to Stand: Minimum assistance  Stand to Sit: Minimum assistance            Balance:  Sitting: Impaired; Without support  Sitting - Static: Good (unsupported)  Sitting - Dynamic: Good (unsupported)  Standing: Impaired; With support  Standing - Static: Fair  Standing - Dynamic : Fair  Ambulation/Gait Training:  Distance (ft): 60 Feet (ft)  Assistive Device: Gait belt;Walker, rolling  Ambulation - Level of Assistance: Contact guard assistance        Gait Abnormalities: Decreased step clearance              Speed/Casandra: Pace decreased (<100 feet/min)  Step Length: Right shortened;Left shortened  Activity Tolerance:   93% SpO2 on 3 L at rest; 83% with gait  Please refer to the flowsheet for vital signs taken during this treatment.   After treatment:   [x]    Patient left in no apparent distress sitting up in chair  []    Patient left in no apparent distress in bed  [x]    Call bell left within reach  [x]    Nursing notified  []    Caregiver present  [x]    Bed alarm activated    COMMUNICATION/COLLABORATION:   The patients plan of care was discussed with: Registered Nurse    Arelis Choi, PT   Time Calculation: 19 mins

## 2018-12-19 NOTE — PROGRESS NOTES
Problem: Falls - Risk of  Goal: *Absence of Falls  Document Giancarlo Fall Risk and appropriate interventions in the flowsheet. Outcome: Progressing Towards Goal  Fall Risk Interventions:  Mobility Interventions: Patient to call before getting OOB    Mentation Interventions: Bed/chair exit alarm, Family/sitter at bedside, Door open when patient unattended    Medication Interventions: Patient to call before getting OOB, Bed/chair exit alarm    Elimination Interventions: Call light in reach, Patient to call for help with toileting needs    History of Falls Interventions: Door open when patient unattended        Problem: Pressure Injury - Risk of  Goal: *Prevention of pressure injury  Document Karl Scale and appropriate interventions in the flowsheet.   Outcome: Progressing Towards Goal  Pressure Injury Interventions:  Sensory Interventions: Assess changes in LOC, Keep linens dry and wrinkle-free    Moisture Interventions: Absorbent underpads    Activity Interventions: Pressure redistribution bed/mattress(bed type)    Mobility Interventions: HOB 30 degrees or less    Nutrition Interventions: Document food/fluid/supplement intake    Friction and Shear Interventions: Lift sheet

## 2018-12-19 NOTE — PROGRESS NOTES
CM noted patient transfer to [facility]. Patient here from [facility]. CM will continue to follow.   Kavin Randhawa, BSW,CRM

## 2018-12-19 NOTE — CONSULTS
Palliative Medicine Consult  Jose: [phone] ([phone])    Patient Name: Yecenia Randhawa  YOB: 1929    Date of Initial Consult: 12/18/18  Reason for Consult: Care decisions  Requesting Provider: Hospitalist - Dr. Rossy Rivera  Primary Care Physician: Krishna Hilton MD     SUMMARY:   Yecenia Randhawa is a 80 y.o. with a past history of moderate alzheimer's dementia (stage 5), osteoarthritis, remote hip replacement and htn who was admitted on 12/15/2018 from her memory care unit after a syncopal episode and acute hypoxia. Workup revealed a large saddle PE with concern for right heart strain by CT of which echo did not confirm. Hemodynamics have remained stable. She was initiated on a heparin gtt. Due to stable hemodynamics embolectomy was not considered. Hospital stay complicated by delirium and Klebsiella UTI. Current medical issues leading to Palliative Medicine involvement include:  New diagnosis of large PE in setting of recent fall in August (required hospitalization), alzheimer's dementia and advanced age, in hospital delirium. Social Hx:  -    - one daughter; Iman Lafleur  - moved to memory care unit at [facility] one year ago       [address] [place] [address] Dr:   1. Delirium  2. Hypoxia  3. Large burden PE   4. Goals of care discussions       PLAN:   1. Met with only daughter Kayy Luciano at bedside. 2. Dementia has been fairly stable; FAST 5 PTA per discussion. No agitation, doing very well at [facility] in structured environment. 3. Consulted to participate in discussions regarding risk of anticoagulation initiation. 4. Has-Bled score is 1 (for age); placing her at low risk for major bleeding risk. (keep in mind this was validated for pt's with afib - not thromboembolism). 5. Would be reasonable to consider anticoagulation with NOAC given relative good health; stable mood and lack of agitation or routine falls prior to admission.    I think burden of warfarin administration which would necessitate frequent INR checks and dietary monitoring may prove difficult. 6. Bigger issue currently is acute delirium.    - due to acute hospitalization / environment change in pt with dementia  - received Benadryl two nights ago; may also have triggered / worsened delirium  - also has UTI which may contribute  - recommend avoidance of antihistamines and benzodiazepines which have propensity to worsen delirium  - minimizing alarms / cardiac monitoring / frequent vital sign checks which may not alter care path but may worsen sx (discontinued prn Benadryl)  - calm/dark environment at night, lights during day. Redirection as tolerated. - add low dose Haldol prn for agitation / marked distress / concern for harm to self  7. Reviewed \"living will\" - which directs that no life prolonging measures be performed if terminal.    8. Daughter Wilbert Win has general power of , not medical, but is also NOK and therefore this also defaults to her. 5. Wilbert Win has signed a DDNR for her in past.    [address] hopeful she can return to [facility] for skilled care as [facility] memory care unit cannot handle her current needs. 11. Will continue to monitor progress of delirium and ability to work with therapies over next 24-48 hrs. 12. Will f/u with Wilbert Win by Thursday. 13. Communicated plan of care with: Palliative IDT, bedside nurse. GOALS OF CARE / TREATMENT PREFERENCES:     GOALS OF CARE:         TREATMENT PREFERENCES:   Code Status: DNR    Advance Care Planning:  [x] The [facility] Interdisciplinary Team has updated the ACP Navigator with [address] and Patient Capacity    Primary Decision [facility] ([address]):  Luci Todd  Relationship to patient:  Daughter  Phone number:  [phone]  [] Named in a scanned document   [x] Legal Next of Kin  [] Guardian         Other Instructions:  Per AMD- no life prolonging measures if terminal condition        Other:    As far as possible, the palliative care team has discussed with patient / health care proxy about goals of care / treatment preferences for patient. HISTORY:     History obtained from:  Daughter, chart review    CHIEF COMPLAINT: confusion    HPI/SUBJECTIVE:    The patient is:   [] Verbal and participatory  [x] Non-participatory due to:  Delirium    See above HPI and discussion. Today Ms. Tiffany Schroeder has been confused after being awake all night and not getting rest.    She is requiring 1:1 sit. Per sitter - ate 50% of lunch meal.        Clinical Pain Assessment (nonverbal scale for severity on nonverbal patients):              Duration: for how long has pt been experiencing pain (e.g., 2 days, 1 month, years)  Frequency: how often pain is an issue (e.g., several times per day, once every few days, constant)     FUNCTIONAL ASSESSMENT:     Palliative Performance Scale (PPS):          PSYCHOSOCIAL/SPIRITUAL SCREENING:     Palliative IDT has assessed this patient for cultural preferences / practices and a referral made as appropriate to needs (Cultural Services, Patient Advocacy, Ethics, etc.)    Any spiritual / Jewish concerns:  [] Yes /  [x] No    Caregiver Burnout:  [] Yes /  [x] No /  [] No Caregiver Present      Anticipatory grief assessment:   [x] Normal  / [] Maladaptive       ESAS Anxiety:      ESAS Depression:          REVIEW OF SYSTEMS:     Positive and pertinent negative findings in ROS are noted above in HPI. The following systems were [] reviewed / [x] unable to be reviewed as noted in HPI  Other findings are noted below. Systems: constitutional, ears/nose/mouth/throat, respiratory, gastrointestinal, genitourinary, musculoskeletal, integumentary, neurologic, psychiatric, endocrine. Positive findings noted below.   Modified ESAS Completed by: provider                                   Stool Occurrence(s): 1        PHYSICAL EXAM:     From RN flowsheet:  Wt Readings from Last 3 Encounters:   12/18/18 72.7 kg (160 lb 4.4 oz)   08/26/18 74.7 kg (164 lb 10.9 oz)   01/11/17 68 kg (150 lb)     Blood pressure 111/72, pulse 88, temperature 98.2 °F (36.8 °C), resp. rate 22, height 5' 4\" (1.626 m), weight 72.7 kg (160 lb 4.4 oz), SpO2 96 %. Pain Scale 1: Numeric (0 - 10)  Pain Intensity 1: 0                 Last bowel movement, if known:     Constitutional: elderly  female; lying in bed, NAD  Respiratory: breathing not labored, symmetric  Gastrointestinal: soft non-tender, +bowel sounds  Musculoskeletal: no deformity, no tenderness to palpation  Skin: warm, dry  Neurologic/Psych: startles easily, opens eyes to loud voice, not answering questions nor following commands - noted to be counting repeatedly       HISTORY:     Principal Problem:    Syncope (12/15/2018)    Active Problems:    Hypoxia (8/22/2018)      UTI (urinary tract infection) (12/15/2018)      KOLBY (acute kidney injury) ([facility] [address] (12/15/2018)      Past Medical History:   Diagnosis Date    Hypercholesteremia 5/27/2010    IBS (irritable bowel syndrome) 5/27/2010    OA (osteoarthritis) 5/27/2010    Osteoporosis 5/27/2010      History reviewed. No pertinent surgical history. History reviewed. No pertinent family history. History reviewed, no pertinent family history.   Social History     Tobacco Use    Smoking status: Not on file   Substance Use Topics    Alcohol use: Not on file     Allergies   Allergen Reactions    Aricept [Donepezil] Other (comments)     GI upset    Flexeril [Cyclobenzaprine] Other (comments)     delirium      Current Facility-Administered Medications   Medication Dose Route Frequency    metoprolol tartrate (LOPRESSOR) tablet 25 mg  25 mg Oral BID    heparin 25,000 units in D5W 250 ml infusion  18-36 Units/kg/hr IntraVENous TITRATE    cefTRIAXone (ROCEPHIN) 1 g in 0.9% sodium chloride (MBP/ADV) 50 mL  1 g IntraVENous Q24H    losartan (COZAAR) tablet 50 mg  50 mg Oral DAILY    memantine (NAMENDA) tablet 10 mg  10 mg Oral BID    cholecalciferol (VITAMIN D3) tablet 1,000 Units  1,000 Units Oral DAILY    calcium-vitamin D (OS-LUCÍA) 500 mg-200 unit tablet  1 Tab Oral DAILY WITH BREAKFAST    sodium chloride (NS) flush 5-10 mL  5-10 mL IntraVENous Q8H    sodium chloride (NS) flush 5-10 mL  5-10 mL IntraVENous PRN    acetaminophen (TYLENOL) tablet 650 mg  650 mg Oral Q4H PRN    diphenhydrAMINE (BENADRYL) capsule 25 mg  25 mg Oral Q4H PRN    docusate sodium (COLACE) capsule 100 mg  100 mg Oral BID    ondansetron (ZOFRAN) injection 4 mg  4 mg IntraVENous Q4H PRN          LAB AND IMAGING FINDINGS:     Lab Results   Component Value Date/Time    WBC 10.6 12/17/2018 02:57 AM    HGB 13.2 12/17/2018 02:57 AM    PLATELET 295 (L) 23/77/7474 02:57 AM     Lab Results   Component Value Date/Time    Sodium 139 12/17/2018 02:57 AM    Potassium 4.6 12/17/2018 02:57 AM    Chloride 109 (H) 12/17/2018 02:57 AM    CO2 26 12/17/2018 02:57 AM    BUN 26 (H) 12/17/2018 02:57 AM    Creatinine 1.16 (H) 12/17/2018 02:57 AM    Calcium 9.0 12/17/2018 02:57 AM    Magnesium 2.1 12/16/2018 04:20 AM    Phosphorus 3.1 12/16/2018 04:20 AM      Lab Results   Component Value Date/Time    AST (SGOT) 15 12/16/2018 04:20 AM    Alk.  phosphatase 102 12/16/2018 04:20 AM    Protein, total 6.3 (L) 12/16/2018 04:20 AM    Albumin 3.0 (L) 12/16/2018 04:20 AM    Globulin 3.3 12/16/2018 04:20 AM     Lab Results   Component Value Date/Time    INR 1.0 12/15/2018 02:00 PM    Prothrombin time 10.4 12/15/2018 02:00 PM    aPTT 67.4 (H) 12/18/2018 05:57 PM      No results found for: IRON, FE, TIBC, IBCT, PSAT, FERR   No results found for: PH, PCO2, PO2  No components found for: [place]   Lab Results   Component Value Date/Time     08/22/2018 12:38 PM    CK - MB 1.9 01/11/2017 11:07 PM                Total time:  70m  Counseling / coordination time, spent as noted above: 70m  > 50% counseling / coordination?: yes- discussion with daughter and hospitalist regarding anticoagulation / bleeding risk, current unknowns regarding recovery process and goals of care discussion / planning. Prolonged service was provided for  []30 min   []75 min in face to face time in the presence of the patient, spent as noted above. Time Start:   Time End:   Note: this can only be billed with 79109 (initial) or 73583 (follow up). If multiple start / stop times, list each separately.

## 2018-12-19 NOTE — ACP (ADVANCE CARE PLANNING)
GOALS OF CARE:   ongoing discussion with daughter Laurie Cardenas.       TREATMENT PREFERENCES:   Code Status: DNR     Advance Care Planning:  [x] The [facility] Interdisciplinary Team has updated the ACP Navigator with [address] and Patient Capacity     Primary Decision [facility] ([address]):  Thiago Peace  Relationship to patient:  Daughter  Phone number:  [phone]  [] Named in a scanned document   [x] Legal Next of Kin  [] Guardian          Other Instructions:  Per AMD- no life prolonging measures if terminal condition

## 2018-12-20 LAB
APTT PPP: 51.4 SEC (ref 22.1–32)
BACTERIA SPEC CULT: NORMAL
ERYTHROCYTE [DISTWIDTH] IN BLOOD BY AUTOMATED COUNT: 14 % (ref 11.5–14.5)
HCT VFR BLD AUTO: 39.5 % (ref 35–47)
HGB BLD-MCNC: 12.2 G/DL (ref 11.5–16)
MCH RBC QN AUTO: 29.5 PG (ref 26–34)
MCHC RBC AUTO-ENTMCNC: 30.9 G/DL (ref 30–36.5)
MCV RBC AUTO: 95.6 FL (ref 80–99)
NRBC # BLD: 0 K/UL (ref 0–0.01)
NRBC BLD-RTO: 0 PER 100 WBC
PLATELET # BLD AUTO: 159 K/UL (ref 150–400)
PMV BLD AUTO: 11.8 FL (ref 8.9–12.9)
RBC # BLD AUTO: 4.13 M/UL (ref 3.8–5.2)
SERVICE CMNT-IMP: NORMAL
THERAPEUTIC RANGE,PTTT: ABNORMAL SECS (ref 58–77)
WBC # BLD AUTO: 9 K/UL (ref 3.6–11)

## 2018-12-20 PROCEDURE — 85730 THROMBOPLASTIN TIME PARTIAL: CPT

## 2018-12-20 PROCEDURE — 36415 COLL VENOUS BLD VENIPUNCTURE: CPT

## 2018-12-20 PROCEDURE — 74011250637 HC RX REV CODE- 250/637: Performed by: INTERNAL MEDICINE

## 2018-12-20 PROCEDURE — 74011250637 HC RX REV CODE- 250/637: Performed by: HOSPITALIST

## 2018-12-20 PROCEDURE — 74011000258 HC RX REV CODE- 258: Performed by: HOSPITALIST

## 2018-12-20 PROCEDURE — 74011250636 HC RX REV CODE- 250/636: Performed by: HOSPITALIST

## 2018-12-20 PROCEDURE — 65270000029 HC RM PRIVATE

## 2018-12-20 PROCEDURE — 85027 COMPLETE CBC AUTOMATED: CPT

## 2018-12-20 RX ORDER — GUAIFENESIN 100 MG/5ML
100 SOLUTION ORAL
Status: DISCONTINUED | OUTPATIENT
Start: 2018-12-20 | End: 2018-12-21 | Stop reason: HOSPADM

## 2018-12-20 RX ADMIN — APIXABAN 5 MG: 5 TABLET, FILM COATED ORAL at 12:35

## 2018-12-20 RX ADMIN — CALCIUM CARBONATE-VITAMIN D TAB 500 MG-200 UNIT 1 TABLET: 500-200 TAB at 07:46

## 2018-12-20 RX ADMIN — APIXABAN 5 MG: 5 TABLET, FILM COATED ORAL at 17:14

## 2018-12-20 RX ADMIN — METOPROLOL TARTRATE 25 MG: 25 TABLET ORAL at 20:56

## 2018-12-20 RX ADMIN — DOCUSATE SODIUM 100 MG: 100 CAPSULE, LIQUID FILLED ORAL at 17:14

## 2018-12-20 RX ADMIN — Medication 10 ML: at 13:36

## 2018-12-20 RX ADMIN — DOCUSATE SODIUM 100 MG: 100 CAPSULE, LIQUID FILLED ORAL at 09:28

## 2018-12-20 RX ADMIN — GUAIFENESIN 100 MG: 200 SOLUTION ORAL at 12:09

## 2018-12-20 RX ADMIN — Medication 10 ML: at 20:56

## 2018-12-20 RX ADMIN — MEMANTINE 10 MG: 10 TABLET ORAL at 09:28

## 2018-12-20 RX ADMIN — LOSARTAN POTASSIUM 50 MG: 50 TABLET ORAL at 09:27

## 2018-12-20 RX ADMIN — VITAMIN D, TAB 1000IU (100/BT) 1000 UNITS: 25 TAB at 09:27

## 2018-12-20 RX ADMIN — CEFTRIAXONE 1 G: 1 INJECTION, POWDER, FOR SOLUTION INTRAMUSCULAR; INTRAVENOUS at 15:18

## 2018-12-20 RX ADMIN — MEMANTINE 10 MG: 10 TABLET ORAL at 17:14

## 2018-12-20 RX ADMIN — METOPROLOL TARTRATE 25 MG: 25 TABLET ORAL at 09:28

## 2018-12-20 RX ADMIN — Medication 10 ML: at 06:17

## 2018-12-20 NOTE — PROGRESS NOTES
12/20/18 0829   Giancarlo Fall Risk   Mobility 1.0   Mobility Interventions Patient to call before getting OOB   Mentation 1   Mentation Interventions Door open when patient unattended;Bed/chair exit alarm   Medication 1   Medication Interventions Patient to call before getting OOB   Elimination 1.0   Elimination Interventions Call light in reach   Prior Fall History 1   History of Falls Interventions Door open when patient unattended   Total Score 5   Standard Fall Precautions Yes   High Fall Risk Yes       Due to High Fall Risk, RT won't be able to perform six minute walk test.  Please coordinate with PT. Thank you.

## 2018-12-20 NOTE — PROGRESS NOTES
Na BUNDY [facility] - JOSE LUIS) here earlier today from [facility] . Patient resides in Memory care and is welcome to return when ready for discharge. Updates sent via [employer].   TONY Contreras, CRM

## 2018-12-20 NOTE — PROGRESS NOTES
Hospitalist Progress Note  Jared Arguello MD  Answering service: [phone] OR [phone] from in house phone        Date of Service:  2018  NAME:  Arma Ahumada  :  10/14/1929  MRN:  [id number]      Admission Summary:   80 y.o. female with past medical history significant for osteoarthritis, IBS, and dementia who presents from [address] [place] [address] via EMS with chief complaint of a fall. Per EMS, pt had a witnessed syncopal fall and hit her head as a result. EMS reported the pt's SpO2 was in the low 80's on room air and improved to low 90's on 2L via [place]. Pt states she does not remember falling or hitting her head, but suspects she may have slipped on something. Of note, according to facility notes, pt was recently diagnosed with hypoxemia and wears O2 PRN. Pt denies any fever, chills, nausea, vomiting, changes in appetite, dysuria, or frequency.  denied chest pain     Interval history / Subjective:   No new changes on heparin drip d/c'd and placed on eliquis  spoke to daughter   Family meeting Today DNR   Cough +      Assessment & Plan:     1. Syncope head CT no acute changes. May have component of dehydration, UTI. She has baseline dementia lives in a memory unit     2. 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  - ECHO report - Systolic function was normal. Ejection fraction was  estimated in the range of 55 % to 60 %. There were no regional wall motion  abnormalities. Wall thickness was mildly increased. No RV strain  - Started on Heparin drip with bolus. - will change to eliquis   - will d/w pulmonary for further MGMT     - pt will not be a good candidate for any type of NOAC or coumadin due to multiple falls and dementia but after d/w daughter swill try will eliquis is the decision  - D/w daughter and [facility] care family meeting today    2. Acute on Chronic hypoxia resp failure  Possibly due to above wean off o2 PT wallk test     3. Sinus tachycardia: monitor for now, start IVF. 4. UTI: rocephin and IVF. C/S GNR > 100k klebsiella    5. CKD stage 3: Cr worse than baseline. Follow renal function  Getting IV fluids. 6. Dementia: without behavioral disturbbance    7. Consult PT to make sure she is safe to walk    Code status: DDNR 12/17/18  scanned on file d/w daughter   DVT prophylaxis: heparin    Care Plan discussed with: Patient/Family  Disposition: Home w/Family and TBD she is from a memory unit  Family meeting today and possible d/c there after tomorrow     Hospital Problems  Date Reviewed: 12/21/2011          Codes Class Noted POA    * (Principal) Syncope ICD-10-CM: R55  ICD-9-CM: 780.2  12/15/2018 Yes        UTI (urinary tract infection) ICD-10-CM: N39.0  ICD-9-CM: 599.0  12/15/2018 Unknown        KOLBY (acute kidney injury) ([facility] [address] ICD-10-CM: N17.9  ICD-9-CM: 584.9  12/15/2018 Unknown        Hypoxia ICD-10-CM: R09.02  ICD-9-CM: 799.02  8/22/2018 Yes                Review of Systems:   A comprehensive review of systems was negative except for that written in the HPI. Vital Signs:    Last 24hrs VS reviewed since prior progress note. Most recent are:  Visit Vitals  /76 (BP 1 Location: Right arm, BP Patient Position: At rest)   Pulse 82   Temp 98.4 °F (36.9 °C)   Resp 20   Ht 5' 4\" (1.626 m)   Wt 72.7 kg (160 lb 4.4 oz)   SpO2 90%   BMI 27.51 kg/m²         Intake/Output Summary (Last 24 hours) at 12/20/2018 1140  Last data filed at 12/20/2018 0700  Gross per 24 hour   Intake 900 ml   Output --   Net 900 ml        Physical Examination:             Constitutional:  No acute distress, cooperative,    ENT:  Oral mucous moist,    Resp:  CTA bilaterally.    CV:  Regular rhythm, normal rate,    GI:  Soft, non distended, non tender bs=    Musculoskeletal:  No edema, warm, 2+ pulses throughout    Neurologic: Moves all extremities. AAOx0,            Data Review:    Review and/or order of clinical lab test    Xr Chest Pa Lat    Result Date: 12/15/2018  Impression: Unchanged cardiomegaly. Ct Head Wo Cont    Result Date: 12/15/2018  IMPRESSION: No evidence of acute process. 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 The findings were called to Dr. Ian Bell on 12/16/2018 at 17:58 by myself. [phone]      Labs:     Recent Labs     12/20/18  0614 12/19/18  0606   WBC 9.0 9.3   HGB 12.2 12.2   HCT 39.5 38.6    156     No results for input(s): NA, K, CL, CO2, BUN, CREA, GLU, CA, MG, PHOS, URICA in the last 72 hours. No results for input(s): SGOT, GPT, ALT, AP, TBIL, TBILI, TP, ALB, GLOB, GGT, AML, LPSE in the last 72 hours. No lab exists for component: AMYP, HLPSE  Recent Labs     12/20/18  0614 12/19/18  0606 12/18/18  2342   APTT 51.4* 60.4* 62.7*      No results for input(s): FE, TIBC, PSAT, FERR in the last 72 hours. No results found for: FOL, RBCF   No results for input(s): PH, PCO2, PO2 in the last 72 hours. No results for input(s): CPK, CKNDX, TROIQ in the last 72 hours.     No lab exists for component: CPKMB  Lab Results   Component Value Date/Time    Cholesterol, total 153 02/23/2010 08:30 AM    HDL Cholesterol 54 02/23/2010 08:30 AM    LDL, calculated 88 02/23/2010 08:30 AM    Triglyceride 55 02/23/2010 08:30 AM    CHOL/HDL Ratio 2.8 02/23/2010 08:30 AM     No results found for: [facility]  Lab Results   Component Value Date/Time    Color YELLOW/STRAW 12/15/2018 02:41 PM    Appearance CLOUDY (A) 12/15/2018 02:41 PM    Specific gravity 1.025 12/15/2018 02:41 PM    pH (UA) 5.0 12/15/2018 02:41 PM    Protein NEGATIVE  12/15/2018 02:41 PM    Glucose NEGATIVE  12/15/2018 02:41 PM    Ketone NEGATIVE  12/15/2018 02:41 PM    Bilirubin NEGATIVE  12/15/2018 02:41 PM    Urobilinogen 0.2 12/15/2018 02:41 PM    Nitrites POSITIVE (A) 12/15/2018 02:41 PM    Leukocyte Esterase MODERATE (A) 12/15/2018 02:41 PM    Epithelial cells FEW 12/15/2018 02:41 PM    Bacteria 4+ (A) 12/15/2018 02:41 PM    WBC >100 (H) 12/15/2018 02:41 PM    RBC 0-5 12/15/2018 02:41 PM         Medications Reviewed:     Current Facility-Administered Medications   Medication Dose Route Frequency    haloperidol (HALDOL) tablet 1 mg  1 mg Oral Q4H PRN    metoprolol tartrate (LOPRESSOR) tablet 25 mg  25 mg Oral BID    heparin 25,000 units in D5W 250 ml infusion  18-36 Units/kg/hr IntraVENous TITRATE    cefTRIAXone (ROCEPHIN) 1 g in 0.9% sodium chloride (MBP/ADV) 50 mL  1 g IntraVENous Q24H    losartan (COZAAR) tablet 50 mg  50 mg Oral DAILY    memantine (NAMENDA) tablet 10 mg  10 mg Oral BID    cholecalciferol (VITAMIN D3) tablet 1,000 Units  1,000 Units Oral DAILY    calcium-vitamin D (OS-LUCÍA) 500 mg-200 unit tablet  1 Tab Oral DAILY WITH BREAKFAST    sodium chloride (NS) flush 5-10 mL  5-10 mL IntraVENous Q8H    sodium chloride (NS) flush 5-10 mL  5-10 mL IntraVENous PRN    acetaminophen (TYLENOL) tablet 650 mg  650 mg Oral Q4H PRN    docusate sodium (COLACE) capsule 100 mg  100 mg Oral BID    ondansetron (ZOFRAN) injection 4 mg  4 mg IntraVENous Q4H PRN     ______________________________________________________________________  EXPECTED LENGTH OF STAY: 4d 7h  ACTUAL LENGTH OF STAY:          5                 Hari Berkowitz MD

## 2018-12-21 VITALS
BODY MASS INDEX: 27.36 KG/M2 | TEMPERATURE: 98.2 F | DIASTOLIC BLOOD PRESSURE: 64 MMHG | RESPIRATION RATE: 17 BRPM | HEART RATE: 97 BPM | SYSTOLIC BLOOD PRESSURE: 101 MMHG | WEIGHT: 160.27 LBS | HEIGHT: 64 IN | OXYGEN SATURATION: 90 %

## 2018-12-21 PROBLEM — N39.0 UTI (URINARY TRACT INFECTION): Status: RESOLVED | Noted: 2018-12-15 | Resolved: 2018-12-21

## 2018-12-21 PROBLEM — R55 SYNCOPE: Status: RESOLVED | Noted: 2018-12-15 | Resolved: 2018-12-21

## 2018-12-21 PROBLEM — N17.9 AKI (ACUTE KIDNEY INJURY) (HCC): Status: RESOLVED | Noted: 2018-12-15 | Resolved: 2018-12-21

## 2018-12-21 PROBLEM — R09.02 HYPOXIA: Status: RESOLVED | Noted: 2018-08-22 | Resolved: 2018-12-21

## 2018-12-21 LAB
ERYTHROCYTE [DISTWIDTH] IN BLOOD BY AUTOMATED COUNT: 14.2 % (ref 11.5–14.5)
HCT VFR BLD AUTO: 39.7 % (ref 35–47)
HGB BLD-MCNC: 12.5 G/DL (ref 11.5–16)
MCH RBC QN AUTO: 29 PG (ref 26–34)
MCHC RBC AUTO-ENTMCNC: 31.5 G/DL (ref 30–36.5)
MCV RBC AUTO: 92.1 FL (ref 80–99)
NRBC # BLD: 0 K/UL (ref 0–0.01)
NRBC BLD-RTO: 0 PER 100 WBC
PLATELET # BLD AUTO: 184 K/UL (ref 150–400)
PMV BLD AUTO: 11.8 FL (ref 8.9–12.9)
RBC # BLD AUTO: 4.31 M/UL (ref 3.8–5.2)
WBC # BLD AUTO: 8.7 K/UL (ref 3.6–11)

## 2018-12-21 PROCEDURE — 85027 COMPLETE CBC AUTOMATED: CPT

## 2018-12-21 PROCEDURE — 74011250637 HC RX REV CODE- 250/637: Performed by: INTERNAL MEDICINE

## 2018-12-21 PROCEDURE — 74011250637 HC RX REV CODE- 250/637: Performed by: HOSPITALIST

## 2018-12-21 PROCEDURE — 36415 COLL VENOUS BLD VENIPUNCTURE: CPT

## 2018-12-21 RX ADMIN — DOCUSATE SODIUM 100 MG: 100 CAPSULE, LIQUID FILLED ORAL at 09:54

## 2018-12-21 RX ADMIN — VITAMIN D, TAB 1000IU (100/BT) 1000 UNITS: 25 TAB at 09:55

## 2018-12-21 RX ADMIN — CALCIUM CARBONATE-VITAMIN D TAB 500 MG-200 UNIT 1 TABLET: 500-200 TAB at 07:07

## 2018-12-21 RX ADMIN — METOPROLOL TARTRATE 25 MG: 25 TABLET ORAL at 09:54

## 2018-12-21 RX ADMIN — APIXABAN 5 MG: 5 TABLET, FILM COATED ORAL at 09:55

## 2018-12-21 RX ADMIN — LOSARTAN POTASSIUM 50 MG: 50 TABLET ORAL at 09:55

## 2018-12-21 RX ADMIN — Medication 10 ML: at 07:07

## 2018-12-21 RX ADMIN — MEMANTINE 10 MG: 10 TABLET ORAL at 09:55

## 2018-12-21 NOTE — DISCHARGE SUMMARY
Discharge Summary       PATIENT ID: Navya Cota  MRN: [id number]   YOB: 1929    DATE OF ADMISSION: 12/15/2018  1:13 PM    DATE OF DISCHARGE: 12/21/18   PRIMARY CARE PROVIDER: Dorcas Shoemaker MD     ATTENDING PHYSICIAN: Selene Charltno  DISCHARGING PROVIDER: Padma Ward MD    To contact this individual call [phone] and ask the  to page. If unavailable ask to be transferred the Adult Hospitalist Department. CONSULTATIONS: IP CONSULT TO PALLIATIVE CARE - PROVIDER    PROCEDURES/SURGERIES: * No surgery found *    ADMITTING DIAGNOSES & HOSPITAL COURSE:   80 y.o. female with past medical history significant for osteoarthritis, IBS, and dementia who presents from [facility] with chief complaint of a fall. Per EMS, pt had a witnessed syncopal fall and hit her head as a result. EMS reported the pt's SpO2 was in the low 80's on room air and improved to low 90's on 2L via NC. Pt states she does not remember falling or hitting her head, but suspects she may have slipped on something. Of note, according to facility notes, pt was recently diagnosed with hypoxemia and wears O2 PRN. Pt denies any fever, chills, nausea, vomiting, changes in appetite, dysuria, or frequency.  denied chest pain      Interval history / Subjective:   No new changes on heparin drip d/c'd and placed on eliquis  spoke to daughter   Family meeting Today DNR   Cough +       Assessment & Plan:      1. Syncope head CT no acute changes. May have component of dehydration, UTI. She has baseline dementia lives in a memory unit      2. 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  - ECHO report - Systolic function was normal. Ejection fraction was  estimated in the range of 55 % to 60 %. There were no regional wall motion  abnormalities.  Wall thickness was mildly increased. No RV strain  - Started on Heparin drip with bolus. - will change to eliquis   - d/'c on eliquis      2. Acute on Chronic hypoxia resp failure  Possibly due to above wean off o2 PT wallk test  home o2 ordered     3. Sinus tachycardia: from PE     4. UTI: rocephin and IVF. C/S GNR > 100k klebsiella completed      5. CKD stage 3: Cr worse than baseline. Follow renal function  Getting IV fluids.     6. Dementia: without behavioral disturbbance     7. Consult PT to make sure she is safe to walk     Code status: DDNR 12/17/18  scanned on file d/w daughter              PENDING TEST RESULTS:   At the time of discharge the following test results are still pending:     FOLLOW UP APPOINTMENTS:    Follow-up Information     Follow up With Specialties Details Why Julissa Zendejas MD [facility] In 2 weeks  [address] [facility] [address]:     DIET: Regular Diet    ACTIVITY: Activity as tolerated    WOUND CARE:     EQUIPMENT needed:       DISCHARGE MEDICATIONS:  Current Discharge Medication List      START taking these medications    Details   apixaban (ELIQUIS) 5 mg tablet Take 1 Tab by mouth two (2) times a day for 30 days. Qty: 60 Tab, Refills: 0         CONTINUE these medications which have NOT CHANGED    Details   albuterol-ipratropium (DUO-NEB) 2.5 mg-0.5 mg/3 ml nebu 3 mL by Nebulization route three (3) times daily as needed. traMADol (ULTRAM) 50 mg tablet Take 50 mg by mouth every six (6) hours as needed for Pain.      guaiFENesin ER (MUCINEX) 600 mg ER tablet Take 600 mg by mouth two (2) times daily as needed for Congestion. diphenhydrAMINE-zinc acetate 1%-0.1% (BENADRYL ITCH STOPPING) topical cream Apply  to affected area daily as needed for Itching. !! acetaminophen (TYLENOL) 500 mg tablet Take 500 mg by mouth two (2) times daily as needed for Pain.       !! acetaminophen (TYLENOL) 325 mg tablet Take 650 mg by mouth two (2) times a day. calcium-vitamin D (OS-LUCÍA 500+D) 500 mg(1,250mg) -200 unit per tablet Take 1 Tab by mouth daily (with breakfast). multivitamin (ONE A DAY) tablet Take 1 Tab by mouth daily. losartan (COZAAR) 50 mg tablet Take 50 mg by mouth daily. cholecalciferol (VITAMIN D3) 1,000 unit cap Take 2,000 Units by mouth daily. memantine (NAMENDA) 10 mg tablet Take 10 mg by mouth two (2) times a day. !! - Potential duplicate medications found. Please discuss with provider. NOTIFY YOUR PHYSICIAN FOR ANY OF THE FOLLOWING:   Fever over 101 degrees for 24 hours. Chest pain, shortness of breath, fever, chills, nausea, vomiting, diarrhea, change in mentation, falling, weakness, bleeding. Severe pain or pain not relieved by medications. Or, any other signs or symptoms that you may have questions about. DISPOSITION:    Home With:   OT  PT  HH  RN      x Long term SNF/Inpatient Rehab    Independent/assisted living    Hospice   x Other: memory care unit at [address] [place] [address] AT DISCHARGE:     Functional status    Poor    x Deconditioned     Independent      Cognition     Lucid     Forgetful    x Dementia      Catheters/lines (plus indication)    Carey     PICC     PEG    x None      Code status     Full code    x DNR      PHYSICAL EXAMINATION AT DISCHARGE:     Constitutional:  No acute distress, cooperative,    ENT:  Oral mucous moist,    Resp:  CTA bilaterally. CV:  Regular rhythm, normal rate,    GI:  Soft, non distended, non tender bs=    Musculoskeletal:  No edema, warm, 2+ pulses throughout    Neurologic:  Moves all extremities. AAOx0,                               CHRONIC MEDICAL DIAGNOSES:  Problem List as of 12/21/2018 Date Reviewed: 12/21/2018          Codes Class Noted - Resolved    Fall ICD-10-CM: W19. Nichole Gums  ICD-9-CM: E888.9  8/23/2018 - Present        Unwitnessed fall ICD-10-CM: R29.6  ICD-9-CM: Cookie Arroyo  8/22/2018 - Present Hypercholesteremia ICD-10-CM: E78.00  ICD-9-CM: 272.0  5/27/2010 - Present    Overview Signed 5/27/2010 12:56 PM by Sameera Brar     1980's             Osteoporosis ICD-10-CM: M81.0  ICD-9-CM: 733.00  5/27/2010 - Present        IBS (irritable bowel syndrome) ICD-10-CM: K58.9  ICD-9-CM: 564.1  5/27/2010 - Present        OA (osteoarthritis) ICD-10-CM: M19.90  ICD-9-CM: 715.90  5/27/2010 - Present        * (Principal) RESOLVED: Syncope ICD-10-CM: R55  ICD-9-CM: 780.2  12/15/2018 - 12/21/2018        RESOLVED: UTI (urinary tract infection) ICD-10-CM: N39.0  ICD-9-CM: 599.0  12/15/2018 - 12/21/2018        RESOLVED: KOLBY (acute kidney injury) ([facility] ICD-10-CM: N17.9  ICD-9-CM: 584.9  12/15/2018 - 12/21/2018        RESOLVED: Hypoxia ICD-10-CM: R09.02  ICD-9-CM: 799.02  8/22/2018 - 12/21/2018              Greater than 30  minutes were spent with the patient on counseling and coordination of care    Signed:   Ernesto Martinez MD  12/21/2018  8:45 AM

## 2018-12-21 NOTE — DISCHARGE INSTRUCTIONS
Discharge Instructions       PATIENT ID: Monica Mendoza  MRN: [id number]   YOB: 1929    DATE OF ADMISSION: 12/15/2018  1:13 PM    DATE OF DISCHARGE: 12/21/2018    PRIMARY CARE PROVIDER: Vahe Ayers MD     ATTENDING PHYSICIAN: Niya Christian MD  DISCHARGING PROVIDER: Louie Pina MD    To contact this individual call [phone] and ask the  to page. If unavailable ask to be transferred the Adult Hospitalist Department. DISCHARGE DIAGNOSES PE    CONSULTATIONS: IP CONSULT TO PALLIATIVE CARE - PROVIDER    PROCEDURES/SURGERIES: * No surgery found *    PENDING TEST RESULTS:   At the time of discharge the following test results are still pending:     FOLLOW UP APPOINTMENTS:   Follow-up Information     Follow up With Specialties Details Why Mirian Macedo MD [facility] [place] In 2 weeks  [address] Tammie Vogel [address]:     DIET: Regular Diet      ACTIVITY: Activity as tolerated    WOUND CARE:     EQUIPMENT needed:       DISCHARGE MEDICATIONS:   See Medication Reconciliation Form    · It is important that you take the medication exactly as they are prescribed. · Keep your medication in the bottles provided by the pharmacist and keep a list of the medication names, dosages, and times to be taken in your wallet. · Do not take other medications without consulting your doctor. NOTIFY YOUR PHYSICIAN FOR ANY OF THE FOLLOWING:   Fever over 101 degrees for 24 hours. Chest pain, shortness of breath, fever, chills, nausea, vomiting, diarrhea, change in mentation, falling, weakness, bleeding. Severe pain or pain not relieved by medications. Or, any other signs or symptoms that you may have questions about.       DISPOSITION:    Home With:   OT  PT  HH  RN      x SNF/Inpatient Rehab/LTAC    Independent/assisted living    Hospice    Other:     CDMP Checked:   Yes x     PROBLEM LIST Updated:  Yes x       Signed:   Deshawn Connell MD  12/21/2018  8:18 AM

## 2018-12-21 NOTE — PROGRESS NOTES
TRANSFER - OUT REPORT:    Telephone report given to Nelson[address] So [address] Nafisa on Shayy Hollandale  being transferred to Healthcare at [facility] for routine progression of care       Report consisted of patients Situation, Background, Assessment and   Recommendations(SBAR). Information from the following report(s) SBAR, Kardex, Intake/Output, MAR and Recent Results was reviewed with the receiving nurse. Lines:       Opportunity for questions and clarification was provided.       Patient transported with:   O2 @ 3 liters  [facility]

## 2018-12-21 NOTE — PROGRESS NOTES
Patient is being discharged today to Healthcare at [facility] [phone] room [phone]. Order for home 02 received this am as patient requires 3 liters. Prior to admission, she was using 02 PRN    NATASHA confirmed with Rita Ramos [phone] liaison at the facility that patient cn be admitted today. As requested Cm faxed the 02 order, discharge summary and instructions, and new prescriptions to him at [phone]. Referral sent to [facility] for 2 pm transport via allscripts and confirmed. Servando Donis at [facility] updated on transport time    Cm talked with daughter, Bianka Lazar, and she was in agreement with this plan. Nurse to call report to [phone]    Envelope and ambulance form on chart-- DNR form copied and placed in envelope.

## 2018-12-21 NOTE — PROGRESS NOTES
Problem: Falls - Risk of  Goal: *Absence of Falls  Document Giancarlo Fall Risk and appropriate interventions in the flowsheet. Outcome: Progressing Towards Goal  Fall Risk Interventions:  Mobility Interventions: Patient to call before getting OOB    Mentation Interventions: Door open when patient unattended, Bed/chair exit alarm    Medication Interventions: Patient to call before getting OOB    Elimination Interventions: Call light in reach    History of Falls Interventions: Door open when patient unattended        Problem: Pressure Injury - Risk of  Goal: *Prevention of pressure injury  Document Karl Scale and appropriate interventions in the flowsheet.   Outcome: Progressing Towards Goal  Pressure Injury Interventions:  Sensory Interventions: Assess changes in LOC    Moisture Interventions: Absorbent underpads    Activity Interventions: Pressure redistribution bed/mattress(bed type)    Mobility Interventions: Pressure redistribution bed/mattress (bed type)    Nutrition Interventions: Document food/fluid/supplement intake    Friction and Shear Interventions: Apply protective barrier, creams and emollients

## 2020-06-09 ENCOUNTER — HOSPITAL ENCOUNTER (EMERGENCY)
Age: 85
Discharge: HOME | End: 2020-06-09
Attending: EMERGENCY MEDICINE
Payer: MEDICARE

## 2020-06-09 ENCOUNTER — APPOINTMENT (OUTPATIENT)
Dept: GENERAL RADIOLOGY | Age: 85
End: 2020-06-09
Attending: EMERGENCY MEDICINE
Payer: MEDICARE

## 2020-06-09 ENCOUNTER — APPOINTMENT (OUTPATIENT)
Dept: CT IMAGING | Age: 85
End: 2020-06-09
Attending: EMERGENCY MEDICINE
Payer: MEDICARE

## 2020-06-09 VITALS
DIASTOLIC BLOOD PRESSURE: 84 MMHG | SYSTOLIC BLOOD PRESSURE: 129 MMHG | BODY MASS INDEX: 27.59 KG/M2 | WEIGHT: 160.72 LBS | OXYGEN SATURATION: 98 % | TEMPERATURE: 98.2 F | RESPIRATION RATE: 16 BRPM | HEART RATE: 80 BPM

## 2020-06-09 DIAGNOSIS — S01.81XA FACIAL LACERATION, INITIAL ENCOUNTER: ICD-10-CM

## 2020-06-09 DIAGNOSIS — S09.90XA INJURY OF HEAD, INITIAL ENCOUNTER: ICD-10-CM
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
## 2020-06-09 PROCEDURE — 70450 CT HEAD/BRAIN W/O DYE: CPT

## 2020-06-09 PROCEDURE — 99284 EMERGENCY DEPT VISIT MOD MDM: CPT

## 2020-06-09 PROCEDURE — 73502 X-RAY EXAM HIP UNI 2-3 VIEWS: CPT

## 2020-06-09 PROCEDURE — 74011000250 HC RX REV CODE- 250: Performed by: EMERGENCY MEDICINE

## 2020-06-09 PROCEDURE — 75810000293 HC SIMP/SUPERF WND  RPR

## 2020-06-09 RX ORDER — BACITRACIN 500 UNIT/G
1 PACKET (EA) TOPICAL
Status: COMPLETED | OUTPATIENT
Start: 2020-06-10 | End: 2020-06-09

## 2020-06-09 RX ORDER — NYSTATIN 100000 [USP'U]/G
POWDER TOPICAL 4 TIMES DAILY
COMMUNITY

## 2020-06-09 RX ORDER — ADHESIVE BANDAGE
30 BANDAGE TOPICAL DAILY PRN
COMMUNITY

## 2020-06-09 RX ORDER — LIDOCAINE HYDROCHLORIDE AND EPINEPHRINE 10; 10 MG/ML; UG/ML
10 INJECTION, SOLUTION INFILTRATION; PERINEURAL ONCE
Status: COMPLETED | OUTPATIENT
Start: 2020-06-09 | End: 2020-06-09

## 2020-06-09 RX ADMIN — BACITRACIN 1 PACKET: 500 OINTMENT TOPICAL at 23:13

## 2020-06-09 RX ADMIN — LIDOCAINE HYDROCHLORIDE,EPINEPHRINE BITARTRATE 100 MG: 10; .01 INJECTION, SOLUTION INFILTRATION; PERINEURAL at 21:56

## 2020-06-10 NOTE — DISCHARGE INSTRUCTIONS
We hope that we have addressed all of your medical concerns. The examination and treatment you received in the Emergency Department were for an emergent problem and were not intended as complete care. It is important that you follow up with your healthcare provider(s) for ongoing care. If your symptoms worsen or do not improve as expected, and you are unable to reach your usual health care provider(s), you should return to the Emergency Department. Today's healthcare is undergoing tremendous change, and patient satisfaction surveys are one of the many tools to assess the quality of medical care. You may receive a survey from the [employer] regarding your experience in the Emergency Department. I hope that your experience has been completely positive, particularly the medical care that I provided. As such, please participate in the survey; anything less than excellent does not meet my expectations or intentions. [facility] and 8 [facility] participate in nationally recognized quality of care measures. If your blood pressure is greater than 120/80, as reported below, we urge that you seek medical care to address the potential of high blood pressure, commonly known as hypertension. Hypertension can be hereditary or can be caused by certain medical conditions, pain, stress, or \"white coat syndrome. \"       Please make an appointment with your health care provider(s) for follow up of your Emergency Department visit. VITALS:   Patient Vitals for the past 8 hrs:   Temp Pulse Resp BP SpO2   06/09/20 2300 -- 89 16 129/84 95 %   06/09/20 2245 -- -- -- -- 96 %   06/09/20 2230 -- -- -- 139/82 96 %   06/09/20 2200 -- -- -- (!) 143/96 97 %   06/09/20 2154 97.6 °F (36.4 °C) 81 18 (!) 144/94 97 %          Thank you for allowing us to provide you with medical care today. We realize that you have many choices for your emergency care needs.   Please choose us in the future for any continued health care needs. Rachel Sheppard [address]: [phone]            No results found for this or any previous visit (from the past 24 hour(s)). Xr Hip Lt W Or Wo Pelv 2-3 Vws    Result Date: 6/9/2020  EXAM: XR HIP LT W OR WO PELV 2-3 VWS INDICATION: hip pain . COMPARISON: 2018. FINDINGS: AP view of the pelvis and a frogleg lateral view of the left hip demonstrate left hip replacement with protrusio acetabuli. 2 of the screws project into the pelvis. There are old left pubic ramus fractures. There is osteopenia. Findings are similar to 2018. IMPRESSION: No change. .    Ct Head Wo Cont    Result Date: 6/9/2020  EXAM:  CT HEAD WO CONT INDICATION:   fall, head injury COMPARISON: CT head 12/15/2018. TECHNIQUE: Unenhanced CT of the head was performed using 5 mm images. Brain and bone windows were generated. CT dose reduction was achieved through use of a standardized protocol tailored for this examination and automatic exposure control for dose modulation. FINDINGS: Generalized parenchymal volume loss with commensurate dilation of the sulci and ventricular system. There is disproportionate atrophy of the bilateral hippocampi and mesial temporal lobes. Confluent areas of periventricular deep white matter hypodensity, consistent with severe chronic microvascular ischemic disease. These findings are unchanged. Basilar cisterns are patent. No midline shift. There is no evidence of acute infarct, hemorrhage, or extraaxial fluid collection. The paranasal sinuses, mastoid air cells, and middle ears are clear. The orbital contents are within normal limits with bilateral lens implants. There are no significant osseous or extracranial soft tissue lesions. IMPRESSION: 1. No evidence of acute intracranial abnormality.  2. Unchanged generalized parenchymal volume loss with disproportionate atrophy of the hippocampi and mesial temporal lobes, suggestive of Alzheimer's dementia. 3. Unchanged severe chronic microvascular ischemic disease. Patient Education        Preventing Falls: Care Instructions  Your Care Instructions     Getting around your home safely can be a challenge if you have injuries or health problems that make it easy for you to fall. Loose rugs and furniture in walkways are among the dangers for many older people who have problems walking or who have poor eyesight. People who have conditions such as arthritis, osteoporosis, or dementia also have to be careful not to fall. You can make your home safer with a few simple measures. Follow-up care is a key part of your treatment and safety. Be sure to make and go to all appointments, and call your doctor if you are having problems. It's also a good idea to know your test results and keep a list of the medicines you take. How can you care for yourself at home? Taking care of yourself  · You may get dizzy if you do not drink enough water. To prevent dehydration, drink plenty of fluids, enough so that your urine is light yellow or clear like water. Choose water and other caffeine-free clear liquids. If you have kidney, heart, or liver disease and have to limit fluids, talk with your doctor before you increase the amount of fluids you drink. · Exercise regularly to improve your strength, muscle tone, and balance. Walk if you can. Swimming may be a good choice if you cannot walk easily. · Have your vision and hearing checked each year or any time you notice a change. If you have trouble seeing and hearing, you might not be able to avoid objects and could lose your balance. · Know the side effects of the medicines you take. Ask your doctor or pharmacist whether the medicines you take can affect your balance. Sleeping pills or sedatives can affect your balance. · Limit the amount of alcohol you drink.  Alcohol can impair your balance and other senses. · Ask your doctor whether calluses or corns on your feet need to be removed. If you wear loose-fitting shoes because of calluses or corns, you can lose your balance and fall. · Talk to your doctor if you have numbness in your feet. Preventing falls at home  · Remove raised doorway thresholds, throw rugs, and clutter. Repair loose carpet or raised areas in the floor. · Move furniture and electrical cords to keep them out of walking paths. · Use nonskid floor wax, and wipe up spills right away, especially on ceramic tile floors. · If you use a walker or cane, put rubber tips on it. If you use crutches, clean the bottoms of them regularly with an abrasive pad, such as steel wool. · Keep your house well lit, especially Rochelle Watson, and outside walkways. Use night-lights in areas such as hallways and bathrooms. Add extra light switches or use remote switches (such as switches that go on or off when you clap your hands) to make it easier to turn lights on if you have to get up during the night. · Install sturdy handrails on stairways. · Move items in your cabinets so that the things you use a lot are on the lower shelves (about waist level). · Keep a cordless phone and a flashlight with new batteries by your bed. If possible, put a phone in each of the main rooms of your house, or carry a cell phone in case you fall and cannot reach a phone. Or, you can wear a device around your neck or wrist. You push a button that sends a signal for help. · Wear low-heeled shoes that fit well and give your feet good support. Use footwear with nonskid soles. Check the heels and soles of your shoes for wear. Repair or replace worn heels or soles. · Do not wear socks without shoes on wood floors. · Walk on the grass when the sidewalks are slippery. If you live in an area that gets snow and ice in the winter, sprinkle salt on slippery steps and sidewalks.   Preventing falls in the bath  · Install grab bars and nonskid mats inside and outside your shower or tub and near the toilet and sinks. · Use shower chairs and bath benches. · Use a hand-held shower head that will allow you to sit while showering. · Get into a tub or shower by putting the weaker leg in first. Get out of a tub or shower with your strong side first.  · Repair loose toilet seats and consider installing a raised toilet seat to make getting on and off the toilet easier. · Keep your bathroom door unlocked while you are in the shower. Where can you learn more? Go to http://kamrynLeonar3Domamadou.info/  Enter G117 in the search box to learn more about \"Preventing Falls: Care Instructions. \"  Current as of: August 7, 2019               Content Version: 12.5  © 5027-9821 [employer]. Care instructions adapted under license by [employer] (which disclaims liability or warranty for this information). If you have questions about a medical condition or this instruction, always ask your healthcare professional. [place] [address] any warranty or liability for your use of this information. Patient Education        Learning About a Closed Head Injury  What is a closed head injury? A closed head injury happens when your head gets hit hard. The strong force of the blow causes your brain to shake in your skull. This movement can cause the brain to bruise, swell, or tear. Sometimes nerves or blood vessels also get damaged. This can cause bleeding in or around the brain. A concussion is a type of closed head injury. What are the symptoms? If you have a mild concussion, you may have a mild headache or feel \"not quite right. \" These symptoms are common. They usually go away over a few days to 4 weeks. But sometimes after a concussion, you feel like you can't function as well as before the injury. And you have new symptoms. This is called postconcussive syndrome.  You may:  · Find it harder to solve problems, think, concentrate, or remember. · Have headaches. · Have changes in your sleep patterns, such as not being able to sleep or sleeping all the time. · Have changes in your personality. · Not be interested in your usual activities. · Feel angry or anxious without a clear reason. · Lose your sense of taste or smell. · Be dizzy, lightheaded, or unsteady. It may be hard to stand or walk. How is a closed head injury treated? Any person who may have a concussion needs to see a doctor. Some people have to stay in the hospital to be watched. Others can go home safely. If you go home, follow your doctor's instructions. He or she will tell you if you need someone to watch you closely for the next 24 hours or longer. Rest is the best treatment. Get plenty of sleep at night. And try to rest during the day. · Avoid activities that are physically or mentally demanding. These include housework, exercise, and schoolwork. And don't play video games, send text messages, or use the computer. You may need to change your school or work schedule to be able to avoid these activities. · Ask your doctor when it's okay to drive, ride a bike, or operate machinery. · Take an over-the-counter pain medicine, such as acetaminophen (Tylenol), ibuprofen (Advil, Motrin), or naproxen (Aleve). Be safe with medicines. Read and follow all instructions on the label. · Check with your doctor before you use any other medicines for pain. · Do not drink alcohol or use illegal drugs. They can slow recovery. They can also increase your risk of getting a second head injury. Follow-up care is a key part of your treatment and safety. Be sure to make and go to all appointments, and call your doctor if you are having problems. It's also a good idea to know your test results and keep a list of the medicines you take. Where can you learn more?   Go to http://kamryn-mamadou.info/  Enter E235 in the search box to learn more about \"Learning About a Closed Head Injury. \"  Current as of: November 20, 2019               Content Version: 12.5  © 2006-2020 [email or web address]. Care instructions adapted under license by [employer] (which disclaims liability or warranty for this information). If you have questions about a medical condition or this instruction, always ask your healthcare professional. [facility] [address] any warranty or liability for your use of this information. Patient Education        Cuts on the Face Closed With Stitches: Care Instructions  Your Care Instructions  A cut on your face can be on your chin, cheek, nose, forehead, eyelid, lip, or ear. The doctor used stitches to close the cut. Using stitches helps the cut heal and reduces scarring. The doctor may also have called in a specialist, such as a plastic surgeon, to close the cut. If the cut went deep and through the skin, the doctor may have put in two layers of stitches. The deeper layer brings the deep part of the cut together. These stitches will dissolve and don't need to be removed. The stitches in the upper layer are the ones you see on the cut. You will probably have a bandage. You will need to have the stitches removed, usually in 3 to 5 days. The doctor has checked you carefully, but problems can develop later. If you notice any problems or new symptoms, get medical treatment right away. Follow-up care is a key part of your treatment and safety. Be sure to make and go to all appointments, and call your doctor if you are having problems. It's also a good idea to know your test results and keep a list of the medicines you take. How can you care for yourself at home? · Keep the cut dry for the first 24 to 48 hours. After this, you can shower if your doctor okays it. Pat the cut dry. · Don't soak the cut, such as in a bathtub. Your doctor will tell you when it's safe to get the cut wet.   · If your doctor told you how to care for your cut, follow your doctor's instructions. If you did not get instructions, follow this general advice:  ? After the first 24 to 48 hours, wash around the cut with clean water 2 times a day. Don't use hydrogen peroxide or alcohol, which can slow healing. ? You may cover the cut with a thin layer of petroleum jelly, such as Vaseline, and a nonstick bandage. ? Apply more petroleum jelly and replace the bandage as needed. · Avoid any activity that could cause your cut to reopen. · Do not remove the stitches on your own. Your doctor will tell you when to come back to have the stitches removed. · Be safe with medicines. Take pain medicines exactly as directed. ? If the doctor gave you a prescription medicine for pain, take it as prescribed. ? If you are not taking a prescription pain medicine, ask your doctor if you can take an over-the-counter medicine. When should you call for help? Call your doctor now or seek immediate medical care if:  · You have new pain, or your pain gets worse. · The skin near the cut is cold or pale or changes color. · You have tingling, weakness, or numbness near the cut. · The cut starts to bleed, and blood soaks through the bandage. Oozing small amounts of blood is normal.  · You have symptoms of infection, such as:  ? Increased pain, swelling, warmth, or redness around the cut.  ? Red streaks leading from the cut.  ? Pus draining from the cut.  ? A fever. Watch closely for changes in your health, and be sure to contact your doctor if:  · You do not get better as expected. Where can you learn more? Go to http://kamryn-mamadou.info/  Enter Q856 in the search box to learn more about \"Cuts on the Face Closed With Stitches: Care Instructions. \"  Current as of: June 26, 2019               Content Version: 12.5  © 9082-6734 [employer].    Care instructions adapted under license by [employer] (which disclaims liability or warranty for this information). If you have questions about a medical condition or this instruction, always ask your healthcare professional. [place] [address] any warranty or liability for your use of this information.

## 2020-06-10 NOTE — ED NOTES
I have reviewed discharge instructions with the patient and guardian. The guardian verbalized understanding. Ambulatory to w/c and taken to daughters vehicle.

## 2020-06-10 NOTE — ED PROVIDER NOTES
This is a 70-year-old female who is brought to the emergency room via EMS after a ground-level fall. Patient was in the bathroom when the nurse left and they returned and found the patient on the floor. Patient has a laceration to the forehead on the left side. Patient does not remember the incident. Patient has a history of dementia and is unable to provide any further history of present illness. The history is provided by the EMS personnel. No  was used. Fall   The accident occurred less than 1 hour ago. The fall occurred while standing. She fell from a height of ground level. She landed on hard floor. The volume of blood lost was minimal. The point of impact was the head. The pain is present in the head. Associated symptoms include laceration. The risk factors include dementia and being elderly. Past Medical History:   Diagnosis Date    Dementia ([facility]     Hypercholesteremia 5/27/2010    Hypertension     IBS (irritable bowel syndrome) 5/27/2010    OA (osteoarthritis) 5/27/2010    Osteoporosis 5/27/2010    Pulmonary embolism ([facility]        History reviewed. No pertinent surgical history. History reviewed. No pertinent family history.     Social History     Socioeconomic History    Marital status:      Spouse name: Not on file    Number of children: Not on file    Years of education: Not on file    Highest education level: Not on file   Occupational History    Not on file   Social Needs    Financial resource strain: Not on file    Food insecurity     Worry: Not on file     Inability: Not on file    Transportation needs     Medical: Not on file     Non-medical: Not on file   Tobacco Use    Smoking status: Never Smoker    Smokeless tobacco: Never Used   Substance and Sexual Activity    Alcohol use: Not Currently    Drug use: Not Currently    Sexual activity: Not on file   Lifestyle    Physical activity     Days per week: Not on file     Minutes per session: Not on file    Stress: Not on file   Relationships    Social connections     Talks on phone: Not on file     Gets together: Not on file     Attends [employer] service: Not on file     Active member of club or organization: Not on file     Attends meetings of clubs or organizations: Not on file     Relationship status: Not on file    Intimate partner violence     Fear of current or ex partner: Not on file     Emotionally abused: Not on file     Physically abused: Not on file     Forced sexual activity: Not on file   Other Topics Concern    Not on file   Social History Narrative    Not on file     ALLERGIES: Aricept [donepezil] and Flexeril [cyclobenzaprine]    Review of Systems   Unable to perform ROS: Dementia       Vitals:    06/09/20 2230 06/09/20 2245 06/09/20 2300 06/09/20 2326   BP: 139/82  129/84 129/84   Pulse:   89 80   Resp:   16 16   Temp:    98.2 °F (36.8 °C)   SpO2: 96% 96% 95% 98%   Weight:                Physical Exam  Vitals signs and nursing note reviewed. Constitutional:       General: She is not in acute distress. Appearance: She is well-developed. She is not diaphoretic. HENT:      Head: Normocephalic. Laceration present. Right Ear: External ear normal.      Left Ear: External ear normal.      Nose: Nose normal.      Mouth/Throat:      Pharynx: No oropharyngeal exudate. Eyes:      General: No scleral icterus. Right eye: No discharge. Left eye: No discharge. Conjunctiva/sclera: Conjunctivae normal.      Pupils: Pupils are equal, round, and reactive to light. Neck:      Musculoskeletal: Normal range of motion and neck supple. Vascular: No JVD. Trachea: No tracheal deviation. Cardiovascular:      Rate and Rhythm: Normal rate and regular rhythm. Heart sounds: Normal heart sounds. No murmur. No friction rub. No gallop. Pulmonary:      Effort: Pulmonary effort is normal. No respiratory distress.       Breath sounds: Normal breath sounds. No stridor. No decreased breath sounds, wheezing, rhonchi or rales. Chest:      Chest wall: No tenderness. Abdominal:      General: Bowel sounds are normal. There is no distension. Palpations: Abdomen is soft. Tenderness: There is no abdominal tenderness. There is no guarding or rebound. Musculoskeletal: Normal range of motion. General: Tenderness present. Left hip: She exhibits tenderness. She exhibits no bony tenderness and no deformity. Skin:     General: Skin is warm and dry. Capillary Refill: Capillary refill takes 2 to 3 seconds. Coloration: Skin is not pale. Findings: Laceration present. No erythema or rash. Neurological:      General: No focal deficit present. Mental Status: She is alert. Mental status is at baseline. GCS: GCS eye subscore is 4. GCS verbal subscore is 5. GCS motor subscore is 6. Cranial Nerves: No cranial nerve deficit. Sensory: No sensory deficit. Motor: Motor function is intact. No abnormal muscle tone. Coordination: Coordination normal.      Deep Tendon Reflexes: Reflexes are normal and symmetric. Reflexes normal.      Comments: Patient pleasantly demented and at baseline per daughter.    Psychiatric:         Mood and Affect: Mood normal.         Behavior: Behavior normal.          MDM  Number of Diagnoses or Management Options  Facial laceration, initial encounter:   Fall, initial encounter:   Injury of head, initial encounter:      Amount and/or Complexity of Data Reviewed  Tests in the radiology section of CPT®: ordered and reviewed    Risk of Complications, Morbidity, and/or Mortality  Presenting problems: moderate  Diagnostic procedures: moderate  Management options: moderate    Patient Progress  Patient progress: stable         Wound Repair  Date/Time: 6/9/2020 10:45 PM  Performed by: attendingPreparation: skin prepped with Betadine  Pre-procedure re-eval: Immediately prior to the procedure, the patient was reevaluated and found suitable for the planned procedure and any planned medications. Time out: Immediately prior to the procedure a time out was called to verify the correct patient, procedure, equipment, staff and marking as appropriate. .  Location details: face  Wound length:2.6 - 7.5 cm (4 cm)  Anesthesia: local infiltration    Anesthesia:  Local Anesthetic: lidocaine 1% with epinephrine  Anesthetic total: 4 mL  Foreign bodies: no foreign bodies  Irrigation solution: saline  Skin closure: 6-0 nylon  Wound subcutaneous closure material used: 4-0 Monocryl. Number of sutures: 12 (#10 6-0 sutures for skin closure; 2 subcutaneous sutures of Monocryl)  Technique: simple, interrupted and horizontal mattress  Approximation: close  Dressing: antibiotic ointment and 4x4  Patient tolerance: Patient tolerated the procedure well with no immediate complications  My total time at bedside, performing this procedure was 16-30 minutes (18 minutes). Chief Complaint   Patient presents with   [facility] Fall       The patient's presenting problems have been discussed, and they are in agreement with the care plan formulated and outlined with them. I have encouraged them to ask questions as they arise throughout their visit. MEDICATIONS GIVEN:  Medications   lidocaine-EPINEPHrine (XYLOCAINE) 1 %-1:100,000 injection 100 mg (100 mg IntraDERMal Given by Provider 6/9/20 2156)   bacitracin 500 unit/gram packet 1 Packet (1 Packet Topical Given 6/9/20 2313)       LABS REVIEWED:  No results found for this or any previous visit (from the past 24 hour(s)).     VITAL SIGNS:  Patient Vitals for the past 24 hrs:   Temp Pulse Resp BP SpO2   06/09/20 2326 98.2 °F (36.8 °C) 80 16 129/84 98 %   06/09/20 2300 -- 89 16 129/84 95 %   06/09/20 2245 -- -- -- -- 96 %   06/09/20 2230 -- -- -- 139/82 96 %   06/09/20 2200 -- -- -- (!) 143/96 97 %   06/09/20 2154 97.6 °F (36.4 °C) 81 18 (!) 144/94 97 %       RADIOLOGY RESULTS:  The following have been ordered and reviewed:  Xr Hip Lt W Or Wo Pelv 2-3 Vws    Result Date: 6/9/2020  EXAM: XR HIP LT W OR WO PELV 2-3 VWS INDICATION: hip pain . COMPARISON: 2018. FINDINGS: AP view of the pelvis and a frogleg lateral view of the left hip demonstrate left hip replacement with protrusio acetabuli. 2 of the screws project into the pelvis. There are old left pubic ramus fractures. There is osteopenia. Findings are similar to 2018. IMPRESSION: No change. .    Ct Head Wo Cont    Result Date: 6/9/2020  EXAM:  CT HEAD WO CONT INDICATION:   fall, head injury COMPARISON: CT head 12/15/2018. TECHNIQUE: Unenhanced CT of the head was performed using 5 mm images. Brain and bone windows were generated. CT dose reduction was achieved through use of a standardized protocol tailored for this examination and automatic exposure control for dose modulation. FINDINGS: Generalized parenchymal volume loss with commensurate dilation of the sulci and ventricular system. There is disproportionate atrophy of the bilateral hippocampi and mesial temporal lobes. Confluent areas of periventricular deep white matter hypodensity, consistent with severe chronic microvascular ischemic disease. These findings are unchanged. Basilar cisterns are patent. No midline shift. There is no evidence of acute infarct, hemorrhage, or extraaxial fluid collection. The paranasal sinuses, mastoid air cells, and middle ears are clear. The orbital contents are within normal limits with bilateral lens implants. There are no significant osseous or extracranial soft tissue lesions. IMPRESSION: 1. No evidence of acute intracranial abnormality. 2. Unchanged generalized parenchymal volume loss with disproportionate atrophy of the hippocampi and mesial temporal lobes, suggestive of Alzheimer's dementia. 3. Unchanged severe chronic microvascular ischemic disease. PROGRESS NOTES:  Discussed results and plan with patient and daughter.  Patient will be discharged home with PCP follow up. Patient instructed to return to the emergency room for any worsening symptoms or any other concerns. DIAGNOSIS:    1. Fall, initial encounter    2. Facial laceration, initial encounter    3. Injury of head, initial encounter        PLAN:  Follow-up Information     Follow up With Specialties Details Why Contact Info    Kelsea Chacon MD Family Practice In 1 week  [address]  [phone]      [address] [facility]  If symptoms worsen [address] [id number] [phone]        Current Discharge Medication List      CONTINUE these medications which have NOT CHANGED    Details   nystatin (MYCOSTATIN) powder Apply  to affected area four (4) times daily. magnesium hydroxide ([place] Milk of Magnesia) 400 mg/5 mL suspension Take 30 mL by mouth daily as needed for Constipation. apixaban (Eliquis) 5 mg tablet Take 5 mg by mouth two (2) times a day. Indications: a clot in the lung      guaiFENesin ER (MUCINEX) 600 mg ER tablet Take 600 mg by mouth two (2) times daily as needed for Congestion. !! acetaminophen (TYLENOL) 500 mg tablet Take 500 mg by mouth two (2) times daily as needed for Pain. !! acetaminophen (TYLENOL) 325 mg tablet Take 650 mg by mouth two (2) times a day. calcium-vitamin D (OS-LUCÍA 500+D) 500 mg(1,250mg) -200 unit per tablet Take 1 Tab by mouth daily (with breakfast). multivitamin (ONE A DAY) tablet Take 1 Tab by mouth daily. losartan (COZAAR) 50 mg tablet Take 50 mg by mouth daily. memantine (NAMENDA) 10 mg tablet Take 10 mg by mouth two (2) times a day. !! - Potential duplicate medications found. Please discuss with provider. ED COURSE: The patient's hospital course has been uncomplicated.

## 2020-06-10 NOTE — ED TRIAGE NOTES
Triage Note: Patient arrives by EMS from [facility] for a ground level fall while in the bathroom. Patient arrives with a laceration noted to left forehead. Patient does take Eliquis.

## 2021-02-09 ENCOUNTER — HOSPITAL ENCOUNTER (INPATIENT)
Age: 86
LOS: 7 days | Discharge: NURSING, REHAB OR LONG-TERM CARE | DRG: 481 | End: 2021-02-16
Attending: EMERGENCY MEDICINE | Admitting: HOSPITALIST
Payer: MEDICARE

## 2021-02-09 ENCOUNTER — APPOINTMENT (OUTPATIENT)
Dept: GENERAL RADIOLOGY | Age: 86
DRG: 481 | End: 2021-02-09
Attending: EMERGENCY MEDICINE
Payer: MEDICARE

## 2021-02-09 ENCOUNTER — APPOINTMENT (OUTPATIENT)
Dept: CT IMAGING | Age: 86
DRG: 481 | End: 2021-02-09
Attending: EMERGENCY MEDICINE
Payer: MEDICARE

## 2021-02-09 DIAGNOSIS — S72.144A CLOSED NONDISPLACED INTERTROCHANTERIC FRACTURE OF RIGHT FEMUR, INITIAL ENCOUNTER (HCC): Primary | ICD-10-CM

## 2021-02-09 DIAGNOSIS — S01.01XA LACERATION OF SCALP, INITIAL ENCOUNTER: ICD-10-CM

## 2021-02-09 DIAGNOSIS — S72.001A CLOSED FRACTURE OF RIGHT HIP, INITIAL ENCOUNTER (HCC): ICD-10-CM

## 2021-02-09 DIAGNOSIS — T14.8XXA STAPLED SKIN WOUND: ICD-10-CM

## 2021-02-09 PROBLEM — S72.009A CLOSED HIP FRACTURE (HCC): Status: ACTIVE | Noted: 2021-02-09

## 2021-02-09 LAB
ALBUMIN SERPL-MCNC: 3.3 G/DL (ref 3.5–5)
ALBUMIN/GLOB SERPL: 1 {RATIO} (ref 1.1–2.2)
ALP SERPL-CCNC: 97 U/L (ref 45–117)
ALT SERPL-CCNC: 22 U/L (ref 12–78)
ANION GAP SERPL CALC-SCNC: 8 MMOL/L (ref 5–15)
APPEARANCE UR: ABNORMAL
AST SERPL-CCNC: 18 U/L (ref 15–37)
BACTERIA URNS QL MICRO: ABNORMAL /HPF
BASOPHILS # BLD: 0 K/UL (ref 0–0.1)
BASOPHILS NFR BLD: 0 % (ref 0–1)
BILIRUB SERPL-MCNC: 0.3 MG/DL (ref 0.2–1)
BILIRUB UR QL: NEGATIVE
BUN SERPL-MCNC: 27 MG/DL (ref 6–20)
BUN/CREAT SERPL: 25 (ref 12–20)
CALCIUM SERPL-MCNC: 9.8 MG/DL (ref 8.5–10.1)
CAOX CRY URNS QL MICRO: ABNORMAL
CHLORIDE SERPL-SCNC: 107 MMOL/L (ref 97–108)
CO2 SERPL-SCNC: 25 MMOL/L (ref 21–32)
COLOR UR: ABNORMAL
COVID-19 RAPID TEST, COVR: NOT DETECTED
CREAT SERPL-MCNC: 1.1 MG/DL (ref 0.55–1.02)
DIFFERENTIAL METHOD BLD: ABNORMAL
EOSINOPHIL # BLD: 0.2 K/UL (ref 0–0.4)
EOSINOPHIL NFR BLD: 3 % (ref 0–7)
EPITH CASTS URNS QL MICRO: ABNORMAL /LPF
ERYTHROCYTE [DISTWIDTH] IN BLOOD BY AUTOMATED COUNT: 14.4 % (ref 11.5–14.5)
GLOBULIN SER CALC-MCNC: 3.4 G/DL (ref 2–4)
GLUCOSE SERPL-MCNC: 128 MG/DL (ref 65–100)
GLUCOSE UR STRIP.AUTO-MCNC: NEGATIVE MG/DL
HCT VFR BLD AUTO: 48 % (ref 35–47)
HGB BLD-MCNC: 15.5 G/DL (ref 11.5–16)
HGB UR QL STRIP: ABNORMAL
IMM GRANULOCYTES # BLD AUTO: 0 K/UL (ref 0–0.04)
IMM GRANULOCYTES NFR BLD AUTO: 0 % (ref 0–0.5)
KETONES UR QL STRIP.AUTO: ABNORMAL MG/DL
LEUKOCYTE ESTERASE UR QL STRIP.AUTO: ABNORMAL
LYMPHOCYTES # BLD: 1.6 K/UL (ref 0.8–3.5)
LYMPHOCYTES NFR BLD: 24 % (ref 12–49)
MCH RBC QN AUTO: 30.1 PG (ref 26–34)
MCHC RBC AUTO-ENTMCNC: 32.3 G/DL (ref 30–36.5)
MCV RBC AUTO: 93.2 FL (ref 80–99)
MONOCYTES # BLD: 0.4 K/UL (ref 0–1)
MONOCYTES NFR BLD: 6 % (ref 5–13)
NEUTS SEG # BLD: 4.6 K/UL (ref 1.8–8)
NEUTS SEG NFR BLD: 67 % (ref 32–75)
NITRITE UR QL STRIP.AUTO: POSITIVE
NRBC # BLD: 0 K/UL (ref 0–0.01)
NRBC BLD-RTO: 0 PER 100 WBC
PH UR STRIP: 5 [PH] (ref 5–8)
PLATELET # BLD AUTO: 247 K/UL (ref 150–400)
PMV BLD AUTO: 11.7 FL (ref 8.9–12.9)
POTASSIUM SERPL-SCNC: 4.6 MMOL/L (ref 3.5–5.1)
PROT SERPL-MCNC: 6.7 G/DL (ref 6.4–8.2)
PROT UR STRIP-MCNC: NEGATIVE MG/DL
RBC # BLD AUTO: 5.15 M/UL (ref 3.8–5.2)
RBC #/AREA URNS HPF: ABNORMAL /HPF (ref 0–5)
SARS-COV-2, COV2: NORMAL
SODIUM SERPL-SCNC: 140 MMOL/L (ref 136–145)
SOURCE, COVRS: NORMAL
SP GR UR REFRACTOMETRY: 1.03 (ref 1–1.03)
UR CULT HOLD, URHOLD: NORMAL
UROBILINOGEN UR QL STRIP.AUTO: 0.2 EU/DL (ref 0.2–1)
WBC # BLD AUTO: 6.8 K/UL (ref 3.6–11)
WBC URNS QL MICRO: ABNORMAL /HPF (ref 0–4)

## 2021-02-09 PROCEDURE — 99285 EMERGENCY DEPT VISIT HI MDM: CPT

## 2021-02-09 PROCEDURE — 72170 X-RAY EXAM OF PELVIS: CPT

## 2021-02-09 PROCEDURE — 90471 IMMUNIZATION ADMIN: CPT

## 2021-02-09 PROCEDURE — 80053 COMPREHEN METABOLIC PANEL: CPT

## 2021-02-09 PROCEDURE — 73552 X-RAY EXAM OF FEMUR 2/>: CPT

## 2021-02-09 PROCEDURE — 36415 COLL VENOUS BLD VENIPUNCTURE: CPT

## 2021-02-09 PROCEDURE — 81001 URINALYSIS AUTO W/SCOPE: CPT

## 2021-02-09 PROCEDURE — 85025 COMPLETE CBC W/AUTO DIFF WBC: CPT

## 2021-02-09 PROCEDURE — 71045 X-RAY EXAM CHEST 1 VIEW: CPT

## 2021-02-09 PROCEDURE — 75810000293 HC SIMP/SUPERF WND  RPR

## 2021-02-09 PROCEDURE — 87635 SARS-COV-2 COVID-19 AMP PRB: CPT

## 2021-02-09 PROCEDURE — 90715 TDAP VACCINE 7 YRS/> IM: CPT | Performed by: EMERGENCY MEDICINE

## 2021-02-09 PROCEDURE — 65270000032 HC RM SEMIPRIVATE

## 2021-02-09 PROCEDURE — U0005 INFEC AGEN DETEC AMPLI PROBE: HCPCS

## 2021-02-09 PROCEDURE — 72125 CT NECK SPINE W/O DYE: CPT

## 2021-02-09 PROCEDURE — 70450 CT HEAD/BRAIN W/O DYE: CPT

## 2021-02-09 PROCEDURE — 74011250636 HC RX REV CODE- 250/636: Performed by: EMERGENCY MEDICINE

## 2021-02-09 PROCEDURE — 72131 CT LUMBAR SPINE W/O DYE: CPT

## 2021-02-09 RX ORDER — PROMETHAZINE HYDROCHLORIDE 25 MG/1
12.5 TABLET ORAL
Status: DISCONTINUED | OUTPATIENT
Start: 2021-02-09 | End: 2021-02-16 | Stop reason: HOSPADM

## 2021-02-09 RX ORDER — SODIUM CHLORIDE 0.9 % (FLUSH) 0.9 %
5-40 SYRINGE (ML) INJECTION EVERY 8 HOURS
Status: DISCONTINUED | OUTPATIENT
Start: 2021-02-09 | End: 2021-02-11 | Stop reason: SDUPTHER

## 2021-02-09 RX ORDER — LOSARTAN POTASSIUM 50 MG/1
50 TABLET ORAL DAILY
Status: DISCONTINUED | OUTPATIENT
Start: 2021-02-10 | End: 2021-02-16

## 2021-02-09 RX ORDER — POLYETHYLENE GLYCOL 3350 17 G/17G
17 POWDER, FOR SOLUTION ORAL DAILY PRN
Status: DISCONTINUED | OUTPATIENT
Start: 2021-02-09 | End: 2021-02-16 | Stop reason: HOSPADM

## 2021-02-09 RX ORDER — ACETAMINOPHEN 650 MG/1
650 SUPPOSITORY RECTAL
Status: DISCONTINUED | OUTPATIENT
Start: 2021-02-09 | End: 2021-02-16 | Stop reason: HOSPADM

## 2021-02-09 RX ORDER — SODIUM CHLORIDE 0.9 % (FLUSH) 0.9 %
5-40 SYRINGE (ML) INJECTION AS NEEDED
Status: DISCONTINUED | OUTPATIENT
Start: 2021-02-09 | End: 2021-02-16 | Stop reason: HOSPADM

## 2021-02-09 RX ORDER — FENTANYL CITRATE 50 UG/ML
25 INJECTION, SOLUTION INTRAMUSCULAR; INTRAVENOUS
Status: DISCONTINUED | OUTPATIENT
Start: 2021-02-09 | End: 2021-02-16 | Stop reason: HOSPADM

## 2021-02-09 RX ORDER — TRAMADOL HYDROCHLORIDE 50 MG/1
50 TABLET ORAL
Status: DISCONTINUED | OUTPATIENT
Start: 2021-02-09 | End: 2021-02-16 | Stop reason: HOSPADM

## 2021-02-09 RX ORDER — MEMANTINE HYDROCHLORIDE 5 MG/1
10 TABLET ORAL 2 TIMES DAILY
Status: DISCONTINUED | OUTPATIENT
Start: 2021-02-09 | End: 2021-02-16 | Stop reason: HOSPADM

## 2021-02-09 RX ORDER — ONDANSETRON 2 MG/ML
4 INJECTION INTRAMUSCULAR; INTRAVENOUS
Status: DISCONTINUED | OUTPATIENT
Start: 2021-02-09 | End: 2021-02-11 | Stop reason: SDUPTHER

## 2021-02-09 RX ORDER — ACETAMINOPHEN 325 MG/1
650 TABLET ORAL
Status: DISCONTINUED | OUTPATIENT
Start: 2021-02-09 | End: 2021-02-16 | Stop reason: HOSPADM

## 2021-02-09 RX ADMIN — TETANUS TOXOID, REDUCED DIPHTHERIA TOXOID AND ACELLULAR PERTUSSIS VACCINE, ADSORBED 0.5 ML: 5; 2.5; 8; 8; 2.5 SUSPENSION INTRAMUSCULAR at 19:35

## 2021-02-09 NOTE — ED PROVIDER NOTES
Patient is a 49-year-old woman with a history of dementia who comes into the emergency department after a ground-level fall with a head injury. Patient is on Eliquis. Patient is unsure of why she fell but facility reports that they were in the room when she fell, heard her fall, and she was conscious when they turned around immediately following her fall. Patient is complaining of intermittent back and leg pain. She denies chest pain, shortness of breath, lightheadedness, and dizziness prior to her fall. Past Medical History:   Diagnosis Date    Dementia ([address]     Hypercholesteremia 5/27/2010    Hypertension     IBS (irritable bowel syndrome) 5/27/2010    OA (osteoarthritis) 5/27/2010    Osteoporosis 5/27/2010    Pulmonary embolism ([facility])        No past surgical history on file. No family history on file.     Social History     Socioeconomic History    Marital status:      Spouse name: Not on file    Number of children: Not on file    Years of education: Not on file    Highest education level: Not on file   Occupational History    Not on file   Social Needs    Financial resource strain: Not on file    Food insecurity     Worry: Not on file     Inability: Not on file    Transportation needs     Medical: Not on file     Non-medical: Not on file   Tobacco Use    Smoking status: Never Smoker    Smokeless tobacco: Never Used   Substance and Sexual Activity    Alcohol use: Not Currently    Drug use: Not Currently    Sexual activity: Not on file   Lifestyle    Physical activity     Days per week: Not on file     Minutes per session: Not on file    Stress: Not on file   Relationships    Social connections     Talks on phone: Not on file     Gets together: Not on file     Attends [employer] service: Not on file     Active member of club or organization: Not on file     Attends meetings of clubs or organizations: Not on file     Relationship status: Not on file    Intimate partner violence     Fear of current or ex partner: Not on file     Emotionally abused: Not on file     Physically abused: Not on file     Forced sexual activity: Not on file   Other Topics Concern    Not on file   Social History Narrative    Not on file         ALLERGIES: Aricept [donepezil] and Flexeril [cyclobenzaprine]    Review of Systems   Constitutional: Negative for chills and fever. Respiratory: Negative for shortness of breath. Cardiovascular: Negative for chest pain. Gastrointestinal: Negative for abdominal pain, constipation, diarrhea and vomiting. Musculoskeletal: Positive for arthralgias, back pain and myalgias. Neurological: Negative for dizziness and light-headedness. All other systems reviewed and are negative. There were no vitals filed for this visit. Physical Exam  Vitals signs and nursing note reviewed. Constitutional:       Appearance: She is well-developed. HENT:      Head: Normocephalic and atraumatic. Eyes:      Pupils: Pupils are equal, round, and reactive to light. Neck:      Musculoskeletal: Normal range of motion and neck supple. Cardiovascular:      Rate and Rhythm: Normal rate and regular rhythm. Pulmonary:      Effort: Pulmonary effort is normal.      Breath sounds: Normal breath sounds. Abdominal:      General: There is no distension. Palpations: Abdomen is soft. Tenderness: There is no abdominal tenderness. Musculoskeletal:      Right hip: Normal.      Left hip: Normal.      Cervical back: Normal.      Thoracic back: Normal.      Lumbar back: She exhibits tenderness. She exhibits no bony tenderness. Right upper leg: Normal.      Left upper leg: Normal.      Right lower leg: No edema. Left lower leg: No edema. Right foot: Normal capillary refill. No deformity. Left foot: Normal capillary refill. No deformity. Skin:     General: Skin is warm and dry.       Capillary Refill: Capillary refill takes less than 2 seconds. Findings: Laceration (2 cm laceration to occipital scalp, bleeding controlled) present. Neurological:      General: No focal deficit present. Mental Status: She is alert and oriented to person, place, and time. Psychiatric:         Mood and Affect: Mood normal.         Behavior: Behavior normal.          MDM  Number of Diagnoses or Management Options  Closed nondisplaced intertrochanteric fracture of right femur, initial encounter ([facility]  Laceration of scalp, initial encounter  Stapled skin wound  Diagnosis management comments: The patient presented after a fall. The patient is now resting comfortably and feels better, is alert and in no distress. The patient has a normal mental status and is neurologically intact. The history, exam, diagnostic testing (if any), and current condition do not demonstrate signs of clinically significant intra-cranial, intra-thoracic, or intra-abdominal trauma. Workup significant for a right femur fracture. The vital signs have been stable. Wound Closure by Adhesive    Date/Time: 2/9/2021 5:51 PM  Performed by: Verena Griffin MD  Authorized by: Verena Griffin MD     Consent:     Consent obtained:  Verbal    Consent given by:  Patient    Risks discussed:  Pain  Laceration details:     Location:  Scalp    Scalp location:  Occipital    Length (cm):  2  Repair type:     Repair type:  Simple  Exploration:     Hemostasis achieved with:  Direct pressure    Wound exploration: wound explored through full range of motion      Contaminated: no    Skin repair:     Repair method:  Staples    Number of staples:  1  Approximation:     Approximation:  Close  Post-procedure details:     Dressing:  Open (no dressing)    Patient tolerance of procedure: Tolerated well, no immediate complications        Perfect Serve Consult for Admission  6:58 PM    ED Room Number: ER09/09  Patient Name and age:  Geovanni Denis 80 y.o.  female  Working Diagnosis:   1.  Closed nondisplaced intertrochanteric fracture of right femur, initial encounter ([facility] [address]    2. Laceration of scalp, initial encounter    3.  Stapled skin wound        COVID-19 Suspicion:  no but coming from a facility  Sepsis present:  no  Reassessment needed: no  Code Status:  Do Not Resuscitate  Readmission: no  Isolation Requirements:  no  Recommended Level of Care:  med/surg  Department:[facility] Adult ED - 21   Other:  Ortho aware

## 2021-02-10 ENCOUNTER — ANESTHESIA (OUTPATIENT)
Dept: SURGERY | Age: 86
DRG: 481 | End: 2021-02-10
Payer: MEDICARE

## 2021-02-10 ENCOUNTER — ANESTHESIA EVENT (OUTPATIENT)
Dept: SURGERY | Age: 86
DRG: 481 | End: 2021-02-10
Payer: MEDICARE

## 2021-02-10 ENCOUNTER — APPOINTMENT (OUTPATIENT)
Dept: GENERAL RADIOLOGY | Age: 86
DRG: 481 | End: 2021-02-10
Attending: PHYSICIAN ASSISTANT
Payer: MEDICARE

## 2021-02-10 LAB
ABO + RH BLD: NORMAL
ATRIAL RATE: 96 BPM
BLOOD GROUP ANTIBODIES SERPL: NORMAL
CALCULATED P AXIS, ECG09: 60 DEGREES
CALCULATED R AXIS, ECG10: -34 DEGREES
CALCULATED T AXIS, ECG11: 120 DEGREES
DIAGNOSIS, 93000: NORMAL
INR PPP: 1.1 (ref 0.9–1.1)
P-R INTERVAL, ECG05: 170 MS
PROTHROMBIN TIME: 11 SEC (ref 9–11.1)
Q-T INTERVAL, ECG07: 406 MS
QRS DURATION, ECG06: 134 MS
QTC CALCULATION (BEZET), ECG08: 512 MS
SARS-COV-2, XPLCVT: NOT DETECTED
SOURCE, COVRS: NORMAL
SPECIMEN EXP DATE BLD: NORMAL
VENTRICULAR RATE, ECG03: 96 BPM

## 2021-02-10 PROCEDURE — 0QS606Z REPOSITION RIGHT UPPER FEMUR WITH INTRAMEDULLARY INTERNAL FIXATION DEVICE, OPEN APPROACH: ICD-10-PCS | Performed by: ORTHOPAEDIC SURGERY

## 2021-02-10 PROCEDURE — 76210000006 HC OR PH I REC 0.5 TO 1 HR: Performed by: ORTHOPAEDIC SURGERY

## 2021-02-10 PROCEDURE — C1713 ANCHOR/SCREW BN/BN,TIS/BN: HCPCS | Performed by: ORTHOPAEDIC SURGERY

## 2021-02-10 PROCEDURE — 76060000033 HC ANESTHESIA 1 TO 1.5 HR: Performed by: ORTHOPAEDIC SURGERY

## 2021-02-10 PROCEDURE — 77030008462 HC STPLR SKN PROX J&J -A: Performed by: ORTHOPAEDIC SURGERY

## 2021-02-10 PROCEDURE — 74011250637 HC RX REV CODE- 250/637: Performed by: HOSPITALIST

## 2021-02-10 PROCEDURE — 74011000250 HC RX REV CODE- 250: Performed by: NURSE ANESTHETIST, CERTIFIED REGISTERED

## 2021-02-10 PROCEDURE — 77030016453 HC BIT DRL CALIB1 ZIMM -C: Performed by: ORTHOPAEDIC SURGERY

## 2021-02-10 PROCEDURE — 77030040922 HC BLNKT HYPOTHRM STRY -A

## 2021-02-10 PROCEDURE — 74011250636 HC RX REV CODE- 250/636: Performed by: NURSE ANESTHETIST, CERTIFIED REGISTERED

## 2021-02-10 PROCEDURE — 77030026438 HC STYL ET INTUB CARD -A: Performed by: ANESTHESIOLOGY

## 2021-02-10 PROCEDURE — 74011000250 HC RX REV CODE- 250: Performed by: PHYSICIAN ASSISTANT

## 2021-02-10 PROCEDURE — 76010000149 HC OR TIME 1 TO 1.5 HR: Performed by: ORTHOPAEDIC SURGERY

## 2021-02-10 PROCEDURE — 77030020788: Performed by: ORTHOPAEDIC SURGERY

## 2021-02-10 PROCEDURE — 85610 PROTHROMBIN TIME: CPT

## 2021-02-10 PROCEDURE — C1769 GUIDE WIRE: HCPCS | Performed by: ORTHOPAEDIC SURGERY

## 2021-02-10 PROCEDURE — 74011250637 HC RX REV CODE- 250/637: Performed by: ANESTHESIOLOGY

## 2021-02-10 PROCEDURE — 73502 X-RAY EXAM HIP UNI 2-3 VIEWS: CPT

## 2021-02-10 PROCEDURE — 74011250636 HC RX REV CODE- 250/636: Performed by: HOSPITALIST

## 2021-02-10 PROCEDURE — 77030008684 HC TU ET CUF COVD -B: Performed by: ANESTHESIOLOGY

## 2021-02-10 PROCEDURE — 2709999900 HC NON-CHARGEABLE SUPPLY: Performed by: ORTHOPAEDIC SURGERY

## 2021-02-10 PROCEDURE — 65270000029 HC RM PRIVATE

## 2021-02-10 PROCEDURE — 93005 ELECTROCARDIOGRAM TRACING: CPT

## 2021-02-10 PROCEDURE — 86901 BLOOD TYPING SEROLOGIC RH(D): CPT

## 2021-02-10 PROCEDURE — 74011250636 HC RX REV CODE- 250/636: Performed by: PHYSICIAN ASSISTANT

## 2021-02-10 PROCEDURE — 74011250636 HC RX REV CODE- 250/636: Performed by: ANESTHESIOLOGY

## 2021-02-10 PROCEDURE — 74011250636 HC RX REV CODE- 250/636: Performed by: ORTHOPAEDIC SURGERY

## 2021-02-10 PROCEDURE — 36415 COLL VENOUS BLD VENIPUNCTURE: CPT

## 2021-02-10 PROCEDURE — 77030031139 HC SUT VCRL2 J&J -A: Performed by: ORTHOPAEDIC SURGERY

## 2021-02-10 DEVICE — NAIL IM L170MM DIA11MM X SH UNIV PERITROCHANTERIC FEM TI: Type: IMPLANTABLE DEVICE | Site: FEMUR | Status: FUNCTIONAL

## 2021-02-10 DEVICE — IMPLANTABLE DEVICE
Type: IMPLANTABLE DEVICE | Site: FEMUR | Status: FUNCTIONAL
Brand: PTN PERITROCHANTERIC NAIL

## 2021-02-10 DEVICE — IMPLANTABLE DEVICE
Type: IMPLANTABLE DEVICE | Site: FEMUR | Status: FUNCTIONAL
Brand: PHOENIX NAIL SYSTEM

## 2021-02-10 RX ORDER — LIDOCAINE HYDROCHLORIDE 20 MG/ML
INJECTION, SOLUTION EPIDURAL; INFILTRATION; INTRACAUDAL; PERINEURAL AS NEEDED
Status: DISCONTINUED | OUTPATIENT
Start: 2021-02-10 | End: 2021-02-10 | Stop reason: HOSPADM

## 2021-02-10 RX ORDER — MIDAZOLAM HYDROCHLORIDE 1 MG/ML
1 INJECTION, SOLUTION INTRAMUSCULAR; INTRAVENOUS AS NEEDED
Status: DISCONTINUED | OUTPATIENT
Start: 2021-02-10 | End: 2021-02-10 | Stop reason: HOSPADM

## 2021-02-10 RX ORDER — ONDANSETRON 2 MG/ML
4 INJECTION INTRAMUSCULAR; INTRAVENOUS AS NEEDED
Status: DISCONTINUED | OUTPATIENT
Start: 2021-02-10 | End: 2021-02-10 | Stop reason: HOSPADM

## 2021-02-10 RX ORDER — SODIUM CHLORIDE 0.9 % (FLUSH) 0.9 %
5-40 SYRINGE (ML) INJECTION AS NEEDED
Status: DISCONTINUED | OUTPATIENT
Start: 2021-02-10 | End: 2021-02-10 | Stop reason: HOSPADM

## 2021-02-10 RX ORDER — SODIUM CHLORIDE 9 MG/ML
75 INJECTION, SOLUTION INTRAVENOUS CONTINUOUS
Status: DISCONTINUED | OUTPATIENT
Start: 2021-02-10 | End: 2021-02-11

## 2021-02-10 RX ORDER — ROCURONIUM BROMIDE 10 MG/ML
INJECTION, SOLUTION INTRAVENOUS AS NEEDED
Status: DISCONTINUED | OUTPATIENT
Start: 2021-02-10 | End: 2021-02-10 | Stop reason: HOSPADM

## 2021-02-10 RX ORDER — SODIUM CHLORIDE, SODIUM LACTATE, POTASSIUM CHLORIDE, CALCIUM CHLORIDE 600; 310; 30; 20 MG/100ML; MG/100ML; MG/100ML; MG/100ML
125 INJECTION, SOLUTION INTRAVENOUS CONTINUOUS
Status: DISCONTINUED | OUTPATIENT
Start: 2021-02-10 | End: 2021-02-10 | Stop reason: HOSPADM

## 2021-02-10 RX ORDER — FACIAL-BODY WIPES
10 EACH TOPICAL DAILY PRN
Status: DISCONTINUED | OUTPATIENT
Start: 2021-02-12 | End: 2021-02-16 | Stop reason: HOSPADM

## 2021-02-10 RX ORDER — FENTANYL CITRATE 50 UG/ML
25 INJECTION, SOLUTION INTRAMUSCULAR; INTRAVENOUS
Status: DISCONTINUED | OUTPATIENT
Start: 2021-02-10 | End: 2021-02-10 | Stop reason: HOSPADM

## 2021-02-10 RX ORDER — SODIUM CHLORIDE 0.9 % (FLUSH) 0.9 %
5-40 SYRINGE (ML) INJECTION EVERY 8 HOURS
Status: DISCONTINUED | OUTPATIENT
Start: 2021-02-10 | End: 2021-02-10 | Stop reason: HOSPADM

## 2021-02-10 RX ORDER — MORPHINE SULFATE 10 MG/ML
2 INJECTION, SOLUTION INTRAMUSCULAR; INTRAVENOUS
Status: DISCONTINUED | OUTPATIENT
Start: 2021-02-10 | End: 2021-02-10 | Stop reason: HOSPADM

## 2021-02-10 RX ORDER — SODIUM CHLORIDE 0.9 % (FLUSH) 0.9 %
5-40 SYRINGE (ML) INJECTION AS NEEDED
Status: DISCONTINUED | OUTPATIENT
Start: 2021-02-10 | End: 2021-02-16 | Stop reason: HOSPADM

## 2021-02-10 RX ORDER — SODIUM CHLORIDE 0.9 % (FLUSH) 0.9 %
5-40 SYRINGE (ML) INJECTION EVERY 8 HOURS
Status: DISCONTINUED | OUTPATIENT
Start: 2021-02-10 | End: 2021-02-16 | Stop reason: HOSPADM

## 2021-02-10 RX ORDER — SODIUM CHLORIDE 9 MG/ML
125 INJECTION, SOLUTION INTRAVENOUS CONTINUOUS
Status: DISPENSED | OUTPATIENT
Start: 2021-02-10 | End: 2021-02-11

## 2021-02-10 RX ORDER — GLYCOPYRROLATE 0.2 MG/ML
INJECTION INTRAMUSCULAR; INTRAVENOUS AS NEEDED
Status: DISCONTINUED | OUTPATIENT
Start: 2021-02-10 | End: 2021-02-10 | Stop reason: HOSPADM

## 2021-02-10 RX ORDER — ONDANSETRON 2 MG/ML
4 INJECTION INTRAMUSCULAR; INTRAVENOUS
Status: ACTIVE | OUTPATIENT
Start: 2021-02-10 | End: 2021-02-11

## 2021-02-10 RX ORDER — PROPOFOL 10 MG/ML
INJECTION, EMULSION INTRAVENOUS AS NEEDED
Status: DISCONTINUED | OUTPATIENT
Start: 2021-02-10 | End: 2021-02-10 | Stop reason: HOSPADM

## 2021-02-10 RX ORDER — DIPHENHYDRAMINE HYDROCHLORIDE 50 MG/ML
12.5 INJECTION, SOLUTION INTRAMUSCULAR; INTRAVENOUS AS NEEDED
Status: DISCONTINUED | OUTPATIENT
Start: 2021-02-10 | End: 2021-02-10 | Stop reason: HOSPADM

## 2021-02-10 RX ORDER — OXYCODONE AND ACETAMINOPHEN 5; 325 MG/1; MG/1
1 TABLET ORAL AS NEEDED
Status: DISCONTINUED | OUTPATIENT
Start: 2021-02-10 | End: 2021-02-10 | Stop reason: HOSPADM

## 2021-02-10 RX ORDER — FENTANYL CITRATE 50 UG/ML
INJECTION, SOLUTION INTRAMUSCULAR; INTRAVENOUS AS NEEDED
Status: DISCONTINUED | OUTPATIENT
Start: 2021-02-10 | End: 2021-02-10 | Stop reason: HOSPADM

## 2021-02-10 RX ORDER — LIDOCAINE HYDROCHLORIDE 10 MG/ML
0.1 INJECTION, SOLUTION EPIDURAL; INFILTRATION; INTRACAUDAL; PERINEURAL AS NEEDED
Status: DISCONTINUED | OUTPATIENT
Start: 2021-02-10 | End: 2021-02-10 | Stop reason: HOSPADM

## 2021-02-10 RX ORDER — HYDROMORPHONE HYDROCHLORIDE 1 MG/ML
0.2 INJECTION, SOLUTION INTRAMUSCULAR; INTRAVENOUS; SUBCUTANEOUS
Status: DISCONTINUED | OUTPATIENT
Start: 2021-02-10 | End: 2021-02-10 | Stop reason: HOSPADM

## 2021-02-10 RX ORDER — SUCCINYLCHOLINE CHLORIDE 20 MG/ML
INJECTION INTRAMUSCULAR; INTRAVENOUS AS NEEDED
Status: DISCONTINUED | OUTPATIENT
Start: 2021-02-10 | End: 2021-02-10 | Stop reason: HOSPADM

## 2021-02-10 RX ORDER — ACETAMINOPHEN 325 MG/1
650 TABLET ORAL ONCE
Status: COMPLETED | OUTPATIENT
Start: 2021-02-10 | End: 2021-02-10

## 2021-02-10 RX ORDER — POLYETHYLENE GLYCOL 3350 17 G/17G
17 POWDER, FOR SOLUTION ORAL DAILY
Status: DISCONTINUED | OUTPATIENT
Start: 2021-02-11 | End: 2021-02-16 | Stop reason: HOSPADM

## 2021-02-10 RX ORDER — FERROUS SULFATE, DRIED 160(50) MG
1 TABLET, EXTENDED RELEASE ORAL
Status: DISCONTINUED | OUTPATIENT
Start: 2021-02-11 | End: 2021-02-16 | Stop reason: HOSPADM

## 2021-02-10 RX ORDER — NEOSTIGMINE METHYLSULFATE 1 MG/ML
INJECTION INTRAVENOUS AS NEEDED
Status: DISCONTINUED | OUTPATIENT
Start: 2021-02-10 | End: 2021-02-10 | Stop reason: HOSPADM

## 2021-02-10 RX ORDER — ONDANSETRON 2 MG/ML
INJECTION INTRAMUSCULAR; INTRAVENOUS AS NEEDED
Status: DISCONTINUED | OUTPATIENT
Start: 2021-02-10 | End: 2021-02-10 | Stop reason: HOSPADM

## 2021-02-10 RX ORDER — DEXAMETHASONE SODIUM PHOSPHATE 4 MG/ML
INJECTION, SOLUTION INTRA-ARTICULAR; INTRALESIONAL; INTRAMUSCULAR; INTRAVENOUS; SOFT TISSUE AS NEEDED
Status: DISCONTINUED | OUTPATIENT
Start: 2021-02-10 | End: 2021-02-10 | Stop reason: HOSPADM

## 2021-02-10 RX ORDER — SODIUM CHLORIDE 9 MG/ML
25 INJECTION, SOLUTION INTRAVENOUS CONTINUOUS
Status: DISCONTINUED | OUTPATIENT
Start: 2021-02-10 | End: 2021-02-10 | Stop reason: HOSPADM

## 2021-02-10 RX ORDER — AMOXICILLIN 250 MG
1 CAPSULE ORAL 2 TIMES DAILY
Status: DISCONTINUED | OUTPATIENT
Start: 2021-02-10 | End: 2021-02-16 | Stop reason: HOSPADM

## 2021-02-10 RX ORDER — MIDAZOLAM HYDROCHLORIDE 1 MG/ML
0.5 INJECTION, SOLUTION INTRAMUSCULAR; INTRAVENOUS
Status: DISCONTINUED | OUTPATIENT
Start: 2021-02-10 | End: 2021-02-10 | Stop reason: HOSPADM

## 2021-02-10 RX ORDER — NALOXONE HYDROCHLORIDE 0.4 MG/ML
0.4 INJECTION, SOLUTION INTRAMUSCULAR; INTRAVENOUS; SUBCUTANEOUS AS NEEDED
Status: DISCONTINUED | OUTPATIENT
Start: 2021-02-10 | End: 2021-02-16 | Stop reason: HOSPADM

## 2021-02-10 RX ORDER — FENTANYL CITRATE 50 UG/ML
50 INJECTION, SOLUTION INTRAMUSCULAR; INTRAVENOUS AS NEEDED
Status: DISCONTINUED | OUTPATIENT
Start: 2021-02-10 | End: 2021-02-10 | Stop reason: HOSPADM

## 2021-02-10 RX ORDER — ENOXAPARIN SODIUM 100 MG/ML
40 INJECTION SUBCUTANEOUS DAILY
Status: DISCONTINUED | OUTPATIENT
Start: 2021-02-11 | End: 2021-02-12

## 2021-02-10 RX ADMIN — GLYCOPYRROLATE 0.4 MG: 0.2 INJECTION, SOLUTION INTRAMUSCULAR; INTRAVENOUS at 19:16

## 2021-02-10 RX ADMIN — PHENYLEPHRINE HYDROCHLORIDE 30 MCG/MIN: 10 INJECTION INTRAVENOUS at 18:30

## 2021-02-10 RX ADMIN — NEOSTIGMINE METHYLSULFATE 2 MG: 1 INJECTION, SOLUTION INTRAVENOUS at 19:16

## 2021-02-10 RX ADMIN — FENTANYL CITRATE 25 MCG: 50 INJECTION, SOLUTION INTRAMUSCULAR; INTRAVENOUS at 10:47

## 2021-02-10 RX ADMIN — Medication 10 ML: at 00:31

## 2021-02-10 RX ADMIN — ROCURONIUM BROMIDE 5 MG: 10 SOLUTION INTRAVENOUS at 18:16

## 2021-02-10 RX ADMIN — LOSARTAN POTASSIUM 50 MG: 50 TABLET, FILM COATED ORAL at 08:40

## 2021-02-10 RX ADMIN — ROCURONIUM BROMIDE 15 MG: 10 SOLUTION INTRAVENOUS at 18:22

## 2021-02-10 RX ADMIN — PROPOFOL 70 MG: 10 INJECTION, EMULSION INTRAVENOUS at 18:16

## 2021-02-10 RX ADMIN — MEMANTINE 10 MG: 5 TABLET ORAL at 00:31

## 2021-02-10 RX ADMIN — FENTANYL CITRATE 25 MCG: 50 INJECTION, SOLUTION INTRAMUSCULAR; INTRAVENOUS at 06:27

## 2021-02-10 RX ADMIN — FENTANYL CITRATE 25 MCG: 50 INJECTION, SOLUTION INTRAMUSCULAR; INTRAVENOUS at 18:45

## 2021-02-10 RX ADMIN — ONDANSETRON HYDROCHLORIDE 4 MG: 2 INJECTION, SOLUTION INTRAMUSCULAR; INTRAVENOUS at 18:38

## 2021-02-10 RX ADMIN — TRAMADOL HYDROCHLORIDE 50 MG: 50 TABLET, FILM COATED ORAL at 08:44

## 2021-02-10 RX ADMIN — FENTANYL CITRATE 25 MCG: 50 INJECTION, SOLUTION INTRAMUSCULAR; INTRAVENOUS at 00:31

## 2021-02-10 RX ADMIN — WATER 2 G: 1 INJECTION INTRAMUSCULAR; INTRAVENOUS; SUBCUTANEOUS at 18:33

## 2021-02-10 RX ADMIN — SODIUM CHLORIDE 75 ML/HR: 9 INJECTION, SOLUTION INTRAVENOUS at 08:44

## 2021-02-10 RX ADMIN — SUCCINYLCHOLINE CHLORIDE 100 MG: 20 INJECTION, SOLUTION INTRAMUSCULAR; INTRAVENOUS at 18:17

## 2021-02-10 RX ADMIN — LIDOCAINE HYDROCHLORIDE 40 MG: 20 INJECTION, SOLUTION EPIDURAL; INFILTRATION; INTRACAUDAL; PERINEURAL at 18:16

## 2021-02-10 RX ADMIN — FENTANYL CITRATE 50 MCG: 50 INJECTION, SOLUTION INTRAMUSCULAR; INTRAVENOUS at 18:47

## 2021-02-10 RX ADMIN — FENTANYL CITRATE 25 MCG: 50 INJECTION, SOLUTION INTRAMUSCULAR; INTRAVENOUS at 15:45

## 2021-02-10 RX ADMIN — ACETAMINOPHEN 650 MG: 325 TABLET ORAL at 17:55

## 2021-02-10 RX ADMIN — Medication 10 ML: at 06:10

## 2021-02-10 RX ADMIN — FENTANYL CITRATE 25 MCG: 50 INJECTION, SOLUTION INTRAMUSCULAR; INTRAVENOUS at 18:43

## 2021-02-10 RX ADMIN — SODIUM CHLORIDE, POTASSIUM CHLORIDE, SODIUM LACTATE AND CALCIUM CHLORIDE 125 ML/HR: 600; 310; 30; 20 INJECTION, SOLUTION INTRAVENOUS at 17:45

## 2021-02-10 RX ADMIN — MEMANTINE 10 MG: 5 TABLET ORAL at 08:40

## 2021-02-10 RX ADMIN — SODIUM CHLORIDE 125 ML/HR: 9 INJECTION, SOLUTION INTRAVENOUS at 20:31

## 2021-02-10 RX ADMIN — DEXAMETHASONE SODIUM PHOSPHATE 4 MG: 4 INJECTION, SOLUTION INTRAMUSCULAR; INTRAVENOUS at 18:38

## 2021-02-10 NOTE — ANESTHESIA PREPROCEDURE EVALUATION
Relevant Problems   No relevant active problems       Anesthetic History   No history of anesthetic complications            Review of Systems / Medical History  Patient summary reviewed, nursing notes reviewed and pertinent labs reviewed    Pulmonary  Within defined limits                 Neuro/Psych         Dementia     Cardiovascular    Hypertension                   GI/Hepatic/Renal  Within defined limits              Endo/Other  Within defined limits           Other Findings   Comments: Hx PE         Physical Exam    Airway  Mallampati: II  TM Distance: > 6 cm  Neck ROM: normal range of motion   Mouth opening: Normal     Cardiovascular  Regular rate and rhythm,  S1 and S2 normal,  no murmur, click, rub, or gallop             Dental  No notable dental hx       Pulmonary  Breath sounds clear to auscultation               Abdominal  GI exam deferred       Other Findings            Anesthetic Plan    ASA: 3  Anesthesia type: general          Induction: Intravenous  Anesthetic plan and risks discussed with: Patient

## 2021-02-10 NOTE — PROGRESS NOTES
Brief Ortho Note:     Nursing reports no issues overnight; pain well controlled; VSS, Labs without significant abnormality; Cleared by hospitalist.     - Will proceed with surgery this afternoon as scheduled.    - NPO  - Hold Eliquis, need 24hr washout period prior to OR, last dose 1700 2/8; SCDs  - Pain - Acetaminophen, Oxycodone, Morphine for breakthrough

## 2021-02-10 NOTE — PROGRESS NOTES
TRANSFER - OUT REPORT:    Verbal report given to RN(name) on Rere Hoffmann  being transferred to (unit) for routine progression of care       Report consisted of patients Situation, Background, Assessment and   Recommendations(SBAR). Information from the following report(s) SBAR, Kardex and MAR was reviewed with the receiving nurse. Lines:   Peripheral IV 02/10/21 Right Hand (Active)   Site Assessment Clean, dry, & intact 02/10/21 1220   Phlebitis Assessment 0 02/10/21 1220   Infiltration Assessment 0 02/10/21 1220   Dressing Status Clean, dry, & intact 02/10/21 1220   Dressing Type Transparent 02/10/21 1220   Hub Color/Line Status Blue; Infusing;Flushed 02/10/21 1220        Opportunity for questions and clarification was provided.       Patient transported to unit after surgery

## 2021-02-10 NOTE — CONSULTS
ORTHO CONSULT NOTE    Date of Consultation:  2021  Referring Physician:  Oneida Patel MD  CC: R hip pain    HPI:  Michael Marx is a 80 y.o. female who c/o R hip pain found to have R IT femur fx after ground level fall at her memory care unit; no details given regarding the fall as patient is poor historian with hx of dementia; Pain is localized to R hip without radiation, mild at rest, excerbated with movement; ambulates with walker at baseline; currently being treated for PE, but Memory Care nurse states there are no other recent medical concerns. Current Anticoagulant Medications: Eliquis, last dose  1700  Past Medical History:   Diagnosis Date    Dementia ([facility] [address]     Hypercholesteremia 2010    Hypertension     IBS (irritable bowel syndrome) 2010    OA (osteoarthritis) 2010    Osteoporosis 2010    Pulmonary embolism (HCC)       No past surgical history on file. No family history on file. Social History     Tobacco Use    Smoking status: Never Smoker    Smokeless tobacco: Never Used   Substance Use Topics    Alcohol use: Not Currently     Allergies   Allergen Reactions    Aricept [Donepezil] Other (comments)     GI upset    Flexeril [Cyclobenzaprine] Other (comments)     delirium        Review of Systems:  Per HPI. Objective:     Patient Vitals for the past 8 hrs:   BP Temp Pulse Resp SpO2   21 1750 (!) 154/82 98 °F (36.7 °C) 87 16 97 %     Temp (24hrs), Av °F (36.7 °C), Min:98 °F (36.7 °C), Max:98 °F (36.7 °C)      EXAM:   Pt in moderate distress, answers questions appropriately, nonlabored respirations; RLE normal in length and orientation; compartments soft, no posterior calf ttp; ttp lateral hip, pain with log roll; DF/PF intact, SILT; DP 2+, cap refill brisk.      Imaging Review:   Results from Hospital Encounter encounter on 21   XR FEMUR RT 2 VS    Narrative EXAM: XR FEMUR RT 2 VS    INDICATION: fall, head injury, dementia, eliquis, pain.    COMPARISON: 8/22/2018    FINDINGS: Two views of the right femur demonstrate an acute nondisplaced right  intertrochanteric fracture of the proximal femur there is no dislocation or  additional fracture in the femur. The right hemipelvis appears intact. .      Impression Acute nondisplaced right intertrochanteric hip fracture. Josh Manuel Results from [place] encounter on 02/09/21   CT SPINE LUMB WO CONT    Narrative ADDITIONAL INDICATION:  fall w pelvic pain, concern for fracture    COMPARISON: CT chest 12/16/2018, CT pelvis 1/11/2017 [facility]    TECHNIQUE: Multislice helical CT of the lumbar spine was performed. Sagittal and  coronal reformations were generated. CT dose reduction was achieved through use  of a standardized protocol tailored for this examination and automatic exposure  control for dose modulation. FINDINGS:  The alignment of the lumbar spine is remarkable for dextrocurvature and no  subluxation. This is stable. The vertebral body heights are well-preserved. And  disc space heights are diminished with endplate sclerosis and spondylosis, as  well as vacuum phenomenon at multiple levels, consistent with degenerative disc  disease. Multilevel degenerative change with multilevel central stenosis and spondylosis. Impression No acute bony abnormality of the lumbar spine. Stable degenerative  and chronic change.          Labs:   Recent Results (from the past 24 hour(s))   CBC WITH AUTOMATED DIFF    Collection Time: 02/09/21  6:07 PM   Result Value Ref Range    WBC 6.8 3.6 - 11.0 K/uL    RBC 5.15 3.80 - 5.20 M/uL    HGB 15.5 11.5 - 16.0 g/dL    HCT 48.0 (H) 35.0 - 47.0 %    MCV 93.2 80.0 - 99.0 FL    MCH 30.1 26.0 - 34.0 PG    MCHC 32.3 30.0 - 36.5 g/dL    RDW 14.4 11.5 - 14.5 %    PLATELET 200 290 - 361 K/uL    MPV 11.7 8.9 - 12.9 FL    NRBC 0.0 0  WBC    ABSOLUTE NRBC 0.00 0.00 - 0.01 K/uL    NEUTROPHILS 67 32 - 75 %    LYMPHOCYTES 24 12 - 49 %    MONOCYTES 6 5 - 13 % EOSINOPHILS 3 0 - 7 %    BASOPHILS 0 0 - 1 %    IMMATURE GRANULOCYTES 0 0.0 - 0.5 %    ABS. NEUTROPHILS 4.6 1.8 - 8.0 K/UL    ABS. LYMPHOCYTES 1.6 0.8 - 3.5 K/UL    ABS. MONOCYTES 0.4 0.0 - 1.0 K/UL    ABS. EOSINOPHILS 0.2 0.0 - 0.4 K/UL    ABS. BASOPHILS 0.0 0.0 - 0.1 K/UL    ABS. IMM. GRANS. 0.0 0.00 - 0.04 K/UL    DF AUTOMATED     METABOLIC PANEL, COMPREHENSIVE    Collection Time: 02/09/21  6:07 PM   Result Value Ref Range    Sodium 140 136 - 145 mmol/L    Potassium 4.6 3.5 - 5.1 mmol/L    Chloride 107 97 - 108 mmol/L    CO2 25 21 - 32 mmol/L    Anion gap 8 5 - 15 mmol/L    Glucose 128 (H) 65 - 100 mg/dL    BUN 27 (H) 6 - 20 MG/DL    Creatinine 1.10 (H) 0.55 - 1.02 MG/DL    BUN/Creatinine ratio 25 (H) 12 - 20      GFR est AA 56 (L) >60 ml/min/1.73m2    GFR est non-AA 47 (L) >60 ml/min/1.73m2    Calcium 9.8 8.5 - 10.1 MG/DL    Bilirubin, total 0.3 0.2 - 1.0 MG/DL    ALT (SGPT) 22 12 - 78 U/L    AST (SGOT) 18 15 - 37 U/L    Alk. phosphatase 97 45 - 117 U/L    Protein, total 6.7 6.4 - 8.2 g/dL    Albumin 3.3 (L) 3.5 - 5.0 g/dL    Globulin 3.4 2.0 - 4.0 g/dL    A-G Ratio 1.0 (L) 1.1 - 2.2     URINALYSIS W/MICROSCOPIC    Collection Time: 02/09/21  6:07 PM   Result Value Ref Range    Color YELLOW/STRAW      Appearance CLOUDY (A) CLEAR      Specific gravity 1.028 1.003 - 1.030      pH (UA) 5.0 5.0 - 8.0      Protein Negative NEG mg/dL    Glucose Negative NEG mg/dL    Ketone TRACE (A) NEG mg/dL    Bilirubin Negative NEG      Blood MODERATE (A) NEG      Urobilinogen 0.2 0.2 - 1.0 EU/dL    Nitrites Positive (A) NEG      Leukocyte Esterase MODERATE (A) NEG      WBC 20-50 0 - 4 /hpf    RBC 0-5 0 - 5 /hpf    Epithelial cells FEW FEW /lpf    Bacteria 1+ (A) NEG /hpf    CA Oxalate crystals FEW (A) NEG     URINE CULTURE HOLD SAMPLE    Collection Time: 02/09/21  6:07 PM    Specimen: Serum; Urine   Result Value Ref Range    Urine culture hold        Urine on hold in Microbiology dept for 2 days.   If unpreserved urine is submitted, it cannot be used for addtional testing after 24 hours, recollection will be required. SARS-COV-2    Collection Time: 02/09/21  8:02 PM   Result Value Ref Range    SARS-CoV-2 Please find results under separate order     COVID-19 RAPID TEST    Collection Time: 02/09/21  8:02 PM   Result Value Ref Range    Specimen source Nasopharyngeal      COVID-19 rapid test Not detected NOTD         Impression:     Patient Active Problem List    Diagnosis Date Noted    Closed hip fracture ([facility] [address] 02/09/2021    Fall 08/23/2018    Unwitnessed fall 08/22/2018    Hypercholesteremia 05/27/2010    Osteoporosis 05/27/2010    IBS (irritable bowel syndrome) 05/27/2010    OA (osteoarthritis) 05/27/2010     Active Problems:    Closed hip fracture ([facility] [address] (2/9/2021)        Plan:   I explained the nature of the injury and discussed the recommended surgery with patient and her daughter. Discussed potential risks/benefits/alternatives of surgery and blood transfusions. Patient consents to both. Plan for Right Hip Nail 2/10 afternoon with Dr. Ace Youssef. Consent signed and placed on chart.     - Medical clearance; admitted to hospitalist service.   - NPO after midnight, Bedrest, NV checks q 4 hours  - Hold Eliquis, need 24hr washout period prior to OR, last dose 1700 2/8; SCDs  - Pain - Acetaminophen, Oxycodone, Morphine for breakthrough     Dr. Veto Hartley is aware and agrees with above plan.       MARGARITA Oliveira  Orthopedic Trauma Service  [facility]

## 2021-02-10 NOTE — PROGRESS NOTES
TRANSITION OF CARE  RUR--11%  Disposition--To [facility]. Covid Negative  Referral to [facility] deferred pending surgery and therapy evaluations. Transport-- BLS Stretcher  With  [facility]    Care Management Interventions  PCP Verified by CM: Yes  Transition of Care Consult (CM Consult): Assisted Living  Physical Therapy Consult: Yes  Occupational Therapy Consult: Yes  Current Support Network: Assisted Living  Confirm Follow Up Transport: Other (see comment)(BLS stretcher)  The Plan for Transition of Care is Related to the Following Treatment Goals : [facility]  Discharge Location  Discharge Placement: Skilled nursing facility     Reason for Admission: Right femur fracture--surgery planned for evening            Dementia                      RUR Score:            11%           Plan for utilizing home health:   None       PCP: First and Last name:  Srinivas Cueva MD   Name of Practice: For [address] [place] [address]   Are you a current patient: Yes    Approximate date of last visit:    Can you participate in a virtual visit with your PCP:                     Current Advanced Directive/Advance Care Plan:   AMD/Living Will scanned 12/17/18                         Transition of Care Plan:                    CM met with patient and her daughter Aye Alvarez (only child) at the bedside. Patient was alert and cheerful but too impaired to participate. Patient lives at [address] [place] [address] where she has been for about 2 years. Patient was  Ambulatory with a walker prior to admission. Patient's daughter confirmed PCP, health insurance, and prescription coverage.

## 2021-02-10 NOTE — ROUTINE PROCESS
Bedside shift change report given to Toña Culp (oncoming nurse) by Ibeth Langley (offgoing nurse). Report included the following information SBAR, Kardex, MAR and Recent Results.

## 2021-02-10 NOTE — H&P
HISTORY AND PHYSICAL      PCP: Koko Tracey MD  History source: ER, the patient is a limited historian      CC: fall      HPI: 80 y.o lady with dementia, HTN, hyperlipidemia, who presents after a ground-level fall. She is a poor historian and doesn't recall what happened exactly. She reports right hip pain with any movement of her right leg. She offers no other complaints and is pain-free if she lays still. PMH/PSH:  Past Medical History:   Diagnosis Date    Dementia ([address]     Hypercholesteremia 5/27/2010    Hypertension     IBS (irritable bowel syndrome) 5/27/2010    OA (osteoarthritis) 5/27/2010    Osteoporosis 5/27/2010    Pulmonary embolism (HCC)      No past surgical history on file. Home meds:   Prior to Admission medications    Medication Sig Start Date End Date Taking? Authorizing Provider   nystatin (MYCOSTATIN) powder Apply  to affected area four (4) times daily. Kendall Ojeda MD   magnesium hydroxide ([place] Milk of Magnesia) 400 mg/5 mL suspension Take 30 mL by mouth daily as needed for Constipation. Kendall Ojeda MD   apixaban (Eliquis) 5 mg tablet Take 5 mg by mouth two (2) times a day. Indications: a clot in the lung    Kendall Ojeda MD   guaiFENesin ER (MUCINEX) 600 mg ER tablet Take 600 mg by mouth two (2) times daily as needed for Congestion. Provider, Historical   acetaminophen (TYLENOL) 500 mg tablet Take 500 mg by mouth two (2) times daily as needed for Pain. Provider, Historical   acetaminophen (TYLENOL) 325 mg tablet Take 650 mg by mouth two (2) times a day. Provider, Historical   calcium-vitamin D (OS-LUCÍA 500+D) 500 mg(1,250mg) -200 unit per tablet Take 1 Tab by mouth daily (with breakfast). Kendall Ojeda MD   multivitamin (ONE A DAY) tablet Take 1 Tab by mouth daily. Kendall Ojeda MD   losartan (COZAAR) 50 mg tablet Take 50 mg by mouth daily. Kendall Ojeda MD   memantine (NAMENDA) 10 mg tablet Take 10 mg by mouth two (2) times a day.     Rusty MD Kendall       Allergies: Allergies   Allergen Reactions    Aricept [Donepezil] Other (comments)     GI upset    Flexeril [Cyclobenzaprine] Other (comments)     delirium       FH:  No pertinent family history    SH:  Social History     Tobacco Use    Smoking status: Never Smoker    Smokeless tobacco: Never Used   Substance Use Topics    Alcohol use: Not Currently       ROS: Review of systems not obtained due to patient factors. PHYSICAL EXAM:  Visit Vitals  BP (!) 154/82   Pulse 87   Temp 98 °F (36.7 °C)   Resp 16   SpO2 97%       Gen: NAD, non-toxic  HEENT: anicteric sclerae, normal conjunctiva  Neck: supple  Heart: RRR, soft systolic murmur, no peripheral edema  Lungs: CTA b/l, non-labored respirations  Abd: soft, NT, ND, BS+  Extr: warm, pain with any movement of RLE  Skin: dry, no rash  Neuro: CN II-XII grossly intact  Psych: not anxious nor agitated, pleasantly confused      Labs/Imaging:  Recent Results (from the past 24 hour(s))   CBC WITH AUTOMATED DIFF    Collection Time: 02/09/21  6:07 PM   Result Value Ref Range    WBC 6.8 3.6 - 11.0 K/uL    RBC 5.15 3.80 - 5.20 M/uL    HGB 15.5 11.5 - 16.0 g/dL    HCT 48.0 (H) 35.0 - 47.0 %    MCV 93.2 80.0 - 99.0 FL    MCH 30.1 26.0 - 34.0 PG    MCHC 32.3 30.0 - 36.5 g/dL    RDW 14.4 11.5 - 14.5 %    PLATELET 599 369 - 059 K/uL    MPV 11.7 8.9 - 12.9 FL    NRBC 0.0 0  WBC    ABSOLUTE NRBC 0.00 0.00 - 0.01 K/uL    NEUTROPHILS 67 32 - 75 %    LYMPHOCYTES 24 12 - 49 %    MONOCYTES 6 5 - 13 %    EOSINOPHILS 3 0 - 7 %    BASOPHILS 0 0 - 1 %    IMMATURE GRANULOCYTES 0 0.0 - 0.5 %    ABS. NEUTROPHILS 4.6 1.8 - 8.0 K/UL    ABS. LYMPHOCYTES 1.6 0.8 - 3.5 K/UL    ABS. MONOCYTES 0.4 0.0 - 1.0 K/UL    ABS. EOSINOPHILS 0.2 0.0 - 0.4 K/UL    ABS. BASOPHILS 0.0 0.0 - 0.1 K/UL    ABS. IMM.  GRANS. 0.0 0.00 - 0.04 K/UL    DF AUTOMATED     METABOLIC PANEL, COMPREHENSIVE    Collection Time: 02/09/21  6:07 PM   Result Value Ref Range    Sodium 140 136 - 145 mmol/L Potassium 4.6 3.5 - 5.1 mmol/L    Chloride 107 97 - 108 mmol/L    CO2 25 21 - 32 mmol/L    Anion gap 8 5 - 15 mmol/L    Glucose 128 (H) 65 - 100 mg/dL    BUN 27 (H) 6 - 20 MG/DL    Creatinine 1.10 (H) 0.55 - 1.02 MG/DL    BUN/Creatinine ratio 25 (H) 12 - 20      GFR est AA 56 (L) >60 ml/min/1.73m2    GFR est non-AA 47 (L) >60 ml/min/1.73m2    Calcium 9.8 8.5 - 10.1 MG/DL    Bilirubin, total 0.3 0.2 - 1.0 MG/DL    ALT (SGPT) 22 12 - 78 U/L    AST (SGOT) 18 15 - 37 U/L    Alk. phosphatase 97 45 - 117 U/L    Protein, total 6.7 6.4 - 8.2 g/dL    Albumin 3.3 (L) 3.5 - 5.0 g/dL    Globulin 3.4 2.0 - 4.0 g/dL    A-G Ratio 1.0 (L) 1.1 - 2.2     URINE CULTURE HOLD SAMPLE    Collection Time: 02/09/21  6:07 PM    Specimen: Serum; Urine   Result Value Ref Range    Urine culture hold        Urine on hold in Microbiology dept for 2 days. If unpreserved urine is submitted, it cannot be used for addtional testing after 24 hours, recollection will be required. Recent Labs     02/09/21  1807   WBC 6.8   HGB 15.5   HCT 48.0*        Recent Labs     02/09/21  1807      K 4.6      CO2 25   BUN 27*   CREA 1.10*   *   CA 9.8     Recent Labs     02/09/21  1807   ALT 22   AP 97   TBILI 0.3   TP 6.7   ALB 3.3*   GLOB 3.4       No results for input(s): CPK, CKNDX, TROIQ in the last 72 hours. No lab exists for component: CPKMB    No results for input(s): INR, PTP, APTT, INREXT in the last 72 hours. No results for input(s): PH, PCO2, PO2 in the last 72 hours. Xr Pelv Ap Only    Result Date: 2/9/2021  Acute nondisplaced right proximal femur intratrochanteric fracture. Xr Femur Rt 2 Vs    Result Date: 2/9/2021  Acute nondisplaced right intertrochanteric hip fracture. .    Ct Head Wo Cont    Result Date: 2/9/2021  1. No acute intracranial abnormality. 2. Stable microvascular ischemic and age-related change with stable mesial temporal and hippocampal atrophy.      Ct Spine Cerv Wo Cont    Result Date: 2/9/2021  1. No acute bony abnormality of the cervical spine. 2. Stable multilevel degenerative change. 3. Stable T4 compression deformity. Ct Spine Lumb Wo Cont    Result Date: 2/9/2021  No acute bony abnormality of the lumbar spine. Stable degenerative and chronic change. Xr Chest Port    Result Date: 2/9/2021  No acute cardiopulmonary disease radiographically. .  .            Assessment & Plan:     Closed right femoral intertrochanteric fracture:  -pain control  -bed rest  -NPO at midnight until ortho eval (consulted by the ED)  -PT/OT when able  -hold Eliquis    History of PE:  -hold Eliquis as above    Dementia without behavioral disturbances:  -continue memantine    HTN: BP mildly elevated in setting of pain; monitor    Hyperlipidemia    DVT ppx: SCDs  Code status: DDNR  Disposition: will need rehab    Signed By: Lieutenant Bud MD     February 9, 2021

## 2021-02-10 NOTE — PROGRESS NOTES
TRANSFER - IN REPORT:    Verbal report received from Lizet Roberts RN(name) on Keny Julio  being received from 2N(unit) for routine post - op      Report consisted of patients Situation, Background, Assessment and   Recommendations(SBAR). Information from the following report(s) SBAR, Intake/Output, MAR and Accordion was reviewed with the receiving nurse. Opportunity for questions and clarification was provided. Assessment completed upon patients arrival to unit and care assumed.

## 2021-02-10 NOTE — PROGRESS NOTES
TRANSFER - IN REPORT:    Verbal report received from [facility](name) on Tong Miller  being received from ER(unit) for routine progression of care      Report consisted of patients Situation, Background, Assessment and   Recommendations(SBAR). Information from the following report(s) ED Summary and Recent Results was reviewed with the receiving nurse. Opportunity for questions and clarification was provided. Assessment completed upon patients arrival to unit and care assumed.

## 2021-02-10 NOTE — PROGRESS NOTES
Primary Nurse Zina Huertas RN and Abundio Prasad RN performed a dual skin assessment on this patient Impairment noted- see wound doc flow sheet  Karl score is 15  Buttocks,sacrum,groin,heels look fine. Has abrasion on occiput due to fall at [address].

## 2021-02-11 LAB
ANION GAP SERPL CALC-SCNC: 6 MMOL/L (ref 5–15)
BASOPHILS # BLD: 0 K/UL (ref 0–0.1)
BASOPHILS NFR BLD: 0 % (ref 0–1)
BUN SERPL-MCNC: 16 MG/DL (ref 6–20)
BUN/CREAT SERPL: 15 (ref 12–20)
CALCIUM SERPL-MCNC: 8.8 MG/DL (ref 8.5–10.1)
CHLORIDE SERPL-SCNC: 111 MMOL/L (ref 97–108)
CO2 SERPL-SCNC: 23 MMOL/L (ref 21–32)
CREAT SERPL-MCNC: 1.05 MG/DL (ref 0.55–1.02)
DIFFERENTIAL METHOD BLD: ABNORMAL
EOSINOPHIL # BLD: 0 K/UL (ref 0–0.4)
EOSINOPHIL NFR BLD: 0 % (ref 0–7)
ERYTHROCYTE [DISTWIDTH] IN BLOOD BY AUTOMATED COUNT: 14.1 % (ref 11.5–14.5)
GLUCOSE SERPL-MCNC: 166 MG/DL (ref 65–100)
HCT VFR BLD AUTO: 40.7 % (ref 35–47)
HGB BLD-MCNC: 13 G/DL (ref 11.5–16)
IMM GRANULOCYTES # BLD AUTO: 0 K/UL (ref 0–0.04)
IMM GRANULOCYTES NFR BLD AUTO: 0 % (ref 0–0.5)
LYMPHOCYTES # BLD: 0.8 K/UL (ref 0.8–3.5)
LYMPHOCYTES NFR BLD: 8 % (ref 12–49)
MCH RBC QN AUTO: 29.9 PG (ref 26–34)
MCHC RBC AUTO-ENTMCNC: 31.9 G/DL (ref 30–36.5)
MCV RBC AUTO: 93.6 FL (ref 80–99)
MONOCYTES # BLD: 0.5 K/UL (ref 0–1)
MONOCYTES NFR BLD: 5 % (ref 5–13)
NEUTS SEG # BLD: 9.3 K/UL (ref 1.8–8)
NEUTS SEG NFR BLD: 87 % (ref 32–75)
NRBC # BLD: 0 K/UL (ref 0–0.01)
NRBC BLD-RTO: 0 PER 100 WBC
PLATELET # BLD AUTO: 198 K/UL (ref 150–400)
PMV BLD AUTO: 11.8 FL (ref 8.9–12.9)
POTASSIUM SERPL-SCNC: 4.6 MMOL/L (ref 3.5–5.1)
RBC # BLD AUTO: 4.35 M/UL (ref 3.8–5.2)
SODIUM SERPL-SCNC: 140 MMOL/L (ref 136–145)
WBC # BLD AUTO: 10.7 K/UL (ref 3.6–11)

## 2021-02-11 PROCEDURE — 97530 THERAPEUTIC ACTIVITIES: CPT

## 2021-02-11 PROCEDURE — 74011250637 HC RX REV CODE- 250/637: Performed by: ORTHOPAEDIC SURGERY

## 2021-02-11 PROCEDURE — 74011000250 HC RX REV CODE- 250: Performed by: ORTHOPAEDIC SURGERY

## 2021-02-11 PROCEDURE — 74011250636 HC RX REV CODE- 250/636: Performed by: ORTHOPAEDIC SURGERY

## 2021-02-11 PROCEDURE — 80048 BASIC METABOLIC PNL TOTAL CA: CPT

## 2021-02-11 PROCEDURE — 97161 PT EVAL LOW COMPLEX 20 MIN: CPT

## 2021-02-11 PROCEDURE — 36415 COLL VENOUS BLD VENIPUNCTURE: CPT

## 2021-02-11 PROCEDURE — 65270000029 HC RM PRIVATE

## 2021-02-11 PROCEDURE — 51798 US URINE CAPACITY MEASURE: CPT

## 2021-02-11 PROCEDURE — 85025 COMPLETE CBC W/AUTO DIFF WBC: CPT

## 2021-02-11 PROCEDURE — 97535 SELF CARE MNGMENT TRAINING: CPT

## 2021-02-11 PROCEDURE — 97167 OT EVAL HIGH COMPLEX 60 MIN: CPT

## 2021-02-11 RX ADMIN — CEFAZOLIN 2 G: 1 INJECTION, POWDER, FOR SOLUTION INTRAMUSCULAR; INTRAVENOUS at 11:43

## 2021-02-11 RX ADMIN — CEFAZOLIN 2 G: 1 INJECTION, POWDER, FOR SOLUTION INTRAMUSCULAR; INTRAVENOUS at 02:44

## 2021-02-11 RX ADMIN — LOSARTAN POTASSIUM 50 MG: 50 TABLET, FILM COATED ORAL at 09:15

## 2021-02-11 RX ADMIN — ACETAMINOPHEN 650 MG: 325 TABLET ORAL at 14:37

## 2021-02-11 RX ADMIN — MEMANTINE 10 MG: 5 TABLET ORAL at 09:15

## 2021-02-11 RX ADMIN — POLYETHYLENE GLYCOL 3350 17 G: 17 POWDER, FOR SOLUTION ORAL at 09:16

## 2021-02-11 RX ADMIN — DOCUSATE SODIUM 50 MG AND SENNOSIDES 8.6 MG 1 TABLET: 8.6; 5 TABLET, FILM COATED ORAL at 18:16

## 2021-02-11 RX ADMIN — MEMANTINE 10 MG: 5 TABLET ORAL at 18:16

## 2021-02-11 RX ADMIN — ENOXAPARIN SODIUM 40 MG: 40 INJECTION SUBCUTANEOUS at 09:15

## 2021-02-11 RX ADMIN — Medication 1 TABLET: at 18:16

## 2021-02-11 RX ADMIN — Medication 1 TABLET: at 09:18

## 2021-02-11 RX ADMIN — ACETAMINOPHEN 650 MG: 325 TABLET ORAL at 02:44

## 2021-02-11 RX ADMIN — DOCUSATE SODIUM 50 MG AND SENNOSIDES 8.6 MG 1 TABLET: 8.6; 5 TABLET, FILM COATED ORAL at 09:15

## 2021-02-11 NOTE — PROGRESS NOTES
Problem: Self Care Deficits Care Plan (Adult)  Goal: *Acute Goals and Plan of Care (Insert Text)  Description: FUNCTIONAL STATUS PRIOR TO ADMISSION: Patient was modified independent using a walker for functional mobility. Unsure level of ADLs in Memory Care unit, poor historian this session. HOME SUPPORT: The patient lived at [facility] for 2 years, supportive family. Occupational Therapy Goals  Initiated 2/11/2021     1. Patient will perform lower body dressing using Reacher and other AE PRN at minimal assistance/contact guard assist level within 7 days. 2. Patient will perform toilet transfers at moderate assistance  level using Walkers, Type: Rolling Walker  within 7 days. 3. Patient will perform all aspects of toileting at moderate assistance  level within 7 days. 4. Patient will perform UB ADLs seated EOB with S for 10 minutes within 7 days. Outcome: Progressing Towards Goal   OCCUPATIONAL THERAPY EVALUATION  Patient: Daniella Vanegas (89 y.o. female)  Date: 2/11/2021  Primary Diagnosis: Closed hip fracture ([facility] [S72.009A]  Procedure(s) (LRB):  RIGHT HIP IM  KIRA INSERTION (Right) 1 Day Post-Op   Precautions: WBAT       ASSESSMENT  Based on the objective data described below, the patient presents with decreased functional mobility and independence in self care s/p admission with GLF at memory care unit with femur fx, currently POD 1 R IM kira insertion, currently max A x2 to total A x2 for bed mobility, total A for all ADLs except for grooming and self feeding. Oriented to self, limited by confusion, pain, fear of falling, decreased alertness with drowsiness this session, recommend SNF at discharge. Current Level of Function Impacting Discharge (ADLs/self-care): total A for all ADLs except setup for self feeding    Functional Outcome Measure:   The patient scored Total: 20/100 on the Barthel Index outcome measure which is indicative of 80% impaired ability to care for basic self needs/dependency on others; inferred 100% dependency on others for instrumental ADLs. Other factors to consider for discharge: from memory care unit     Patient will benefit from skilled therapy intervention to address the above noted impairments. PLAN :  Recommendations and Planned Interventions: self care training, functional mobility training, therapeutic exercise, balance training, therapeutic activities, endurance activities, and patient education    Frequency/Duration: Patient will be followed by occupational therapy 5 times a week to address goals. Recommendation for discharge: (in order for the patient to meet his/her long term goals)  Therapy up to 5 days/week in SNF setting    This discharge recommendation:  Has been made in collaboration with the attending provider and/or case management    IF patient discharges home will need the following DME: demetrius lift with [facility]       SUBJECTIVE:   Patient stated I'm at [facility].     OBJECTIVE DATA SUMMARY:   HISTORY:   Past Medical History:   Diagnosis Date    Dementia ([facility] [address]     Hypercholesteremia 5/27/2010    Hypertension     IBS (irritable bowel syndrome) 5/27/2010    OA (osteoarthritis) 5/27/2010    Osteoporosis 5/27/2010    Pulmonary embolism ([facility] [address]    History reviewed. No pertinent surgical history.     Expanded or extensive additional review of patient history:          Hand dominance: Right    EXAMINATION OF PERFORMANCE DEFICITS:  Cognitive/Behavioral Status:  Neurologic State: Alert  Orientation Level: Oriented to person  Cognition: Follows commands             Skin: intact    Edema: mild R surgical leg    Hearing:       Vision/Perceptual:            Intact to board                         Range of Motion:  B UE intact  AROM: Generally decreased, functional(decreased R>L LE due to surgery)                         Strength:  B UE general weakness  Strength: Generally decreased, functional(decreased R>L LE due to surgery) Coordination:  Coordination: Grossly decreased, non-functional(complicated by drowsiness this date)            Tone & Sensation:  B UE  Tone: Normal                         Balance:  Sitting: Impaired  Sitting - Static: Poor (constant support); Fair (occasional)(poor initial balance, progressed to fair)  Sitting - Dynamic: Poor (constant support)  Standing: Impaired; With support  Standing - Static: Constant support;Poor  Standing - Dynamic : Not tested    Functional Mobility and Transfers for ADLs:  Bed Mobility:  Supine to Sit: Maximum assistance;Assist x2; Additional time;Bed Modified(elevated HOB, cues to keep eyes open)  Sit to Supine: Total assistance;Assist x2  Scooting: Total assistance;Assist x2    Transfers:  Sit to Stand: Assist x2;Adaptive equipment; Moderate assistance;Maximum assistance(elevated bed height, use of RW)  Stand to Sit: Moderate assistance;Assist x2(poor eccentric control)    ADL Assessment:  Feeding: Setup; Additional time    Oral Facial Hygiene/Grooming: Maximum assistance    Bathing: Total assistance    Upper Body Dressing: Total assistance    Lower Body Dressing: Total assistance    Toileting: Total assistance      Completed OT evaluation and ADLs seated EOB and standing as able with high guard for support, fair, intermittent support for balance. A x2 for sit to stand with RW, fear of falling, Educated on safety and endurance training with encouragement for full participation in ADLs while in hospital. Good understanding noted. ADL Intervention and task modifications:     Setup for self feeding once back in bed, extended time required due to flucuating alertness    Grooming  Washing Face: Minimum assistance(Elem, VCs for encouargement)          Lower Body Dressing Assistance  Socks:  Total assistance (dependent)              Therapeutic Exercise:     Functional Measure:  Barthel Index:    Bathin  Bladder: 5  Bowels: 5  Groomin  Dressin  Feedin  Mobility: 0  Stairs: 0  Toilet Use: 0  Transfer (Bed to Chair and Back): 5  Total: 20/100        The Barthel ADL Index: Guidelines  1. The index should be used as a record of what a patient does, not as a record of what a patient could do. 2. The main aim is to establish degree of independence from any help, physical or verbal, however minor and for whatever reason. 3. The need for supervision renders the patient not independent. 4. A patient's performance should be established using the best available evidence. Asking the patient, friends/relatives and nurses are the usual sources, but direct observation and common sense are also important. However direct testing is not needed. 5. Usually the patient's performance over the preceding 24-48 hours is important, but occasionally longer periods will be relevant. 6. Middle categories imply that the patient supplies over 50 per cent of the effort. 7. Use of aids to be independent is allowed. Edgar Salmon., Barthel, D.W. (4856). Functional evaluation: the Barthel Index. [facility] (14)2. Juan Alberto Morganr anshu BRODY WheelerF, Nic Chaconma., Dauna De Motte., [place], [facility] (1999). Measuring the change indisability after inpatient rehabilitation; comparison of the responsiveness of the Barthel Index and Functional Rankin Measure. Journal of Neurology, Neurosurgery, and Psychiatry, 66(4), 890-084. SATURNINO Coelho.GUILHERME, BENJA Colin, & Shilpi Lopez MDOMITILA. (2004.) Assessment of post-stroke quality of life in cost-effectiveness studies: The usefulness of the Barthel Index and the EuroQoL-5D.  Quality of Life Research, 15, 577-36         Occupational Therapy Evaluation Charge Determination   History Examination Decision-Making   HIGH Complexity : Extensive review of history including physical, cognitive and psychosocial history  HIGH Complexity : 5 or more performance deficits relating to physical, cognitive , or psychosocial skils that result in activity limitations and / or participation restrictions HIGH Complexity : Patient presents with comorbidities that affect occupational performance. Signifigant modification of tasks or assistance (eg, physical or verbal) with assessment (s) is necessary to enable patient to complete evaluation       Based on the above components, the patient evaluation is determined to be of the following complexity level: HIGH   Pain Rating:  Unable to verbalize, grimacing with standing task     Activity Tolerance:   Poor and requires rest breaks    After treatment patient left in no apparent distress:    Supine in bed, Call bell within reach, and Bed / chair alarm activated    COMMUNICATION/EDUCATION:   The patients plan of care was discussed with: Physical therapist and Registered nurse. Home safety education was provided and the patient/caregiver indicated understanding., Patient/family have participated as able in goal setting and plan of care. , and Patient/family agree to work toward stated goals and plan of care. This patients plan of care is appropriate for delegation to [facility].     Thank you for this referral.  Manny Mercado, MAINE  Time Calculation: 42 mins

## 2021-02-11 NOTE — OP NOTES
[address]  OPERATIVE REPORT    Name:  Claudia Manzano  MR#:  [id number]  :  10/14/1929  ACCOUNT #:  [de-identified]  DATE OF SERVICE:  02/10/2021    PREOPERATIVE DIAGNOSIS:  Right intertrochanteric femur fracture. POSTOPERATIVE DIAGNOSIS:  Right intertrochanteric femur fracture. PROCEDURE PERFORMED:  Intramedullary cyndy fixation of right intertrochanteric femur fracture. SURGEON:  Bárbara Hidalgo MD    ASSISTANT:  none. ANESTHESIA:  General.    COMPLICATIONS:  None. SPECIMENS REMOVED:  none. IMPLANTS:  Biomet 11 x 17 mm cyndy. ESTIMATED BLOOD LOSS:  Minimal.    DISPOSITION:  The patient was taken to the recovery room in stable condition. OPERATIVE INDICATIONS:  The patient is a 51-year-old female who presented to [facility] after a fall. She was found to have a right intertrochanteric femur fracture. She was admitted to the hospitalist service and cleared for surgery. The patient has dementia. I did speak to her daughter on the phone. She wished to proceed with surgery. I recommended operative fixation for her hip fracture. Informed consent was obtained including all risks and benefits and they wished to proceed. PROCEDURE:  The patient was identified in the preoperative holding area. Right lower extremity was marked to the patient. All preoperative questions were answered. She was seen by the Anesthesia Department in the operating room, transferred to the operating table in supine position. Once appropriate anesthesia was obtained, she was placed on a fracture table. The well left leg was placed in the well-leg tolbert. The right leg was in the fracture table. Fluoroscopy was brought in. Images were taken and appropriate reduction of her intertrochanteric fracture was obtained using fluoroscopy as a guide. The right hip was prepped and draped in the usual sterile fashion. Time-out was conducted indicating correct operative site.   The patient received IV Ancef and cephalosporin antibiotics prior to incision being made. Next, incision was made just proximal to the greater trochanter, dissected down through the soft tissue to the greater trochanter. A guidewire for a Biomet cyndy was inserted in appropriate position using fluoroscopy as a guide and inserted into the femur. The starter reamer was used. A 11 x 17 mm cyndy was placed into the femur using the jig as a guide. A separate incision was made for a hip screw. Cannulated system was used. A cannulated guidewire was placed across the femoral head and neck in appropriate position using fluoroscopy. This was measured as a 95-mm screw which was drilled, and then a 95-mm screw was placed. This was compressed through the jig at the fracture site. Next, a distal lag screw was placed through the jig through a separate incision. Once that distal lag screw was in place, the jig was removed. Final images were taken in all planes showing appropriate alignment of intramedullary cyndy fixation of an intertrochanteric femur fracture. Incisions were irrigated with saline, closed with Vicryl sutures and skin staples. Sterile dressings were placed. The patient was awoken and taken to the recovery room in stable condition.         MD SULEMA [id number]  D:  02/10/2021 19:19  T:  02/10/2021 22:02  JOB #:  [id number]

## 2021-02-11 NOTE — PROGRESS NOTES
CM reviewed Cm notes. CM sent a referral to [facility] via [employer].  [facility], [facility]-

## 2021-02-11 NOTE — BRIEF OP NOTE
Brief Postoperative Note    Patient: Zoraida Chase  YOB: 1929  MRN: [id number]    Date of Procedure: 2/10/2021     Pre-Op Diagnosis: RIGHT HIP FRACTURE    Post-Op Diagnosis: Same as preoperative diagnosis. Procedure(s):  RIGHT HIP IM  KIRA INSERTION    Surgeon(s):  Delfino Keyes MD    Surgical Assistant: None    Anesthesia: General     Estimated Blood Loss (mL): Minimal    Complications: None    Specimens: * No specimens in log *     Implants:   Implant Name Type Inv.  Item Serial No.  Lot No. LRB No. Used Action   ptn peritrochanteric nail   NA  [id number] Right 1 Implanted   SCREW BONE L95MM [id number] CANC PERITROCHANTERIC HIP TI LAG - SNA  SCREW BONE L95MM [id number] CANC PERITROCHANTERIC HIP TI LAG NA CONI BIOMET TRAUMA_WD [id number] Right 1 Implanted   SCREW BNE L32MM DIA5MM TIB TI DBL LD FOR [place] NAIL SYS - SNA  SCREW BNE L32MM DIA5MM TIB TI DBL LD FOR [place] NAIL SYS NA CONI BIOMET TRAUMA_WD [id number] Right 1 Implanted       Drains:   External Female Catheter 02/09/21 (Active)   Site Assessment Clean, dry, & intact 02/10/21 0650   Repositioned Yes 02/10/21 0650   Perineal Care Yes 02/10/21 0650   Wick Changed Yes 02/10/21 0650   Suction Canister/Tubing Changed Yes 02/10/21 0650       Findings: as above    Electronically Signed by [place] MD on 2/10/2021 at 7:26 PM

## 2021-02-11 NOTE — PROGRESS NOTES
NATALIE- D/c to [facility]    CM received a message from [facility] and they can accept this pt when she is ready for d/c. CM spoke with Gregorio Love, and hs said that pt will not be medically ready for d/c tomorrow. Will follow.  Dominique Higuera

## 2021-02-11 NOTE — ROUTINE PROCESS
Bedside shift change report given to Jessica (oncoming nurse) by Angus Jarquin (offgoing nurse). Report included the following information SBAR.

## 2021-02-11 NOTE — PROGRESS NOTES
Hospitalist Progress Note          Reba Muñoz NP  Please call  and page for questions. Call physician on-call through the  7pm-7am    Daily Progress Note: 2021    Primary care provider:Sarabjit Arguello MD    Date of admission: 2021  5:36 PM    Admission Summary and Hospital Course:      From H&P 2021:   \"80 y.o lady with dementia, HTN, hyperlipidemia, who presents after a ground-level fall. She is a poor historian and doesn't recall what happened exactly. She reports right hip pain with any movement of her right leg. She offers no other complaints and is pain-free if she lays still. \"    Subjective:     Pt seen today in bed with daughter at bedside. She is alert, oriented to self and situation. Having pain to hip area. Other than the right hip pain, she denies complaints. Assessment/Plan:     Closed right femoral intertrochanteric fracture  -pain control  -bed rest  -PT/OT when able  -lovenox for now - switch back to [facility] on dc?  -for OR this evening     History of PE:  -hold Eliquis as above  -on lovenox     Dementia: without behavioral disturbances:  -Stable, continue memantine  -supportive care     HTN:   -Stable, continue home losartan     Hyperlipidemia  - Not on home meds      DVTppx: SCDs, lovenox  Gippx: NA  Code Status: DNR  Diet: NPO for surg  Activity: OOB to chair TID and PRN  Discharge: TBD; pending progress.  Likely >48h         Review of Systems:     Full ROS complete with pertinent positives and negatives as per HPI, otherwise negative  Objective:   Physical Exam:     Visit Vitals  /85 (BP Patient Position: At rest)   Pulse 99   Temp 98 °F (36.7 °C)   Resp 15   Ht 5' 2\" (1.575 m)   Wt 68.1 kg (150 lb 2.1 oz)   SpO2 95%   BMI 27.46 kg/m²    O2 Flow Rate (L/min): 1 l/min O2 Device: Room air    Temp (24hrs), Av °F (36.7 °C), Min:97.6 °F (36.4 °C), Max:98.4 °F (36.9 °C)    02/10 1901 -  0700  In: 200 [I.V.:200]  Out: 50    02/09 0701 - 02/10 1900  In: 600 [I.V.:600]  Out: 300 [Urine:300]      General:  Alert, cooperative, no distress, appears stated age. Lungs:   Clear to auscultation bilaterally. Heart:  Regular rate and rhythm, S1, S2 normal, no murmur, click, rub or gallop. Abdomen:   Soft, non-tender, non-distended. Bowel sounds normal.    Extremities: Extremities normal, no cyanosis or edema. RLE externally rotated and slightly shorter than left   Skin: Skin color, texture, turgor normal. No rashes or lesions   Neurologic: CNII-XII grossly intact. Oriented to self only; ANAYA     Data Review:       Recent Days:  Recent Labs     02/09/21  1807   WBC 6.8   HGB 15.5   HCT 48.0*        Recent Labs     02/10/21  1034 02/09/21  1807   NA  --  140   K  --  4.6   CL  --  107   CO2  --  25   GLU  --  128*   BUN  --  27*   CREA  --  1.10*   CA  --  9.8   ALB  --  3.3*   ALT  --  22   INR 1.1  --      No results for input(s): PH, PCO2, PO2, HCO3, FIO2 in the last 72 hours.     24 Hour Results:  Recent Results (from the past 24 hour(s))   TYPE & SCREEN    Collection Time: 02/10/21  6:18 AM   Result Value Ref Range    Crossmatch Expiration 02/13/2021,2359     ABO/Rh(D) Ollis Hosteller POSITIVE     Antibody screen NEG    PROTHROMBIN TIME + INR    Collection Time: 02/10/21 10:34 AM   Result Value Ref Range    INR 1.1 0.9 - 1.1      Prothrombin time 11.0 9.0 - 11.1 sec   EKG, 12 LEAD, INITIAL    Collection Time: 02/10/21 10:53 AM   Result Value Ref Range    Ventricular Rate 96 BPM    Atrial Rate 96 BPM    P-R Interval 170 ms    QRS Duration 134 ms    Q-T Interval 406 ms    QTC Calculation (Bezet) 512 ms    Calculated P Axis 60 degrees    Calculated R Axis -34 degrees    Calculated T Axis 120 degrees    Diagnosis       Normal sinus rhythm  Left axis deviation  Left ventricular hypertrophy with QRS widening and repolarization abnormality  Abnormal ECG  When compared with ECG of 15-DEC-2018 13:25,  Right bundle branch block is no longer present  Criteria for Anterior infarct are no longer present  Criteria for Anterolateral infarct are no longer present  Confirmed by Christel Jasmine MD, Wilfrid Gottlieb ([id number]) on 2/10/2021 2:18:20 PM         Problem List:  Problem List as of 2/11/2021 Date Reviewed: 12/21/2018          Codes Class Noted - Resolved    Closed hip fracture ([facility] ICD-10-CM: J87.915X  ICD-9-CM: 820.8  2/9/2021 - Present        Fall ICD-10-CM: W19. Josué Mena  ICD-9-CM: E888.9  8/23/2018 - Present        Unwitnessed fall ICD-10-CM: R29.6  ICD-9-CM: Kayla Hard  8/22/2018 - Present        Hypercholesteremia ICD-10-CM: E78.00  ICD-9-CM: 272.0  5/27/2010 - Present    Overview Signed 5/27/2010 12:56 PM by Aftab Herron's             Osteoporosis ICD-10-CM: M81.0  ICD-9-CM: 733.00  5/27/2010 - Present        IBS (irritable bowel syndrome) ICD-10-CM: K58.9  ICD-9-CM: 564.1  5/27/2010 - Present        OA (osteoarthritis) ICD-10-CM: M19.90  ICD-9-CM: 715.90  5/27/2010 - Present        RESOLVED: Syncope ICD-10-CM: R55  ICD-9-CM: 780.2  12/15/2018 - 12/21/2018        RESOLVED: UTI (urinary tract infection) ICD-10-CM: N39.0  ICD-9-CM: 599.0  12/15/2018 - 12/21/2018        RESOLVED: KOLBY (acute kidney injury) ([facility] ICD-10-CM: N17.9  ICD-9-CM: 584.9  12/15/2018 - 12/21/2018        RESOLVED: Hypoxia ICD-10-CM: R09.02  ICD-9-CM: 799.02  8/22/2018 - 12/21/2018              Medications reviewed  Current Facility-Administered Medications   Medication Dose Route Frequency    0.9% sodium chloride infusion  75 mL/hr IntraVENous CONTINUOUS    0.9% sodium chloride infusion  125 mL/hr IntraVENous CONTINUOUS    sodium chloride (NS) flush 5-40 mL  5-40 mL IntraVENous Q8H    sodium chloride (NS) flush 5-40 mL  5-40 mL IntraVENous PRN    naloxone (NARCAN) injection 0.4 mg  0.4 mg IntraVENous PRN    calcium-vitamin D (OS-LUCÍA +D3) 500 mg-200 unit per tablet 1 Tab  1 Tab Oral TID WITH MEALS    senna-docusate (PERICOLACE) 8.6-50 mg per tablet 1 Tab  1 Tab Oral BID    polyethylene glycol (MIRALAX) packet 17 g  17 g Oral DAILY    [START ON 2/12/2021] bisacodyL (DULCOLAX) suppository 10 mg  10 mg Rectal DAILY PRN    ondansetron (ZOFRAN) injection 4 mg  4 mg IntraVENous Q4H PRN    ceFAZolin (ANCEF) 2 g in sterile water (preservative free) 20 mL IV syringe  2 g IntraVENous Q8H    enoxaparin (LOVENOX) injection 40 mg  40 mg SubCUTAneous DAILY    sodium chloride (NS) flush 5-40 mL  5-40 mL IntraVENous Q8H    sodium chloride (NS) flush 5-40 mL  5-40 mL IntraVENous PRN    acetaminophen (TYLENOL) tablet 650 mg  650 mg Oral Q6H PRN    Or    acetaminophen (TYLENOL) suppository 650 mg  650 mg Rectal Q6H PRN    polyethylene glycol (MIRALAX) packet 17 g  17 g Oral DAILY PRN    promethazine (PHENERGAN) tablet 12.5 mg  12.5 mg Oral Q6H PRN    Or    ondansetron (ZOFRAN) injection 4 mg  4 mg IntraVENous Q6H PRN    memantine (NAMENDA) tablet 10 mg  10 mg Oral BID    losartan (COZAAR) tablet 50 mg  50 mg Oral DAILY    traMADoL (ULTRAM) tablet 50 mg  50 mg Oral Q6H PRN    fentaNYL citrate (PF) injection 25 mcg  25 mcg IntraVENous Q4H PRN       Care Plan discussed with: Patient/family, nurse      Taryn Campos, NP  Hospitalist  Providers can reach me on [employer]

## 2021-02-11 NOTE — PROGRESS NOTES
Bedside and Verbal shift change report given to Jacki Martinez (oncoming nurse) by Alyssa Ruiz RN (offgoing nurse). Report included the following information SBAR, Kardex and MAR.

## 2021-02-11 NOTE — PERIOP NOTES
TRANSFER - OUT REPORT:    Verbal report given to RenettaRNnamlakshmi) on Ramandeep Oconnor  being transferred to 575(unit) for routine post - op       Report consisted of patients Situation, Background, Assessment and   Recommendations(SBAR). Time Pre op antibiotic given:18:30 pm Ancef  Anesthesia Stop time: 19:35  Carey Present on Transfer to floor:no  Order for Carey on Chart:no  Discharge Prescriptions with Chart:nio    Information from the following report(s) SBAR, Kardex, OR Summary, Procedure Summary, Intake/Output, MAR, Recent Results, Med Rec Status, Cardiac Rhythm SR BBB and Alarm Parameters  was reviewed with the receiving nurse. Opportunity for questions and clarification was provided. Is the patient on 02? YES       L/Min 1       Other     Is the patient on a monitor? NO    Is the nurse transporting with the patient? NO    Surgical Waiting Area notified of patient's transfer from PACU? YES      The following personal items collected during your admission accompanied patient upon transfer:   Dental Appliance: Dental Appliances: None  Vision:    Hearing Aid:    Jewelry: Jewelry: None  Clothing: Clothing: None(no belongings to preop)  Other Valuables:  Other Valuables: None  Valuables sent to safe:

## 2021-02-11 NOTE — PROGRESS NOTES
Physical Therapy    Orders acknowledged, chart reviewed. Attempted PT evaluation, however pt unable to keep eyes open, unable to actively participate at this time; vitals stable. Will follow up later today, RN informed.     Dong Espinal, PT, MPT

## 2021-02-11 NOTE — PROGRESS NOTES
Problem: Mobility Impaired (Adult and Pediatric)  Goal: *Acute Goals and Plan of Care (Insert Text)  Description: FUNCTIONAL STATUS PRIOR TO ADMISSION: Pt unable to provide history. Per chart review, pt used RW in memory care unit    [facility]: The patient lived in memory care unit    Physical Therapy Goals  Initiated 2/11/2021  1. Patient will move from supine to sit and sit to supine  in bed with moderate assistance  within 7 day(s). 2.  Patient will transfer from bed to chair and chair to bed with moderate assistance  using the least restrictive device within 7 day(s). 3.  Patient will perform sit to stand with moderate assistance  within 7 day(s). 4.  Patient will ambulate with minimal assistance/contact guard assist for 10 feet with the least restrictive device within 7 day(s). Outcome: Not Met   PHYSICAL THERAPY EVALUATION  Patient: Zoraida Chase (85 y.o. female)  Date: 2/11/2021  Primary Diagnosis: Closed hip fracture ([facility] [address] [S72.009A]  Procedure(s) (LRB):  RIGHT HIP IM  KIRA INSERTION (Right) 1 Day Post-Op   Precautions: fall, WBAT        ASSESSMENT  Based on the objective data described below, the patient presents with drowsiness, impaired cognition (dementia), post-op pain/ weakness R LE, impaired sitting/ standing balance, decreased function with mobility s/p fall with IT fx and IM nail R hip POD#1. Pt alert at beginning of session, but quickly began falling asleep and required repeated verbal/ tactile cues to keep eyes open. Pt required 2 person assist with bed mob and sit to stand from elevated EOB to RW with 2 person assist for lift/balance; brief standing trial only due to poor balance and fatigue. Pt demo pain behavior with all movement of R LE and fear of falling with transitions. Vitals stable throughout session. Pt will benefit from con't PT for mobility progression as tolerated.     Current Level of Function Impacting Discharge (mobility/balance): bed mob max/ total A x 2, static sit mod A to SBA, sit to stand to RW mod/ max A x 2    Other factors to consider for discharge: from memory care unit, h/o dementia     Patient will benefit from skilled therapy intervention to address the above noted impairments. PLAN :  Recommendations and Planned Interventions: bed mobility training, transfer training, gait training, therapeutic exercises, patient and family training/education and therapeutic activities      Frequency/Duration: Patient will be followed by physical therapy:  twice daily to address goals. Recommendation for discharge: (in order for the patient to meet his/her long term goals)  Therapy up to 5 days/week in SNF setting    This discharge recommendation:  Has not yet been discussed the attending provider and/or case management    IF patient discharges home will need the following DME: to be determined (TBD)         SUBJECTIVE:   Patient oriented to first name only    OBJECTIVE DATA SUMMARY:   HISTORY:    Past Medical History:   Diagnosis Date    Dementia ([facility] [address]     Hypercholesteremia 5/27/2010    Hypertension     IBS (irritable bowel syndrome) 5/27/2010    OA (osteoarthritis) 5/27/2010    Osteoporosis 5/27/2010    Pulmonary embolism ([facility] [address]    History reviewed. No pertinent surgical history.     Personal factors and/or comorbidities impacting plan of care: dementia         EXAMINATION/PRESENTATION/DECISION MAKING:   Critical Behavior:  Neurologic State: Drowsy  Orientation Level: Disoriented to place  Cognition: Follows commands          Skin:  Dressing in place R hip    Range Of Motion:  AROM: Generally decreased, functional(decreased R>L LE due to surgery)                       Strength:    Strength: Generally decreased, functional(decreased R>L LE due to surgery)                    Tone & Sensation:   Tone: Normal                              Coordination:  Coordination: Grossly decreased, non-functional(complicated by drowsiness this date)  Vision:      Functional Mobility:  Bed Mobility:     Supine to Sit: Maximum assistance;Assist x2; Additional time;Bed Modified(elevated HOB, cues to keep eyes open)  Sit to Supine: Total assistance;Assist x2  Scooting: Total assistance;Assist x2  Transfers:  Sit to Stand: Assist x2;Adaptive equipment; Moderate assistance;Maximum assistance(elevated bed height, use of RW)  Stand to Sit: Moderate assistance;Assist x2(poor eccentric control)                       Balance:   Sitting: Impaired  Sitting - Static: Poor (constant support); Fair (occasional)(poor initial balance, progressed to fair)  Sitting - Dynamic: Poor (constant support)  Standing: Impaired; With support  Standing - Static: Constant support;Poor  Standing - Dynamic : Not tested                 Right Side Weight Bearing: As tolerated               Physical Therapy Evaluation Charge Determination   History Examination Presentation Decision-Making   MEDIUM  Complexity : 1-2 comorbidities / personal factors will impact the outcome/ POC  MEDIUM Complexity : 3 Standardized tests and measures addressing body structure, function, activity limitation and / or participation in recreation  LOW Complexity : Stable, uncomplicated  LOW Complexity : FOTO score of       Based on the above components, the patient evaluation is determined to be of the following complexity level: LOW     Pain Rating:  Pt demo pain behavior with all movement of R LE    Activity Tolerance:   Fair, Poor and requires rest breaks    After treatment patient left in no apparent distress:   Call bell within reach, Side rails x 3 and sitting up in bed    COMMUNICATION/EDUCATION:   The patients plan of care was discussed with: Occupational therapist and Registered nurse. Patient is unable to participate in goal setting and plan of care.     Thank you for this referral.  Anu Mckinney, PT   Time Calculation: 34 mins

## 2021-02-11 NOTE — PROGRESS NOTES
Hospitalist Progress Note         Annette Vasquez MD  Please call  and page for questions. Call physician on-call through the  7pm-7am    Daily Progress Note: 2/11/2021    Primary care provider:Nigel Arguello MD    Date of admission: 2/9/2021  5:36 PM    Admission Summary and Hospital Course:      From H&P 02/09/2021:   \"80 y.o lady with dementia, HTN, hyperlipidemia, who presents after a ground-level fall. She is a poor historian and doesn't recall what happened exactly. She reports right hip pain with any movement of her right leg. She offers no other complaints and is pain-free if she lays still. \"    Subjective:     Patient is lying in bed comfortably, pleasant elderly with underlying dementia. Daughter at the bedside. Discussed discharge plan of care. Daughter looking at [facility] rehab at [facility] where she resides on the [facility] care side.      Assessment/Plan:     Closed right femoral intertrochanteric fracture  -Response and fixation of the right intertrochanteric femoral fracture  -PT and OT, needs rehab  -DVT prophylaxis: Lovenox for now, will transition back to apixaban from tomorrow if hemoglobin stable.     History of PE:  -hold Eliquis as above  -on lovenox     Dementia: without behavioral disturbances:  -Stable, continue memantine  -supportive care     HTN:   -Stable, continue home losartan     Hyperlipidemia  - Not on home meds      DVTppx: SCDs, lovenox  Code Status: DNR  Diet: Regular diet  Activity: OOB to chair TID and PRN  Discharge: SNF rehab  Anticipate discharge> 48 hours, early next week         Review of Systems:     Full ROS complete with pertinent positives and negatives as per HPI, otherwise negative  Objective:   Physical Exam:     Visit Vitals  /71 (BP 1 Location: Left upper arm, BP Patient Position: At rest)   Pulse 89   Temp 97.8 °F (36.6 °C)   Resp 16   Ht 5' 2\" (1.575 m)   Wt 68.1 kg (150 lb 2.1 oz)   SpO2 100%   BMI 27.46 kg/m²    O2 Flow Rate (L/min): 1 l/min O2 Device: Nasal cannula    Temp (24hrs), Av °F (36.7 °C), Min:97.7 °F (36.5 °C), Max:98.4 °F (36.9 °C)    No intake/output data recorded.  1901 -  0700  In: 800 [I.V.:800]  Out: 350 [Urine:300]      General:  Alert, cooperative, no distress, appears stated age. Lungs:   Clear to auscultation bilaterally. Heart:  Regular rate and rhythm, S1, S2 normal, no murmur, click, rub or gallop. Abdomen:   Soft, non-tender, non-distended. Bowel sounds normal.    Extremities: Extremities normal, no cyanosis or edema. Right humeral surgical wound bandaged. Skin: Skin color, texture, turgor normal. No rashes or lesions   Neurologic:  Alert but confused. CNII-XII grossly intact. Oriented to self only; ANAYA     Data Review:       Recent Days:  Recent Labs     21  0300 21  1807   WBC 10.7 6.8   HGB 13.0 15.5   HCT 40.7 48.0*    247     Recent Labs     21  0300 02/10/21  1034 21  1807     --  140   K 4.6  --  4.6   *  --  107   CO2 23  --  25   *  --  128*   BUN 16  --  27*   CREA 1.05*  --  1.10*   CA 8.8  --  9.8   ALB  --   --  3.3*   ALT  --   --  22   INR  --  1.1  --      No results for input(s): PH, PCO2, PO2, HCO3, FIO2 in the last 72 hours. 24 Hour Results:  Recent Results (from the past 24 hour(s))   CBC WITH AUTOMATED DIFF    Collection Time: 21  3:00 AM   Result Value Ref Range    WBC 10.7 3.6 - 11.0 K/uL    RBC 4.35 3.80 - 5.20 M/uL    HGB 13.0 11.5 - 16.0 g/dL    HCT 40.7 35.0 - 47.0 %    MCV 93.6 80.0 - 99.0 FL    MCH 29.9 26.0 - 34.0 PG    MCHC 31.9 30.0 - 36.5 g/dL    RDW 14.1 11.5 - 14.5 %    PLATELET 987 839 - 092 K/uL    MPV 11.8 8.9 - 12.9 FL    NRBC 0.0 0  WBC    ABSOLUTE NRBC 0.00 0.00 - 0.01 K/uL    NEUTROPHILS 87 (H) 32 - 75 %    LYMPHOCYTES 8 (L) 12 - 49 %    MONOCYTES 5 5 - 13 %    EOSINOPHILS 0 0 - 7 %    BASOPHILS 0 0 - 1 %    IMMATURE GRANULOCYTES 0 0.0 - 0.5 %    ABS. NEUTROPHILS 9.3 (H) 1.8 - 8.0 K/UL    ABS. LYMPHOCYTES 0.8 0.8 - 3.5 K/UL    ABS. MONOCYTES 0.5 0.0 - 1.0 K/UL    ABS. EOSINOPHILS 0.0 0.0 - 0.4 K/UL    ABS. BASOPHILS 0.0 0.0 - 0.1 K/UL    ABS. IMM. GRANS. 0.0 0.00 - 0.04 K/UL    DF AUTOMATED     METABOLIC PANEL, BASIC    Collection Time: 02/11/21  3:00 AM   Result Value Ref Range    Sodium 140 136 - 145 mmol/L    Potassium 4.6 3.5 - 5.1 mmol/L    Chloride 111 (H) 97 - 108 mmol/L    CO2 23 21 - 32 mmol/L    Anion gap 6 5 - 15 mmol/L    Glucose 166 (H) 65 - 100 mg/dL    BUN 16 6 - 20 MG/DL    Creatinine 1.05 (H) 0.55 - 1.02 MG/DL    BUN/Creatinine ratio 15 12 - 20      GFR est AA 60 (L) >60 ml/min/1.73m2    GFR est non-AA 49 (L) >60 ml/min/1.73m2    Calcium 8.8 8.5 - 10.1 MG/DL       Problem List:  Problem List as of 2/11/2021 Date Reviewed: 12/21/2018          Codes Class Noted - Resolved    [facility] ICD-10-CM: K71.319N  ICD-9-CM: 820.8  2/9/2021 - Present        Fall ICD-10-CM: W19. Hugo Juarez  ICD-9-CM: E888.9  8/23/2018 - Present        Unwitnessed fall ICD-10-CM: R29.6  ICD-9-CM: Tracy Likes  8/22/2018 - Present        Hypercholesteremia ICD-10-CM: E78.00  ICD-9-CM: 272.0  5/27/2010 - Present    Overview Signed 5/27/2010 12:56 PM by Jeremiah Herron's             Osteoporosis ICD-10-CM: M81.0  ICD-9-CM: 733.00  5/27/2010 - Present        IBS (irritable bowel syndrome) ICD-10-CM: K58.9  ICD-9-CM: 564.1  5/27/2010 - Present        OA (osteoarthritis) ICD-10-CM: M19.90  ICD-9-CM: 715.90  5/27/2010 - Present        RESOLVED: Syncope ICD-10-CM: R55  ICD-9-CM: 780.2  12/15/2018 - 12/21/2018        RESOLVED: UTI (urinary tract infection) ICD-10-CM: N39.0  ICD-9-CM: 599.0  12/15/2018 - 12/21/2018        RESOLVED: KOLBY (acute kidney injury) ([facility] [address] ICD-10-CM: N17.9  ICD-9-CM: 584.9  12/15/2018 - 12/21/2018        RESOLVED: Hypoxia ICD-10-CM: R09.02  ICD-9-CM: 799.02  8/22/2018 - 12/21/2018              Medications reviewed  Current Facility-Administered Medications   Medication Dose Route Frequency    0.9% sodium chloride infusion  125 mL/hr IntraVENous CONTINUOUS    sodium chloride (NS) flush 5-40 mL  5-40 mL IntraVENous Q8H    sodium chloride (NS) flush 5-40 mL  5-40 mL IntraVENous PRN    naloxone (NARCAN) injection 0.4 mg  0.4 mg IntraVENous PRN    calcium-vitamin D (OS-LUCÍA +D3) 500 mg-200 unit per tablet 1 Tab  1 Tab Oral TID WITH MEALS    senna-docusate (PERICOLACE) 8.6-50 mg per tablet 1 Tab  1 Tab Oral BID    polyethylene glycol (MIRALAX) packet 17 g  17 g Oral DAILY    [START ON 2/12/2021] bisacodyL (DULCOLAX) suppository 10 mg  10 mg Rectal DAILY PRN    ondansetron (ZOFRAN) injection 4 mg  4 mg IntraVENous Q4H PRN    enoxaparin (LOVENOX) injection 40 mg  40 mg SubCUTAneous DAILY    sodium chloride (NS) flush 5-40 mL  5-40 mL IntraVENous PRN    acetaminophen (TYLENOL) tablet 650 mg  650 mg Oral Q6H PRN    Or    acetaminophen (TYLENOL) suppository 650 mg  650 mg Rectal Q6H PRN    polyethylene glycol (MIRALAX) packet 17 g  17 g Oral DAILY PRN    promethazine (PHENERGAN) tablet 12.5 mg  12.5 mg Oral Q6H PRN    memantine (NAMENDA) tablet 10 mg  10 mg Oral BID    [Held by provider] losartan (COZAAR) tablet 50 mg  50 mg Oral DAILY    traMADoL (ULTRAM) tablet 50 mg  50 mg Oral Q6H PRN    fentaNYL citrate (PF) injection 25 mcg  25 mcg IntraVENous Q4H PRN       Care Plan discussed with: Patient/family, nurse      Jorge Tello NP  Hospitalist  Providers can reach me on [employer]

## 2021-02-11 NOTE — PROGRESS NOTES
Occupational Therapy    Orders acknowledged, chart reviewed. Attempted OT evaluation and spoke with PT, however pt unable to keep eyes open, unable to actively participate at this time; vitals stable. Will follow up later today, RN present with family member, drowsiness is not normal for patient. Jenny Benavidez.  Rogelio, MS OTR/L

## 2021-02-11 NOTE — ANESTHESIA POSTPROCEDURE EVALUATION
Procedure(s):  RIGHT HIP IM  KIRA INSERTION. general    Anesthesia Post Evaluation        Patient location during evaluation: PACU  Patient participation: complete - patient participated  Level of consciousness: awake and alert  Pain management: adequate  Airway patency: patent  Anesthetic complications: no  Cardiovascular status: acceptable  Respiratory status: acceptable  Hydration status: acceptable  Comments: I have seen and evaluated the patient and is ready for discharge. Mary Ruffin MD    Post anesthesia nausea and vomiting:  none      INITIAL Post-op Vital signs:   Vitals Value Taken Time   /93 02/10/21 1950   Temp 36.8 °C (98.2 °F) 02/10/21 1935   Pulse 83 02/10/21 1956   Resp 19 02/10/21 1956   SpO2 98 % 02/10/21 1956   Vitals shown include unvalidated device data.

## 2021-02-12 LAB
ANION GAP SERPL CALC-SCNC: 6 MMOL/L (ref 5–15)
BASOPHILS # BLD: 0 K/UL (ref 0–0.1)
BASOPHILS NFR BLD: 0 % (ref 0–1)
BUN SERPL-MCNC: 17 MG/DL (ref 6–20)
BUN/CREAT SERPL: 21 (ref 12–20)
CALCIUM SERPL-MCNC: 8.5 MG/DL (ref 8.5–10.1)
CHLORIDE SERPL-SCNC: 113 MMOL/L (ref 97–108)
CO2 SERPL-SCNC: 22 MMOL/L (ref 21–32)
CREAT SERPL-MCNC: 0.81 MG/DL (ref 0.55–1.02)
DIFFERENTIAL METHOD BLD: ABNORMAL
EOSINOPHIL # BLD: 0.2 K/UL (ref 0–0.4)
EOSINOPHIL NFR BLD: 1 % (ref 0–7)
ERYTHROCYTE [DISTWIDTH] IN BLOOD BY AUTOMATED COUNT: 14.2 % (ref 11.5–14.5)
GLUCOSE SERPL-MCNC: 109 MG/DL (ref 65–100)
HCT VFR BLD AUTO: 36.4 % (ref 35–47)
HGB BLD-MCNC: 11.4 G/DL (ref 11.5–16)
IMM GRANULOCYTES # BLD AUTO: 0 K/UL (ref 0–0.04)
IMM GRANULOCYTES NFR BLD AUTO: 0 % (ref 0–0.5)
LYMPHOCYTES # BLD: 1.8 K/UL (ref 0.8–3.5)
LYMPHOCYTES NFR BLD: 16 % (ref 12–49)
MCH RBC QN AUTO: 30 PG (ref 26–34)
MCHC RBC AUTO-ENTMCNC: 31.3 G/DL (ref 30–36.5)
MCV RBC AUTO: 95.8 FL (ref 80–99)
MONOCYTES # BLD: 1.1 K/UL (ref 0–1)
MONOCYTES NFR BLD: 10 % (ref 5–13)
NEUTS SEG # BLD: 8 K/UL (ref 1.8–8)
NEUTS SEG NFR BLD: 73 % (ref 32–75)
NRBC # BLD: 0 K/UL (ref 0–0.01)
NRBC BLD-RTO: 0 PER 100 WBC
PLATELET # BLD AUTO: 190 K/UL (ref 150–400)
PMV BLD AUTO: 11.8 FL (ref 8.9–12.9)
POTASSIUM SERPL-SCNC: 4 MMOL/L (ref 3.5–5.1)
RBC # BLD AUTO: 3.8 M/UL (ref 3.8–5.2)
SODIUM SERPL-SCNC: 141 MMOL/L (ref 136–145)
WBC # BLD AUTO: 11.2 K/UL (ref 3.6–11)

## 2021-02-12 PROCEDURE — 94760 N-INVAS EAR/PLS OXIMETRY 1: CPT

## 2021-02-12 PROCEDURE — 77010033678 HC OXYGEN DAILY

## 2021-02-12 PROCEDURE — 97530 THERAPEUTIC ACTIVITIES: CPT

## 2021-02-12 PROCEDURE — 97535 SELF CARE MNGMENT TRAINING: CPT | Performed by: OCCUPATIONAL THERAPIST

## 2021-02-12 PROCEDURE — 36415 COLL VENOUS BLD VENIPUNCTURE: CPT

## 2021-02-12 PROCEDURE — 74011250636 HC RX REV CODE- 250/636: Performed by: ORTHOPAEDIC SURGERY

## 2021-02-12 PROCEDURE — 74011250637 HC RX REV CODE- 250/637: Performed by: ORTHOPAEDIC SURGERY

## 2021-02-12 PROCEDURE — 80048 BASIC METABOLIC PNL TOTAL CA: CPT

## 2021-02-12 PROCEDURE — 85025 COMPLETE CBC W/AUTO DIFF WBC: CPT

## 2021-02-12 PROCEDURE — 65270000029 HC RM PRIVATE

## 2021-02-12 PROCEDURE — 97110 THERAPEUTIC EXERCISES: CPT

## 2021-02-12 PROCEDURE — 74011250637 HC RX REV CODE- 250/637: Performed by: HOSPITALIST

## 2021-02-12 RX ADMIN — DOCUSATE SODIUM 50 MG AND SENNOSIDES 8.6 MG 1 TABLET: 8.6; 5 TABLET, FILM COATED ORAL at 09:06

## 2021-02-12 RX ADMIN — ACETAMINOPHEN 650 MG: 325 TABLET ORAL at 09:06

## 2021-02-12 RX ADMIN — MEMANTINE 10 MG: 5 TABLET ORAL at 18:08

## 2021-02-12 RX ADMIN — ENOXAPARIN SODIUM 40 MG: 40 INJECTION SUBCUTANEOUS at 09:06

## 2021-02-12 RX ADMIN — Medication 1 TABLET: at 18:08

## 2021-02-12 RX ADMIN — APIXABAN 5 MG: 2.5 TABLET, FILM COATED ORAL at 19:44

## 2021-02-12 RX ADMIN — MEMANTINE 10 MG: 5 TABLET ORAL at 09:06

## 2021-02-12 RX ADMIN — Medication 10 ML: at 14:00

## 2021-02-12 RX ADMIN — POLYETHYLENE GLYCOL 3350 17 G: 17 POWDER, FOR SOLUTION ORAL at 09:06

## 2021-02-12 RX ADMIN — Medication 1 TABLET: at 09:06

## 2021-02-12 RX ADMIN — DOCUSATE SODIUM 50 MG AND SENNOSIDES 8.6 MG 1 TABLET: 8.6; 5 TABLET, FILM COATED ORAL at 18:08

## 2021-02-12 RX ADMIN — ACETAMINOPHEN 650 MG: 325 TABLET ORAL at 19:45

## 2021-02-12 NOTE — PROGRESS NOTES
Problem: Mobility Impaired (Adult and Pediatric)  Goal: *Acute Goals and Plan of Care (Insert Text)  Description: FUNCTIONAL STATUS PRIOR TO ADMISSION: Pt unable to provide history. Per chart review, pt used RW in memory care unit    [address]: The patient lived in memory care unit    Physical Therapy Goals  Initiated 2/11/2021  1. Patient will move from supine to sit and sit to supine  in bed with moderate assistance  within 7 day(s). 2.  Patient will transfer from bed to chair and chair to bed with moderate assistance  using the least restrictive device within 7 day(s). 3.  Patient will perform sit to stand with moderate assistance  within 7 day(s). 4.  Patient will ambulate with minimal assistance/contact guard assist for 10 feet with the least restrictive device within 7 day(s). 2/12/2021 1417 by Griffin, Melony VANEGAS  Outcome: Progressing Towards Goal  PHYSICAL THERAPY TREATMENT  Patient: Monserrat Logan (4 y.o. female)  Date: 2/12/2021  Diagnosis: Closed hip fracture ([facility] [address] [S72.009A] <principal problem not specified>  Procedure(s) (LRB):  RIGHT HIP IM  KIRA INSERTION (Right) 2 Days Post-Op  Precautions: DNR, Fall, Skin, WBAT  Chart, physical therapy assessment, plan of care and goals were reviewed. ASSESSMENT  Patient continues with skilled PT services and is gradually progressing towards goals. Continues to require  Max assist x2 to complete bed mobility this afternoon. She completed sit<>stand x2 reps w/ Max Ax2 w/ Mod-Max Ax2 to remain standing. Pt demonstrates flexed posture at hips and rounded shoulders; unable to come to fully upright position. Requires verbal and tactile cuing (facilitation) for hip extension and scapular retraction. Noted WBing through [address] noted, verbal cues w/ demonstration to widen RABIA for increased support. Pt unable to move LLE laterally.  At this time, it is not safe for pt to transfer OOB>chair w/ NSG, and recommend bed>chair position if pt is to sit upright. Current Level of Function Impacting Discharge (mobility/balance): Max Ax2 for functional transfers    Other factors to consider for discharge:          PLAN :  Patient continues to benefit from skilled intervention to address the above impairments. Continue treatment per established plan of care. to address goals. Recommendation for discharge: (in order for the patient to meet his/her long term goals)  Therapy up to 5 days/week in SNF setting (Memory Care Unit)    This discharge recommendation:  Has been made in collaboration with the attending provider and/or case management    IF patient discharges home will need the following DME: patient owns DME required for discharge (RW)       SUBJECTIVE:   Patient stated It's both.  Pt admits she has fear of falling and that pain play a role in mobility. OBJECTIVE DATA SUMMARY:   Critical Behavior:  Neurologic State: Alert  Orientation Level: Oriented to person, Disoriented to place, Disoriented to situation, Disoriented to time  Cognition: Decreased attention/concentration, Follows commands, Impaired decision making, Memory loss, Poor safety awareness  Safety/Judgement: Decreased awareness of environment, Decreased awareness of need for assistance, Decreased awareness of need for safety, Decreased insight into deficits, Fall prevention  Functional Mobility Training:  Bed Mobility:  Rolling: Maximum assistance; Additional time;Assist x1  Supine to Sit: Maximum assistance;Assist x2  Sit to Supine: Maximum assistance;Assist x2  Scooting: Maximum assistance;Assist x2        Transfers:  Sit to Stand: Maximum assistance;Assist x2  Stand to Sit: Moderate assistance;Assist x2                             Balance:  Sitting: Impaired  Sitting - Static: Fair (occasional)  Sitting - Dynamic: Poor (constant support)  Standing: Impaired; With support  Standing - Static: Constant support;Poor  Standing - Dynamic : Not tested      Therapeutic Exercises (BLE)- AM session:   SUPINE  EXERCISES   Sets   Reps   Active Active Assist   Passive Self ROM   Comments   Ankle Pumps  10 []                                        [x]                                        []                                        []                                           Quad Sets  10 [x]                                        []                                        []                                        []                                           Heel Slides  10 []                                        [x]                                        []                                        []                                           Hip Abduction   []                                        [x]                                        []                                        []                                           Glut Sets   []                                        []                                        []                                        []                                              []                                        []                                        []                                        []                                              []                                        []                                        []                                        []                                             STANDING  EXERCISES   Sets   Reps   Active Active Assist   Passive Self ROM   Comments   Heel Raises   []                                        []                                        []                                        []                                           Hip Abduction   []                                        []                                        []                                        []                                              []                                        []                                        [] []                                              []                                        []                                        []                                        []                                             Pain Rating: Moderate pain w/ activity    Activity Tolerance:   Fair      After treatment patient left in no apparent distress:   Supine in bed, Call bell within reach, and Side rails x 3    COMMUNICATION/COLLABORATION:   The patients plan of care was discussed with: Registered nurse.      Melony Moya PTA   Time Calculation: 21 mins

## 2021-02-12 NOTE — ROUTINE PROCESS
Bedside shift change report given to Jessica (oncoming nurse) by Catalina Bah (offgoing nurse). Report included the following information SBAR.

## 2021-02-12 NOTE — PROGRESS NOTES
Hospitalist Progress Note         Carmen Scott MD  Please call  and page for questions. Call physician on-call through the  7pm-7am    Daily Progress Note: 2021    Primary care provider:Norma Arguello MD    Date of admission: 2021  5:36 PM    Admission Summary and Hospital Course:      From H&P 2021:   \"80 y.o lady with dementia, HTN, hyperlipidemia, who presents after a ground-level fall. She is a poor historian and doesn't recall what happened exactly. She reports right hip pain with any movement of her right leg. She offers no other complaints and is pain-free if she lays still. \"    Subjective:     I have seen patient earlier today during breakfast.  She is confused, she thinks she is at [facility]. No family at bedside. Assessment/Plan:     Closed right femoral intertrochanteric fracture  -Response and fixation of the right intertrochanteric femoral fracture  -PT and OT, needs rehab  -DVT prophylaxis: DC Lovenox, back to home apixaban.     History of PE:  -Resumed apixaban     Dementia: without behavioral disturbances:  -Stable, continue memantine  -supportive care     HTN:   -Stable, continue home losartan     Hyperlipidemia  - Not on home meds      DVTppx: Apixaban. Code Status: DNR  Diet: Regular diet  Activity: OOB to chair TID and PRN  Discharge: SNF rehab  Anticipate discharge, the earliest on Monday.   I was informed by CM that [facility] would not take her before Monday         Review of Systems:     Full ROS complete with pertinent positives and negatives as per HPI, otherwise negative  Objective:   Physical Exam:     Visit Vitals  BP (!) 148/72   Pulse (!) 108   Temp 98.3 °F (36.8 °C)   Resp 18   Ht 5' 2\" (1.575 m)   Wt 68.1 kg (150 lb 2.1 oz)   SpO2 91%   BMI 27.46 kg/m²    O2 Flow Rate (L/min): 2 l/min O2 Device: Nasal cannula    Temp (24hrs), Av.2 °F (36.8 °C), Min:98 °F (36.7 °C), Max:98.3 °F (36.8 °C)    No intake/output data recorded. 02/10 1901 - 02/12 0700  In: 520 [P.O.:320; I.V.:200]  Out: 400 [Urine:350]      General:  Alert, cooperative, no distress, appears stated age. Lungs:   Clear to auscultation bilaterally. Heart:  Regular rate and rhythm, S1, S2 normal, no murmur, click, rub or gallop. Abdomen:   Soft, non-tender, non-distended. Bowel sounds normal.    Extremities: Extremities normal, no cyanosis or edema. Right humeral surgical wound bandaged. Skin: Skin color, texture, turgor normal. No rashes or lesions   Neurologic:  Alert but confused. CNII-XII grossly intact. Oriented to self only; ANAYA     Data Review:       Recent Days:  Recent Labs     02/12/21 0254 02/11/21  0300   WBC 11.2* 10.7   HGB 11.4* 13.0   HCT 36.4 40.7    198     Recent Labs     02/12/21  0254 02/11/21  0300 02/10/21  1034    140  --    K 4.0 4.6  --    * 111*  --    CO2 22 23  --    * 166*  --    BUN 17 16  --    CREA 0.81 1.05*  --    CA 8.5 8.8  --    INR  --   --  1.1     No results for input(s): PH, PCO2, PO2, HCO3, FIO2 in the last 72 hours. 24 Hour Results:  Recent Results (from the past 24 hour(s))   CBC WITH AUTOMATED DIFF    Collection Time: 02/12/21  2:54 AM   Result Value Ref Range    WBC 11.2 (H) 3.6 - 11.0 K/uL    RBC 3.80 3.80 - 5.20 M/uL    HGB 11.4 (L) 11.5 - 16.0 g/dL    HCT 36.4 35.0 - 47.0 %    MCV 95.8 80.0 - 99.0 FL    MCH 30.0 26.0 - 34.0 PG    MCHC 31.3 30.0 - 36.5 g/dL    RDW 14.2 11.5 - 14.5 %    PLATELET 577 491 - 454 K/uL    MPV 11.8 8.9 - 12.9 FL    NRBC 0.0 0  WBC    ABSOLUTE NRBC 0.00 0.00 - 0.01 K/uL    NEUTROPHILS 73 32 - 75 %    LYMPHOCYTES 16 12 - 49 %    MONOCYTES 10 5 - 13 %    EOSINOPHILS 1 0 - 7 %    BASOPHILS 0 0 - 1 %    IMMATURE GRANULOCYTES 0 0.0 - 0.5 %    ABS. NEUTROPHILS 8.0 1.8 - 8.0 K/UL    ABS. LYMPHOCYTES 1.8 0.8 - 3.5 K/UL    ABS. MONOCYTES 1.1 (H) 0.0 - 1.0 K/UL    ABS. EOSINOPHILS 0.2 0.0 - 0.4 K/UL    ABS.  BASOPHILS 0.0 0.0 - 0.1 K/UL ABS. IMM. GRANS. 0.0 0.00 - 0.04 K/UL    DF AUTOMATED     METABOLIC PANEL, BASIC    Collection Time: 02/12/21  2:54 AM   Result Value Ref Range    Sodium 141 136 - 145 mmol/L    Potassium 4.0 3.5 - 5.1 mmol/L    Chloride 113 (H) 97 - 108 mmol/L    CO2 22 21 - 32 mmol/L    Anion gap 6 5 - 15 mmol/L    Glucose 109 (H) 65 - 100 mg/dL    BUN 17 6 - 20 MG/DL    Creatinine 0.81 0.55 - 1.02 MG/DL    BUN/Creatinine ratio 21 (H) 12 - 20      GFR est AA >60 >60 ml/min/1.73m2    GFR est non-AA >60 >60 ml/min/1.73m2    Calcium 8.5 8.5 - 10.1 MG/DL       Problem List:  Problem List as of 2/12/2021 Date Reviewed: 12/21/2018          Codes Class Noted - Resolved    Closed hip fracture ([facility] ICD-10-CM: S38.558G  ICD-9-CM: 820.8  2/9/2021 - Present        Fall ICD-10-CM: W19. Velvet Sour  ICD-9-CM: E888.9  8/23/2018 - Present        Unwitnessed fall ICD-10-CM: R29.6  ICD-9-CM: Bin Ward  8/22/2018 - Present        Hypercholesteremia ICD-10-CM: E78.00  ICD-9-CM: 272.0  5/27/2010 - Present    Overview Signed 5/27/2010 12:56 PM by Calixto Bush     1980's             Osteoporosis ICD-10-CM: M81.0  ICD-9-CM: 733.00  5/27/2010 - Present        IBS (irritable bowel syndrome) ICD-10-CM: K58.9  ICD-9-CM: 564.1  5/27/2010 - Present        OA (osteoarthritis) ICD-10-CM: M19.90  ICD-9-CM: 715.90  5/27/2010 - Present        RESOLVED: Syncope ICD-10-CM: R55  ICD-9-CM: 780.2  12/15/2018 - 12/21/2018        RESOLVED: UTI (urinary tract infection) ICD-10-CM: N39.0  ICD-9-CM: 599.0  12/15/2018 - 12/21/2018        RESOLVED: KOLBY (acute kidney injury) ([facility] ICD-10-CM: N17.9  ICD-9-CM: 584.9  12/15/2018 - 12/21/2018        RESOLVED: Hypoxia ICD-10-CM: R09.02  ICD-9-CM: 799.02  8/22/2018 - 12/21/2018              Medications reviewed  Current Facility-Administered Medications   Medication Dose Route Frequency    apixaban (ELIQUIS) tablet 5 mg  5 mg Oral BID    sodium chloride (NS) flush 5-40 mL  5-40 mL IntraVENous Q8H    sodium chloride (NS) flush 5-40 mL 5-40 mL IntraVENous PRN    naloxone (NARCAN) injection 0.4 mg  0.4 mg IntraVENous PRN    calcium-vitamin D (OS-LUCÍA +D3) 500 mg-200 unit per tablet 1 Tab  1 Tab Oral TID WITH MEALS    senna-docusate (PERICOLACE) 8.6-50 mg per tablet 1 Tab  1 Tab Oral BID    polyethylene glycol (MIRALAX) packet 17 g  17 g Oral DAILY    bisacodyL (DULCOLAX) suppository 10 mg  10 mg Rectal DAILY PRN    sodium chloride (NS) flush 5-40 mL  5-40 mL IntraVENous PRN    acetaminophen (TYLENOL) tablet 650 mg  650 mg Oral Q6H PRN    Or    acetaminophen (TYLENOL) suppository 650 mg  650 mg Rectal Q6H PRN    polyethylene glycol (MIRALAX) packet 17 g  17 g Oral DAILY PRN    promethazine (PHENERGAN) tablet 12.5 mg  12.5 mg Oral Q6H PRN    memantine (NAMENDA) tablet 10 mg  10 mg Oral BID    [Held by provider] losartan (COZAAR) tablet 50 mg  50 mg Oral DAILY    traMADoL (ULTRAM) tablet 50 mg  50 mg Oral Q6H PRN    fentaNYL citrate (PF) injection 25 mcg  25 mcg IntraVENous Q4H PRN       Care Plan discussed with: Patient/family, nurse      Naima Roberson NP  Hospitalist  Providers can reach me on [employer]

## 2021-02-12 NOTE — PROGRESS NOTES
Problem: Self Care Deficits Care Plan (Adult)  Goal: *Acute Goals and Plan of Care (Insert Text)  Description: FUNCTIONAL STATUS PRIOR TO ADMISSION: Patient was modified independent using a walker for functional mobility. Unsure level of ADLs in Memory Care unit, poor historian this session. HOME SUPPORT: The patient lived at [facility] for 2 years, supportive family. Occupational Therapy Goals  Initiated 2/11/2021     1. Patient will perform lower body dressing using Reacher and other AE PRN at minimal assistance/contact guard assist level within 7 days. 2. Patient will perform toilet transfers at moderate assistance  level using Walkers, Type: Rolling Walker  within 7 days. 3. Patient will perform all aspects of toileting at moderate assistance  level within 7 days. 4. Patient will perform UB ADLs seated EOB with S for 10 minutes within 7 days. Outcome: Progressing Towards Goal     OCCUPATIONAL THERAPY TREATMENT  Patient: Monserrat Logan (5 y.o. female)  Date: 2/12/2021  Diagnosis: Closed hip fracture ([facility] [S72.009A] <principal problem not specified>  Procedure(s) (LRB):  RIGHT HIP IM  KIRA INSERTION (Right) 2 Days Post-Op  Precautions: DNR, Fall, Skin, WBAT  Chart, occupational therapy assessment, plan of care, and goals were reviewed. ASSESSMENT  Patient continues with skilled OT services and is progressing slowly towards goals. She continues to requires cognitive and physical assist for all ADLs and functional mobility and is only oriented to self. Continue to recommend SNF at discharge. Current Level of Function Impacting Discharge (ADLs): min A UE ADLs, total A LE ADLs and toileting    Other factors to consider for discharge: see above         PLAN :  Patient continues to benefit from skilled intervention to address the above impairments. Continue treatment per established plan of care. to address goals.     Recommend with staff: up to chair /chair position in bed for all meals    Recommend next OT session: toilet transfers to [facility]    Recommendation for discharge: (in order for the patient to meet his/her long term goals)  Therapy up to 5 days/week in SNF setting    This discharge recommendation:  Has been made in collaboration with the attending provider and/or case management    IF patient discharges home will need the following DME: TBD       SUBJECTIVE:   Patient stated Jackelyn Talley didn't know I broke my hip.     OBJECTIVE DATA SUMMARY:   Cognitive/Behavioral Status:  Neurologic State: Alert  Orientation Level: Oriented to person;Disoriented to place; Disoriented to situation;Disoriented to time  Cognition: Decreased attention/concentration; Follows commands; Impaired decision making;Memory loss;Poor safety awareness  Perception: Appears intact  Perseveration: Perseverates during ADLS  Safety/Judgement: Decreased awareness of environment;Decreased awareness of need for assistance;Decreased awareness of need for safety;Decreased insight into deficits; Fall prevention    Functional Mobility and Transfers for ADLs:  Bed Mobility:  Rolling: Maximum assistance; Additional time;Assist x1      ADL Intervention:  Feeding  Feeding Assistance: Set-up  Container Management: Minimum assistance  Cutting Food: Total assistance (dependent)  Utensil Management: Set-up  Food to Mouth: Modified independent  Drink to Mouth: Modified independent  Cues: Verbal cues provided;Visual cues provided;Physical assistance    Toileting- pt incontinent of urine upon arrival  Toileting Assistance: Total assistance(dependent)  Bladder Hygiene: Total assistance (dependent)  Bowel Hygiene:  Total assistance (dependent)  Clothing Management: Total assistance (dependent)  Cues: Tactile cues provided;Verbal cues provided;Visual cues provided  Adaptive Equipment: (bed rails)    Cognitive Retraining  Orientation Retraining: Reorienting  Safety/Judgement: Decreased awareness of environment;Decreased awareness of need for assistance;Decreased awareness of need for safety;Decreased insight into deficits; Fall prevention      Pain:  Raises voice in pain with mobility, but no number given    Activity Tolerance:   Poor    After treatment patient left in no apparent distress:   Supine in bed and Call bell within reach    COMMUNICATION/COLLABORATION:   The patients plan of care was discussed with: Registered nurse.      Aylin Wall OT  Time Calculation: 17 mins

## 2021-02-12 NOTE — PROGRESS NOTES
Problem: Mobility Impaired (Adult and Pediatric)  Goal: *Acute Goals and Plan of Care (Insert Text)  Description: FUNCTIONAL STATUS PRIOR TO ADMISSION: Pt unable to provide history. Per chart review, pt used RW in memory care unit    [address] [facility]: The patient lived in [facility] care unit    Physical Therapy Goals  Initiated 2/11/2021  1. Patient will move from supine to sit and sit to supine  in bed with moderate assistance  within 7 day(s). 2.  Patient will transfer from bed to chair and chair to bed with moderate assistance  using the least restrictive device within 7 day(s). 3.  Patient will perform sit to stand with moderate assistance  within 7 day(s). 4.  Patient will ambulate with minimal assistance/contact guard assist for 10 feet with the least restrictive device within 7 day(s). Outcome: Progressing Towards Goal  PHYSICAL THERAPY MORNING SESSION NOTE  Patient: Sarah Salazar (46 y.o. female)  Date: 2/12/2021  Primary Diagnosis: Closed hip fracture ([facility] [address] [S72.009A]  Procedure(s) (LRB):  RIGHT HIP IM  KIRA INSERTION (Right) 2 Days Post-Op   Precautions: DNR, Fall, Skin, WBAT    Sarah Salazar was seen for morning PT session. This morning, Mrs. Aaron Mccullough is only tolerating bed level exercises and sitting EOB w/ Max-Total Ax1 (Max Ax2 to return to supine). The limiting factors are pain and deconditioning overall (noted SpO2 87%-low 90's on RA; NC 2L reapplied). Continue to expect Sarah Salazar will need rehab prior to discharging to prior place of residence. The full therapy note will follow after this afternoon's session. Morning session functional mobility:  Bed Mobility:  Rolling: Maximum assistance; Additional time;Assist x1  Supine to Sit: Maximum assistance; Total assistance; Additional time;Assist x1;Bed Modified  Sit to Supine: Maximum assistance;Assist x2       Balance:   Sitting: Impaired  Sitting - Static: Fair (occasional)(offloading R hip d/t pain/discomfort)      Thank you,  Melony VANEGAS Means,PTA   Time Calculation: 45 mins

## 2021-02-13 LAB
ANION GAP SERPL CALC-SCNC: 5 MMOL/L (ref 5–15)
BASOPHILS # BLD: 0 K/UL (ref 0–0.1)
BASOPHILS NFR BLD: 0 % (ref 0–1)
BUN SERPL-MCNC: 14 MG/DL (ref 6–20)
BUN/CREAT SERPL: 18 (ref 12–20)
CALCIUM SERPL-MCNC: 9.2 MG/DL (ref 8.5–10.1)
CHLORIDE SERPL-SCNC: 108 MMOL/L (ref 97–108)
CO2 SERPL-SCNC: 26 MMOL/L (ref 21–32)
CREAT SERPL-MCNC: 0.78 MG/DL (ref 0.55–1.02)
DIFFERENTIAL METHOD BLD: ABNORMAL
EOSINOPHIL # BLD: 0.1 K/UL (ref 0–0.4)
EOSINOPHIL NFR BLD: 1 % (ref 0–7)
ERYTHROCYTE [DISTWIDTH] IN BLOOD BY AUTOMATED COUNT: 14.1 % (ref 11.5–14.5)
GLUCOSE SERPL-MCNC: 115 MG/DL (ref 65–100)
HCT VFR BLD AUTO: 37.2 % (ref 35–47)
HGB BLD-MCNC: 12.1 G/DL (ref 11.5–16)
IMM GRANULOCYTES # BLD AUTO: 0.1 K/UL (ref 0–0.04)
IMM GRANULOCYTES NFR BLD AUTO: 1 % (ref 0–0.5)
LYMPHOCYTES # BLD: 1.7 K/UL (ref 0.8–3.5)
LYMPHOCYTES NFR BLD: 15 % (ref 12–49)
MCH RBC QN AUTO: 29.7 PG (ref 26–34)
MCHC RBC AUTO-ENTMCNC: 32.5 G/DL (ref 30–36.5)
MCV RBC AUTO: 91.4 FL (ref 80–99)
MONOCYTES # BLD: 1 K/UL (ref 0–1)
MONOCYTES NFR BLD: 9 % (ref 5–13)
NEUTS SEG # BLD: 8.4 K/UL (ref 1.8–8)
NEUTS SEG NFR BLD: 74 % (ref 32–75)
NRBC # BLD: 0 K/UL (ref 0–0.01)
NRBC BLD-RTO: 0 PER 100 WBC
PLATELET # BLD AUTO: 227 K/UL (ref 150–400)
PMV BLD AUTO: 11.8 FL (ref 8.9–12.9)
POTASSIUM SERPL-SCNC: 3.8 MMOL/L (ref 3.5–5.1)
RBC # BLD AUTO: 4.07 M/UL (ref 3.8–5.2)
SODIUM SERPL-SCNC: 139 MMOL/L (ref 136–145)
WBC # BLD AUTO: 11.3 K/UL (ref 3.6–11)

## 2021-02-13 PROCEDURE — 74011000258 HC RX REV CODE- 258: Performed by: HOSPITALIST

## 2021-02-13 PROCEDURE — 85025 COMPLETE CBC W/AUTO DIFF WBC: CPT

## 2021-02-13 PROCEDURE — 97530 THERAPEUTIC ACTIVITIES: CPT

## 2021-02-13 PROCEDURE — 74011250637 HC RX REV CODE- 250/637: Performed by: ORTHOPAEDIC SURGERY

## 2021-02-13 PROCEDURE — 74011250636 HC RX REV CODE- 250/636: Performed by: HOSPITALIST

## 2021-02-13 PROCEDURE — 65270000029 HC RM PRIVATE

## 2021-02-13 PROCEDURE — 74011250637 HC RX REV CODE- 250/637: Performed by: HOSPITALIST

## 2021-02-13 PROCEDURE — 36415 COLL VENOUS BLD VENIPUNCTURE: CPT

## 2021-02-13 PROCEDURE — 80048 BASIC METABOLIC PNL TOTAL CA: CPT

## 2021-02-13 RX ADMIN — MEMANTINE 10 MG: 5 TABLET ORAL at 09:21

## 2021-02-13 RX ADMIN — POLYETHYLENE GLYCOL 3350 17 G: 17 POWDER, FOR SOLUTION ORAL at 09:21

## 2021-02-13 RX ADMIN — DOCUSATE SODIUM 50 MG AND SENNOSIDES 8.6 MG 1 TABLET: 8.6; 5 TABLET, FILM COATED ORAL at 09:21

## 2021-02-13 RX ADMIN — ACETAMINOPHEN 650 MG: 325 TABLET ORAL at 17:19

## 2021-02-13 RX ADMIN — Medication 1 TABLET: at 09:21

## 2021-02-13 RX ADMIN — Medication 1 TABLET: at 17:19

## 2021-02-13 RX ADMIN — Medication 9 ML: at 14:00

## 2021-02-13 RX ADMIN — APIXABAN 5 MG: 2.5 TABLET, FILM COATED ORAL at 17:19

## 2021-02-13 RX ADMIN — ACETAMINOPHEN 650 MG: 325 TABLET ORAL at 09:21

## 2021-02-13 RX ADMIN — APIXABAN 5 MG: 2.5 TABLET, FILM COATED ORAL at 09:21

## 2021-02-13 RX ADMIN — Medication 1 TABLET: at 14:06

## 2021-02-13 RX ADMIN — CEFTRIAXONE SODIUM 1 G: 1 INJECTION, POWDER, FOR SOLUTION INTRAMUSCULAR; INTRAVENOUS at 07:46

## 2021-02-13 RX ADMIN — MEMANTINE 10 MG: 5 TABLET ORAL at 17:19

## 2021-02-13 RX ADMIN — DOCUSATE SODIUM 50 MG AND SENNOSIDES 8.6 MG 1 TABLET: 8.6; 5 TABLET, FILM COATED ORAL at 17:19

## 2021-02-13 NOTE — PROGRESS NOTES
Problem: Mobility Impaired (Adult and Pediatric)  Goal: *Acute Goals and Plan of Care (Insert Text)  Description: FUNCTIONAL STATUS PRIOR TO ADMISSION: Pt unable to provide history. Per chart review, pt used RW in memory care unit    [facility]: The patient lived in memory care unit    Physical Therapy Goals  Initiated 2/11/2021  1. Patient will move from supine to sit and sit to supine  in bed with moderate assistance  within 7 day(s). 2.  Patient will transfer from bed to chair and chair to bed with moderate assistance  using the least restrictive device within 7 day(s). 3.  Patient will perform sit to stand with moderate assistance  within 7 day(s). 4.  Patient will ambulate with minimal assistance/contact guard assist for 10 feet with the least restrictive device within 7 day(s). Outcome: Progressing Towards Goal   PHYSICAL THERAPY TREATMENT  Patient: Edvin Riggs (22 y.o. female)  Date: 2/13/2021  Diagnosis: Closed hip fracture ([facility] [address] [S72.009A] <principal problem not specified>  Procedure(s) (LRB):  RIGHT HIP IM  KIRA INSERTION (Right) 3 Days Post-Op  Precautions: DNR, Fall, Skin, WBAT  Chart, physical therapy assessment, plan of care and goals were reviewed. ASSESSMENT  Patient continues with skilled PT services and is progressing towards goals. Pt received supine in bed with family and willing to work with therapy. Pt displays increased disorientation this session, family member stated that this is not her normal, she has trouble remembering but does not normally act this way. Pt was able to tolerate bed mobility acknowledging pain in her leg but no understanding of why. Pt continued to attempt to take wrap protecting the IV line in her wrist. Pt displays a posterior lean when sitting EOB but able to correct with VC/TC at times. Pt was able to attempt two STS from the bedside needing Mod A x2 displaying post lean and lacking bilateral knee and hip ext.  Pt completed session supine in bed with HOB elevated to comfort with call bell within reach and all needs met at the time. Rn notified of intial contact of 02 at 88% on RA, Pt given nasal candula and 1L with improvements up to 93% (Pt attempting to take off during session as well)     Current Level of Function Impacting Discharge (mobility/balance): Max A for all bed mobililty, STS Mod A x2 (attempt)    Other factors to consider for discharge: disorientation, pain management, safety awarness         PLAN :  Patient continues to benefit from skilled intervention to address the above impairments. Continue treatment per established plan of care. to address goals. Recommendation for discharge: (in order for the patient to meet his/her long term goals)  Therapy up to 5 days/week in SNF setting    This discharge recommendation:  Has not yet been discussed the attending provider and/or case management    IF patient discharges home will need the following DME: to be determined (TBD)       SUBJECTIVE:   Patient stated I have to take this clip off.     OBJECTIVE DATA SUMMARY:   Critical Behavior:  Neurologic State: Confused, Alert  Orientation Level: Disoriented X4  Cognition: Decreased attention/concentration, Decreased command following  Safety/Judgement: Decreased awareness of environment, Decreased awareness of need for assistance, Decreased awareness of need for safety, Decreased insight into deficits, Fall prevention  Functional Mobility Training:  Bed Mobility:  Supine to Sit: Maximum assistance  Sit to Supine: Maximum assistance  Scooting: Maximum assistance     Transfers:  Sit to Stand: Moderate assistance;Assist x2  Stand to Sit: Moderate assistance;Assist x2     Balance:  Sitting: Impaired  Sitting - Static: Fair (occasional)  Sitting - Dynamic: Poor (constant support)  Standing: Impaired; With support  Standing - Static: Constant support;Poor  Standing - Dynamic : Not tested      Pain Rating:  Visible pain- disoriented     Activity Tolerance:   Fair      After treatment patient left in no apparent distress:   Supine in bed and Call bell within reach    COMMUNICATION/COLLABORATION:   The patients plan of care was discussed with: Registered nurse.      Alpesh Patton PTA   Time Calculation: 31 mins

## 2021-02-13 NOTE — PROGRESS NOTES
Hospitalist Progress Note         Debra Sebastian MD  Please call  and page for questions. Call physician on-call through the  7pm-7am    Daily Progress Note: 2/13/2021    Primary care provider:Daniel Arguello MD    Date of admission: 2/9/2021  5:36 PM    Admission Summary and Hospital Course:      From H&P 02/09/2021:   \"80 y.o lady with dementia, HTN, hyperlipidemia, who presents after a ground-level fall. She is a poor historian and doesn't recall what happened exactly. She reports right hip pain with any movement of her right leg. She offers no other complaints and is pain-free if she lays still. \"    Subjective:     Patient remained alert but confused, daughter at the bedside. Her daughter states patient was more confused earlier but has now cleared. I reassured her that in someone with dementia is not unusual for them to have more confusion and delirium during acute illnesses, pain inducing problems such as fractures,, hospitalization however we will continue to monitor for any medical issues which we have not identified any yet. Assessment/Plan:     Closed right femoral intertrochanteric fracture  -Response and fixation of the right intertrochanteric femoral fracture  -PT and OT, needs rehab  -DVT prophylaxis: DC Lovenox, back to home apixaban.     History of PE:  -Resumed apixaban     Dementia: without behavioral disturbances:  Increased confusion and delirium during hospitalization. Patient is pleasantly confused for the most part  -Stable, continue memantine  -supportive care     HTN:   -Stable, continue home losartan     Hyperlipidemia  - Not on home meds      DVTppx: Apixaban. Code Status: DNR  Diet: Regular diet  Activity: OOB to chair TID and PRN  Discharge: SNF rehab  Anticipate discharge, the earliest on Monday.   I was informed by CM that [facility] would not take her before Monday         Review of Systems:     Full ROS complete with pertinent positives and negatives as per HPI, otherwise negative  Objective:   Physical Exam:     Visit Vitals  BP (!) 131/93   Pulse 98   Temp 97.9 °F (36.6 °C)   Resp 18   Ht 5' 2\" (1.575 m)   Wt 68.9 kg (152 lb)   SpO2 94%   BMI 27.80 kg/m²    O2 Flow Rate (L/min): 2 l/min O2 Device: Room air    Temp (24hrs), Av.1 °F (36.7 °C), Min:97.8 °F (36.6 °C), Max:98.7 °F (37.1 °C)    No intake/output data recorded.  1901 -  0700  In: -   Out: 350 [Urine:350]      General:  Alert, cooperative, no distress, appears stated age. Lungs:   Clear to auscultation bilaterally. Heart:  Regular rate and rhythm, S1, S2 normal, no murmur, click, rub or gallop. Abdomen:   Soft, non-tender, non-distended. Bowel sounds normal.    Extremities: Extremities normal, no cyanosis or edema. Right humeral surgical wound bandaged. Skin: Skin color, texture, turgor normal. No rashes or lesions   Neurologic:  Alert but confused. CNII-XII grossly intact. Oriented to self only; ANAYA     Data Review:       Recent Days:  Recent Labs     21  0705 21  0254 21  0300   WBC 11.3* 11.2* 10.7   HGB 12.1 11.4* 13.0   HCT 37.2 36.4 40.7    190 198     Recent Labs     21  0705 21  0254 21  0300    141 140   K 3.8 4.0 4.6    113* 111*   CO2 26 22 23   * 109* 166*   BUN 14 17 16   CREA 0.78 0.81 1.05*   CA 9.2 8.5 8.8     No results for input(s): PH, PCO2, PO2, HCO3, FIO2 in the last 72 hours.     24 Hour Results:  Recent Results (from the past 24 hour(s))   CBC WITH AUTOMATED DIFF    Collection Time: 21  7:05 AM   Result Value Ref Range    WBC 11.3 (H) 3.6 - 11.0 K/uL    RBC 4.07 3.80 - 5.20 M/uL    HGB 12.1 11.5 - 16.0 g/dL    HCT 37.2 35.0 - 47.0 %    MCV 91.4 80.0 - 99.0 FL    MCH 29.7 26.0 - 34.0 PG    MCHC 32.5 30.0 - 36.5 g/dL    RDW 14.1 11.5 - 14.5 %    PLATELET 325 149 - 001 K/uL    MPV 11.8 8.9 - 12.9 FL    NRBC 0.0 0  WBC    ABSOLUTE NRBC 0.00 0.00 - 0.01 K/uL    NEUTROPHILS 74 32 - 75 %    LYMPHOCYTES 15 12 - 49 %    MONOCYTES 9 5 - 13 %    EOSINOPHILS 1 0 - 7 %    BASOPHILS 0 0 - 1 %    IMMATURE GRANULOCYTES 1 (H) 0.0 - 0.5 %    ABS. NEUTROPHILS 8.4 (H) 1.8 - 8.0 K/UL    ABS. LYMPHOCYTES 1.7 0.8 - 3.5 K/UL    ABS. MONOCYTES 1.0 0.0 - 1.0 K/UL    ABS. EOSINOPHILS 0.1 0.0 - 0.4 K/UL    ABS. BASOPHILS 0.0 0.0 - 0.1 K/UL    ABS. IMM. GRANS. 0.1 (H) 0.00 - 0.04 K/UL    DF AUTOMATED     METABOLIC PANEL, BASIC    Collection Time: 02/13/21  7:05 AM   Result Value Ref Range    Sodium 139 136 - 145 mmol/L    Potassium 3.8 3.5 - 5.1 mmol/L    Chloride 108 97 - 108 mmol/L    CO2 26 21 - 32 mmol/L    Anion gap 5 5 - 15 mmol/L    Glucose 115 (H) 65 - 100 mg/dL    BUN 14 6 - 20 MG/DL    Creatinine 0.78 0.55 - 1.02 MG/DL    BUN/Creatinine ratio 18 12 - 20      GFR est AA >60 >60 ml/min/1.73m2    GFR est non-AA >60 >60 ml/min/1.73m2    Calcium 9.2 8.5 - 10.1 MG/DL       Problem List:  Problem List as of 2/13/2021 Date Reviewed: 12/21/2018          Codes Class Noted - Resolved    [facility] ICD-10-CM: S63.044N  ICD-9-CM: 820.8  2/9/2021 - Present        Fall ICD-10-CM: W19. Jose Nichols  ICD-9-CM: E888.9  8/23/2018 - Present        Unwitnessed fall ICD-10-CM: R29.6  ICD-9-CM: Genette Breech  8/22/2018 - Present        Hypercholesteremia ICD-10-CM: E78.00  ICD-9-CM: 272.0  5/27/2010 - Present    Overview Signed 5/27/2010 12:56 PM by Loretta Herron's             Osteoporosis ICD-10-CM: M81.0  ICD-9-CM: 733.00  5/27/2010 - Present        IBS (irritable bowel syndrome) ICD-10-CM: K58.9  ICD-9-CM: 564.1  5/27/2010 - Present        OA (osteoarthritis) ICD-10-CM: M19.90  ICD-9-CM: 715.90  5/27/2010 - Present        RESOLVED: Syncope ICD-10-CM: R55  ICD-9-CM: 780.2  12/15/2018 - 12/21/2018        RESOLVED: UTI (urinary tract infection) ICD-10-CM: N39.0  ICD-9-CM: 599.0  12/15/2018 - 12/21/2018        RESOLVED: KOLBY (acute kidney injury) ([facility] ICD-10-CM: N17.9  ICD-9-CM: 584.9 12/15/2018 - 12/21/2018        RESOLVED: Hypoxia ICD-10-CM: R09.02  ICD-9-CM: 799.02  8/22/2018 - 12/21/2018              Medications reviewed  Current Facility-Administered Medications   Medication Dose Route Frequency    cefTRIAXone (ROCEPHIN) 1 g in 0.9% sodium chloride (MBP/ADV) 50 mL MBP  1 g IntraVENous Q24H    apixaban (ELIQUIS) tablet 5 mg  5 mg Oral BID    sodium chloride (NS) flush 5-40 mL  5-40 mL IntraVENous Q8H    sodium chloride (NS) flush 5-40 mL  5-40 mL IntraVENous PRN    naloxone (NARCAN) injection 0.4 mg  0.4 mg IntraVENous PRN    calcium-vitamin D (OS-LUCÍA +D3) 500 mg-200 unit per tablet 1 Tab  1 Tab Oral TID WITH MEALS    senna-docusate (PERICOLACE) 8.6-50 mg per tablet 1 Tab  1 Tab Oral BID    polyethylene glycol (MIRALAX) packet 17 g  17 g Oral DAILY    bisacodyL (DULCOLAX) suppository 10 mg  10 mg Rectal DAILY PRN    sodium chloride (NS) flush 5-40 mL  5-40 mL IntraVENous PRN    acetaminophen (TYLENOL) tablet 650 mg  650 mg Oral Q6H PRN    Or    acetaminophen (TYLENOL) suppository 650 mg  650 mg Rectal Q6H PRN    polyethylene glycol (MIRALAX) packet 17 g  17 g Oral DAILY PRN    promethazine (PHENERGAN) tablet 12.5 mg  12.5 mg Oral Q6H PRN    memantine (NAMENDA) tablet 10 mg  10 mg Oral BID    [Held by provider] losartan (COZAAR) tablet 50 mg  50 mg Oral DAILY    traMADoL (ULTRAM) tablet 50 mg  50 mg Oral Q6H PRN    fentaNYL citrate (PF) injection 25 mcg  25 mcg IntraVENous Q4H PRN       Care Plan discussed with: Patient/family, nurse      Bryant Michael NP  Hospitalist  Providers can reach me on PerfectServe

## 2021-02-13 NOTE — PROGRESS NOTES
Bedside and Verbal shift change report given to Matheus RN (oncoming nurse) by Gela Norman RN (offgoing nurse). Report included the following information SBAR, Kardex and MAR.

## 2021-02-14 LAB
ALBUMIN SERPL-MCNC: 2.1 G/DL (ref 3.5–5)
ALBUMIN/GLOB SERPL: 0.7 {RATIO} (ref 1.1–2.2)
ALP SERPL-CCNC: 76 U/L (ref 45–117)
ALT SERPL-CCNC: 15 U/L (ref 12–78)
ANION GAP SERPL CALC-SCNC: 3 MMOL/L (ref 5–15)
AST SERPL-CCNC: 25 U/L (ref 15–37)
BASOPHILS # BLD: 0 K/UL (ref 0–0.1)
BASOPHILS NFR BLD: 0 % (ref 0–1)
BILIRUB SERPL-MCNC: 0.7 MG/DL (ref 0.2–1)
BUN SERPL-MCNC: 12 MG/DL (ref 6–20)
BUN/CREAT SERPL: 18 (ref 12–20)
CALCIUM SERPL-MCNC: 8.4 MG/DL (ref 8.5–10.1)
CHLORIDE SERPL-SCNC: 110 MMOL/L (ref 97–108)
CO2 SERPL-SCNC: 27 MMOL/L (ref 21–32)
CREAT SERPL-MCNC: 0.67 MG/DL (ref 0.55–1.02)
DIFFERENTIAL METHOD BLD: ABNORMAL
EOSINOPHIL # BLD: 0.4 K/UL (ref 0–0.4)
EOSINOPHIL NFR BLD: 5 % (ref 0–7)
ERYTHROCYTE [DISTWIDTH] IN BLOOD BY AUTOMATED COUNT: 13.7 % (ref 11.5–14.5)
GLOBULIN SER CALC-MCNC: 2.9 G/DL (ref 2–4)
GLUCOSE SERPL-MCNC: 104 MG/DL (ref 65–100)
HCT VFR BLD AUTO: 33.4 % (ref 35–47)
HGB BLD-MCNC: 10.7 G/DL (ref 11.5–16)
IMM GRANULOCYTES # BLD AUTO: 0 K/UL (ref 0–0.04)
IMM GRANULOCYTES NFR BLD AUTO: 0 % (ref 0–0.5)
LYMPHOCYTES # BLD: 1.6 K/UL (ref 0.8–3.5)
LYMPHOCYTES NFR BLD: 19 % (ref 12–49)
MCH RBC QN AUTO: 29.8 PG (ref 26–34)
MCHC RBC AUTO-ENTMCNC: 32 G/DL (ref 30–36.5)
MCV RBC AUTO: 93 FL (ref 80–99)
MONOCYTES # BLD: 0.7 K/UL (ref 0–1)
MONOCYTES NFR BLD: 8 % (ref 5–13)
NEUTS SEG # BLD: 5.6 K/UL (ref 1.8–8)
NEUTS SEG NFR BLD: 68 % (ref 32–75)
NRBC # BLD: 0 K/UL (ref 0–0.01)
NRBC BLD-RTO: 0 PER 100 WBC
PLATELET # BLD AUTO: 209 K/UL (ref 150–400)
PMV BLD AUTO: 11.5 FL (ref 8.9–12.9)
POTASSIUM SERPL-SCNC: 3.8 MMOL/L (ref 3.5–5.1)
PROT SERPL-MCNC: 5 G/DL (ref 6.4–8.2)
RBC # BLD AUTO: 3.59 M/UL (ref 3.8–5.2)
RBC MORPH BLD: ABNORMAL
SARS-COV-2, COV2: NOT DETECTED
SODIUM SERPL-SCNC: 140 MMOL/L (ref 136–145)
SPECIMEN SOURCE, FCOV2M: NORMAL
WBC # BLD AUTO: 8.3 K/UL (ref 3.6–11)

## 2021-02-14 PROCEDURE — 74011250637 HC RX REV CODE- 250/637: Performed by: HOSPITALIST

## 2021-02-14 PROCEDURE — 36415 COLL VENOUS BLD VENIPUNCTURE: CPT

## 2021-02-14 PROCEDURE — 65270000029 HC RM PRIVATE

## 2021-02-14 PROCEDURE — 85025 COMPLETE CBC W/AUTO DIFF WBC: CPT

## 2021-02-14 PROCEDURE — 74011250637 HC RX REV CODE- 250/637: Performed by: ORTHOPAEDIC SURGERY

## 2021-02-14 PROCEDURE — U0005 INFEC AGEN DETEC AMPLI PROBE: HCPCS

## 2021-02-14 PROCEDURE — 74011250636 HC RX REV CODE- 250/636: Performed by: HOSPITALIST

## 2021-02-14 PROCEDURE — 87086 URINE CULTURE/COLONY COUNT: CPT

## 2021-02-14 PROCEDURE — 80053 COMPREHEN METABOLIC PANEL: CPT

## 2021-02-14 PROCEDURE — 74011000258 HC RX REV CODE- 258: Performed by: HOSPITALIST

## 2021-02-14 RX ADMIN — Medication 1 TABLET: at 09:22

## 2021-02-14 RX ADMIN — MEMANTINE 10 MG: 5 TABLET ORAL at 09:22

## 2021-02-14 RX ADMIN — Medication 1 TABLET: at 12:36

## 2021-02-14 RX ADMIN — ACETAMINOPHEN 650 MG: 325 TABLET ORAL at 17:47

## 2021-02-14 RX ADMIN — POLYETHYLENE GLYCOL 3350 17 G: 17 POWDER, FOR SOLUTION ORAL at 09:22

## 2021-02-14 RX ADMIN — MEMANTINE 10 MG: 5 TABLET ORAL at 17:47

## 2021-02-14 RX ADMIN — APIXABAN 5 MG: 2.5 TABLET, FILM COATED ORAL at 17:47

## 2021-02-14 RX ADMIN — DOCUSATE SODIUM 50 MG AND SENNOSIDES 8.6 MG 1 TABLET: 8.6; 5 TABLET, FILM COATED ORAL at 09:22

## 2021-02-14 RX ADMIN — APIXABAN 5 MG: 2.5 TABLET, FILM COATED ORAL at 09:22

## 2021-02-14 RX ADMIN — CEFTRIAXONE SODIUM 1 G: 1 INJECTION, POWDER, FOR SOLUTION INTRAMUSCULAR; INTRAVENOUS at 07:20

## 2021-02-14 RX ADMIN — Medication 1 TABLET: at 17:47

## 2021-02-14 NOTE — PROGRESS NOTES
Bedside and Verbal shift change report given to Terris Canavan, RN (oncoming nurse) by Di Benitez RN (offgoing nurse). Report included the following information SBAR.

## 2021-02-14 NOTE — PROGRESS NOTES
Physician Progress Note      Lisa Whiting  CSN #:                  [id number]  :                       10/14/1929  ADMIT DATE:       2021 5:36 PM  [address] [place] DATE:  RESPONDING  PROVIDER #:        Viet Avila MD          QUERY TEXT:    Pt admitted with Closed right femoral intertrochanteric fracture. Pt noted to have Osteoporosis. If possible, please document in progress notes and discharge summary if you are evaluating and/or treating any of the following: The medical record reflects the following:  Risk Factors: 79 y/o female admitted with Femoral fracture post fall, hx of Osteoporosis, Dementia, HTN, PE,  Clinical Indicators: R hip pain post fall, per XR Acute nondisplaced right proximal femur intratrochanteric fracture. Treatment: Hold Eliquis, bed rest, Ortho Consult, pain control, NPO after MN for OR  Options provided:  -- Pathological right femoral intertrochanteric fracture  -- Osteoporotic right femoral intertrochanteric fracture  -- Osteoporotic right femoral intertrochanteric fracture following ground level fall which would not usually break a normal, healthy bone  -- Traumatic right femoral intertrochanteric fracture  -- Other - I will add my own diagnosis  -- Disagree - Not applicable / Not valid  -- Disagree - Clinically unable to determine / Unknown  -- Refer to Clinical Documentation Reviewer    PROVIDER RESPONSE TEXT:    This patient has a traumatic fracture of right femoral intertrochanteric bone. Query created by:  Shauna Buchanan on 2/10/2021 10:32 AM      Electronically signed by:  Viet Avila MD 2021 10:37 PM

## 2021-02-14 NOTE — PROGRESS NOTES
Physical Therapy    Reviewed chart and attempted to treat pt. Pt sleeping soundly with left lateral trunk lean. Pt would briefly open eyes then quickly close. Pt would not arouse enough to participate with therapy. repositioned trunk. Deferred treatment and will continue to follow.

## 2021-02-14 NOTE — PROGRESS NOTES
Hospitalist Progress Note         Gwyn Byrd MD  Please call  and page for questions. Call physician on-call through the  7pm-7am    Daily Progress Note: 2/14/2021    Primary care provider:Leila Arguello MD    Date of admission: 2/9/2021  5:36 PM    Admission Summary and Hospital Course:      From H&P 02/09/2021:   \"80 y.o lady with dementia, HTN, hyperlipidemia, who presents after a ground-level fall. She is a poor historian and doesn't recall what happened exactly. She reports right hip pain with any movement of her right leg. She offers no other complaints and is pain-free if she lays still. \"    Subjective:     Patient is alert, baseline confused. She asked Shivani Leija are we doing now\" denies any subjective complaints. No visitor in the room. Assessment/Plan:     Closed right femoral intertrochanteric fracture, traumatic due to fall  -Response and fixation of the right intertrochanteric femoral fracture  -PT and OT, needs rehab  -DVT prophylaxis: DC Lovenox, back to home apixaban.     History of PE:  -Resumed apixaban     Dementia: without behavioral disturbances:  Increased confusion and delirium during hospitalization. Patient is pleasantly confused for the most part  -Stable, continue memantine  -supportive care     HTN:   -Stable, continue home losartan     Hyperlipidemia  - Not on home meds      DVTppx: Apixaban. Code Status: DNR  Diet: Regular diet  Activity: OOB to chair TID and PRN  Discharge: SNF rehab  Anticipate discharge, the earliest on Monday.   I was informed by CM that [facility] would not take her before Monday         Review of Systems:     Full ROS complete with pertinent positives and negatives as per HPI, otherwise negative  Objective:   Physical Exam:     Visit Vitals  /67 (BP Patient Position: At rest)   Pulse 90   Temp 98.4 °F (36.9 °C)   Resp 16   Ht 5' 2\" (1.575 m)   Wt 68.9 kg (152 lb)   SpO2 92%   BMI 27.80 kg/m² O2 Flow Rate (L/min): 1 l/min O2 Device: Nasal cannula    Temp (24hrs), Av.5 °F (36.9 °C), Min:97.8 °F (36.6 °C), Max:99.1 °F (37.3 °C)    No intake/output data recorded. No intake/output data recorded. General:  Alert, cooperative, no distress, confused which is baseline. Lungs:   Clear to auscultation bilaterally. Heart:  Regular rate and rhythm, S1, S2 normal, no murmur, click, rub or gallop. Abdomen:   Soft, non-tender, non-distended. Bowel sounds normal.    Extremities: Extremities normal, no cyanosis or edema. Right humeral surgical wound bandaged. Skin: Skin color, texture, turgor normal. No rashes or lesions   Neurologic:  Alert but confused. CNII-XII grossly intact. Oriented to self only; ANAYA     Data Review:       Recent Days:  Recent Labs     21  0826 21  0705 21  0254   WBC 8.3 11.3* 11.2*   HGB 10.7* 12.1 11.4*   HCT 33.4* 37.2 36.4    227 190     Recent Labs     21  0826 21  0705 21  0254    139 141   K 3.8 3.8 4.0   * 108 113*   CO2 27 26 22   * 115* 109*   BUN 12 14 17   CREA 0.67 0.78 0.81   CA 8.4* 9.2 8.5   ALB 2.1*  --   --    ALT 15  --   --      No results for input(s): PH, PCO2, PO2, HCO3, FIO2 in the last 72 hours. 24 Hour Results:  Recent Results (from the past 24 hour(s))   CBC WITH AUTOMATED DIFF    Collection Time: 21  8:26 AM   Result Value Ref Range    WBC 8.3 3.6 - 11.0 K/uL    RBC 3.59 (L) 3.80 - 5.20 M/uL    HGB 10.7 (L) 11.5 - 16.0 g/dL    HCT 33.4 (L) 35.0 - 47.0 %    MCV 93.0 80.0 - 99.0 FL    MCH 29.8 26.0 - 34.0 PG    MCHC 32.0 30.0 - 36.5 g/dL    RDW 13.7 11.5 - 14.5 %    PLATELET 433 403 - 807 K/uL    MPV 11.5 8.9 - 12.9 FL    NRBC 0.0 0  WBC    ABSOLUTE NRBC 0.00 0.00 - 0.01 K/uL    NEUTROPHILS 68 32 - 75 %    LYMPHOCYTES 19 12 - 49 %    MONOCYTES 8 5 - 13 %    EOSINOPHILS 5 0 - 7 %    BASOPHILS 0 0 - 1 %    IMMATURE GRANULOCYTES 0 0.0 - 0.5 %    ABS.  NEUTROPHILS 5.6 1.8 - 8.0 K/UL    ABS. LYMPHOCYTES 1.6 0.8 - 3.5 K/UL    ABS. MONOCYTES 0.7 0.0 - 1.0 K/UL    ABS. EOSINOPHILS 0.4 0.0 - 0.4 K/UL    ABS. BASOPHILS 0.0 0.0 - 0.1 K/UL    ABS. IMM. GRANS. 0.0 0.00 - 0.04 K/UL    DF SMEAR SCANNED      RBC COMMENTS ANISOCYTOSIS  1+       METABOLIC PANEL, COMPREHENSIVE    Collection Time: 02/14/21  8:26 AM   Result Value Ref Range    Sodium 140 136 - 145 mmol/L    Potassium 3.8 3.5 - 5.1 mmol/L    Chloride 110 (H) 97 - 108 mmol/L    CO2 27 21 - 32 mmol/L    Anion gap 3 (L) 5 - 15 mmol/L    Glucose 104 (H) 65 - 100 mg/dL    BUN 12 6 - 20 MG/DL    Creatinine 0.67 0.55 - 1.02 MG/DL    BUN/Creatinine ratio 18 12 - 20      GFR est AA >60 >60 ml/min/1.73m2    GFR est non-AA >60 >60 ml/min/1.73m2    Calcium 8.4 (L) 8.5 - 10.1 MG/DL    Bilirubin, total 0.7 0.2 - 1.0 MG/DL    ALT (SGPT) 15 12 - 78 U/L    AST (SGOT) 25 15 - 37 U/L    Alk. phosphatase 76 45 - 117 U/L    Protein, total 5.0 (L) 6.4 - 8.2 g/dL    Albumin 2.1 (L) 3.5 - 5.0 g/dL    Globulin 2.9 2.0 - 4.0 g/dL    A-G Ratio 0.7 (L) 1.1 - 2.2     SARS-COV-2    Collection Time: 02/14/21  8:37 AM   Result Value Ref Range    SARS-CoV-2 Please find results under separate order         Problem List:  Problem List as of 2/14/2021 Date Reviewed: 12/21/2018          Codes Class Noted - Resolved    [facility] ICD-10-CM: O71.310I  ICD-9-CM: 820.8  2/9/2021 - Present        Fall ICD-10-CM: W19. Norvel Reef  ICD-9-CM: E888.9  8/23/2018 - Present        Unwitnessed fall ICD-10-CM: R29.6  ICD-9-CM: Taryn Ponce  8/22/2018 - Present        Hypercholesteremia ICD-10-CM: E78.00  ICD-9-CM: 272.0  5/27/2010 - Present    Overview Signed 5/27/2010 12:56 PM by Evelia Herron's             Osteoporosis ICD-10-CM: M81.0  ICD-9-CM: 733.00  5/27/2010 - Present        IBS (irritable bowel syndrome) ICD-10-CM: K58.9  ICD-9-CM: 564.1  5/27/2010 - Present        OA (osteoarthritis) ICD-10-CM: M19.90  ICD-9-CM: 715.90  5/27/2010 - Present        RESOLVED: Syncope ICD-10-CM: R55  ICD-9-CM: 780.2  12/15/2018 - 12/21/2018        RESOLVED: UTI (urinary tract infection) ICD-10-CM: N39.0  ICD-9-CM: 599.0  12/15/2018 - 12/21/2018        RESOLVED: KOLBY (acute kidney injury) ([facility] ICD-10-CM: N17.9  ICD-9-CM: 584.9  12/15/2018 - 12/21/2018        RESOLVED: Hypoxia ICD-10-CM: R09.02  ICD-9-CM: 799.02  8/22/2018 - 12/21/2018              Medications reviewed  Current Facility-Administered Medications   Medication Dose Route Frequency    cefTRIAXone (ROCEPHIN) 1 g in 0.9% sodium chloride (MBP/ADV) 50 mL MBP  1 g IntraVENous Q24H    apixaban (ELIQUIS) tablet 5 mg  5 mg Oral BID    sodium chloride (NS) flush 5-40 mL  5-40 mL IntraVENous Q8H    sodium chloride (NS) flush 5-40 mL  5-40 mL IntraVENous PRN    naloxone (NARCAN) injection 0.4 mg  0.4 mg IntraVENous PRN    calcium-vitamin D (OS-LUCÍA +D3) 500 mg-200 unit per tablet 1 Tab  1 Tab Oral TID WITH MEALS    senna-docusate (PERICOLACE) 8.6-50 mg per tablet 1 Tab  1 Tab Oral BID    polyethylene glycol (MIRALAX) packet 17 g  17 g Oral DAILY    bisacodyL (DULCOLAX) suppository 10 mg  10 mg Rectal DAILY PRN    sodium chloride (NS) flush 5-40 mL  5-40 mL IntraVENous PRN    acetaminophen (TYLENOL) tablet 650 mg  650 mg Oral Q6H PRN    Or    acetaminophen (TYLENOL) suppository 650 mg  650 mg Rectal Q6H PRN    polyethylene glycol (MIRALAX) packet 17 g  17 g Oral DAILY PRN    promethazine (PHENERGAN) tablet 12.5 mg  12.5 mg Oral Q6H PRN    memantine (NAMENDA) tablet 10 mg  10 mg Oral BID    [Held by provider] losartan (COZAAR) tablet 50 mg  50 mg Oral DAILY    traMADoL (ULTRAM) tablet 50 mg  50 mg Oral Q6H PRN    fentaNYL citrate (PF) injection 25 mcg  25 mcg IntraVENous Q4H PRN       Care Plan discussed with: Patient/family, nurse      Taryn Campos, NP  Hospitalist  Providers can reach me on Perfect[employer]erve

## 2021-02-15 LAB
ANION GAP SERPL CALC-SCNC: 6 MMOL/L (ref 5–15)
BACTERIA SPEC CULT: NORMAL
BASOPHILS # BLD: 0 K/UL (ref 0–0.1)
BASOPHILS NFR BLD: 1 % (ref 0–1)
BUN SERPL-MCNC: 14 MG/DL (ref 6–20)
BUN/CREAT SERPL: 17 (ref 12–20)
CALCIUM SERPL-MCNC: 8.9 MG/DL (ref 8.5–10.1)
CHLORIDE SERPL-SCNC: 108 MMOL/L (ref 97–108)
CO2 SERPL-SCNC: 26 MMOL/L (ref 21–32)
CREAT SERPL-MCNC: 0.82 MG/DL (ref 0.55–1.02)
DIFFERENTIAL METHOD BLD: ABNORMAL
EOSINOPHIL # BLD: 0.5 K/UL (ref 0–0.4)
EOSINOPHIL NFR BLD: 6 % (ref 0–7)
ERYTHROCYTE [DISTWIDTH] IN BLOOD BY AUTOMATED COUNT: 14 % (ref 11.5–14.5)
GLUCOSE SERPL-MCNC: 114 MG/DL (ref 65–100)
HCT VFR BLD AUTO: 35.5 % (ref 35–47)
HGB BLD-MCNC: 11.5 G/DL (ref 11.5–16)
IMM GRANULOCYTES # BLD AUTO: 0 K/UL (ref 0–0.04)
IMM GRANULOCYTES NFR BLD AUTO: 1 % (ref 0–0.5)
LYMPHOCYTES # BLD: 1.8 K/UL (ref 0.8–3.5)
LYMPHOCYTES NFR BLD: 22 % (ref 12–49)
MCH RBC QN AUTO: 29.9 PG (ref 26–34)
MCHC RBC AUTO-ENTMCNC: 32.4 G/DL (ref 30–36.5)
MCV RBC AUTO: 92.4 FL (ref 80–99)
MONOCYTES # BLD: 0.7 K/UL (ref 0–1)
MONOCYTES NFR BLD: 9 % (ref 5–13)
NEUTS SEG # BLD: 5.3 K/UL (ref 1.8–8)
NEUTS SEG NFR BLD: 61 % (ref 32–75)
NRBC # BLD: 0 K/UL (ref 0–0.01)
NRBC BLD-RTO: 0 PER 100 WBC
PLATELET # BLD AUTO: 244 K/UL (ref 150–400)
PMV BLD AUTO: 11.8 FL (ref 8.9–12.9)
POTASSIUM SERPL-SCNC: 3.8 MMOL/L (ref 3.5–5.1)
RBC # BLD AUTO: 3.84 M/UL (ref 3.8–5.2)
SERVICE CMNT-IMP: NORMAL
SODIUM SERPL-SCNC: 140 MMOL/L (ref 136–145)
WBC # BLD AUTO: 8.4 K/UL (ref 3.6–11)

## 2021-02-15 PROCEDURE — 97535 SELF CARE MNGMENT TRAINING: CPT

## 2021-02-15 PROCEDURE — 36415 COLL VENOUS BLD VENIPUNCTURE: CPT

## 2021-02-15 PROCEDURE — 74011250636 HC RX REV CODE- 250/636: Performed by: HOSPITALIST

## 2021-02-15 PROCEDURE — 97530 THERAPEUTIC ACTIVITIES: CPT

## 2021-02-15 PROCEDURE — 85025 COMPLETE CBC W/AUTO DIFF WBC: CPT

## 2021-02-15 PROCEDURE — 74011250637 HC RX REV CODE- 250/637: Performed by: ORTHOPAEDIC SURGERY

## 2021-02-15 PROCEDURE — 80048 BASIC METABOLIC PNL TOTAL CA: CPT

## 2021-02-15 PROCEDURE — 94760 N-INVAS EAR/PLS OXIMETRY 1: CPT

## 2021-02-15 PROCEDURE — 74011000258 HC RX REV CODE- 258: Performed by: HOSPITALIST

## 2021-02-15 PROCEDURE — 77010033678 HC OXYGEN DAILY

## 2021-02-15 PROCEDURE — 65270000029 HC RM PRIVATE

## 2021-02-15 PROCEDURE — 74011250637 HC RX REV CODE- 250/637: Performed by: HOSPITALIST

## 2021-02-15 RX ADMIN — MEMANTINE 10 MG: 5 TABLET ORAL at 10:26

## 2021-02-15 RX ADMIN — DOCUSATE SODIUM 50 MG AND SENNOSIDES 8.6 MG 1 TABLET: 8.6; 5 TABLET, FILM COATED ORAL at 10:26

## 2021-02-15 RX ADMIN — CEFTRIAXONE SODIUM 1 G: 1 INJECTION, POWDER, FOR SOLUTION INTRAMUSCULAR; INTRAVENOUS at 07:22

## 2021-02-15 RX ADMIN — APIXABAN 5 MG: 2.5 TABLET, FILM COATED ORAL at 10:26

## 2021-02-15 RX ADMIN — Medication 1 TABLET: at 12:30

## 2021-02-15 RX ADMIN — APIXABAN 5 MG: 2.5 TABLET, FILM COATED ORAL at 18:50

## 2021-02-15 RX ADMIN — DOCUSATE SODIUM 50 MG AND SENNOSIDES 8.6 MG 1 TABLET: 8.6; 5 TABLET, FILM COATED ORAL at 18:50

## 2021-02-15 RX ADMIN — Medication 1 TABLET: at 18:50

## 2021-02-15 RX ADMIN — MEMANTINE 10 MG: 5 TABLET ORAL at 18:50

## 2021-02-15 NOTE — PROGRESS NOTES
Orthopedic Progress Note    S: Pain well controlled on current meds, doing well with therapy, no new complaints;Denies numbness, tingling, focal weakness, cp, sob, fever, chills    O: NAD, respirations unlabored; Dressings CDI, incision edges aligned, no drainage, no surrounding erythema; thigh soft, no edema, no posterior calf ttp; EHL/DF/PF 5/5, SILT; Cap refill brisk, foot warm, DP2+    Patient Vitals for the past 4 hrs:   Temp Pulse BP   02/15/21 1025 98.4 °F (36.9 °C) 85 (!) 145/76     Recent Labs     02/15/21  0403 02/14/21  0826   HGB 11.5 10.7*   HCT 35.5 33.4*    209   BUN 14 12   CREA 0.82 0.67   K 3.8 3.8    140       XR HIP RT W OR WO PELV 2-3 VWS  Narrative: EXAM: XR HIP RT W OR WO PELV 2-3 VWS    INDICATION: intra op. COMPARISON: None. FINDINGS fluoroscopy was utilized intraoperatively. Impression: INTERPRETATION PROVIDED FOR COMPLIANCE ONLY AT NO CHARGE. A/P:  Procedure: Procedure(s):  RIGHT HIP IM  KIRA INSERTION  Post Op day: 5 Days Post-Op    - DVT ppx - continue home meds, Eliquis 5mg BID; SCDs  - PT/OT - WBAT  - Pain - Tylenol, Tramadol PRN; Ice, Elevation, Ace wrap as needed  - Dressing - Leave in place if it remains dry and intact; change as needed for saturation with dry sterile island dressing  - Dispo - Pending PT/OT recs; needs f/u in 3 weeks with Dr. Eura Hammans; refer to DC instructions for further details. - Orthopedically stable, will sign off, call with questions.      MARGARITA Gomez  Orthopedic Trauma Service  [address] [place] [address]

## 2021-02-15 NOTE — PROGRESS NOTES
Problem: Mobility Impaired (Adult and Pediatric)  Goal: *Acute Goals and Plan of Care (Insert Text)  Description: FUNCTIONAL STATUS PRIOR TO ADMISSION: Pt unable to provide history. Per chart review, pt used RW in memory care unit    [facility]: The patient lived in memory care unit    Physical Therapy Goals  Initiated 2/11/2021  1. Patient will move from supine to sit and sit to supine  in bed with moderate assistance  within 7 day(s). 2.  Patient will transfer from bed to chair and chair to bed with moderate assistance  using the least restrictive device within 7 day(s). 3.  Patient will perform sit to stand with moderate assistance  within 7 day(s). 4.  Patient will ambulate with minimal assistance/contact guard assist for 10 feet with the least restrictive device within 7 day(s). Outcome: Progressing Towards Goal   PHYSICAL THERAPY TREATMENT  Patient: Edvin Riggs (42 y.o. female)  Date: 2/15/2021  Diagnosis: Closed hip fracture ([facility] [S72.009A] <principal problem not specified>  Procedure(s) (LRB):  RIGHT HIP IM  KIRA INSERTION (Right) 5 Days Post-Op  Precautions: DNR, Fall, Skin, WBAT  Chart, physical therapy assessment, plan of care and goals were reviewed. ASSESSMENT  Patient continues with skilled PT services and is progressing towards goals. Pt remains confused however more alert with increased command following and participation for transfer training. Patient was able to come to standing with min assist x 2, then required mod assist x 2 for taking a few shuffled steps to bedside chair. Today first day able to transfer to chair. Current Level of Function Impacting Discharge (mobility/balance):  Max assist supine to sit; sitting edge of bed indep with verbal cues to min to mod assist for leaning backwards, Min to mod assist for standing - mobilizing to bedside chair with RW    Other factors to consider for discharge:          PLAN :  Patient continues to benefit from skilled intervention to address the above impairments. Continue treatment per established plan of care. to address goals. Recommendation for discharge: (in order for the patient to meet his/her long term goals)  Therapy up to 5 days/week in SNF setting    This discharge recommendation:  Has been made in collaboration with the attending provider and/or case management    IF patient discharges home will need the following DME: patient owns DME required for discharge       SUBJECTIVE:   Patient stated i'll try.     OBJECTIVE DATA SUMMARY:   Critical Behavior:  Neurologic State: Alert, Confused  Orientation Level: Oriented to person  Cognition: Follows commands, Decreased attention/concentration  Safety/Judgement: Decreased awareness of environment, Decreased awareness of need for safety, Decreased insight into deficits, Fall prevention  Functional Mobility Training:  Bed Mobility:     Supine to Sit: Maximum assistance;Assist x2     Scooting: Maximum assistance        Transfers:  Sit to Stand: Minimum assistance;Assist x2(from elevated bed height)  Stand to Sit: Moderate assistance;Assist x2        Bed to Chair: Moderate assistance;Assist x2;Adaptive equipment; Additional time(with physical assist to manage RW)                    Balance:  Sitting: Impaired  Sitting - Static: Fair (occasional)  Sitting - Dynamic: Not tested  Standing: Impaired  Standing - Static: Constant support;Poor; Fair  Standing - Dynamic : Constant support;Poor  Ambulation/Gait Training:  Distance (ft): 2 Feet (ft)  Assistive Device: Walker, rolling(required physical assist to [employer])  Ambulation - Level of Assistance:  Moderate assistance;Assist x2        Gait Abnormalities: Decreased step clearance;Shuffling gait  Right Side Weight Bearing: As tolerated     Base of Support: Center of gravity altered;Shift to left  Stance: Right decreased  Speed/Casandra: Slow;Shuffled  Step Length: Right shortened;Left shortened        Therapeutic Exercises:   Pt able to perform ankle pumps with cues and demonstration. Pain Rating:  Pt denied pain - appears fearful and guarded when moved. Activity Tolerance:   Fair - tolerated up in chair    After treatment patient left in no apparent distress:   Sitting in chair, Call bell within reach, Bed / chair alarm activated, and Caregiver / family present    COMMUNICATION/COLLABORATION:   The patients plan of care was discussed with: Registered nurse and daughter .      Toney Santo, PT   Time Calculation: 35 mins

## 2021-02-15 NOTE — PROGRESS NOTES
Bedside and Verbal shift change report given to [address] [place] [address] Rufino (oncoming nurse) by Javier Anderson (offgoing nurse). Report included the following information SBAR, Kardex and MAR.

## 2021-02-15 NOTE — PROGRESS NOTES
Hospitalist Progress Note         Lonie Sandifer, MD  Please call  and page for questions. Call physician on-call through the  7pm-7am    Daily Progress Note: 2/15/2021    Primary care provider:Kalie Arguello MD    Date of admission: 2/9/2021  5:36 PM    Admission Summary and Hospital Course:      From H&P 02/09/2021:   \"80 y.o lady with dementia, HTN, hyperlipidemia, who presents after a ground-level fall. She is a poor historian and doesn't recall what happened exactly. She reports right hip pain with any movement of her right leg. She offers no other complaints and is pain-free if she lays still. \"    Subjective:     Pleasantly confused. Uneventful overnight. Assessment/Plan:     Closed right femoral intertrochanteric fracture, traumatic due to fall  -Response and fixation of the right intertrochanteric femoral fracture  -PT and OT, needs rehab  -DVT prophylaxis: DC Lovenox, back to home apixaban.     History of PE:  -Resumed apixaban     Dementia: without behavioral disturbances:  Increased confusion and delirium during hospitalization. Patient is pleasantly confused for the most part  -Stable, continue memantine  -supportive care     HTN:   -Stable, continue home losartan     Hyperlipidemia  - Not on home meds      DVTppx: Apixaban. Code Status: DNR  Diet: Regular diet  Activity: OOB to chair TID and PRN  Discharge: SNF rehab  Patient medically ready for discharge awaiting acceptance to SNF, discussed with case management.        Review of Systems:     Full ROS complete with pertinent positives and negatives as per HPI, otherwise negative  Objective:   Physical Exam:     Visit Vitals  /87 (BP 1 Location: Right upper arm, BP Patient Position: At rest)   Pulse 84   Temp 98.3 °F (36.8 °C)   Resp 16   Ht 5' 2\" (1.575 m)   Wt 68.9 kg (152 lb)   SpO2 97%   BMI 27.80 kg/m²    O2 Flow Rate (L/min): 2 l/min O2 Device: Nasal cannula    Temp (24hrs), Av.4 °F (36.9 °C), Min:98.1 °F (36.7 °C), Max:99.1 °F (37.3 °C)    No intake/output data recorded.  1901 - 02/15 0700  In: -   Out: 450 [Urine:450]      General:  Alert, cooperative, no distress, confused which is baseline. Lungs:   Clear to auscultation bilaterally. Heart:  Regular rate and rhythm, S1, S2 normal, no murmur, click, rub or gallop. Abdomen:   Soft, non-tender, non-distended. Bowel sounds normal.    Extremities: Extremities normal, no cyanosis or edema. Right humeral surgical wound bandaged. Skin: Skin color, texture, turgor normal. No rashes or lesions   Neurologic:  Alert but confused. CNII-XII grossly intact. Oriented to self only; ANAYA     Data Review:       Recent Days:  Recent Labs     02/15/21  0403 21  0826 21  0705   WBC 8.4 8.3 11.3*   HGB 11.5 10.7* 12.1   HCT 35.5 33.4* 37.2    209 227     Recent Labs     02/15/21  0403 21  0826 21  0705    140 139   K 3.8 3.8 3.8    110* 108   CO2 26 27 26   * 104* 115*   BUN 14 12 14   CREA 0.82 0.67 0.78   CA 8.9 8.4* 9.2   ALB  --  2.1*  --    ALT  --  15  --      No results for input(s): PH, PCO2, PO2, HCO3, FIO2 in the last 72 hours. 24 Hour Results:  Recent Results (from the past 24 hour(s))   CBC WITH AUTOMATED DIFF    Collection Time: 02/15/21  4:03 AM   Result Value Ref Range    WBC 8.4 3.6 - 11.0 K/uL    RBC 3.84 3.80 - 5.20 M/uL    HGB 11.5 11.5 - 16.0 g/dL    HCT 35.5 35.0 - 47.0 %    MCV 92.4 80.0 - 99.0 FL    MCH 29.9 26.0 - 34.0 PG    MCHC 32.4 30.0 - 36.5 g/dL    RDW 14.0 11.5 - 14.5 %    PLATELET 462 796 - 287 K/uL    MPV 11.8 8.9 - 12.9 FL    NRBC 0.0 0  WBC    ABSOLUTE NRBC 0.00 0.00 - 0.01 K/uL    NEUTROPHILS 61 32 - 75 %    LYMPHOCYTES 22 12 - 49 %    MONOCYTES 9 5 - 13 %    EOSINOPHILS 6 0 - 7 %    BASOPHILS 1 0 - 1 %    IMMATURE GRANULOCYTES 1 (H) 0.0 - 0.5 %    ABS. NEUTROPHILS 5.3 1.8 - 8.0 K/UL    ABS. LYMPHOCYTES 1.8 0.8 - 3.5 K/UL    ABS.  MONOCYTES 0.7 0.0 - 1.0 K/UL    ABS. EOSINOPHILS 0.5 (H) 0.0 - 0.4 K/UL    ABS. BASOPHILS 0.0 0.0 - 0.1 K/UL    ABS. IMM. GRANS. 0.0 0.00 - 0.04 K/UL    DF AUTOMATED     METABOLIC PANEL, BASIC    Collection Time: 02/15/21  4:03 AM   Result Value Ref Range    Sodium 140 136 - 145 mmol/L    Potassium 3.8 3.5 - 5.1 mmol/L    Chloride 108 97 - 108 mmol/L    CO2 26 21 - 32 mmol/L    Anion gap 6 5 - 15 mmol/L    Glucose 114 (H) 65 - 100 mg/dL    BUN 14 6 - 20 MG/DL    Creatinine 0.82 0.55 - 1.02 MG/DL    BUN/Creatinine ratio 17 12 - 20      GFR est AA >60 >60 ml/min/1.73m2    GFR est non-AA >60 >60 ml/min/1.73m2    Calcium 8.9 8.5 - 10.1 MG/DL       Problem List:  Problem List as of 2/15/2021 Date Reviewed: 12/21/2018          Codes Class Noted - Resolved    [facility] ICD-10-CM: B46.996Q  ICD-9-CM: 820.8  2/9/2021 - Present        Fall ICD-10-CM: W19. Sonal Katz  ICD-9-CM: E888.9  8/23/2018 - Present        Unwitnessed fall ICD-10-CM: R29.6  ICD-9-CM: Kortney Dys  8/22/2018 - Present        Hypercholesteremia ICD-10-CM: E78.00  ICD-9-CM: 272.0  5/27/2010 - Present    Overview Signed 5/27/2010 12:56 PM by Erin Hinds     1980's             Osteoporosis ICD-10-CM: M81.0  ICD-9-CM: 733.00  5/27/2010 - Present        IBS (irritable bowel syndrome) ICD-10-CM: K58.9  ICD-9-CM: 564.1  5/27/2010 - Present        OA (osteoarthritis) ICD-10-CM: M19.90  ICD-9-CM: 715.90  5/27/2010 - Present        RESOLVED: Syncope ICD-10-CM: R55  ICD-9-CM: 780.2  12/15/2018 - 12/21/2018        RESOLVED: UTI (urinary tract infection) ICD-10-CM: N39.0  ICD-9-CM: 599.0  12/15/2018 - 12/21/2018        RESOLVED: OKLBY (acute kidney injury) ([facility] ICD-10-CM: N17.9  ICD-9-CM: 584.9  12/15/2018 - 12/21/2018        RESOLVED: Hypoxia ICD-10-CM: R09.02  ICD-9-CM: 799.02  8/22/2018 - 12/21/2018              Medications reviewed  Current Facility-Administered Medications   Medication Dose Route Frequency    cefTRIAXone (ROCEPHIN) 1 g in 0.9% sodium chloride (MBP/ADV) 50 mL MBP  1 g IntraVENous Q24H    apixaban (ELIQUIS) tablet 5 mg  5 mg Oral BID    sodium chloride (NS) flush 5-40 mL  5-40 mL IntraVENous Q8H    sodium chloride (NS) flush 5-40 mL  5-40 mL IntraVENous PRN    naloxone (NARCAN) injection 0.4 mg  0.4 mg IntraVENous PRN    calcium-vitamin D (OS-LUCÍA +D3) 500 mg-200 unit per tablet 1 Tab  1 Tab Oral TID WITH MEALS    senna-docusate (PERICOLACE) 8.6-50 mg per tablet 1 Tab  1 Tab Oral BID    polyethylene glycol (MIRALAX) packet 17 g  17 g Oral DAILY    bisacodyL (DULCOLAX) suppository 10 mg  10 mg Rectal DAILY PRN    sodium chloride (NS) flush 5-40 mL  5-40 mL IntraVENous PRN    acetaminophen (TYLENOL) tablet 650 mg  650 mg Oral Q6H PRN    Or    acetaminophen (TYLENOL) suppository 650 mg  650 mg Rectal Q6H PRN    polyethylene glycol (MIRALAX) packet 17 g  17 g Oral DAILY PRN    promethazine (PHENERGAN) tablet 12.5 mg  12.5 mg Oral Q6H PRN    memantine (NAMENDA) tablet 10 mg  10 mg Oral BID    [Held by provider] losartan (COZAAR) tablet 50 mg  50 mg Oral DAILY    traMADoL (ULTRAM) tablet 50 mg  50 mg Oral Q6H PRN    fentaNYL citrate (PF) injection 25 mcg  25 mcg IntraVENous Q4H PRN       Care Plan discussed with: Patient/family, nurse      Michael Walker NP  Hospitalist  Providers can reach me on [employer]

## 2021-02-15 NOTE — PROGRESS NOTES
Problem: Self Care Deficits Care Plan (Adult)  Goal: *Acute Goals and Plan of Care (Insert Text)  Description: FUNCTIONAL STATUS PRIOR TO ADMISSION: Patient was modified independent using a walker for functional mobility. Unsure level of ADLs in Memory Care unit, poor historian this session. HOME SUPPORT: The patient lived at [facility] for [facility] years, supportive family. Occupational Therapy Goals  Initiated 2/11/2021     1. Patient will perform lower body dressing using Reacher and other AE PRN at minimal assistance/contact guard assist level within 7 days. 2. Patient will perform toilet transfers at moderate assistance  level using Walkers, Type: Rolling Walker  within 7 days. 3. Patient will perform all aspects of toileting at moderate assistance  level within 7 days. 4. Patient will perform UB ADLs seated EOB with S for 10 minutes within 7 days. Outcome: Progressing Towards Goal   OCCUPATIONAL THERAPY TREATMENT  Patient: Estuardo Newton (66 y.o. female)  Date: 2/15/2021  Diagnosis: Closed hip fracture ([facility] [S72.009A] <principal problem not specified>  Procedure(s) (LRB):  RIGHT HIP IM  KIRA INSERTION (Right) 5 Days Post-Op  Precautions: DNR, Fall, Skin, WBAT  Chart, occupational therapy assessment, plan of care, and goals were reviewed. ASSESSMENT  Patient continues with skilled OT services and is progressing towards goals. Patient received supine with daughter in room. Although patient still confused and requiring repeated reorientation, note overall improved alertness and participation this session. Patient requiring maximal assistance of two-persons to come to EOB. Tolerated sitting for extended period in prep for seated ADL participation, with several posterior LOBs in static sitting which required assistance to correct as patient with delayed protective responses.  Patient with improved transfer to bedside chair, standing with min assist x2 and completing bed>chair transfer with mod assist x2 with RW and physical assistance for RW management. Once in bedside chair, patient self-feeding after initial set-up - encouraged patient to complete self-care tasks as independently as possible while in acute care setting to facilitate strengthening and endurance. Anticipate need for frequent reinforcement given memory deficits. Patient's daughter present throughout session for all education and training with patient benefiting from her familiar presence and encouragement. Current Level of Function Impacting Discharge (ADLs): up to total A    Other factors to consider for discharge: confusion         PLAN :  Patient continues to benefit from skilled intervention to address the above impairments. Continue treatment per established plan of care. to address goals. Recommend with staff: bed in chair position for all meals; set-up for upper body ADLs    Recommend next OT session: sitting balance for ADLs; LB ADLs    Recommendation for discharge: (in order for the patient to meet his/her long term goals)  Therapy up to 5 days/week in SNF setting    This discharge recommendation:  Has been made in collaboration with the attending provider and/or case management    IF patient discharges home will need the following DME: bedside commode       SUBJECTIVE:   Patient stated I can't eat that.     OBJECTIVE DATA SUMMARY:   Cognitive/Behavioral Status:  Neurologic State: Alert;Confused  Orientation Level: Oriented to person  Cognition: Follows commands;Decreased attention/concentration  Perception: Appears intact  Perseveration: Perseverates during ADLS  Safety/Judgement: Decreased awareness of environment;Decreased awareness of need for safety;Decreased insight into deficits; Fall prevention    Functional Mobility and Transfers for ADLs:  Bed Mobility:  Supine to Sit: Maximum assistance;Assist x2  Scooting: Maximum assistance    Transfers:  Sit to Stand: Minimum assistance;Assist x2   Moderate assistance;Assist x2  Bed to Chair: Moderate assistance;Assist x2;Adaptive equipment; Additional time(with physical A for RW management)    Balance:  Sitting: Impaired  Sitting - Static: Fair (occasional); Poor (constant support)  Sitting - Dynamic: Poor (constant support)  Standing: Impaired  Standing - Static: Constant support;Poor; Fair  Standing - Dynamic : Constant support;Poor    ADL Intervention:  Feeding  Feeding Assistance: Set-up  Container Management: Minimum assistance  Cutting Food: Total assistance (dependent)  Utensil Management: Set-up  Food to Mouth: Modified independent  Drink to Mouth: Modified independent  Cues: Verbal cues provided;Visual cues provided;Physical assistance    Lower Body Dressing Assistance  Dressing Assistance: Total assistance(dependent)  Protective Undergarmet: Total assistance (dependent)  Socks: Total assistance (dependent)    Cognitive Retraining  Orientation Retraining: Reorienting;Place;Situation; Awareness of environment  Safety/Judgement: Decreased awareness of environment;Decreased awareness of need for safety;Decreased insight into deficits; Fall prevention    Patient recalled and demonstrated avoiding extreme planes of movement with Right LE during ADLs and functional mobility with verbal and visual cues. Standing: Patient instructed and demonstrated to walk up to sink/counter top/surfaces, step into walker to increase safety of joint and fall prevention. Patient instructed to increase amount of time standing, observe standing position during ADLs in order to increase even weight bearing through bilateral LEs in order to increase independence with ADLs. Goal to be reached 30 days post - op, per orthopedic surgeon or per PT. Patient indicated understanding. Pain:  Patient with few reports, tolerated session well.     Activity Tolerance:   Fair and requires rest breaks    After treatment patient left in no apparent distress:   Sitting in chair, Call bell within reach, Bed / chair alarm activated, and Caregiver / family present    COMMUNICATION/COLLABORATION:   The patients plan of care was discussed with: Physical therapist and Registered nurse.      Abdirahman Aldana OT  Time Calculation: 32 mins

## 2021-02-15 NOTE — PROGRESS NOTES
Bedside and Verbal shift change report given to Stephon Sotomayor RN (oncoming nurse) by Kaleigh Serna RN (offgoing nurse). Report included the following information SBAR.

## 2021-02-16 VITALS
DIASTOLIC BLOOD PRESSURE: 78 MMHG | TEMPERATURE: 98 F | OXYGEN SATURATION: 100 % | HEART RATE: 88 BPM | RESPIRATION RATE: 12 BRPM | SYSTOLIC BLOOD PRESSURE: 154 MMHG | BODY MASS INDEX: 27.97 KG/M2 | WEIGHT: 152 LBS | HEIGHT: 62 IN

## 2021-02-16 LAB
ANION GAP SERPL CALC-SCNC: 6 MMOL/L (ref 5–15)
BASOPHILS # BLD: 0 K/UL (ref 0–0.1)
BASOPHILS NFR BLD: 0 % (ref 0–1)
BUN SERPL-MCNC: 16 MG/DL (ref 6–20)
BUN/CREAT SERPL: 23 (ref 12–20)
CALCIUM SERPL-MCNC: 9 MG/DL (ref 8.5–10.1)
CHLORIDE SERPL-SCNC: 108 MMOL/L (ref 97–108)
CO2 SERPL-SCNC: 25 MMOL/L (ref 21–32)
CREAT SERPL-MCNC: 0.71 MG/DL (ref 0.55–1.02)
DIFFERENTIAL METHOD BLD: ABNORMAL
EOSINOPHIL # BLD: 0.5 K/UL (ref 0–0.4)
EOSINOPHIL NFR BLD: 6 % (ref 0–7)
ERYTHROCYTE [DISTWIDTH] IN BLOOD BY AUTOMATED COUNT: 14.1 % (ref 11.5–14.5)
GLUCOSE SERPL-MCNC: 116 MG/DL (ref 65–100)
HCT VFR BLD AUTO: 33.6 % (ref 35–47)
HGB BLD-MCNC: 10.8 G/DL (ref 11.5–16)
IMM GRANULOCYTES # BLD AUTO: 0.1 K/UL (ref 0–0.04)
IMM GRANULOCYTES NFR BLD AUTO: 1 % (ref 0–0.5)
LYMPHOCYTES # BLD: 1.5 K/UL (ref 0.8–3.5)
LYMPHOCYTES NFR BLD: 18 % (ref 12–49)
MCH RBC QN AUTO: 29.8 PG (ref 26–34)
MCHC RBC AUTO-ENTMCNC: 32.1 G/DL (ref 30–36.5)
MCV RBC AUTO: 92.8 FL (ref 80–99)
MONOCYTES # BLD: 0.8 K/UL (ref 0–1)
MONOCYTES NFR BLD: 10 % (ref 5–13)
NEUTS SEG # BLD: 5.5 K/UL (ref 1.8–8)
NEUTS SEG NFR BLD: 65 % (ref 32–75)
NRBC # BLD: 0 K/UL (ref 0–0.01)
NRBC BLD-RTO: 0 PER 100 WBC
PLATELET # BLD AUTO: 236 K/UL (ref 150–400)
PMV BLD AUTO: 11.8 FL (ref 8.9–12.9)
POTASSIUM SERPL-SCNC: 4 MMOL/L (ref 3.5–5.1)
RBC # BLD AUTO: 3.62 M/UL (ref 3.8–5.2)
SODIUM SERPL-SCNC: 139 MMOL/L (ref 136–145)
WBC # BLD AUTO: 8.4 K/UL (ref 3.6–11)

## 2021-02-16 PROCEDURE — 74011250637 HC RX REV CODE- 250/637: Performed by: ORTHOPAEDIC SURGERY

## 2021-02-16 PROCEDURE — 74011250637 HC RX REV CODE- 250/637: Performed by: HOSPITALIST

## 2021-02-16 PROCEDURE — 74011000258 HC RX REV CODE- 258: Performed by: HOSPITALIST

## 2021-02-16 PROCEDURE — 74011250636 HC RX REV CODE- 250/636: Performed by: HOSPITALIST

## 2021-02-16 PROCEDURE — 36415 COLL VENOUS BLD VENIPUNCTURE: CPT

## 2021-02-16 PROCEDURE — 94760 N-INVAS EAR/PLS OXIMETRY 1: CPT

## 2021-02-16 PROCEDURE — 80048 BASIC METABOLIC PNL TOTAL CA: CPT

## 2021-02-16 PROCEDURE — 85025 COMPLETE CBC W/AUTO DIFF WBC: CPT

## 2021-02-16 PROCEDURE — 77010033678 HC OXYGEN DAILY

## 2021-02-16 PROCEDURE — 97535 SELF CARE MNGMENT TRAINING: CPT

## 2021-02-16 RX ORDER — TRAMADOL HYDROCHLORIDE 50 MG/1
50 TABLET ORAL
Qty: 12 TAB | Refills: 0 | Status: SHIPPED | OUTPATIENT
Start: 2021-02-16 | End: 2021-02-19

## 2021-02-16 RX ADMIN — CEFTRIAXONE SODIUM 1 G: 1 INJECTION, POWDER, FOR SOLUTION INTRAMUSCULAR; INTRAVENOUS at 07:17

## 2021-02-16 RX ADMIN — MEMANTINE 10 MG: 5 TABLET ORAL at 08:49

## 2021-02-16 RX ADMIN — APIXABAN 5 MG: 2.5 TABLET, FILM COATED ORAL at 08:49

## 2021-02-16 RX ADMIN — Medication 1 TABLET: at 11:49

## 2021-02-16 RX ADMIN — Medication 1 TABLET: at 08:49

## 2021-02-16 NOTE — PROGRESS NOTES
Problem: Falls - Risk of  Goal: *Absence of Falls  Description: Document Silverio Reyes Fall Risk and appropriate interventions in the flowsheet.   Outcome: Resolved/Met

## 2021-02-16 NOTE — PROGRESS NOTES
Report called to [facility] Angelique(Kimi Sue). RN had no questions or concerns regarding report. SBAR, KARDEX and MAR Discussed. Patient transported to facility by [facility].

## 2021-02-16 NOTE — PROGRESS NOTES
Problem: Self Care Deficits Care Plan (Adult)  Goal: *Acute Goals and Plan of Care (Insert Text)  Description: FUNCTIONAL STATUS PRIOR TO ADMISSION: Patient was modified independent using a walker for functional mobility. Unsure level of ADLs in Memory Care unit, poor historian this session. HOME SUPPORT: The patient lived at [facility] for 2 years, supportive family. Occupational Therapy Goals  Initiated 2/11/2021     1. Patient will perform lower body dressing using Reacher and other AE PRN at minimal assistance/contact guard assist level within 7 days. 2. Patient will perform toilet transfers at moderate assistance  level using Walkers, Type: Rolling Walker  within 7 days. 3. Patient will perform all aspects of toileting at moderate assistance  level within 7 days. 4. Patient will perform UB ADLs seated EOB with S for 10 minutes within 7 days. Outcome: Progressing Towards Goal    OCCUPATIONAL THERAPY TREATMENT  Patient: Syed Zarate (58 y.o. female)  Date: 2/16/2021  Diagnosis: Closed hip fracture ([facility] [S72.009A] <principal problem not specified>  Procedure(s) (LRB):  RIGHT HIP IM  KIRA INSERTION (Right) 6 Days Post-Op  Precautions: DNR, Fall, Skin, WBAT  Chart, occupational therapy assessment, plan of care, and goals were reviewed. ASSESSMENT  Patient continues with skilled OT services and is progressing towards goals. Pt progressing with bed mobility and LE dressing, with good engagement when provided simple, directive, multi-modal cues for engagement, as well as, extended time; however, she continues to require Total A for LE ADLs and Max A for rolling, with noted fear of falling evidenced through pt grasping out for whatever she could reach during rolling. Pt's daughter present and educated on recommendation for weighted blanket, as well as, voice recorded stuff animal to assist with pt's reported anxiety; good understanding verbalized.   Pt on track for discharge back to locked memory unit at [facility] and is safe for this discharge from a self-care standpoint, with acute OT to continue to follow in the event of delay in discharge. Current Level of Function Impacting Discharge (ADLs): Total A    Other factors to consider for discharge: Total A         PLAN :  Patient continues to benefit from skilled intervention to address the above impairments. Continue treatment per established plan of care. to address goals. Recommend next OT session: POC progression    Recommendation for discharge: (in order for the patient to meet his/her long term goals)  Therapy up to 5 days/week in SNF setting    This discharge recommendation:  Has been made in collaboration with the attending provider and/or case management    IF patient discharges home will need the following DME: TBD       SUBJECTIVE:   Patient stated i'm falling.     OBJECTIVE DATA SUMMARY:   Cognitive/Behavioral Status:  Neurologic State: Alert;Confused  Orientation Level: Oriented to person;Disoriented to place; Disoriented to situation;Disoriented to time  Cognition: Follows commands     Perseveration: Perseverates during conversation;Perseverates during mobility;Perseverates during ADLS(on wanting to go home)  Safety/Judgement: Decreased awareness of environment;Decreased awareness of need for assistance;Decreased awareness of need for safety;Decreased insight into deficits    Functional Mobility and Transfers for ADLs:  Bed Mobility:  Rolling: Maximum assistance    ADL Intervention:    Lower Body Bathing  Lower Body : Maximum assistance  Position Performed: Supine(sidelying)  Cues: Verbal cues provided; Tactile cues provided;Visual cues provided;Physical assistance(for hand placement and rolling technique)    Lower Body Dressing Assistance  Dressing Assistance:  Total assistance(dependent)(with extended time)  Position Performed: Supine(side rolling)    Cognitive Retraining  Safety/Judgement: Decreased awareness of environment;Decreased awareness of need for assistance;Decreased awareness of need for safety;Decreased insight into deficits    Pain:  Pt indicated high amounts of pain in RLE during mobility/balance challenges, did not quantify; nursing aware and following    Activity Tolerance:   Poor and requires frequent rest breaks    After treatment patient left in no apparent distress:   Supine in bed, Call bell within reach, Bed / chair alarm activated, Caregiver / family present, and Side rails x 3    COMMUNICATION/COLLABORATION:   The patients plan of care was discussed with: Physical therapist, Registered nurse, and Case management.      Vera Golden OT  Time Calculation: 29 mins

## 2021-02-16 NOTE — PROGRESS NOTES
NATALIE: Plan for discharge to [facility] when medically stable. Noted negative COVID test 2/14. [facility] transport time set for Tigre Gustafson.     Chart reviewed. CM called [facility] admissions [phone] and left message to find out if they can accept today. 12:32 PM: CM called [facility] again, and spoke with Sara. Confirmed they can accept patient today. CM notified Dr. Randa Segal. CM requested transport with [facility] They cannot do transport until 5:15. The daughter would prefer transport be sooner.     CM arranged transport with [facility] for 2:30 PM.     Iris Valerio, MILAGROW/CRM

## 2021-02-16 NOTE — PROGRESS NOTES
Bedside and Verbal shift change report given to Jazzy Cast RN (oncoming nurse) by Bryanna Gleason RN (offgoing nurse). Report included the following information SBAR.

## 2021-02-16 NOTE — DISCHARGE INSTRUCTIONS
Post op Discharge Instructions Hip Fracture   Hunter Huitron MD  [facility]  [phone]    Patient Name: Maria Antonia Alaniz  Date of admission:  2/9/2021  Date of procedure: 2/10/2021   Procedure: Procedure(s):  RIGHT HIP IM  KIRA INSERTION  PCP: Tex Pelaez MD  Date of discharge: No discharge date for patient encounter. Follow up office visit    See Dr. Zeyad Palomo approximately 3-4 weeks from date of surgery. Call [phone] to make an appointment. Activity   Walk with your walker as instructed by your physical therapist. Continue using your walker until seen for follow-up visit.  Perform your exercise routine 3 times a day as instructed by the physical therapist.  Mark Beard Get up frequently and walk (with assistance as needed)   You may not drive    Incision Care   Keep a dressing on your incision and change daily. Once you incision is not draining, you may leave it open to air  [facility] hands thoroughly before changing the dressing.  You may take a shower when your incision is dry. Do not take a tub bath or go swimming. Preventing blood clots   Lovenox injection 40mg sub q once daily until 14 days post-op then Enteric coated Aspirin 325 mg one tablet once daily for two weeks    Pain management   Take pain medication as prescribed; decrease the amount you take as your pain lessens   Avoid alcoholic beverages while taking pain medications   Place an ice bag on the hip for 15-20 minutes after exercising and as needed throughout the day and night    Diet   Resume usual diet; encourage fluids; eat foods high in fiber, calcium and vitamin D.   You may want to take a stool softener (such as Senokot-S or Colace) to prevent constipation while you are taking pain medication.  If constipation occurs, take a laxative (such as Dulcolax tablets, Miralax, or a suppository)      When to call your Orthopaedic Surgeon.  If you call after 5pm or on a weekend, the on call physician will be contacted   Pain that is not relieved by pain medication, ice, activity   Signs of infection  o Incision is reddened  o Incision continues to drain; drainage has an odor  o Persistent fever over 101 degrees   Signs of a blood clot in your leg  o Calf pain, tenderness, redness and/or swelling of lower leg    When to call your Primary Care Physician   Concerns about medical conditions such as diabetes, high blood pressure, asthma, congestive heart failure   Call if blood sugars are elevated, persistent headache or dizziness, coughing or congestion, constipation or diarrhea, burning with urination, abnormal heart rate (slow or fast)    When to call 911 and go to the nearest emergency room   Acute onset of chest pain, shortness of breath, difficulty breathing       Discharge SNF/Rehab Instructions/LTAC       PATIENT ID: Ofelia Mayer  MRN: [id number]   YOB: 1929    DATE OF ADMISSION: 2/9/2021  5:36 PM    DATE OF DISCHARGE: 2/16/2021    PRIMARY CARE PROVIDER: Jade Eden MD       ATTENDING PHYSICIAN: Ken Sethi MD  DISCHARGING PROVIDER: Chelsey Jules MD     To contact this individual call [phone] and ask the  to page. If unavailable ask to be transferred the Adult Hospitalist Department. CONSULTATIONS: IP CONSULT TO HOSPITALIST    PROCEDURES/SURGERIES: Procedure(s):  RIGHT HIP IM  KIRA INSERTION    ADMITTING DIAGNOSES & HOSPITAL COURSE:   From H&P 02/09/2021:   Katie evans lady with dementia, HTN, hyperlipidemia, who presents after a ground-level fall. She is a poor historian and doesn't recall what happened exactly. She reports right hip pain with any movement of her right leg. She offers no other complaints and is pain-free if she lays still. \"     Closed right femoral intertrochanteric fracture, traumatic due to fall  -Underwent fixation of the right intertrochanteric femoral fracture  -Use tylenol for pain,she did not require any strong pain medication for days. Tramadol for breakthrough   -Continue with physical therapy.  -Already on apixaban. History of PE:  -Continue apixaban     Dementia: without behavioral disturbances: Patient remained pleasantly and cooperative for the most part,albeit confused. HTN:   -Resume losartan. Hyperlipidemia  - Not on home meds    UTI.completed antibiotics. PENDING TEST RESULTS:   At the time of discharge the following test results are still pending: none    FOLLOW UP APPOINTMENTS:    Follow-up Information    None          ADDITIONAL CARE RECOMMENDATIONS:     DIET: Regular Diet and Cardiac Diet      OXYGEN / BiPAP SETTINGS: 2 L/min via nasal canula. ACTIVITY: Activity as tolerated and PT/OT Eval and Treat      EQUIPMENT needed: TBD      DISCHARGE MEDICATIONS:   See Medication Reconciliation Form      NOTIFY YOUR PHYSICIAN FOR ANY OF THE FOLLOWING:   Fever over 101 degrees for 24 hours. Chest pain, shortness of breath, fever, chills, nausea, vomiting, diarrhea, change in mentation, falling, weakness, bleeding. Severe pain or pain not relieved by medications. Or, any other signs or symptoms that you may have questions about. DISPOSITION:    Home With:   OT  PT  HH  RN      x SNF/Inpatient Rehab/LTAC    Independent/assisted living    Hospice    Other:       PATIENT CONDITION AT DISCHARGE:     Functional status    Poor    x Deconditioned     Independent      Cognition     Lucid     Forgetful    x Dementia      Catheters/lines (plus indication)    Carey     PICC     PEG    x None      Code status     Full code    x DNR      PHYSICAL EXAMINATION AT DISCHARGE:  General:  Alert, cooperative, no distress, confused which is baseline. Lungs:   Clear to auscultation bilaterally. Heart:  Regular rate and rhythm, S1, S2 normal, no murmur, click, rub or gallop. Abdomen:   Soft, non-tender, non-distended. Bowel sounds normal.    Extremities: Extremities normal, no cyanosis or edema. Right humeral surgical wound bandaged. Skin: Skin color, texture, turgor normal. No rashes or lesions   Neurologic:  Alert but confused. CNII-XII grossly intact. Oriented to self only,person           CHRONIC MEDICAL DIAGNOSES:  Problem List as of 2/16/2021 Date Reviewed: 12/21/2018          Codes Class Noted - Resolved    Closed hip fracture ([facility] ICD-10-CM: C89.449N  ICD-9-CM: 820.8  2/9/2021 - Present        Fall ICD-10-CM: W19. Fransisco Poplar  ICD-9-CM: E888.9  8/23/2018 - Present        Unwitnessed fall ICD-10-CM: R29.6  ICD-9-CM: Nathalie Ards  8/22/2018 - Present        Hypercholesteremia ICD-10-CM: E78.00  ICD-9-CM: 272.0  5/27/2010 - Present    Overview Signed 5/27/2010 12:56 PM by Gudelia Bank     1980's             Osteoporosis ICD-10-CM: M81.0  ICD-9-CM: 733.00  5/27/2010 - Present        IBS (irritable bowel syndrome) ICD-10-CM: K58.9  ICD-9-CM: 564.1  5/27/2010 - Present        OA (osteoarthritis) ICD-10-CM: M19.90  ICD-9-CM: 715.90  5/27/2010 - Present        RESOLVED: Syncope ICD-10-CM: R55  ICD-9-CM: 780.2  12/15/2018 - 12/21/2018        RESOLVED: UTI (urinary tract infection) ICD-10-CM: N39.0  ICD-9-CM: 599.0  12/15/2018 - 12/21/2018        RESOLVED: KOLBY (acute kidney injury) ([facility] ICD-10-CM: N17.9  ICD-9-CM: 584.9  12/15/2018 - 12/21/2018        RESOLVED: Hypoxia ICD-10-CM: R09.02  ICD-9-CM: 799.02  8/22/2018 - 12/21/2018                    Signed:    Brit De Los Santos MD  2/16/2021  12:34 PM

## 2021-02-17 ENCOUNTER — PATIENT OUTREACH (OUTPATIENT)
Dept: CASE MANAGEMENT | Age: 86
End: 2021-02-17

## 2021-02-17 NOTE — PROGRESS NOTES
Transition of care outreach postponed for 14 days due to patient's discharge to SNF.   [facility] 2/9-2/16/21 closed Hip Fx. d/c ni paige f/u 14d

## 2021-02-26 NOTE — PROGRESS NOTES
Physician Progress Note      PATIENTTawsonali Matos  [facility] #:                  [id number]  :                       10/14/1929  ADMIT DATE:       2021 5:36 PM  [address] [place] DATE:        2021 2:52 PM  RESPONDING  PROVIDER #:        Naren Garcia MD          QUERY TEXT:    Patient admitted with R hip fracture. Noted documentation of UTI in D/C Summary . In order to support the diagnosis of UTI, please include additional clinical indicators in your documentation. Or please document if the diagnosis of UTI has been ruled out after further study. The medical record reflects the following:  Risk Factors: 79 y/o female admitted with R hip fracture post ground level fall, hx of HTN, hyperlipidemia, Dementia, UTI  Clinical Indicators: Increased confusion and delirium, T. 99.1, , U/A +blood, +nitrates +1 Bacteria, UC negative for growth,  Treatment: Rocephin, Ancef, IVFs, Surgical intervention for R hip, Ortho Surgery following,  Options provided:  -- UTI present as evidenced by, Please document evidence. -- UTI was ruled out  -- Other - I will add my own diagnosis  -- Disagree - Not applicable / Not valid  -- Disagree - Clinically unable to determine / Unknown  -- Refer to Clinical Documentation Reviewer    PROVIDER RESPONSE TEXT:    UTI is present as evidenced by urine exam.Urine culture negative but unfortunately it was collected after initiation of abx    Query created by: Omi Taylor on 2021 8:25 AM      QUERY TEXT:    Patient was admitted with a femur fracture and underwent surgical repair. Estimated blood loss from surgery was minimal. HGB was noted to drop from 15.7-10.7. H&H was monitored. If possible, please document in the progress notes and discharge summary if you are evaluating and/or treating any of the following:    After study, was this patient suspected to have:     The medical record reflects the following:  Risk Factors: 79 y/o female admitted with R hip fracture post ground level fall, hx of HTN, hyperlipidemia, Dementia, UTI, PE  Clinical Indicators: Increased confusion and delirium, Hgb dropped 15.5 to 11.4 then  10.7, , per Op Note estimated blood loss from surgery was minimal  Treatment: on Eliquis, H&H was monitored, Rocephin, Ancef, IVFs, Surgical intervention for R hip, Ortho Surgery following, PT/OT Eval and Treat  Options provided:  -- Acute blood loss anemia  -- Chronic blood loss anemia  -- Acute on chronic blood loss anemia  -- Iron deficiency anemia  -- Postoperative acute blood loss anemia  -- Dilutional anemia  -- Precipitous drop in Hemoglobin and Hematocrit  -- Other - I will add my own diagnosis  -- Disagree - Not applicable / Not valid  -- Disagree - Clinically unable to determine / Unknown  -- Refer to Clinical Documentation Reviewer    PROVIDER RESPONSE TEXT:    This patient has acute blood loss anemia. Query created by:  Shauna Buchanan on 2/22/2021 8:39 AM      Electronically signed by:  Viet Avila MD 2/25/2021 9:39 PM

## 2021-03-08 ENCOUNTER — PATIENT OUTREACH (OUTPATIENT)
Dept: CASE MANAGEMENT | Age: 86
End: 2021-03-08

## 2021-03-08 NOTE — PROGRESS NOTES
Transition of care outreach postponed for 14 days due to patient's discharge to SNF.   D/c ni [facility] 2/16/21 f/u at [facility]

## 2021-03-17 ENCOUNTER — PATIENT OUTREACH (OUTPATIENT)
Dept: CASE MANAGEMENT | Age: 86
End: 2021-03-17

## 2021-03-17 NOTE — PROGRESS NOTES
Transition of care outreach postponed for 14 days due to patient's discharge to SNF.  d/c to [facility] 2/16/21 still admitted at 30d episode resolved

## 2022-03-18 PROBLEM — S72.009A CLOSED HIP FRACTURE (HCC): Status: ACTIVE | Noted: 2021-02-09

## 2022-03-19 PROBLEM — R29.6 UNWITNESSED FALL: Status: ACTIVE | Noted: 2018-08-22
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
## 2022-11-14 ENCOUNTER — HOSPITAL ENCOUNTER (INPATIENT)
Age: 87
LOS: 11 days | Discharge: HOME | DRG: 871 | End: 2022-11-25
Attending: EMERGENCY MEDICINE | Admitting: FAMILY MEDICINE
Payer: MEDICARE

## 2022-11-14 ENCOUNTER — APPOINTMENT (OUTPATIENT)
Dept: CT IMAGING | Age: 87
DRG: 871 | End: 2022-11-14
Attending: EMERGENCY MEDICINE
Payer: MEDICARE

## 2022-11-14 ENCOUNTER — APPOINTMENT (OUTPATIENT)
Dept: GENERAL RADIOLOGY | Age: 87
DRG: 871 | End: 2022-11-14
Attending: EMERGENCY MEDICINE
Payer: MEDICARE

## 2022-11-14 DIAGNOSIS — R63.4 WEIGHT LOSS: ICD-10-CM

## 2022-11-14 DIAGNOSIS — R53.81 DEBILITY: ICD-10-CM

## 2022-11-14 DIAGNOSIS — Z51.5 PALLIATIVE CARE ENCOUNTER: ICD-10-CM

## 2022-11-14 DIAGNOSIS — G30.1 SEVERE LATE ONSET ALZHEIMER'S DEMENTIA WITHOUT BEHAVIORAL DISTURBANCE, PSYCHOTIC DISTURBANCE, MOOD DISTURBANCE, OR ANXIETY (HCC): ICD-10-CM

## 2022-11-14 DIAGNOSIS — F02.C0 SEVERE LATE ONSET ALZHEIMER'S DEMENTIA WITHOUT BEHAVIORAL DISTURBANCE, PSYCHOTIC DISTURBANCE, MOOD DISTURBANCE, OR ANXIETY (HCC): ICD-10-CM

## 2022-11-14 DIAGNOSIS — E87.20 LACTIC ACIDOSIS: ICD-10-CM

## 2022-11-14 DIAGNOSIS — R06.02 SHORTNESS OF BREATH: ICD-10-CM

## 2022-11-14 DIAGNOSIS — R09.02 HYPOXIA: Primary | ICD-10-CM

## 2022-11-14 LAB
ALBUMIN SERPL-MCNC: 2.7 G/DL (ref 3.5–5)
ALBUMIN/GLOB SERPL: 0.6 {RATIO} (ref 1.1–2.2)
ALP SERPL-CCNC: 942 U/L (ref 45–117)
ALT SERPL-CCNC: 59 U/L (ref 12–78)
ANION GAP SERPL CALC-SCNC: 11 MMOL/L (ref 5–15)
AST SERPL-CCNC: 48 U/L (ref 15–37)
BASE DEFICIT BLD-SCNC: 2.5 MMOL/L
BASOPHILS # BLD: 0 K/UL (ref 0–0.1)
BASOPHILS NFR BLD: 0 % (ref 0–1)
BILIRUB SERPL-MCNC: 0.5 MG/DL (ref 0.2–1)
BUN SERPL-MCNC: 20 MG/DL (ref 6–20)
BUN/CREAT SERPL: 18 (ref 12–20)
CALCIUM SERPL-MCNC: 9.5 MG/DL (ref 8.5–10.1)
CHLORIDE SERPL-SCNC: 99 MMOL/L (ref 97–108)
CO2 SERPL-SCNC: 27 MMOL/L (ref 21–32)
COVID-19 RAPID TEST, COVR: NOT DETECTED
CREAT SERPL-MCNC: 1.13 MG/DL (ref 0.55–1.02)
DIFFERENTIAL METHOD BLD: ABNORMAL
EOSINOPHIL # BLD: 0 K/UL (ref 0–0.4)
EOSINOPHIL NFR BLD: 0 % (ref 0–7)
ERYTHROCYTE [DISTWIDTH] IN BLOOD BY AUTOMATED COUNT: 13.2 % (ref 11.5–14.5)
GLOBULIN SER CALC-MCNC: 4.6 G/DL (ref 2–4)
GLUCOSE SERPL-MCNC: 239 MG/DL (ref 65–100)
HCO3 BLD-SCNC: 23.2 MMOL/L (ref 22–26)
HCT VFR BLD AUTO: 42.8 % (ref 35–47)
HGB BLD-MCNC: 13.7 G/DL (ref 11.5–16)
IMM GRANULOCYTES # BLD AUTO: 0 K/UL (ref 0–0.04)
IMM GRANULOCYTES NFR BLD AUTO: 0 % (ref 0–0.5)
LACTATE BLD-SCNC: 2.03 MMOL/L (ref 0.4–2)
LACTATE SERPL-SCNC: 1 MMOL/L (ref 0.4–2)
LACTATE SERPL-SCNC: 3.7 MMOL/L (ref 0.4–2)
LYMPHOCYTES # BLD: 0.5 K/UL (ref 0.8–3.5)
LYMPHOCYTES NFR BLD: 5 % (ref 12–49)
MCH RBC QN AUTO: 29.8 PG (ref 26–34)
MCHC RBC AUTO-ENTMCNC: 32 G/DL (ref 30–36.5)
MCV RBC AUTO: 93.2 FL (ref 80–99)
MONOCYTES # BLD: 0.7 K/UL (ref 0–1)
MONOCYTES NFR BLD: 8 % (ref 5–13)
NEUTS SEG # BLD: 8.2 K/UL (ref 1.8–8)
NEUTS SEG NFR BLD: 87 % (ref 32–75)
NRBC # BLD: 0 K/UL (ref 0–0.01)
NRBC BLD-RTO: 0 PER 100 WBC
PCO2 BLD: 42.8 MMHG (ref 35–45)
PH BLD: 7.34 [PH] (ref 7.35–7.45)
PLATELET # BLD AUTO: 261 K/UL (ref 150–400)
PMV BLD AUTO: 11.8 FL (ref 8.9–12.9)
PO2 BLD: 42 MMHG (ref 80–100)
POTASSIUM SERPL-SCNC: 3.8 MMOL/L (ref 3.5–5.1)
PROT SERPL-MCNC: 7.3 G/DL (ref 6.4–8.2)
RBC # BLD AUTO: 4.59 M/UL (ref 3.8–5.2)
SAO2 % BLD: 74.6 % (ref 92–97)
SERVICE CMNT-IMP: ABNORMAL
SERVICE CMNT-IMP: ABNORMAL
SODIUM SERPL-SCNC: 137 MMOL/L (ref 136–145)
SOURCE, COVRS: NORMAL
SPECIMEN TYPE: ABNORMAL
TROPONIN-HIGH SENSITIVITY: 23 NG/L (ref 0–51)
WBC # BLD AUTO: 9.5 K/UL (ref 3.6–11)

## 2022-11-14 PROCEDURE — 74011000636 HC RX REV CODE- 636: Performed by: EMERGENCY MEDICINE

## 2022-11-14 PROCEDURE — 36415 COLL VENOUS BLD VENIPUNCTURE: CPT

## 2022-11-14 PROCEDURE — 65270000046 HC RM TELEMETRY

## 2022-11-14 PROCEDURE — 80053 COMPREHEN METABOLIC PANEL: CPT

## 2022-11-14 PROCEDURE — 83605 ASSAY OF LACTIC ACID: CPT

## 2022-11-14 PROCEDURE — 84484 ASSAY OF TROPONIN QUANT: CPT

## 2022-11-14 PROCEDURE — 71045 X-RAY EXAM CHEST 1 VIEW: CPT

## 2022-11-14 PROCEDURE — 74011250636 HC RX REV CODE- 250/636: Performed by: EMERGENCY MEDICINE

## 2022-11-14 PROCEDURE — 87040 BLOOD CULTURE FOR BACTERIA: CPT

## 2022-11-14 PROCEDURE — 71275 CT ANGIOGRAPHY CHEST: CPT

## 2022-11-14 PROCEDURE — 99285 EMERGENCY DEPT VISIT HI MDM: CPT

## 2022-11-14 PROCEDURE — 85025 COMPLETE CBC W/AUTO DIFF WBC: CPT

## 2022-11-14 PROCEDURE — 87635 SARS-COV-2 COVID-19 AMP PRB: CPT

## 2022-11-14 RX ORDER — LORATADINE 10 MG/1
10 TABLET ORAL DAILY
COMMUNITY

## 2022-11-14 RX ORDER — CHOLECALCIFEROL TAB 125 MCG (5000 UNIT) 125 MCG
5000 TAB ORAL DAILY
COMMUNITY

## 2022-11-14 RX ORDER — GUAIFENESIN 100 MG/5ML
200 SOLUTION ORAL
COMMUNITY

## 2022-11-14 RX ORDER — SODIUM CHLORIDE 0.9 % (FLUSH) 0.9 %
5-10 SYRINGE (ML) INJECTION AS NEEDED
Status: DISCONTINUED | OUTPATIENT
Start: 2022-11-14 | End: 2022-11-25 | Stop reason: HOSPADM

## 2022-11-14 RX ORDER — ATORVASTATIN CALCIUM 10 MG/1
10 TABLET, FILM COATED ORAL DAILY
COMMUNITY

## 2022-11-14 RX ORDER — MODAFINIL 100 MG/1
100 TABLET ORAL
COMMUNITY

## 2022-11-14 RX ADMIN — SODIUM CHLORIDE 359 ML: 9 INJECTION, SOLUTION INTRAVENOUS at 13:47

## 2022-11-14 RX ADMIN — IOPAMIDOL 80 ML: 755 INJECTION, SOLUTION INTRAVENOUS at 15:25

## 2022-11-14 RX ADMIN — SODIUM CHLORIDE 1000 ML: 9 INJECTION, SOLUTION INTRAVENOUS at 12:33

## 2022-11-14 NOTE — ED TRIAGE NOTES
Patient arrives by EMS from [facility] for complaints of cough and generalized weakness over the past two days. Per EMS the patient's family and staff at [facility] are \"concerned for sepsis. \"

## 2022-11-14 NOTE — ED NOTES
Patient repositioned in stretcher for comfort and warm blanket given. IVF complete. Patient afebrile and VSS.

## 2022-11-14 NOTE — ED NOTES
Spoke with Dr Venecia Palacios regarding IVF orders. Clarified that the total IVF to be administered is 1,359 mL. First liter of Normal Saline infused. 359 mL remaining fluid bolus infusing at this time. Also sought clarification if another lactic acid needs to be drawn after IVFs are complete. Awaiting additional orders.

## 2022-11-14 NOTE — ED PROVIDER NOTES
Date of Service:  11/14/2022    Patient:  Thomas Jean Baptiste    Chief Complaint:  Shortness of Breath       HPI:  Thomas Jean Baptiste is a 80 y.o.  female who presents for evaluation of shortness of breath. Patient with dementia unable to provide any history. From nursing facility with concerns for sepsis by family due to increased work of breathing and high heart rate. No other information is immediately available       Past Medical History:   Diagnosis Date    Dementia ([facility]     Hypercholesteremia 5/27/2010    Hypertension     IBS (irritable bowel syndrome) 5/27/2010    OA (osteoarthritis) 5/27/2010    Osteoporosis 5/27/2010    Pulmonary embolism ([facility]        History reviewed. No pertinent surgical history. History reviewed. No pertinent family history. Social History     Socioeconomic History    Marital status:      Spouse name: Not on file    Number of children: Not on file    Years of education: Not on file    Highest education level: Not on file   Occupational History    Not on file   Tobacco Use    Smoking status: Never    Smokeless tobacco: Never   Substance and Sexual Activity    Alcohol use: Not Currently    Drug use: Not Currently    Sexual activity: Not on file   Other Topics Concern    Not on file   Social History Narrative    Not on file     Social Determinants of Health     Financial Resource Strain: Not on file   Food Insecurity: Not on file   Transportation Needs: Not on file   Physical Activity: Not on file   Stress: Not on file   Social Connections: Not on file   Intimate Partner Violence: Not on file   Housing Stability: Not on file         ALLERGIES: Aricept [donepezil] and Flexeril [cyclobenzaprine]    Review of Systems   Unable to perform ROS: Dementia     Vitals:    11/14/22 1029   BP: 122/68   Pulse: (!) 111   Resp: 20   Temp: 98.9 °F (37.2 °C)   SpO2: 90%   Weight: 45.3 kg (99 lb 13.9 oz)   Height: 5' (1.524 m)            Physical Exam  Vitals and nursing note reviewed. Constitutional:       Appearance: She is not ill-appearing or toxic-appearing. HENT:      Head: Normocephalic and atraumatic. Mouth/Throat:      Mouth: Mucous membranes are moist.   Cardiovascular:      Rate and Rhythm: Regular rhythm. Tachycardia present. Pulmonary:      Effort: No tachypnea or accessory muscle usage. Breath sounds: Rhonchi and rales present. Chest:      Chest wall: No mass or tenderness. Abdominal:      Palpations: Abdomen is soft. Musculoskeletal:      Right lower leg: No edema. Left lower leg: No edema. Skin:     General: Skin is warm. Neurological:      Mental Status: She is alert. Motor: No weakness. Psychiatric:         Mood and Affect: Mood normal.        MDM     Amount and/or Complexity of Data Reviewed  Decide to obtain previous medical records or to obtain history from someone other than the patient: yes      ED Course as of 11/14/22 1623   Mon Nov 14, 2022   1051 EKG 1040  Sinus tachycardia with PVCs. Left axis deviation. Bundle branch block pattern that is incomplete.   No STEMI. [GG]   1203 IMMATURE GRANULOCYTES: 0 [GG]      ED Course User Index  [GG] Sd Perez DO     VITAL SIGNS:  Patient Vitals for the past 4 hrs:   Temp Pulse Resp BP SpO2   11/14/22 1445 -- 82 29 (!) 91/57 99 %   11/14/22 1440 98.6 °F (37 °C) -- -- -- --   11/14/22 1432 -- 84 21 113/89 100 %   11/14/22 1415 -- 86 21 (!) 108/57 97 %   11/14/22 1400 -- 91 16 104/60 (!) 89 %   11/14/22 1344 -- 91 16 (!) 96/54 96 %   11/14/22 1330 -- 93 23 (!) 81/55 96 %   11/14/22 1321 -- (!) 102 18 109/74 100 %   11/14/22 1315 -- 90 18 (!) 157/146 97 %   11/14/22 1245 -- 91 20 (!) 110/98 90 %   11/14/22 1230 -- (!) 101 17 104/65 95 %         LABS:  Recent Results (from the past 6 hour(s))   CBC WITH AUTOMATED DIFF    Collection Time: 11/14/22 10:43 AM   Result Value Ref Range    WBC 9.5 3.6 - 11.0 K/uL    RBC 4.59 3.80 - 5.20 M/uL    HGB 13.7 11.5 - 16.0 g/dL    HCT 42.8 35.0 - 47.0 % MCV 93.2 80.0 - 99.0 FL    MCH 29.8 26.0 - 34.0 PG    MCHC 32.0 30.0 - 36.5 g/dL    RDW 13.2 11.5 - 14.5 %    PLATELET 158 758 - 415 K/uL    MPV 11.8 8.9 - 12.9 FL    NRBC 0.0 0  WBC    ABSOLUTE NRBC 0.00 0.00 - 0.01 K/uL    NEUTROPHILS 87 (H) 32 - 75 %    LYMPHOCYTES 5 (L) 12 - 49 %    MONOCYTES 8 5 - 13 %    EOSINOPHILS 0 0 - 7 %    BASOPHILS 0 0 - 1 %    IMMATURE GRANULOCYTES 0 0.0 - 0.5 %    ABS. NEUTROPHILS 8.2 (H) 1.8 - 8.0 K/UL    ABS. LYMPHOCYTES 0.5 (L) 0.8 - 3.5 K/UL    ABS. MONOCYTES 0.7 0.0 - 1.0 K/UL    ABS. EOSINOPHILS 0.0 0.0 - 0.4 K/UL    ABS. BASOPHILS 0.0 0.0 - 0.1 K/UL    ABS. IMM. GRANS. 0.0 0.00 - 0.04 K/UL    DF AUTOMATED     METABOLIC PANEL, COMPREHENSIVE    Collection Time: 11/14/22 10:43 AM   Result Value Ref Range    Sodium 137 136 - 145 mmol/L    Potassium 3.8 3.5 - 5.1 mmol/L    Chloride 99 97 - 108 mmol/L    CO2 27 21 - 32 mmol/L    Anion gap 11 5 - 15 mmol/L    Glucose 239 (H) 65 - 100 mg/dL    BUN 20 6 - 20 MG/DL    Creatinine 1.13 (H) 0.55 - 1.02 MG/DL    BUN/Creatinine ratio 18 12 - 20      eGFR 45 (L) >60 ml/min/1.73m2    Calcium 9.5 8.5 - 10.1 MG/DL    Bilirubin, total 0.5 0.2 - 1.0 MG/DL    ALT (SGPT) 59 12 - 78 U/L    AST (SGOT) 48 (H) 15 - 37 U/L    Alk. phosphatase 942 (H) 45 - 117 U/L    Protein, total 7.3 6.4 - 8.2 g/dL    Albumin 2.7 (L) 3.5 - 5.0 g/dL    Globulin 4.6 (H) 2.0 - 4.0 g/dL    A-G Ratio 0.6 (L) 1.1 - 2.2     LACTIC ACID    Collection Time: 11/14/22 10:43 AM   Result Value Ref Range    Lactic acid 3.7 (HH) 0.4 - 2.0 MMOL/L   TROPONIN-HIGH SENSITIVITY    Collection Time: 11/14/22 10:43 AM   Result Value Ref Range    Troponin-High Sensitivity 23 0 - 51 ng/L        IMAGING:  CTA CHEST W OR W WO CONT   Final Result   1. Large amount of near occlusive debris in the dependent airways in the   bilateral lower lobes, with mild associated dependent atelectasis. 2. No pulmonary embolism. XR CHEST PORT   Final Result      Left lower lobe atelectasis. Medications During Visit:  Medications   sodium chloride (NS) flush 5-10 mL (has no administration in time range)   sodium chloride 0.9 % bolus infusion 1,000 mL (0 mL IntraVENous IV Completed 11/14/22 1333)     Followed by   sodium chloride 0.9 % bolus infusion 359 mL (0 mL IntraVENous IV Completed 11/14/22 1410)   iopamidoL (ISOVUE-370) 76 % injection 100 mL (80 mL IntraVENous Given 11/14/22 1525)         DECISION MAKING:  Bhumika Matos is a 80 y.o. female who comes in as above. Here, patient at her baseline. She is requiring supplemental oxygen for some hypoxia which is new. CT does not show any pulmonary embolus in the setting of somebody has had them before apparently patient does have some dependent debris in the lower portions of the lung field, doubt pneumonia given no fever or white count. Slight elevation of lactic acidosis might be related to previous hypoxia. I have not started the patient on antibiotics but will admit for continued management of hypoxia. IMPRESSION:  1. Hypoxia    2. Lactic acidosis        DISPOSITION:  Admitted          Procedures    Perfect Serve Consult for Admission  4:24 PM    ED Room Number: SER02/02  Patient Name and age:  Bhumika Matos 80 y.o.  female  Working Diagnosis:   1. Hypoxia    2. Lactic acidosis        COVID-19 Suspicion:  no  Sepsis present:  no  Reassessment needed: no  Code Status:  Do Not Resuscitate  Readmission: no  Isolation Requirements:  no  Recommended Level of Care:  telemetry  Department:[facility] - [phone]  Other:  Here, patient at her baseline. She is requiring supplemental oxygen for some hypoxia which is new. CT does not show any pulmonary embolus in the setting of somebody has had them before apparently patient does have some dependent debris in the lower portions of the lung field, doubt pneumonia given no fever or white count. Slight elevation of lactic acidosis might be related to previous hypoxia.   I have not started the patient on antibiotics but will admit for continued management of hypoxia.

## 2022-11-14 NOTE — ED NOTES
Patient and her daughter and Eliot Marlow, given update on plan of care. Second set of blood cultures draw. IVF at bedside. Spoke with Dr Crystal Lilly regarding Lactic Acid results. Awaiting orders. Pt sleeping with respirations even and unlabored.

## 2022-11-15 LAB
ALBUMIN SERPL-MCNC: 2.3 G/DL (ref 3.5–5)
ALBUMIN/GLOB SERPL: 0.6 {RATIO} (ref 1.1–2.2)
ALP SERPL-CCNC: 713 U/L (ref 45–117)
ALT SERPL-CCNC: 48 U/L (ref 12–78)
ANION GAP SERPL CALC-SCNC: 3 MMOL/L (ref 5–15)
AST SERPL-CCNC: 39 U/L (ref 15–37)
ATRIAL RATE: 84 BPM
BASOPHILS # BLD: 0 K/UL (ref 0–0.1)
BASOPHILS NFR BLD: 0 % (ref 0–1)
BILIRUB SERPL-MCNC: 0.4 MG/DL (ref 0.2–1)
BUN SERPL-MCNC: 14 MG/DL (ref 6–20)
BUN/CREAT SERPL: 23 (ref 12–20)
CALCIUM SERPL-MCNC: 9.3 MG/DL (ref 8.5–10.1)
CALCULATED P AXIS, ECG09: 58 DEGREES
CALCULATED R AXIS, ECG10: -54 DEGREES
CALCULATED T AXIS, ECG11: 7 DEGREES
CHLORIDE SERPL-SCNC: 107 MMOL/L (ref 97–108)
CO2 SERPL-SCNC: 29 MMOL/L (ref 21–32)
COMMENT, HOLDF: NORMAL
CREAT SERPL-MCNC: 0.62 MG/DL (ref 0.55–1.02)
DIAGNOSIS, 93000: NORMAL
DIFFERENTIAL METHOD BLD: ABNORMAL
EOSINOPHIL # BLD: 0 K/UL (ref 0–0.4)
EOSINOPHIL NFR BLD: 0 % (ref 0–7)
ERYTHROCYTE [DISTWIDTH] IN BLOOD BY AUTOMATED COUNT: 13.3 % (ref 11.5–14.5)
EST. AVERAGE GLUCOSE BLD GHB EST-MCNC: 131 MG/DL
GLOBULIN SER CALC-MCNC: 3.9 G/DL (ref 2–4)
GLUCOSE BLD STRIP.AUTO-MCNC: 102 MG/DL (ref 65–117)
GLUCOSE BLD STRIP.AUTO-MCNC: 139 MG/DL (ref 65–117)
GLUCOSE SERPL-MCNC: 106 MG/DL (ref 65–100)
HBA1C MFR BLD: 6.2 % (ref 4–5.6)
HCT VFR BLD AUTO: 38.4 % (ref 35–47)
HGB BLD-MCNC: 12.3 G/DL (ref 11.5–16)
IMM GRANULOCYTES # BLD AUTO: 0 K/UL (ref 0–0.04)
IMM GRANULOCYTES NFR BLD AUTO: 0 % (ref 0–0.5)
LYMPHOCYTES # BLD: 1.3 K/UL (ref 0.8–3.5)
LYMPHOCYTES NFR BLD: 11 % (ref 12–49)
MCH RBC QN AUTO: 30.1 PG (ref 26–34)
MCHC RBC AUTO-ENTMCNC: 32 G/DL (ref 30–36.5)
MCV RBC AUTO: 93.9 FL (ref 80–99)
MONOCYTES # BLD: 0.7 K/UL (ref 0–1)
MONOCYTES NFR BLD: 6 % (ref 5–13)
NEUTS SEG # BLD: 9.9 K/UL (ref 1.8–8)
NEUTS SEG NFR BLD: 83 % (ref 32–75)
NRBC # BLD: 0 K/UL (ref 0–0.01)
NRBC BLD-RTO: 0 PER 100 WBC
P-R INTERVAL, ECG05: 156 MS
PLATELET # BLD AUTO: 217 K/UL (ref 150–400)
PMV BLD AUTO: 11.6 FL (ref 8.9–12.9)
POTASSIUM SERPL-SCNC: 3.9 MMOL/L (ref 3.5–5.1)
PROT SERPL-MCNC: 6.2 G/DL (ref 6.4–8.2)
Q-T INTERVAL, ECG07: 390 MS
QRS DURATION, ECG06: 114 MS
QTC CALCULATION (BEZET), ECG08: 460 MS
RBC # BLD AUTO: 4.09 M/UL (ref 3.8–5.2)
SAMPLES BEING HELD,HOLD: NORMAL
SERVICE CMNT-IMP: ABNORMAL
SERVICE CMNT-IMP: NORMAL
SODIUM SERPL-SCNC: 139 MMOL/L (ref 136–145)
VENTRICULAR RATE, ECG03: 84 BPM
WBC # BLD AUTO: 12 K/UL (ref 3.6–11)

## 2022-11-15 PROCEDURE — 82962 GLUCOSE BLOOD TEST: CPT

## 2022-11-15 PROCEDURE — 65270000046 HC RM TELEMETRY

## 2022-11-15 PROCEDURE — 80053 COMPREHEN METABOLIC PANEL: CPT

## 2022-11-15 PROCEDURE — 85025 COMPLETE CBC W/AUTO DIFF WBC: CPT

## 2022-11-15 PROCEDURE — 74011000250 HC RX REV CODE- 250: Performed by: FAMILY MEDICINE

## 2022-11-15 PROCEDURE — 74011250636 HC RX REV CODE- 250/636: Performed by: FAMILY MEDICINE

## 2022-11-15 PROCEDURE — 74011250637 HC RX REV CODE- 250/637: Performed by: HOSPITALIST

## 2022-11-15 PROCEDURE — 74011000250 HC RX REV CODE- 250: Performed by: INTERNAL MEDICINE

## 2022-11-15 PROCEDURE — 97535 SELF CARE MNGMENT TRAINING: CPT

## 2022-11-15 PROCEDURE — 83036 HEMOGLOBIN GLYCOSYLATED A1C: CPT

## 2022-11-15 PROCEDURE — 74011000258 HC RX REV CODE- 258: Performed by: FAMILY MEDICINE

## 2022-11-15 PROCEDURE — 36415 COLL VENOUS BLD VENIPUNCTURE: CPT

## 2022-11-15 PROCEDURE — 94640 AIRWAY INHALATION TREATMENT: CPT

## 2022-11-15 PROCEDURE — 97165 OT EVAL LOW COMPLEX 30 MIN: CPT

## 2022-11-15 PROCEDURE — 74011250637 HC RX REV CODE- 250/637: Performed by: FAMILY MEDICINE

## 2022-11-15 PROCEDURE — 94664 DEMO&/EVAL PT USE INHALER: CPT

## 2022-11-15 PROCEDURE — 77010033678 HC OXYGEN DAILY

## 2022-11-15 PROCEDURE — 93005 ELECTROCARDIOGRAM TRACING: CPT

## 2022-11-15 RX ORDER — ACETAMINOPHEN 325 MG/1
650 TABLET ORAL
Status: DISCONTINUED | OUTPATIENT
Start: 2022-11-15 | End: 2022-11-25 | Stop reason: HOSPADM

## 2022-11-15 RX ORDER — ONDANSETRON 4 MG/1
4 TABLET, ORALLY DISINTEGRATING ORAL
Status: DISCONTINUED | OUTPATIENT
Start: 2022-11-15 | End: 2022-11-25 | Stop reason: HOSPADM

## 2022-11-15 RX ORDER — INSULIN LISPRO 100 [IU]/ML
INJECTION, SOLUTION INTRAVENOUS; SUBCUTANEOUS
Status: DISCONTINUED | OUTPATIENT
Start: 2022-11-15 | End: 2022-11-15

## 2022-11-15 RX ORDER — SODIUM CHLORIDE 0.9 % (FLUSH) 0.9 %
5-40 SYRINGE (ML) INJECTION EVERY 8 HOURS
Status: DISCONTINUED | OUTPATIENT
Start: 2022-11-15 | End: 2022-11-25 | Stop reason: HOSPADM

## 2022-11-15 RX ORDER — ONDANSETRON 2 MG/ML
4 INJECTION INTRAMUSCULAR; INTRAVENOUS
Status: DISCONTINUED | OUTPATIENT
Start: 2022-11-15 | End: 2022-11-25 | Stop reason: HOSPADM

## 2022-11-15 RX ORDER — IPRATROPIUM BROMIDE AND ALBUTEROL SULFATE 2.5; .5 MG/3ML; MG/3ML
3 SOLUTION RESPIRATORY (INHALATION)
Status: DISCONTINUED | OUTPATIENT
Start: 2022-11-15 | End: 2022-11-21

## 2022-11-15 RX ORDER — POLYETHYLENE GLYCOL 3350 17 G/17G
17 POWDER, FOR SOLUTION ORAL DAILY PRN
Status: DISCONTINUED | OUTPATIENT
Start: 2022-11-15 | End: 2022-11-25 | Stop reason: HOSPADM

## 2022-11-15 RX ORDER — MODAFINIL 100 MG/1
100 TABLET ORAL DAILY
Status: DISCONTINUED | OUTPATIENT
Start: 2022-11-16 | End: 2022-11-25 | Stop reason: HOSPADM

## 2022-11-15 RX ORDER — SODIUM CHLORIDE 0.9 % (FLUSH) 0.9 %
5-40 SYRINGE (ML) INJECTION AS NEEDED
Status: DISCONTINUED | OUTPATIENT
Start: 2022-11-15 | End: 2022-11-25 | Stop reason: HOSPADM

## 2022-11-15 RX ORDER — SODIUM CHLORIDE 9 MG/ML
50 INJECTION, SOLUTION INTRAVENOUS CONTINUOUS
Status: DISCONTINUED | OUTPATIENT
Start: 2022-11-15 | End: 2022-11-21

## 2022-11-15 RX ORDER — SODIUM CHLORIDE 0.9 % (FLUSH) 0.9 %
5-10 SYRINGE (ML) INJECTION AS NEEDED
Status: DISCONTINUED | OUTPATIENT
Start: 2022-11-15 | End: 2022-11-25 | Stop reason: HOSPADM

## 2022-11-15 RX ORDER — THERA TABS 400 MCG
1 TAB ORAL DAILY
Status: DISCONTINUED | OUTPATIENT
Start: 2022-11-16 | End: 2022-11-25 | Stop reason: HOSPADM

## 2022-11-15 RX ORDER — MEMANTINE HYDROCHLORIDE 10 MG/1
10 TABLET ORAL 2 TIMES DAILY
Status: DISCONTINUED | OUTPATIENT
Start: 2022-11-15 | End: 2022-11-25 | Stop reason: HOSPADM

## 2022-11-15 RX ORDER — ACETAMINOPHEN 650 MG/1
650 SUPPOSITORY RECTAL
Status: DISCONTINUED | OUTPATIENT
Start: 2022-11-15 | End: 2022-11-25 | Stop reason: HOSPADM

## 2022-11-15 RX ORDER — MAGNESIUM SULFATE 100 %
4 CRYSTALS MISCELLANEOUS AS NEEDED
Status: DISCONTINUED | OUTPATIENT
Start: 2022-11-15 | End: 2022-11-15

## 2022-11-15 RX ORDER — ATORVASTATIN CALCIUM 10 MG/1
10 TABLET, FILM COATED ORAL DAILY
Status: DISCONTINUED | OUTPATIENT
Start: 2022-11-16 | End: 2022-11-25 | Stop reason: HOSPADM

## 2022-11-15 RX ADMIN — APIXABAN 5 MG: 5 TABLET, FILM COATED ORAL at 21:03

## 2022-11-15 RX ADMIN — IPRATROPIUM BROMIDE AND ALBUTEROL SULFATE 3 ML: .5; 3 SOLUTION RESPIRATORY (INHALATION) at 17:57

## 2022-11-15 RX ADMIN — MEMANTINE 10 MG: 10 TABLET ORAL at 13:04

## 2022-11-15 RX ADMIN — SODIUM CHLORIDE 75 ML/HR: 9 INJECTION, SOLUTION INTRAVENOUS at 04:36

## 2022-11-15 RX ADMIN — PIPERACILLIN AND TAZOBACTAM 4.5 G: 4; .5 INJECTION, POWDER, FOR SOLUTION INTRAVENOUS at 04:37

## 2022-11-15 RX ADMIN — SODIUM CHLORIDE, PRESERVATIVE FREE 10 ML: 5 INJECTION INTRAVENOUS at 21:04

## 2022-11-15 RX ADMIN — IPRATROPIUM BROMIDE AND ALBUTEROL SULFATE 3 ML: .5; 3 SOLUTION RESPIRATORY (INHALATION) at 20:03

## 2022-11-15 RX ADMIN — VANCOMYCIN HYDROCHLORIDE 1250 MG: 1.25 INJECTION, POWDER, LYOPHILIZED, FOR SOLUTION INTRAVENOUS at 04:36

## 2022-11-15 RX ADMIN — APIXABAN 2.5 MG: 2.5 TABLET, FILM COATED ORAL at 13:04

## 2022-11-15 RX ADMIN — PIPERACILLIN AND TAZOBACTAM 3.38 G: 3; .375 INJECTION, POWDER, FOR SOLUTION INTRAVENOUS at 18:51

## 2022-11-15 RX ADMIN — MEMANTINE 10 MG: 10 TABLET ORAL at 18:51

## 2022-11-15 RX ADMIN — ACETAMINOPHEN 650 MG: 325 TABLET ORAL at 10:20

## 2022-11-15 RX ADMIN — ACETAMINOPHEN 650 MG: 325 TABLET ORAL at 21:03

## 2022-11-15 RX ADMIN — PIPERACILLIN AND TAZOBACTAM 3.38 G: 3; .375 INJECTION, POWDER, FOR SOLUTION INTRAVENOUS at 10:21

## 2022-11-15 NOTE — PROGRESS NOTES
CM Note:    At 9:55am - CM met with patient at bedside to introduce self and role. CM attempted to complete the initial assessment, unable to do so at this time. CM to follow up with patients daughter Puja Donnelly - . At 10:04am - CM contacted patients daughter who reports that she will come up to unit to complete the initial assessment.     SHUN Magana, CRM, LMHP-e  Available in Perfect Serve

## 2022-11-15 NOTE — PROGRESS NOTES
[address] [facility] Adult  Hospitalist Group                                                                                          Hospitalist Progress Note  Ismael Alfonso MD  Answering service: [phone] OR 36 from in house phone        Date of Service:  11/15/2022  NAME:  Tobias Meeks  :  10/14/1929  MRN:  [id number]      Admission Summary:   Tobias Meeks is a 80 y.o. female with past medical history of hypertension, hyperlipidemia, dementia, IBS, osteoarthritis, osteoporosis, pulmonary edema presented as a direct admission/transfer [facility] to [facility] with chief complaint of shortness of breath. Patient is a limited historian. Majority of history was obtained per review of ED and electronic medical records. Per these reports, patient lives at [facility] facility where staff noted the patient was having symptoms of cough, generalized weakness over the past 2 days. Symptoms noted remain constant, without specific alleviating factors and there was concern for possible sepsis. Patient was initially transferred to [facility] with initial recorded vital signs temperature 98.9 °F, /68, heart rate 111, respiratory 20, O2 saturation 90% on room air. Oxygen saturation decreased to 88%. Patient was placed on 2 L oxygen via nasal cannula. Patient's blood pressure decreased to 81/55. Abnormal labs included blood glucose 239, creatinine 1.13, GFR 45, albumin 2.7, AST 40, alkaline phosphatase 942, lactic acid 3.7. Initial high-sensitivity troponin 23. Chest x-ray portable showed left lower lobe atelectasis. CTA chest was negative for PE however showed large amount of near occlusive debris and dependent airways and bilateral lower lobes with mild dependent atelectasis. Patient was then transferred to [facility] for admission. Interval history / Subjective:    Follow up Pneumonia  Continues to have off and on low grade fever  On 2l NC Assessment & Plan:     Severe sepsis  Lactic acidosis/hypotension--now resolved  Aspiration Pneumonia  -Follow blood cultures  -Check UA with microscopy  -Rapid covid negative  -Continue Vanc/Zosyn, continue  -Lactic acid level has corrected    Acute respiratory failure with hypoxia--now better  History of PE on Eliquis  -Continuous pulse oximetry monitoring  -Supplemental oxygen  -Titrate oxygen to keep O2 saturation greater than 94%  -COVID-19 test negative  -Order influenza AMB antigen testing     Uncontrolled type 2 diabetes mellitus with hyperglycemia: on SSI  Elevated creatinine: Monitor  LFT elevation: Monitor  Generalized weakness: PT/OT  Dementia  -Patient's baseline mental status which she is currently disoriented on exam  -Order CT of the head without IV contrast without any acute process     Dysphagia diet       Code status: DNR  Prophylaxis: Eliquis  PTA: [place]    Plan: Continue antibiotics,likely discharge tomorrow  Care Plan discussed with: Patient, daughter, RN  Anticipated Disposition: [place] Problems  Date Reviewed: 12/21/2018            Codes Class Noted POA    Hypoxia ICD-10-CM: R09.02  ICD-9-CM: 799.02  11/14/2022 Unknown             Review of Systems:   A comprehensive review of systems was negative except for that written in the HPI. Vital Signs:    Last 24hrs VS reviewed since prior progress note.  Most recent are:  Visit Vitals  BP (!) 141/75 (BP 1 Location: Left upper arm, BP Patient Position: At rest;Lying)   Pulse 84   Temp (!) 100.5 °F (38.1 °C)   Resp 22   Ht 5' (1.524 m)   Wt 46.7 kg (102 lb 15.3 oz)   SpO2 95%   BMI 20.11 kg/m²         Intake/Output Summary (Last 24 hours) at 11/15/2022 1132  Last data filed at 11/14/2022 1410  Gross per 24 hour   Intake 1359 ml   Output --   Net 1359 ml        Physical Examination:     I had a face to face encounter with this patient and independently examined them on 11/15/2022 as outlined below:          Constitutional:  No acute distress   ENT:  Oral mucosa moist, oropharynx benign. Resp:  CTA bilaterally. No wheezing/rhonchi/rales. No accessory muscle use. CV:  Regular rhythm, normal rate, no murmurs, gallops, rubs    GI:  Soft, non distended, non tender. normoactive bowel sounds, no hepatosplenomegaly     Musculoskeletal:  No edema, warm, 2+ pulses throughout    Neurologic:  Moves all extremities. AAOx3, CN II-XII reviewed            Data Review:    Review and/or order of clinical lab test      Labs:     Recent Labs     11/15/22  0517 11/14/22  1043   WBC 12.0* 9.5   HGB 12.3 13.7   HCT 38.4 42.8    261     Recent Labs     11/15/22  0517 11/14/22  1043    137   K 3.9 3.8    99   CO2 29 27   BUN 14 20   CREA 0.62 1.13*   * 239*   CA 9.3 9.5     Recent Labs     11/15/22  0517 11/14/22  1043   ALT 48 59   * 942*   TBILI 0.4 0.5   TP 6.2* 7.3   ALB 2.3* 2.7*   GLOB 3.9 4.6*     No results for input(s): INR, PTP, APTT, INREXT in the last 72 hours. No results for input(s): FE, TIBC, PSAT, FERR in the last 72 hours. No results found for: FOL, RBCF   No results for input(s): PH, PCO2, PO2 in the last 72 hours. No results for input(s): CPK, CKNDX, TROIQ in the last 72 hours.     No lab exists for component: CPKMB  Lab Results   Component Value Date/Time    Cholesterol, total 153 02/23/2010 08:30 AM    HDL Cholesterol 54 02/23/2010 08:30 AM    LDL, calculated 88 02/23/2010 08:30 AM    Triglyceride 55 02/23/2010 08:30 AM    CHOL/HDL Ratio 2.8 02/23/2010 08:30 AM     Lab Results   Component Value Date/Time    Glucose (POC) 102 11/15/2022 08:49 AM     Lab Results   Component Value Date/Time    Color YELLOW/STRAW 02/09/2021 06:07 PM    Appearance CLOUDY (A) 02/09/2021 06:07 PM    Specific gravity 1.028 02/09/2021 06:07 PM    pH (UA) 5.0 02/09/2021 06:07 PM    Protein Negative 02/09/2021 06:07 PM    Glucose Negative 02/09/2021 06:07 PM    Ketone TRACE (A) 02/09/2021 06:07 PM    Bilirubin Negative 02/09/2021 06:07 PM    Urobilinogen 0.2 02/09/2021 06:07 PM    Nitrites Positive (A) 02/09/2021 06:07 PM    Leukocyte Esterase MODERATE (A) 02/09/2021 06:07 PM    Epithelial cells FEW 02/09/2021 06:07 PM    Bacteria 1+ (A) 02/09/2021 06:07 PM    WBC 20-50 02/09/2021 06:07 PM    RBC 0-5 02/09/2021 06:07 PM         Medications Reviewed:     Current Facility-Administered Medications   Medication Dose Route Frequency    sodium chloride (NS) flush 5-10 mL  5-10 mL IntraVENous PRN    piperacillin-tazobactam (ZOSYN) 3.375 g in 0.9% sodium chloride (MBP/ADV) 100 mL MBP  3.375 g IntraVENous Q8H    0.9% sodium chloride infusion  75 mL/hr IntraVENous CONTINUOUS    Vancomycin Pharmacy Dosing   Other Rx Dosing/Monitoring    sodium chloride (NS) flush 5-40 mL  5-40 mL IntraVENous Q8H    sodium chloride (NS) flush 5-40 mL  5-40 mL IntraVENous PRN    acetaminophen (TYLENOL) tablet 650 mg  650 mg Oral Q6H PRN    Or    acetaminophen (TYLENOL) suppository 650 mg  650 mg Rectal Q6H PRN    polyethylene glycol (MIRALAX) packet 17 g  17 g Oral DAILY PRN    ondansetron (ZOFRAN ODT) tablet 4 mg  4 mg Oral Q8H PRN    Or    ondansetron (ZOFRAN) injection 4 mg  4 mg IntraVENous Q6H PRN    [START ON 11/16/2022] vancomycin (VANCOCIN) 1,000 mg in 0.9% sodium chloride 250 mL ([id number])  1,000 mg IntraVENous Q24H    glucose chewable tablet 16 g  4 Tablet Oral PRN    glucagon (GLUCAGEN) injection 1 mg  1 mg IntraMUSCular PRN    dextrose 10 % infusion 0-250 mL  0-250 mL IntraVENous PRN    insulin lispro (HUMALOG) injection   SubCUTAneous AC&HS    sodium chloride (NS) flush 5-10 mL  5-10 mL IntraVENous PRN     ______________________________________________________________________  EXPECTED LENGTH OF STAY: 4d 19h  ACTUAL LENGTH OF STAY:          1                 Cady Meyers MD

## 2022-11-15 NOTE — ED NOTES
TRANSFER - OUT REPORT:    Verbal report given to Jagdish Chiu RN(name) on Alicia Putnam  being transferred to  at [facility]) for routine progression of care       Report consisted of patients Situation, Background, Assessment and   Recommendations(SBAR). Information from the following report(s) SBAR, ED Summary, Procedure Summary, Intake/Output, MAR, Recent Results and Cardiac Rhythm SR was reviewed with the receiving nurse. Lines:   Peripheral IV 11/14/22 Right Antecubital (Active)   Site Assessment Clean, dry, & intact 11/14/22 1046   Phlebitis Assessment 0 11/14/22 1046   Infiltration Assessment 0 11/14/22 1046   Dressing Status Clean, dry, & intact 11/14/22 1046   Dressing Type Transparent 11/14/22 1046        Opportunity for questions and clarification was provided. Patient transported with:   Monitor  O2 @ 2 liters   Visit Vitals  /70   Pulse 88   Temp 98.8 °F (37.1 °C)   Resp 26   Ht 5' (1.524 m)   Wt 45.3 kg (99 lb 13.9 oz)   SpO2 94%   BMI 19.50 kg/m²     Report was given to the [facility] transport team. Patient left ED in no acute distress.

## 2022-11-15 NOTE — PROGRESS NOTES
Pharmacist Note - Vancomycin Dosing    Consult provided for this 80 y.o. female for indication of asp PNA. Antibiotic regimen(s): Zosyn and Vancomycin  Patient on vancomycin PTA? NO     Recent Labs     22  1043   WBC 9.5   CREA 1.13*   BUN 20     Frequency of BMP: Daily  Height: 152 cm  Weight: 46.7 kg  Est CrCl: 22 ml/min  Temp (24hrs), Av.8 °F (37.1 °C), Min:98.6 °F (37 °C), Max:98.9 °F (37.2 °C)    Cultures: blood      MRSA Swab ordered (if applicable)? YES    The plan below is expected to result in a target range of AUC/LOIDA 400-600    Therapy will be initiated with a loading dose of 1250 mg IV x 1. Pharmacy to follow patient daily and order levels / make dose adjustments as appropriate. Vancomycin has been dosed used Bayesian kinetics software to target an AUC/LOIDA of 400-600, which provides adequate exposure for an assumed infection due to MRSA with an LOIDA of 1 or less while reducing the risk of nephrotoxicity as seen with traditional trough based dosing goals.

## 2022-11-15 NOTE — PROGRESS NOTES
CM Note:    Medicare pt has received, reviewed, and signed 1st IM letter informing them of their right to appeal the discharge. Signed copy has been placed on pt bedside chart. CM also documented in document list. Initial assessment to follow.     SHUN Cagle, MIGUELITO, [id number]-[id number]  Available in [employer]

## 2022-11-15 NOTE — PROGRESS NOTES
Apixaban Order Review  Indication: hx PE  Dose: apixaban 2.5 mg BID  Current Labs:  Recent Labs     11/15/22  0517 11/14/22  1043   CREA 0.62 1.13*   HGB 12.3 13.7    261     Est CrCL: 35-40 ml/min    Drug Interactions: none  Plan: Change to apixaban 5 mg BID as a resumption of home dosing.     [facility][1] DOAC Dosing Guide

## 2022-11-15 NOTE — PROGRESS NOTES
TRANSFER - IN REPORT:    Verbal report received from [facility] (name) on Андрей Desouza  being received from [address] [place] [address] (unit) for routine progression of care      Report consisted of patients Situation, Background, Assessment and   Recommendations(SBAR). Information from the following report(s) SBAR, Kardex, ED Summary, MAR, and Recent Results was reviewed with the receiving nurse. Opportunity for questions and clarification was provided. Assessment completed upon patients arrival to unit and care assumed.

## 2022-11-15 NOTE — PROGRESS NOTES
Patient resting in bed, confused at all times, needs help with feeding, incontinent. Problem: Falls - Risk of  Goal: *Absence of Falls  Description: Document [place] Skill Fall Risk and appropriate interventions in the flowsheet.   Outcome: Progressing Towards Goal  Note: Fall Risk Interventions:  Mobility Interventions: Communicate number of staff needed for ambulation/transfer, Bed/chair exit alarm    Mentation Interventions: Adequate sleep, hydration, pain control, Bed/chair exit alarm, Door open when patient unattended, Evaluate medications/consider consulting pharmacy    Medication Interventions: Evaluate medications/consider consulting pharmacy, Bed/chair exit alarm, Patient to call before getting OOB, Teach patient to arise slowly    Elimination Interventions: Patient to call for help with toileting needs              Problem: Patient Education: Go to Patient Education Activity  Goal: Patient/Family Education  Outcome: Progressing Towards Goal     Problem: Aspiration - Risk of  Goal: *Absence of aspiration  Outcome: Progressing Towards Goal     Problem: Patient Education: Go to Patient Education Activity  Goal: Patient/Family Education  Outcome: Progressing Towards Goal     Problem: Patient Education: Go to Patient Education Activity  Goal: Patient/Family Education  Outcome: Progressing Towards Goal     Problem: Pneumonia: Day 1  Goal: Off Pathway (Use only if patient is Off Pathway)  Outcome: Progressing Towards Goal  Goal: Activity/Safety  Outcome: Progressing Towards Goal  Goal: Consults, if ordered  Outcome: Progressing Towards Goal  Goal: Diagnostic Test/Procedures  Outcome: Progressing Towards Goal  Goal: Nutrition/Diet  Outcome: Progressing Towards Goal  Goal: Medications  Outcome: Progressing Towards Goal  Goal: Respiratory  Outcome: Progressing Towards Goal  Goal: Treatments/Interventions/Procedures  Outcome: Progressing Towards Goal  Goal: Psychosocial  Outcome: Progressing Towards Goal  Goal: *Oxygen saturation within defined limits  Outcome: Progressing Towards Goal  Goal: *Influenza vaccine administered (October-March)  Outcome: Progressing Towards Goal  Goal: *Pneumoccocal vaccine administered  Outcome: Progressing Towards Goal  Goal: *Hemodynamically stable  Outcome: Progressing Towards Goal  Goal: *Demonstrates progressive activity  Outcome: Progressing Towards Goal  Goal: *Tolerating diet  Outcome: Progressing Towards Goal     Problem: Pneumonia: Day 2  Goal: Off Pathway (Use only if patient is Off Pathway)  Outcome: Progressing Towards Goal  Goal: Activity/Safety  Outcome: Progressing Towards Goal  Goal: Consults, if ordered  Outcome: Progressing Towards Goal  Goal: Diagnostic Test/Procedures  Outcome: Progressing Towards Goal  Goal: Nutrition/Diet  Outcome: Progressing Towards Goal  Goal: Discharge Planning  Outcome: Progressing Towards Goal  Goal: Medications  Outcome: Progressing Towards Goal  Goal: Respiratory  Outcome: Progressing Towards Goal  Goal: Treatments/Interventions/Procedures  Outcome: Progressing Towards Goal  Goal: Psychosocial  Outcome: Progressing Towards Goal  Goal: *Oxygen saturation within defined limits  Outcome: Progressing Towards Goal  Goal: *Hemodynamically stable  Outcome: Progressing Towards Goal  Goal: *Demonstrates progressive activity  Outcome: Progressing Towards Goal  Goal: *Tolerating diet  Outcome: Progressing Towards Goal  Goal: *Optimal pain control at patient's stated goal  Outcome: Progressing Towards Goal     Problem: Pneumonia: Day 3  Goal: Off Pathway (Use only if patient is Off Pathway)  Outcome: Progressing Towards Goal  Goal: Activity/Safety  Outcome: Progressing Towards Goal  Goal: Consults, if ordered  Outcome: Progressing Towards Goal  Goal: Diagnostic Test/Procedures  Outcome: Progressing Towards Goal  Goal: Nutrition/Diet  Outcome: Progressing Towards Goal  Goal: Discharge Planning  Outcome: Progressing Towards Goal  Goal: Medications  Outcome: Progressing Towards Goal  Goal: Respiratory  Outcome: Progressing Towards Goal  Goal: Treatments/Interventions/Procedures  Outcome: Progressing Towards Goal  Goal: Psychosocial  Outcome: Progressing Towards Goal  Goal: *Oxygen saturation within defined limits  Outcome: Progressing Towards Goal  Goal: *Hemodynamically stable  Outcome: Progressing Towards Goal  Goal: *Demonstrates progressive activity  Outcome: Progressing Towards Goal  Goal: *Tolerating diet  Outcome: Progressing Towards Goal  Goal: *Describes available resources and support systems  Outcome: Progressing Towards Goal  Goal: *Optimal pain control at patient's stated goal  Outcome: Progressing Towards Goal     Problem: Pneumonia: Day 4  Goal: Off Pathway (Use only if patient is Off Pathway)  Outcome: Progressing Towards Goal  Goal: Activity/Safety  Outcome: Progressing Towards Goal  Goal: Nutrition/Diet  Outcome: Progressing Towards Goal  Goal: Discharge Planning  Outcome: Progressing Towards Goal  Goal: Medications  Outcome: Progressing Towards Goal  Goal: Respiratory  Outcome: Progressing Towards Goal  Goal: Treatments/Interventions/Procedures  Outcome: Progressing Towards Goal  Goal: Psychosocial  Outcome: Progressing Towards Goal     Problem: Pneumonia: Discharge Outcomes  Goal: *Demonstrates progressive activity  Outcome: Progressing Towards Goal  Goal: *Describes follow-up/return visits to physicians  Outcome: Progressing Towards Goal  Goal: *Tolerating diet  Outcome: Progressing Towards Goal  Goal: *Verbalizes name, dosage, time, side effects, and number of days to continue medications  Outcome: Progressing Towards Goal  Goal: *Influenza immunization  Outcome: Progressing Towards Goal  Goal: *Pneumococcal immunization  Outcome: Progressing Towards Goal  Goal: *Respiratory status at baseline  Outcome: Progressing Towards Goal  Goal: *Vital signs within defined limits  Outcome: Progressing Towards Goal  Goal: *Describes available resources and support systems  Outcome: Progressing Towards Goal  Goal: *Optimal pain control at patient's stated goal  Outcome: Progressing Towards Goal

## 2022-11-15 NOTE — CONSULTS
[facility]  Pulmonary, Critical Care, and Sleep Medicine    Initial Patient Consult    Name: Chata Griggs MRN: [id number]   : 10/14/1929 Hospital: [facility]    Date: 11/15/2022        IMPRESSION:   Acute hypoxic resp failure -currently on 3  liters nc   Suspected aspiration pna   Decreased   respiratory secretion clearance with retained secretions in the basilar airways/ occlusive   Advanced age  Hypotensive on admit - resolved with ivf   Lll atx   Hx of dementia   Hx of uti   Per chart hx of pte  and chronic eliquis   Dm  Hx of ibs         RECOMMENDATIONS:   Agree with ab - currently on zosyn and  vanco   Fu bc - so far ng   Needs covid  screen as came from a snf- done and rapid neg    Speech eval / diet adjust as per there recs   Watch volume status - per chart hx of pulm edema/ last echo nl lvef  Dementia  Continue eliquis   Nebs to help with secretion clearance   Vibro bed if available  or  chest percussion  - cannot cooperate with acapella due to mental status      Subjective: This patient has been seen and evaluated at the request of Dr. Marianela Munoz for aspiration pna . Patient is a 80 y.o. female who was transferred from a snf to [facility] with 2 days of increased chest congestion . She arrived hypotensive and hypoxic . She was place on o2 and her bp responded to ivf . Pt reportedly has hx of dementia and upon my arrival acknowledged me , said hi and did ask if \" I am in the right place. \"  She was not able to provide any other hx . Cough was present  but very congested and weak. She was in no resp  distress. Past Medical History:   Diagnosis Date    Dementia ([facility] [address]     Hypercholesteremia 2010    Hypertension     IBS (irritable bowel syndrome) 2010    OA (osteoarthritis) 2010    Osteoporosis 2010    Pulmonary embolism ([facility] [address]       History reviewed. No pertinent surgical history.    Prior to Admission medications    Medication Sig Start Date End Date Taking? Authorizing Provider   guaiFENesin (ROBITUSSIN) 100 mg/5 mL liquid Take 200 mg by mouth every four (4) hours as needed for Cough. Yes Rusty, MD Kendall   atorvastatin (Lipitor) 10 mg tablet Take 10 mg by mouth daily. Yes Kendall Ojeda MD   modafiniL (ProvigiL) 100 mg tablet Take 100 mg by mouth every morning. Yes Rusty, MD Kendall   loratadine (Claritin) 10 mg tablet Take 10 mg by mouth daily. Yes Rusty, MD Kendall   coal tar (NEUTROGENA T-GEL) 0.5 % shampoo Apply 1 Bottle to affected area nightly as needed (two times per week for dandruff). Yes Rusty, MD Kendall   cholecalciferol (Vitamin D3) (5000 Units/125 mcg) tab tablet Take 5,000 Units by mouth daily. Yes Rusty, MD Kendall   apixaban (ELIQUIS) 5 mg tablet Take 5 mg by mouth two (2) times a day. Indications: a clot in the lung   Yes Other, MD Kendall   acetaminophen (TYLENOL) 500 mg tablet Take 500 mg by mouth two (2) times daily as needed for Pain. Yes Provider, Historical   acetaminophen (TYLENOL) 325 mg tablet Take 650 mg by mouth two (2) times a day. Yes Provider, Historical   calcium-vitamin D (OS-LUCÍA +D3) 500 mg-200 unit per tablet Take 1 Tab by mouth daily (with breakfast). Yes Rusty, MD Kednall   multivitamin (ONE A DAY) tablet Take 1 Tab by mouth daily. Yes Rusty, MD Kendall   losartan (COZAAR) 50 mg tablet Take 50 mg by mouth daily. Yes Other, MD Kendall   memantine (NAMENDA) 10 mg tablet Take 10 mg by mouth two (2) times a day. Yes Other, MD Kendall     Allergies   Allergen Reactions    Aricept [Donepezil] Other (comments)     GI upset    Flexeril [Cyclobenzaprine] Other (comments)     delirium      Social History     Tobacco Use    Smoking status: Never    Smokeless tobacco: Never   Substance Use Topics    Alcohol use: Not Currently      History reviewed. No pertinent family history.    Came from [facility]     Current Facility-Administered Medications   Medication Dose Route Frequency    piperacillin-tazobactam (ZOSYN) 3.375 g in 0.9% sodium chloride (MBP/ADV) 100 mL MBP  3.375 g IntraVENous Q8H    0.9% sodium chloride infusion  75 mL/hr IntraVENous CONTINUOUS    Vancomycin Pharmacy Dosing   Other Rx Dosing/Monitoring    sodium chloride (NS) flush 5-40 mL  5-40 mL IntraVENous Q8H    [START ON 2022] vancomycin (VANCOCIN) 1,000 mg in 0.9% sodium chloride 250 mL ([id number])  1,000 mg IntraVENous Q24H    insulin lispro (HUMALOG) injection   SubCUTAneous AC&HS    memantine (NAMENDA) tablet 10 mg  10 mg Oral BID    [START ON 2022] modafiniL (PROVIGIL) tablet 100 mg  100 mg Oral DAILY    [START ON 2022] therapeutic multivitamin (THERAGRAN) tablet 1 Tablet  1 Tablet Oral DAILY    [START ON 2022] atorvastatin (LIPITOR) tablet 10 mg  10 mg Oral DAILY    apixaban (ELIQUIS) tablet 2.5 mg  2.5 mg Oral Q12H       Review of Systems:  Review of systems not obtained due to patient factors. Objective:   Vital Signs:    Visit Vitals  BP (!) 107/58 (BP 1 Location: Right upper arm, BP Patient Position: At rest;Lying)   Pulse 74   Temp 99.2 °F (37.3 °C)   Resp 22   Ht 5' (1.524 m)   Wt 46.7 kg (102 lb 15.3 oz)   SpO2 93%   BMI 20.11 kg/m²       O2 Device: Nasal cannula   O2 Flow Rate (L/min): 3 l/min   Temp (24hrs), Av.2 °F (37.3 °C), Min:98.6 °F (37 °C), Max:100.5 °F (38.1 °C)       Intake/Output:   Last shift:      No intake/output data recorded. Last 3 shifts:  1901 - 11/15 0700  In: 4667 [I.V.:1359]  Out: -     Intake/Output Summary (Last 24 hours) at 11/15/2022 1337  Last data filed at 2022 1410  Gross per 24 hour   Intake 359 ml   Output --   Net 359 ml      Physical Exam:   General:  Alert, elderly lady . NO resp distress    Head:  Normocephalic, without obvious abnormality, atraumatic. Eyes:  Conjunctivae/corneas clear. PERRL, EOMs intact. Nose: Nares normal. Septum midline. Mucosa normal. No drainage or sinus tenderness. Throat: Moist mucosa   Neck: Supple, symmetrical   Back:   Did not turn    Lungs:    On ant/ lat scattered rhonchi and congested cough that was very weak    Chest wall:  No tenderness or deformity. Heart:  Regular rate and rhythm, S1, S2 normal, no murmur, click, rub or gallop. Abdomen:   Soft, non-tender    Extremities: Extremities normal, atraumatic, no cyanosis or edema.    Pulses: Not examined    Skin: Skin color, texture, turgor normal. No rashes or lesions   Lymph nodes: Cervical, supraclavicular, nodes normal.   Neurologic: Grossly nonfocal as moves all but not nl mental status      Data review:     Recent Results (from the past 24 hour(s))   BLOOD GAS,LACTIC ACID, POC    Collection Time: 11/14/22  4:50 PM   Result Value Ref Range    pH (POC) 7.34 (L) 7.35 - 7.45      pCO2 (POC) 42.8 35.0 - 45.0 MMHG    pO2 (POC) 42 (LL) 80 - 100 MMHG    HCO3 (POC) 23.2 22 - 26 MMOL/L    sO2 (POC) 74.6 (L) 92 - 97 %    Base deficit (POC) 2.5 mmol/L    Specimen type (POC) VENOUS BLOOD      Performed by Aileen Zarate (Tech)     Lactic Acid (POC) 2.03 (HH) 0.40 - 2.00 mmol/L    Critical value read back [id number]    COVID-19 RAPID TEST    Collection Time: 11/14/22  9:49 PM   Result Value Ref Range    Specimen source Nasopharyngeal      COVID-19 rapid test Not detected NOTD     LACTIC ACID    Collection Time: 11/14/22  9:49 PM   Result Value Ref Range    Lactic acid 1.0 0.4 - 2.0 MMOL/L   EKG, 12 LEAD, INITIAL    Collection Time: 11/15/22  5:05 AM   Result Value Ref Range    Ventricular Rate 84 BPM    Atrial Rate 84 BPM    P-R Interval 156 ms    QRS Duration 114 ms    Q-T Interval 390 ms    QTC Calculation (Bezet) 460 ms    Calculated P Axis 58 degrees    Calculated R Axis -54 degrees    Calculated T Axis 7 degrees    Diagnosis       Normal sinus rhythm  Left anterior fascicular block  Inferior infarct , age undetermined  Anterior infarct , age undetermined  When compared with ECG of 10-FEB-2021 10:53,  Significant changes have occurred     METABOLIC PANEL, COMPREHENSIVE    Collection Time: 11/15/22  5:17 AM   Result Value Ref Range    Sodium 139 136 - 145 mmol/L    Potassium 3.9 3.5 - 5.1 mmol/L    Chloride 107 97 - 108 mmol/L    CO2 29 21 - 32 mmol/L    Anion gap 3 (L) 5 - 15 mmol/L    Glucose 106 (H) 65 - 100 mg/dL    BUN 14 6 - 20 MG/DL    Creatinine 0.62 0.55 - 1.02 MG/DL    BUN/Creatinine ratio 23 (H) 12 - 20      eGFR >60 >60 ml/min/1.73m2    Calcium 9.3 8.5 - 10.1 MG/DL    Bilirubin, total 0.4 0.2 - 1.0 MG/DL    ALT (SGPT) 48 12 - 78 U/L    AST (SGOT) 39 (H) 15 - 37 U/L    Alk. phosphatase 713 (H) 45 - 117 U/L    Protein, total 6.2 (L) 6.4 - 8.2 g/dL    Albumin 2.3 (L) 3.5 - 5.0 g/dL    Globulin 3.9 2.0 - 4.0 g/dL    A-G Ratio 0.6 (L) 1.1 - 2.2     CBC WITH AUTOMATED DIFF    Collection Time: 11/15/22  5:17 AM   Result Value Ref Range    WBC 12.0 (H) 3.6 - 11.0 K/uL    RBC 4.09 3.80 - 5.20 M/uL    HGB 12.3 11.5 - 16.0 g/dL    HCT 38.4 35.0 - 47.0 %    MCV 93.9 80.0 - 99.0 FL    MCH 30.1 26.0 - 34.0 PG    MCHC 32.0 30.0 - 36.5 g/dL    RDW 13.3 11.5 - 14.5 %    PLATELET 638 512 - 425 K/uL    MPV 11.6 8.9 - 12.9 FL    NRBC 0.0 0  WBC    ABSOLUTE NRBC 0.00 0.00 - 0.01 K/uL    NEUTROPHILS 83 (H) 32 - 75 %    LYMPHOCYTES 11 (L) 12 - 49 %    MONOCYTES 6 5 - 13 %    EOSINOPHILS 0 0 - 7 %    BASOPHILS 0 0 - 1 %    IMMATURE GRANULOCYTES 0 0.0 - 0.5 %    ABS. NEUTROPHILS 9.9 (H) 1.8 - 8.0 K/UL    ABS. LYMPHOCYTES 1.3 0.8 - 3.5 K/UL    ABS. MONOCYTES 0.7 0.0 - 1.0 K/UL    ABS. EOSINOPHILS 0.0 0.0 - 0.4 K/UL    ABS. BASOPHILS 0.0 0.0 - 0.1 K/UL    ABS. IMM. GRANS. 0.0 0.00 - 0.04 K/UL    DF AUTOMATED     HEMOGLOBIN A1C WITH EAG    Collection Time: 11/15/22  5:17 AM   Result Value Ref Range    Hemoglobin A1c 6.2 (H) 4.0 - 5.6 %    Est. average glucose 131 mg/dL   SAMPLES BEING HELD    Collection Time: 11/15/22  5:32 AM   Result Value Ref Range    SAMPLES BEING HELD 1red 1blue     COMMENT        Add-on orders for these samples will be processed based on acceptable specimen integrity and analyte stability, which may vary by analyte. GLUCOSE, POC    Collection Time: 11/15/22  8:49 AM   Result Value Ref Range    Glucose (POC) 102 65 - 117 mg/dL    Performed by Edy Clas, POC    Collection Time: 11/15/22 11:36 AM   Result Value Ref Range    Glucose (POC) 139 (H) 65 - 117 mg/dL    Performed by [employer]        Imaging:  I have personally reviewed the patients radiographs and have reviewed the reports:  Have tried 3 computers. ON 3rd had some difficulty  pulling up  but view.   Cxr seen -/ agree with report         Alvino Abdalla MD

## 2022-11-15 NOTE — PROGRESS NOTES
Problem: Self Care Deficits Care Plan (Adult)  Goal: *Acute Goals and Plan of Care (Insert Text)  Description: FUNCTIONAL STATUS PRIOR TO ADMISSION: Pt is a poor historian d/t baseline dementia. Per limited information obtained from chart review pt utilizes wheelchair for functional mobility. HOME SUPPORT: Patient admitted from [address] and per chart review, requires assistance with all ADL tasks (assist level unclear at time of evaluation). Occupational Therapy Goals  Initiated 11/15/2022  1. Patient will perform basic seated grooming routine with supervision/set-up within 7 day(s). 2.  Patient will perform seated anterior neck to thigh bathing with moderate assistance within 7 day(s). 3.  Patient will perform lateral rolling in preparation for completion of toileting routine with moderate assistance  within 7 day(s). 4.  Patient will participate in upper extremity therapeutic exercise/activities with supervision and minimal multimodal cues for 5 minutes within 7 day(s). Outcome: Not Met   OCCUPATIONAL THERAPY EVALUATION  Patient: Miguel Uriostegui (72 y.o. female)  Date: 11/15/2022  Primary Diagnosis: Hypoxia [R09.02]       Precautions: fall       ASSESSMENT  Based on the objective data described below, the patient presents with impaired balance, activity tolerance, generalized weakness, cardiopulmonary endurance (3L NC, baseline RA), and cognition (baseline dementia - command following, attention, orientation, processing) s/p admission from [address] for mgt of SOB/weakness. Pt received supine, alerting to voice but drowsy, and A&Ox0. Pt followed ~25% of simple, direct, one-step commands with visual and verbal cues. When presented with rote grooming task in supported sitting, pt able to complete with set-up assist. Lateral rolling and supine-sit transfer required total A, and while seated EOB pt with persistent posterior lean requiring total A to maintain sitting balance.  BP stable from supine to sitting. Pt returned to supine, positioned for comfort/maintenance of midline, and bed placed in modified chair position. Recommend return to LTC pending clarification of PLOF and support/assist available. Current Level of Function Impacting Discharge (ADLs/self-care): set-up rote UB ADLs in supported sitting, total A bed mobility     Functional Outcome Measure: The patient scored Total: 0/100 on the Barthel Index outcome measure which is indicative of being totally dependent in basic self-care. Other factors to consider for discharge: PLOF?, assist available at LTC?, O2 needs      Patient will benefit from skilled therapy intervention to address the above noted impairments. PLAN :  Recommendations and Planned Interventions: self care training, functional mobility training, therapeutic exercise, balance training, therapeutic activities, endurance activities, patient education, and home safety training    Frequency/Duration: Patient will be followed by occupational therapy 3 times a week to address goals. Recommendation for discharge: (in order for the patient to meet his/her long term goals)  Recommend return to LTC pending clarification of PLOF and support/assist available. This discharge recommendation:  A follow-up discussion with the attending provider and/or case management is planned    IF patient discharges home will need the following DME: TBD - pending clarification of home set-up       SUBJECTIVE:   Patient stated Oh. . Kimberly Poster me\" (when asked pt's name/birth date)    OBJECTIVE DATA SUMMARY:   HISTORY:   Past Medical History:   Diagnosis Date    Dementia ([facility] [address]     Hypercholesteremia 5/27/2010    Hypertension     IBS (irritable bowel syndrome) 5/27/2010    OA (osteoarthritis) 5/27/2010    Osteoporosis 5/27/2010    Pulmonary embolism ([facility] [address]    History reviewed. No pertinent surgical history.     Expanded or extensive additional review of patient history:     Home Situation  Home Environment: [address] [facility] Name: [address]: [facility]  Patient Expects to be Discharged to[de-identified] Skilled nursing facility    Hand dominance: Right    EXAMINATION OF PERFORMANCE DEFICITS:  Cognitive/Behavioral Status:  Neurologic State: Confused;Drowsy  Orientation Level: Disoriented X4 (unable to state name or birthdate)  Cognition: Decreased command following;Decreased attention/concentration (follows ~25% of simple commands)  Perception: Appears intact  Perseveration: No perseveration noted  Safety/Judgement: Decreased awareness of environment;Decreased awareness of need for assistance;Decreased awareness of need for safety;Decreased insight into deficits    Skin: visually intact     Edema: none noted     Hearing: Auditory  Auditory Impairment: None    Vision/Perceptual:                           Acuity:  (unable to read name badge or clock)         Range of Motion:    AROM: Generally decreased, functional (MD documented BUE flexion contractures - pt lacking ~10 degrees of extension in BUE with assessment)                         Strength:    Strength: Grossly decreased, non-functional                Coordination:  Coordination: Generally decreased, functional            Tone & Sensation:    Tone: Normal  Sensation: Intact                      Balance:  Sitting: Impaired  Sitting - Static: Poor (constant support)  Sitting - Dynamic: Poor (constant support)  Standing:  (NT)    Functional Mobility and Transfers for ADLs:  Bed Mobility:  Rolling: Total assistance  Supine to Sit: Total assistance  Sit to Supine: Total assistance  Scooting: Total assistance    Transfers:       ADL Assessment:  Feeding: Moderate assistance (inferred, pt will require assist with container mgt)    Oral Facial Hygiene/Grooming: Setup (in supported sitting)    Bathing: Total assistance (inferred)    Type of Bath: Chlorhexidine (CHG); Full    Upper Body Dressing:  Total assistance (inferred)    Lower Body Dressing: Total assistance    Toileting: Total assistance                ADL Intervention and task modifications:       Grooming  Grooming Assistance: Set-up  Position Performed:  (bed in modified chair position)  Washing Face: Set-up  Cues: Verbal cues provided;Visual cues provided         Lower Body Dressing Assistance  Socks: Total assistance (dependent)  Position Performed: Supine         Cognitive Retraining  Orientation Retraining: Awareness of environment; Reorienting;Situation;Time;Place;Person  Following Commands: Follows one step commands/directions (~25%)  Safety/Judgement: Decreased awareness of environment;Decreased awareness of need for assistance;Decreased awareness of need for safety;Decreased insight into deficits  Cues: Tactile cues provided;Verbal cues provided;Visual cues provided    Functional Measure:    Barthel Index:  Bathin  Bladder: 0  Bowels: 0  Groomin  Dressin  Feedin  Mobility: 0  Stairs: 0  Toilet Use: 0  Transfer (Bed to Chair and Back): 0  Total: 0/100      The Barthel ADL Index: Guidelines  1. The index should be used as a record of what a patient does, not as a record of what a patient could do. 2. The main aim is to establish degree of independence from any help, physical or verbal, however minor and for whatever reason. 3. The need for supervision renders the patient not independent. 4. A patient's performance should be established using the best available evidence. Asking the patient, friends/relatives and nurses are the usual sources, but direct observation and common sense are also important. However direct testing is not needed. 5. Usually the patient's performance over the preceding 24-48 hours is important, but occasionally longer periods will be relevant. 6. Middle categories imply that the patient supplies over 50 per cent of the effort. 7. Use of aids to be independent is allowed.     Score Interpretation (from Sinoff 1997)    Independent   60-79 Minimally independent   40-59 Partially dependent   20-39 Very dependent   <20 Totally dependent     -Tiffanie Vides., Barthel, D.W. (1965). Functional evaluation: the Barthel Index. [address] ([address] (1997). The Barthel activities of daily living index: self-reporting versus actual performance in the old (> or = 75 years). Journal of [address] [place] [address] 45(7), 14 [facility], J.J.[email or web address].F, Param Castro., Naomi Perdomo. (1999). Measuring the change in disability after inpatient rehabilitation; comparison of the responsiveness of the Barthel Index and Functional [place] Measure. Journal of Neurology, Neurosurgery, and Psychiatry, 66(4), 568-998. MORRO Duron, BENJA Colin, & Raleigh Patel MDOMITILA. (2004) Assessment of post-stroke quality of life in cost-effectiveness studies: The usefulness of the Barthel Index and the EuroQoL-5D. Quality of Life Research, 15, 389-81     Occupational Therapy Evaluation Charge Determination   History Examination Decision-Making   LOW Complexity : Brief history review  LOW Complexity : 1-3 performance deficits relating to physical, cognitive , or psychosocial skils that result in activity limitations and / or participation restrictions  MEDIUM Complexity : Patient may present with comorbidities that affect occupational performnce.  Miniml to moderate modification of tasks or assistance (eg, physical or verbal ) with assesment(s) is necessary to enable patient to complete evaluation       Based on the above components, the patient evaluation is determined to be of the following complexity level: LOW   Pain Rating:  Pt withdraws to painful stimuli, does not appear or report any pain with movement/at rest     Activity Tolerance:   Poor    After treatment patient left in no apparent distress:    Supine in bed, Heels elevated for pressure relief, Call bell within reach, Bed / chair alarm activated, and Side rails x 3    COMMUNICATION/EDUCATION:   The patients plan of care was discussed with: Registered nurse. Patient is unable to participate in goal setting and plan of care. This patients plan of care is appropriate for delegation to MARY.     Thank you for this referral.  Marguerita Boas, OTD, OTR/L  Time Calculation: 20 mins

## 2022-11-15 NOTE — H&P
[address] [facility] Adult  Hospitalist Group  History and Physical    Date of Service:  11/15/2022  Primary Care Provider: Chidi Hancock MD  Source of information: The patient and Chart review    Chief Complaint: Shortness of Breath      History of Presenting Illness:   Shelley Sotomayor is a 80 y.o. female with past medical history of hypertension, hyperlipidemia, dementia, IBS, osteoarthritis, osteoporosis, pulmonary edema presented as a direct admission/[facility] to [facility] with chief complaint of shortness of breath. Patient is a limited historian. Majority of history was obtained per review of ED and electronic medical records. Per these reports, patient lives at [facility] where staff noted the patient was having symptoms of cough, generalized weakness over the past 2 days. Symptoms noted remain constant, without specific alleviating factors and there was concern for possible sepsis. Patient was initially transferred to [facility] with initial recorded vital signs temperature 98.9 °F, /68, heart rate 111, respiratory 20, O2 saturation 90% on room air. Oxygen saturation decreased to 88%. Patient was placed on 2 L oxygen via nasal cannula. Patient's blood pressure decreased to 81/55. Abnormal labs included blood glucose 239, creatinine 1.13, GFR 45, albumin 2.7, AST 40, alkaline phosphatase 942, lactic acid 3.7. Initial high-sensitivity troponin 23. Chest x-ray portable showed left lower lobe atelectasis. CTA chest was negative for PE however showed large amount of near occlusive debris and dependent airways and bilateral lower lobes with mild dependent atelectasis. Patient was then transferred to [facility] for admission. REVIEW OF SYSTEMS:  Pertinent items are noted in the History of Present Illness.      Past Medical History:   Diagnosis Date    Dementia ([address]     Hypercholesteremia 5/27/2010 Hypertension     IBS (irritable bowel syndrome) 5/27/2010    OA (osteoarthritis) 5/27/2010    Osteoporosis 5/27/2010    Pulmonary embolism (HCC)       MEDICATIONS:  Prior to Admission medications    Medication Sig Start Date End Date Taking? Authorizing Provider   guaiFENesin (ROBITUSSIN) 100 mg/5 mL liquid Take 200 mg by mouth every four (4) hours as needed for Cough. Yes Rusty, MD Kendall   atorvastatin (Lipitor) 10 mg tablet Take 10 mg by mouth daily. Yes Rusty, MD Kendall   modafiniL (ProvigiL) 100 mg tablet Take 100 mg by mouth every morning. Yes Rusty, MD Kendall   loratadine (Claritin) 10 mg tablet Take 10 mg by mouth daily. Yes Kendall Ojeda MD   coal tar (NEUTROGENA T-GEL) 0.5 % shampoo Apply 1 Bottle to affected area nightly as needed (two times per week for dandruff). Yes Rusty, MD Kendall   cholecalciferol (Vitamin D3) (5000 Units/125 mcg) tab tablet Take 5,000 Units by mouth daily. Yes Rusty, MD Kendall   apixaban (ELIQUIS) 5 mg tablet Take 5 mg by mouth two (2) times a day. Indications: a clot in the lung   Yes Rusty, MD Kendall   acetaminophen (TYLENOL) 500 mg tablet Take 500 mg by mouth two (2) times daily as needed for Pain. Yes Provider, Historical   acetaminophen (TYLENOL) 325 mg tablet Take 650 mg by mouth two (2) times a day. Yes Provider, Historical   calcium-vitamin D (OS-LUCÍA +D3) 500 mg-200 unit per tablet Take 1 Tab by mouth daily (with breakfast). Yes Rusty, MD Kendall   multivitamin (ONE A DAY) tablet Take 1 Tab by mouth daily. Yes Other, MD Kendall   losartan (COZAAR) 50 mg tablet Take 50 mg by mouth daily. Yes Rusty, MD Kendall   memantine (NAMENDA) 10 mg tablet Take 10 mg by mouth two (2) times a day. Yes Rusty, MD Kendall     Allergies   Allergen Reactions    Aricept [Donepezil] Other (comments)     GI upset    Flexeril [Cyclobenzaprine] Other (comments)     delirium        Social History:  reports that she has never smoked. She reports that she does not currently use alcohol.  She reports that she does not currently use drugs. FAMILY HISTORY:  Unknown and unable to obtain from patient who is not answering questions appropriately    Objective:   Visit Vitals  /86 (BP 1 Location: Right upper arm, BP Patient Position: Semi fowlers)   Pulse 81   Temp 98.8 °F (37.1 °C)   Resp 22   Ht 5' (1.524 m)   Wt 45.3 kg (99 lb 13.9 oz)   SpO2 98%   BMI 19.50 kg/m²    O2 Flow Rate (L/min): 2 l/min O2 Device: Nasal cannula    PHYSICAL EXAM:   General:  Patient is in no acute respiratory distress. Audible coarse breath sounds. Head:  Normocephalic, without obvious abnormality, atraumatic   Eyes:  Conjunctivae/corneas clear. Pupils 2 mm reactive bilateral.   E/N/M/T: Nares normal. Septum midline. No nasal drainage or sinus tenderness  Tongue midline/ non-edematous  Clear oropharynx  Dry oral mucosal   Neck: Normal appearance and movements, symmetrical, trachea midline  No palpable adenopathy  No thyroid enlargement, tenderness or nodules  No carotid bruit   No JVD  Trachea midline   Lungs:   Symmetrical chest expansion and respiratory effort  Diffuse Rales to auscultation bilaterally   Chest wall:  No tenderness or deformity   Heart:  Regular rate and rhythm   Normal S1 and S2; no murmur, click, rub or gallop   Abdomen:   Soft, no tenderness  No rebound, guarding, or rigidity  Non-distended   Bowel sounds normal  No masses or hepatosplenomegaly  No hernias present   Back: No costovertebral angle tenderness  No step-off deformity   Extremities: Flexion contracture bilateral upper extremities  Bilateral foot drop with spasticity of bilateral lower extremities  No cyanosis or edema     Vascular/  Pulses: 2+ radial/ 1+ DP bilateral pulses   Integument/  Skin: No rashes or ulcers  Warm and dry   Musculo-      skeletal: Gait not tested  Range of motion of bilateral upper and lower extremities  No calf tenderness   Neuro: GCS 13 (E4 V3 M6). Moves all extremities x 4 severe generalized weakness.   No slurred speech. No facial droop. Sensation grossly intact. Psych: Alert, oriented x 1 to person only. Data Review: All diagnostic labs and studies have been reviewed. Abnormal Labs Reviewed   CBC WITH AUTOMATED DIFF - Abnormal; Notable for the following components:       Result Value    NEUTROPHILS 87 (*)     LYMPHOCYTES 5 (*)     ABS. NEUTROPHILS 8.2 (*)     ABS. LYMPHOCYTES 0.5 (*)     All other components within normal limits   METABOLIC PANEL, COMPREHENSIVE - Abnormal; Notable for the following components:    Glucose 239 (*)     Creatinine 1.13 (*)     eGFR 45 (*)     AST (SGOT) 48 (*)     Alk. phosphatase 942 (*)     Albumin 2.7 (*)     Globulin 4.6 (*)     A-G Ratio 0.6 (*)     All other components within normal limits   LACTIC ACID - Abnormal; Notable for the following components:    Lactic acid 3.7 (*)     All other components within normal limits   BLOOD GAS,LACTIC ACID, POC - Abnormal; Notable for the following components:    pH (POC) 7.34 (*)     pO2 (POC) 42 (*)     sO2 (POC) 74.6 (*)     Lactic Acid (POC) 2.03 (*)     All other components within normal limits       All Micro Results       Procedure Component Value Units Date/Time    COVID-19 RAPID TEST [[id number]] Collected: 11/14/22 2149    Order Status: Completed Specimen: Nasopharyngeal Updated: 11/14/22 2210     Specimen source Nasopharyngeal        COVID-19 rapid test Not detected        Comment: Rapid [employer] ID Now       Rapid NAAT:  The specimen is NEGATIVE for SARS-CoV-2, the novel coronavirus associated with COVID-19. Negative results should be treated as presumptive and, if inconsistent with clinical signs and symptoms or necessary for patient management, should be tested with an alternative molecular assay. Negative results do not preclude SARS-CoV-2 infection and should not be used as the sole basis for patient management decisions.        This test has been authorized by the FDA under an Emergency Use Authorization (EUA) for use by authorized laboratories. Fact sheet for Healthcare Providers:  http://www.jose.radhames/  Fact sheet for Patients: http://www.jose.radhames/       Methodology: Isothermal Nucleic Acid Amplification         CULTURE, BLOOD, PAIRED [[id number]] Collected: 11/14/22 1215    Order Status: Sent Specimen: Blood Updated: 11/14/22 1643    CULTURE, BLOOD, PAIRED [[id number]]     Order Status: Canceled Specimen: Blood             IMAGING:   CTA CHEST W OR W WO CONT   Final Result   1. Large amount of near occlusive debris in the dependent airways in the   bilateral lower lobes, with mild associated dependent atelectasis. 2. No pulmonary embolism. XR CHEST PORT   Final Result      Left lower lobe atelectasis. ECG/ECHO:    Results for orders placed or performed during the hospital encounter of 02/09/21   EKG, 12 LEAD, INITIAL   Result Value Ref Range    Ventricular Rate 96 BPM    Atrial Rate 96 BPM    P-R Interval 170 ms    QRS Duration 134 ms    Q-T Interval 406 ms    QTC Calculation (Bezet) 512 ms    Calculated P Axis 60 degrees    Calculated R Axis -34 degrees    Calculated T Axis 120 degrees    Diagnosis       Normal sinus rhythm  Left axis deviation  Left ventricular hypertrophy with QRS widening and repolarization abnormality  Abnormal ECG  When compared with ECG of 15-DEC-2018 13:25,  Right bundle branch block is no longer present  Criteria for Anterior infarct are no longer present  Criteria for Anterolateral infarct are no longer present  Confirmed by Abraham Red MD, Werner Montoya ([id number]) on 2/10/2021 2:18:20 PM          Assessment:   Given the patient's current clinical presentation, there is a high level of concern for decompensation if discharged from the emergency department. Complex decision making was performed, which includes reviewing the patient's available past medical records, laboratory results, and imaging studies.     Active Problems:    1. Acute respiratory failure with hypoxia  -Admit to telemetry  -Continuous pulse oximetry monitoring  -Supplemental oxygen  -Titrate oxygen to keep O2 saturation greater than 94%  -COVID-19 test negative  -Order influenza AMB antigen testing    2. Severe sepsis  Lactic acidosis  -Check blood cultures  -Check UA with microscopy  -Ordered Zosyn 3.375 g IV every 8 hours  -Order vancomycin pharmacy to dose  -Already received 0.9% normal saline IV fluids 30 mils per KG bolus  -Lactic acid level has corrected    3. Aspiration pneumonia  -place on aspiration pneumonia  -keep HOB elevated  -N.p.o. until patient passes either nursing dysphagia screen or speech therapy evaluation  -Plan as above continue with IV antibiotics with Zosyn and vancomycin    4. Hypotension  -Continue with IV fluid resuscitation  -Monitor blood pressure closely    5. Uncontrolled type 2 diabetes mellitus with hyperglycemia  -Order Humalog insulin correctional coverage, scheduled blood glucose checks and check hemoglobin A1c level. 6.  Elevated creatinine  -Creatinine 1.13, GFR 45, BUN 20  -Repeat renal panel    7. LFT elevation  -AST 48, alkaline phosphatase 942  -Repeat CMP    8. Generalized weakness  Physical debility; with flexion contracture of bilateral upper extremities and foot drop with spasticity of lower extremities  -Consult PT/OT once breathing has improved and patient able to participate in activity    9. Dementia  -Patient's baseline mental status which she is currently disoriented on exam  -Order CT of the head without IV contrast without any acute process    10. Active edema  -Check lipid panel  -Continue statin therapy    DIET: NPO UNTIL PASSES DYSPHAGIA SCREEN OR SPEECH THERAPY SWALLOW EVALUATION. IF PASSES,THEN CARDIAC DIET.      ISOLATION PRECAUTIONS: There are currently no Active Isolations    DVT PROPHYLAXIS: Lovenox  FUNCTIONAL STATUS PRIOR TO HOSPITALIZATION: Ambulatory and capable of self-care but relies on assistive devices (rolling walker/cane). Ambulatory status/function: Uncertain of patient's baseline ambulatory status   EARLY MOBILITY ASSESSMENT: Recommend an assessment from physical therapy and/or occupational therapy  ANTICIPATED DISCHARGE: Greater than 48 hours. ANTICIPATED DISPOSITION: SNF  EMERGENCY CONTACT/SURROGATE DECISION MAKER: vaibhav Jamil [phone]    CRITICAL CARE WAS PERFORMED FOR THIS ENCOUNTER: NO.    ADVANCED DIRECTIVE/ CODE STATUS:  DDNR (DURABLE DO NOT RESUSCITATE) signed and documented on 9/14/2018. Signed By: Miguel Ángel Alexandra MD     November 15, 2022         Please note that this dictation may have been completed with Dragon, the [employer] voice recognition software. Quite often unanticipated grammatical, syntax, homophones, and other interpretive errors are inadvertently transcribed by the computer software. Please disregard these errors. Please excuse any errors that have escaped final proofreading. Code sepsis was called on Girish Choi    Sepsis present due to SIRS at least 2/4 HR >90 and RR>20  Sepsis Source  Pneumonia  Lactic Acid Greater > 2, Repeat Lactic Acid ordered within 4 hrs YES  Severe? Yes lactic > 2  Shock present? Yes Systolic BP < 90  Sepsis OrderSet Used? YES    Sepsis Re-Assessment Documentation:     Date: 11/15/2022   Time: 04:00 AM    The sepsis reassessment was performed at 04:00 A. M.

## 2022-11-15 NOTE — PROGRESS NOTES
Transition of Care Plan  RUR- 9% Low Risk  DISPOSITION: The disposition plan is to transition to a (return) SNF - [facility] [phone] - accepted patient. (CM added to AVS)  RAPID COVID test is needed prior to discharge. F/U with PCP/Specialist    Transport: [facility] - phone [phone], scheduled for    Reason for Admission:  \"SOB and on 02\"                   RUR Score:  9% Moderate Risk                   Plan for utilizing home health:  Yes, years ago        PCP: First and Last name:  Dr. Owen Like     Name of Practice:  (sees at facility)   Are you a current patient: Yes/No:  Yes   Approximate date of last visit: (date of last visit unknown at this time)   Can you participate in a virtual visit with your PCP: N/A                    Current Advanced Directive/Advance Care Plan: DNR  Has ACP documentation on file at this time. Healthcare Decision Maker:   Click here to complete [address] including selection of the Healthcare Decision Maker Relationship (ie \"Primary\")  Gualberto Leums  - [phone]                       Transition of Care Plan: To likely return to [place] [address]                   Reviewed chart for transitions of care, and discussed in rounds. CM met with patient at bedside to explain role and offer support. Patient is alert and oriented. CM spoke with patients daughter to complete this initial assessment. Baseline: DME - wheelchair  ADLs/IDALS: Needs assistance   Previous Home Health: Yes, in the past years ago  Previous SNF/IPR: Yes, Return to [facility] - SNF  ER Contact: Gualberto Lemus  - [phone]                    Patient lives at [address] [place] [address] and will return once medically stable. Patient needs assistance with ADLs/IDALs. Patient uses DMEs (wheelchair) to ambulate. Patient's preferred pharmacy is [facility]. [facility] is expected to transport at discharge.      Care Management Interventions  PCP Verified by CM: Yes  Palliative Care Criteria Met (RRAT>21 & CHF Dx)?: No  Mode of Transport at Discharge: Other (see comment)  Transition of Care Consult (CM Consult): Discharge Planning  Jonah Signup: No  Discharge Durable Medical Equipment: No  Physical Therapy Consult: Yes  Occupational Therapy Consult: Yes  Speech Therapy Consult: No  Support Systems: [place]  Confirm Follow Up Transport: Other (see comment)  The Patient and/or Patient Representative was Provided with a Choice of Provider and Agrees with the Discharge Plan?: Yes  Freedom of Choice List was Provided with Basic Dialogue that Supports the Patient's Individualized Plan of Care/Goals, Treatment Preferences and Shares the Quality Data Associated with the Providers?: Yes  [place] Resource Information Provided?: No  Discharge Location  Patient Expects to be Discharged to[de-identified] Skilled nursing facility    At 10:15am - CM submitted SNF referral to [facility] via all script for review. [place] - [phone]  - pending review. At 12:21pm - CM contacted admin coordinator at [facility] Phone: [phone], to follow up regarding referral submitted for review. CM left  and awaiting callback. At 12:32pm - CM received message from Elena Newberry [address] coordinator at [facility] who confirms that patient can return to facility once medically stable. Patient will need a rapid covid test completed prior to discharge. CM also perfect served attending, per request of patients daughter. Patient also provided update requesting to speak with patients assigned nurse.     Danita Lieberman, SHUN, CRM, LMHP-e  Available in Perfect Serve

## 2022-11-15 NOTE — PROGRESS NOTES
Bedside shift change report given to [address]  (oncoming nurse) by [employer] [facility]  (offgoing nurse). Report included the following information SBAR, Kardex, MAR, and Recent Results.

## 2022-11-15 NOTE — PROGRESS NOTES
Pharmacist Note - Vancomycin Dosing      Recent Labs     11/15/22  0517 11/14/22  1043   WBC 12.0* 9.5   CREA 0.62 1.13*   BUN 14 20       Therapy to be followed by a maintenance dose of 1000 mg IV every 24 hours. Pharmacy to follow patient daily and order levels / make dose adjustments as appropriate. *Vancomycin has been dosed used Bayesian kinetics software to target an AUC/LOIDA of 400-600, which provides adequate exposure for an assumed infection due to MRSA with an LOIDA of 1 or less while reducing the risk of nephrotoxicity as seen with traditional trough based dosing goals.

## 2022-11-16 ENCOUNTER — APPOINTMENT (OUTPATIENT)
Dept: GENERAL RADIOLOGY | Age: 87
DRG: 871 | End: 2022-11-16
Attending: PHYSICIAN ASSISTANT
Payer: MEDICARE

## 2022-11-16 ENCOUNTER — APPOINTMENT (OUTPATIENT)
Dept: GENERAL RADIOLOGY | Age: 87
DRG: 871 | End: 2022-11-16
Attending: NURSE PRACTITIONER
Payer: MEDICARE

## 2022-11-16 LAB
ALBUMIN SERPL-MCNC: 2 G/DL (ref 3.5–5)
ALBUMIN/GLOB SERPL: 0.5 {RATIO} (ref 1.1–2.2)
ALP SERPL-CCNC: 633 U/L (ref 45–117)
ALT SERPL-CCNC: 34 U/L (ref 12–78)
ANION GAP SERPL CALC-SCNC: 5 MMOL/L (ref 5–15)
ANION GAP SERPL CALC-SCNC: 5 MMOL/L (ref 5–15)
APPEARANCE UR: ABNORMAL
APTT PPP: 41.1 SEC (ref 22.1–31)
AST SERPL-CCNC: 31 U/L (ref 15–37)
BASOPHILS # BLD: 0 K/UL (ref 0–0.1)
BASOPHILS NFR BLD: 0 % (ref 0–1)
BILIRUB SERPL-MCNC: 0.5 MG/DL (ref 0.2–1)
BILIRUB UR QL: NEGATIVE
BUN SERPL-MCNC: 13 MG/DL (ref 6–20)
BUN SERPL-MCNC: 13 MG/DL (ref 6–20)
BUN/CREAT SERPL: 16 (ref 12–20)
BUN/CREAT SERPL: 17 (ref 12–20)
CALCIUM SERPL-MCNC: 8.4 MG/DL (ref 8.5–10.1)
CALCIUM SERPL-MCNC: 8.5 MG/DL (ref 8.5–10.1)
CHLORIDE SERPL-SCNC: 108 MMOL/L (ref 97–108)
CHLORIDE SERPL-SCNC: 109 MMOL/L (ref 97–108)
CO2 SERPL-SCNC: 26 MMOL/L (ref 21–32)
CO2 SERPL-SCNC: 26 MMOL/L (ref 21–32)
COLOR UR: ABNORMAL
CREAT SERPL-MCNC: 0.77 MG/DL (ref 0.55–1.02)
CREAT SERPL-MCNC: 0.79 MG/DL (ref 0.55–1.02)
DIFFERENTIAL METHOD BLD: ABNORMAL
EOSINOPHIL # BLD: 0 K/UL (ref 0–0.4)
EOSINOPHIL NFR BLD: 0 % (ref 0–7)
ERYTHROCYTE [DISTWIDTH] IN BLOOD BY AUTOMATED COUNT: 13.2 % (ref 11.5–14.5)
GLOBULIN SER CALC-MCNC: 3.9 G/DL (ref 2–4)
GLUCOSE SERPL-MCNC: 163 MG/DL (ref 65–100)
GLUCOSE SERPL-MCNC: 166 MG/DL (ref 65–100)
GLUCOSE UR STRIP.AUTO-MCNC: NEGATIVE MG/DL
HCT VFR BLD AUTO: 35.1 % (ref 35–47)
HGB BLD-MCNC: 11.3 G/DL (ref 11.5–16)
HGB UR QL STRIP: ABNORMAL
IMM GRANULOCYTES # BLD AUTO: 0 K/UL (ref 0–0.04)
IMM GRANULOCYTES NFR BLD AUTO: 0 % (ref 0–0.5)
INR PPP: 1.1 (ref 0.9–1.1)
KETONES UR QL STRIP.AUTO: 15 MG/DL
LEUKOCYTE ESTERASE UR QL STRIP.AUTO: NEGATIVE
LYMPHOCYTES # BLD: 0.5 K/UL (ref 0.8–3.5)
LYMPHOCYTES NFR BLD: 6 % (ref 12–49)
MAGNESIUM SERPL-MCNC: 1.6 MG/DL (ref 1.6–2.4)
MCH RBC QN AUTO: 30.3 PG (ref 26–34)
MCHC RBC AUTO-ENTMCNC: 32.2 G/DL (ref 30–36.5)
MCV RBC AUTO: 94.1 FL (ref 80–99)
MONOCYTES # BLD: 0.5 K/UL (ref 0–1)
MONOCYTES NFR BLD: 5 % (ref 5–13)
NEUTS SEG # BLD: 8 K/UL (ref 1.8–8)
NEUTS SEG NFR BLD: 89 % (ref 32–75)
NITRITE UR QL STRIP.AUTO: NEGATIVE
NRBC # BLD: 0 K/UL (ref 0–0.01)
NRBC BLD-RTO: 0 PER 100 WBC
PH UR STRIP: 5 [PH] (ref 5–8)
PLATELET # BLD AUTO: 201 K/UL (ref 150–400)
PMV BLD AUTO: 11.5 FL (ref 8.9–12.9)
POTASSIUM SERPL-SCNC: 3.2 MMOL/L (ref 3.5–5.1)
POTASSIUM SERPL-SCNC: 3.3 MMOL/L (ref 3.5–5.1)
PROT SERPL-MCNC: 5.9 G/DL (ref 6.4–8.2)
PROT UR STRIP-MCNC: 30 MG/DL
PROTHROMBIN TIME: 11.9 SEC (ref 9–11.1)
RBC # BLD AUTO: 3.73 M/UL (ref 3.8–5.2)
RBC MORPH BLD: ABNORMAL
SODIUM SERPL-SCNC: 139 MMOL/L (ref 136–145)
SODIUM SERPL-SCNC: 140 MMOL/L (ref 136–145)
SP GR UR REFRACTOMETRY: 1.03
THERAPEUTIC RANGE,PTTT: ABNORMAL SECS (ref 58–77)
UROBILINOGEN UR QL STRIP.AUTO: 1 EU/DL (ref 0.2–1)
WBC # BLD AUTO: 9 K/UL (ref 3.6–11)

## 2022-11-16 PROCEDURE — 74011250636 HC RX REV CODE- 250/636: Performed by: FAMILY MEDICINE

## 2022-11-16 PROCEDURE — 94640 AIRWAY INHALATION TREATMENT: CPT

## 2022-11-16 PROCEDURE — 97162 PT EVAL MOD COMPLEX 30 MIN: CPT

## 2022-11-16 PROCEDURE — 71045 X-RAY EXAM CHEST 1 VIEW: CPT

## 2022-11-16 PROCEDURE — 83735 ASSAY OF MAGNESIUM: CPT

## 2022-11-16 PROCEDURE — 74011250636 HC RX REV CODE- 250/636: Performed by: HOSPITALIST

## 2022-11-16 PROCEDURE — 85025 COMPLETE CBC W/AUTO DIFF WBC: CPT

## 2022-11-16 PROCEDURE — 36415 COLL VENOUS BLD VENIPUNCTURE: CPT

## 2022-11-16 PROCEDURE — 74011000258 HC RX REV CODE- 258: Performed by: FAMILY MEDICINE

## 2022-11-16 PROCEDURE — 80053 COMPREHEN METABOLIC PANEL: CPT

## 2022-11-16 PROCEDURE — 81001 URINALYSIS AUTO W/SCOPE: CPT

## 2022-11-16 PROCEDURE — 85730 THROMBOPLASTIN TIME PARTIAL: CPT

## 2022-11-16 PROCEDURE — 85610 PROTHROMBIN TIME: CPT

## 2022-11-16 PROCEDURE — 74011250637 HC RX REV CODE- 250/637: Performed by: HOSPITALIST

## 2022-11-16 PROCEDURE — 94760 N-INVAS EAR/PLS OXIMETRY 1: CPT

## 2022-11-16 PROCEDURE — 74011000250 HC RX REV CODE- 250: Performed by: INTERNAL MEDICINE

## 2022-11-16 PROCEDURE — 65270000046 HC RM TELEMETRY

## 2022-11-16 PROCEDURE — 74011000250 HC RX REV CODE- 250: Performed by: FAMILY MEDICINE

## 2022-11-16 RX ORDER — POTASSIUM CHLORIDE 750 MG/1
40 TABLET, FILM COATED, EXTENDED RELEASE ORAL
Status: DISCONTINUED | OUTPATIENT
Start: 2022-11-16 | End: 2022-11-16

## 2022-11-16 RX ORDER — FUROSEMIDE 10 MG/ML
20 INJECTION INTRAMUSCULAR; INTRAVENOUS ONCE
Status: COMPLETED | OUTPATIENT
Start: 2022-11-16 | End: 2022-11-16

## 2022-11-16 RX ADMIN — IPRATROPIUM BROMIDE AND ALBUTEROL SULFATE 3 ML: .5; 3 SOLUTION RESPIRATORY (INHALATION) at 20:57

## 2022-11-16 RX ADMIN — SODIUM CHLORIDE, PRESERVATIVE FREE 10 ML: 5 INJECTION INTRAVENOUS at 21:01

## 2022-11-16 RX ADMIN — POTASSIUM BICARBONATE 40 MEQ: 782 TABLET, EFFERVESCENT ORAL at 19:00

## 2022-11-16 RX ADMIN — MEMANTINE 10 MG: 10 TABLET ORAL at 19:00

## 2022-11-16 RX ADMIN — PIPERACILLIN AND TAZOBACTAM 3.38 G: 3; .375 INJECTION, POWDER, FOR SOLUTION INTRAVENOUS at 02:03

## 2022-11-16 RX ADMIN — FUROSEMIDE 20 MG: 10 INJECTION, SOLUTION INTRAMUSCULAR; INTRAVENOUS at 19:00

## 2022-11-16 RX ADMIN — ATORVASTATIN CALCIUM 10 MG: 10 TABLET, FILM COATED ORAL at 09:35

## 2022-11-16 RX ADMIN — THERA TABS 1 TABLET: TAB at 09:35

## 2022-11-16 RX ADMIN — PIPERACILLIN AND TAZOBACTAM 3.38 G: 3; .375 INJECTION, POWDER, FOR SOLUTION INTRAVENOUS at 19:00

## 2022-11-16 RX ADMIN — SODIUM CHLORIDE, PRESERVATIVE FREE 10 ML: 5 INJECTION INTRAVENOUS at 06:56

## 2022-11-16 RX ADMIN — PIPERACILLIN AND TAZOBACTAM 3.38 G: 3; .375 INJECTION, POWDER, FOR SOLUTION INTRAVENOUS at 09:35

## 2022-11-16 RX ADMIN — SODIUM CHLORIDE 75 ML/HR: 9 INJECTION, SOLUTION INTRAVENOUS at 00:50

## 2022-11-16 RX ADMIN — MEMANTINE 10 MG: 10 TABLET ORAL at 09:35

## 2022-11-16 RX ADMIN — VANCOMYCIN HYDROCHLORIDE 1000 MG: 1 INJECTION, POWDER, LYOPHILIZED, FOR SOLUTION INTRAVENOUS at 04:46

## 2022-11-16 RX ADMIN — APIXABAN 5 MG: 5 TABLET, FILM COATED ORAL at 09:35

## 2022-11-16 RX ADMIN — APIXABAN 5 MG: 5 TABLET, FILM COATED ORAL at 20:57

## 2022-11-16 RX ADMIN — MODAFINIL 100 MG: 100 TABLET ORAL at 09:35

## 2022-11-16 RX ADMIN — IPRATROPIUM BROMIDE AND ALBUTEROL SULFATE 3 ML: .5; 3 SOLUTION RESPIRATORY (INHALATION) at 08:16

## 2022-11-16 NOTE — PROGRESS NOTES
Transition of Care Plan  RUR- 9% Low Risk  DISPOSITION: The disposition plan is to transition to a (return) SNF - [facility] [phone] - accepted patient. (CM added to AVS)  RAPID COVID test is needed prior to discharge. F/U with PCP/Specialist    Transport: [facility] - phone [phone], scheduled for    Patient admitted from [facility] . Patient will need a completed rapid covid test done prior to discharge. During this morning's IDRs, patient informed by attending that patient is medically stable for discharge. At 11:27am - NATASHA messaged [place] admin coordinator at [facility] via all script regarding patient discharging to facility today. At 11:30am - NATASHA perfect served attending regarding patient needing a rapid covid test completed. At 12:03pm - CM contacted Cristian Galeas coordinator -  [phone] to retreive the room number, call to report number and requested [facility]  time. CM encouraged to fax discharge summary and rapid covid results to [phone]. At 12:10pm - CM spoke with patient/patient daughter at bedside to provide the above update. Patients daughter expressed concerns regarding patient being discharged. NATASHA perfect served attending per request of patient daughter. At 3:36pm - No Discharge information has been placed in chart at this time, patient to likely discharge tomorrow and be monitored over night tonight. CM provided update to Cristian Galeas coordinator at [facility].     Call To Report Number: [phone]  Room Number: 369  [facility]  time:    SHUN Cook, CRM, LMHP-e  Available in Perfect Serve

## 2022-11-16 NOTE — PROGRESS NOTES
Fever during the night, pt alert to self, needs assistance with feeding. Problem: Falls - Risk of  Goal: *Absence of Falls  Description: Document Jasmin Burnette Fall Risk and appropriate interventions in the flowsheet.   Outcome: Progressing Towards Goal  Note: Fall Risk Interventions:  Mobility Interventions: Communicate number of staff needed for ambulation/transfer, Bed/chair exit alarm    Mentation Interventions: Bed/chair exit alarm, Door open when patient unattended, Adequate sleep, hydration, pain control, Evaluate medications/consider consulting pharmacy    Medication Interventions: Teach patient to arise slowly, Evaluate medications/consider consulting pharmacy, Patient to call before getting OOB    Elimination Interventions: Call light in reach, Toileting schedule/hourly rounds              Problem: Patient Education: Go to Patient Education Activity  Goal: Patient/Family Education  Outcome: Progressing Towards Goal     Problem: Aspiration - Risk of  Goal: *Absence of aspiration  Outcome: Progressing Towards Goal     Problem: Patient Education: Go to Patient Education Activity  Goal: Patient/Family Education  Outcome: Progressing Towards Goal     Problem: Patient Education: Go to Patient Education Activity  Goal: Patient/Family Education  Outcome: Progressing Towards Goal     Problem: Pneumonia: Day 1  Goal: Off Pathway (Use only if patient is Off Pathway)  Outcome: Progressing Towards Goal  Goal: Activity/Safety  Outcome: Progressing Towards Goal  Goal: Consults, if ordered  Outcome: Progressing Towards Goal  Goal: Diagnostic Test/Procedures  Outcome: Progressing Towards Goal  Goal: Nutrition/Diet  Outcome: Progressing Towards Goal  Goal: Medications  Outcome: Progressing Towards Goal  Goal: Respiratory  Outcome: Progressing Towards Goal  Goal: Treatments/Interventions/Procedures  Outcome: Progressing Towards Goal  Goal: Psychosocial  Outcome: Progressing Towards Goal  Goal: *Oxygen saturation within defined limits  Outcome: Progressing Towards Goal  Goal: *Influenza vaccine administered (October-March)  Outcome: Progressing Towards Goal  Goal: *Pneumoccocal vaccine administered  Outcome: Progressing Towards Goal  Goal: *Hemodynamically stable  Outcome: Progressing Towards Goal  Goal: *Demonstrates progressive activity  Outcome: Progressing Towards Goal  Goal: *Tolerating diet  Outcome: Progressing Towards Goal     Problem: Pneumonia: Day 2  Goal: Off Pathway (Use only if patient is Off Pathway)  Outcome: Progressing Towards Goal  Goal: Activity/Safety  Outcome: Progressing Towards Goal  Goal: Consults, if ordered  Outcome: Progressing Towards Goal  Goal: Diagnostic Test/Procedures  Outcome: Progressing Towards Goal  Goal: Nutrition/Diet  Outcome: Progressing Towards Goal  Goal: Discharge Planning  Outcome: Progressing Towards Goal  Goal: Medications  Outcome: Progressing Towards Goal  Goal: Respiratory  Outcome: Progressing Towards Goal  Goal: Treatments/Interventions/Procedures  Outcome: Progressing Towards Goal  Goal: Psychosocial  Outcome: Progressing Towards Goal  Goal: *Oxygen saturation within defined limits  Outcome: Progressing Towards Goal  Goal: *Hemodynamically stable  Outcome: Progressing Towards Goal  Goal: *Demonstrates progressive activity  Outcome: Progressing Towards Goal  Goal: *Tolerating diet  Outcome: Progressing Towards Goal  Goal: *Optimal pain control at patient's stated goal  Outcome: Progressing Towards Goal     Problem: Pneumonia: Day 3  Goal: Off Pathway (Use only if patient is Off Pathway)  Outcome: Progressing Towards Goal  Goal: Activity/Safety  Outcome: Progressing Towards Goal  Goal: Consults, if ordered  Outcome: Progressing Towards Goal  Goal: Diagnostic Test/Procedures  Outcome: Progressing Towards Goal  Goal: Nutrition/Diet  Outcome: Progressing Towards Goal  Goal: Discharge Planning  Outcome: Progressing Towards Goal  Goal: Medications  Outcome: Progressing Towards Goal  Goal: Respiratory  Outcome: Progressing Towards Goal  Goal: Treatments/Interventions/Procedures  Outcome: Progressing Towards Goal  Goal: Psychosocial  Outcome: Progressing Towards Goal  Goal: *Oxygen saturation within defined limits  Outcome: Progressing Towards Goal  Goal: *Hemodynamically stable  Outcome: Progressing Towards Goal  Goal: *Demonstrates progressive activity  Outcome: Progressing Towards Goal  Goal: *Tolerating diet  Outcome: Progressing Towards Goal  Goal: *Describes available resources and support systems  Outcome: Progressing Towards Goal  Goal: *Optimal pain control at patient's stated goal  Outcome: Progressing Towards Goal     Problem: Pneumonia: Day 4  Goal: Off Pathway (Use only if patient is Off Pathway)  Outcome: Progressing Towards Goal  Goal: Activity/Safety  Outcome: Progressing Towards Goal  Goal: Nutrition/Diet  Outcome: Progressing Towards Goal  Goal: Discharge Planning  Outcome: Progressing Towards Goal  Goal: Medications  Outcome: Progressing Towards Goal  Goal: Respiratory  Outcome: Progressing Towards Goal  Goal: Treatments/Interventions/Procedures  Outcome: Progressing Towards Goal  Goal: Psychosocial  Outcome: Progressing Towards Goal     Problem: Pneumonia: Discharge Outcomes  Goal: *Demonstrates progressive activity  Outcome: Progressing Towards Goal  Goal: *Describes follow-up/return visits to physicians  Outcome: Progressing Towards Goal  Goal: *Tolerating diet  Outcome: Progressing Towards Goal  Goal: *Verbalizes name, dosage, time, side effects, and number of days to continue medications  Outcome: Progressing Towards Goal  Goal: *Influenza immunization  Outcome: Progressing Towards Goal  Goal: *Pneumococcal immunization  Outcome: Progressing Towards Goal  Goal: *Respiratory status at baseline  Outcome: Progressing Towards Goal  Goal: *Vital signs within defined limits  Outcome: Progressing Towards Goal  Goal: *Describes available resources and support systems  Outcome: Progressing Towards Goal  Goal: *Optimal pain control at patient's stated goal  Outcome: Progressing Towards Goal

## 2022-11-16 NOTE — PROGRESS NOTES
Problem: Mobility Impaired (Adult and Pediatric)  Goal: *Acute Goals and Plan of Care (Insert Text)  Description: FUNCTIONAL STATUS PRIOR TO ADMISSION: The patient utilized wheelchair and required maximum assistance for transfers to the chair. Per pt's daughter she participated with transfers using her UE to transfer to wheelchair and occasionally assisted with standing. HOME SUPPORT PRIOR TO ADMISSION: Patient admitted from [facility] and received PT services recently. She has a supportive daughter. Physical Therapy Goals  Initiated 11/16/2022  1. Patient will move from supine to sit and sit to supine  and roll side to side in bed with maximal assistance within 7 day(s). 2.  Patient will transfer from bed to chair and chair to bed with maximal assistance using the least restrictive device within 7 day(s). 3.  Patient will perform sit to stand with maximal assistance within 7 day(s). Outcome: Not Progressing Towards Goal   PHYSICAL THERAPY EVALUATION  Patient: Jimmie Maldonado (23 y.o. female)  Date: 11/16/2022  Primary Diagnosis: Hypoxia [R09.02]       Precautions: fall       ASSESSMENT  Based on the objective data described below, the patient presents with drowsiness but able to awaken and speak with therapist, oriented only to her first name, could not recall her last name, disoriented to place, time, and situation due to baseline of dementia. Pt presents decreased command follow, RUDOLPH, desaturating requiring O2, weakness throughout, decreased ROM, increased extensor tone of BLE, poor sitting balance, and dependent for all mobility and sitting balance at this time. Pt admitted with hypoxia and at rest SPO2 97% with 3 LPM O2. Pt assisted to sit with total assist x 2. Pt noted to have increased extensor tone of BLE and trunk and not able to use BUE to assist with transfer or sitting balance.   SPO2 decreased to 78% in sitting with increased work of breathing and coughing therefore assisted back to bed and slowly increased to 90%. Pt noted to go back to sleep at session end. Current Level of Function Impacting Discharge (mobility/balance): dependent for mobility and self care    Functional Outcome Measure: The patient scored 0 on the seated functional reach outcome measure which is indicative of high fall risk. Other factors to consider for discharge: high fall risk, wheelchair level at baseline, recently received skilled PT services at [facility]     Patient will benefit from skilled therapy intervention to address the above noted impairments. PLAN :  Recommendations and Planned Interventions: bed mobility training, transfer training, and patient and family training/education      Frequency/Duration: Patient will be followed by physical therapy:  3 times a week to address goals. Recommendation for discharge: (in order for the patient to meet his/her long term goals)  Therapy up to 5 days/week in SNF setting    This discharge recommendation:  Has not yet been discussed the attending provider and/or case management    IF patient discharges home will need the following DME: patient owns DME required for discharge         SUBJECTIVE:   Patient stated I don't remember(last name).     OBJECTIVE DATA SUMMARY:   HISTORY:    Past Medical History:   Diagnosis Date    Dementia ([facility] [address]     Hypercholesteremia 5/27/2010    Hypertension     IBS (irritable bowel syndrome) 5/27/2010    OA (osteoarthritis) 5/27/2010    Osteoporosis 5/27/2010    Pulmonary embolism ([facility] [address]    History reviewed. No pertinent surgical history.     Personal factors and/or comorbidities impacting plan of care: required significant assistance for mobility PTA, high fall risk     Home Situation  Home Environment: [address] [place] [address] Name: [address] [place] [address] Highway: [facility]  Patient Expects to be Discharged to[de-identified] Skilled nursing facility    EXAMINATION/PRESENTATION/DECISION MAKING:   Critical Behavior:  Neurologic State: Confused, Drowsy  Orientation Level: Disoriented X4  Cognition: Decreased attention/concentration, Decreased command following, Memory loss  Safety/Judgement: Decreased awareness of environment, Decreased awareness of need for assistance, Decreased awareness of need for safety, Decreased insight into deficits  Hearing: Auditory  Auditory Impairment: None      Range Of Motion:  AROM: Grossly decreased, non-functional, BLE with extensor tone in supine, increased in sitting  BUE generally decreased                       Strength:    Strength: Grossly decreased, non-functional                    Tone & Sensation:   Tone: Abnormal                              Coordination:  Coordination: Grossly decreased, non-functional       Functional Mobility:  Bed Mobility:  Rolling: Total assistance;Assist x2  Supine to Sit: Total assistance;Assist x2  Sit to Supine: Total assistance;Assist x2  Scooting: Total assistance;Assist x2                       Balance:   Sitting: Impaired  Sitting - Static: Poor (constant support)  Sitting - Dynamic: Poor (constant support)       Functional Measure:  Functional Reach:    Completed:  [] standing       [x] seated           Average: 0       Functional reach: (standing)  Reaches £  6 in = High Fall Risk  Reaches 6-10 in = Moderate Fall Risk    Reaches ³  10 in = Low Fall Risk  Reina Figueroa et al. Functional reach: a new clinical measure of balance. [facility] Kianna Barbosate; 39: [id number]. Modified Functional Reach: (seated)  Age: Men: Women:   21-39 17.9\" 17.6\"   40-59 17.5\" 15.9\"   60-79 14.4\" 13.2\"   80-97 14.0\" 12.5\"       Maryanne Carroll. Forward and Lateral Sitting Functional Reach in Younger, Middle-aged, and Older Adults.  J Geriatric Phys Ther. 2007; 30:43-48           Physical Therapy Evaluation Charge Determination   History Examination Presentation Decision-Making   HIGH Complexity :3+ comorbidities / personal factors will impact the outcome/ POC  HIGH Complexity : 4+ Standardized tests and measures addressing body structure, function, activity limitation and / or participation in recreation  MEDIUM Complexity : Evolving with changing characteristics  MEDIUM Complexity : FOTO score of 26-74      Based on the above components, the patient evaluation is determined to be of the following complexity level: MEDIUM    Pain Rating:  No complaints of pain    Activity Tolerance:   Poor, desaturates with exertion and requires oxygen, and observed SOB with activity    After treatment patient left in no apparent distress:   Supine in bed and Call bell within reach    COMMUNICATION/EDUCATION:   The patients plan of care was discussed with: Registered nurse. Fall prevention education was provided and the patient/caregiver indicated understanding. and Patient is unable to participate in goal setting and plan of care.     Thank you for this referral.  Jeremy Chapman

## 2022-11-16 NOTE — PROGRESS NOTES
[facility]  Pulmonary, Critical Care, and Sleep Medicine    Progress note    Name: Guerrero Chinchilla MRN: [id number]   : 10/14/1929 Hospital: 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 Olegario Beach for aspiration pna . Patient is a 80 y.o. female who was transferred from a snf to [facility] with 2 days of increased chest congestion . She arrived hypotensive and hypoxic . She was place on o2 and her bp responded to ivf . Pt reportedly has hx of dementia and upon my arrival acknowledged me , said hi and did ask if \" I am in the right place. \"  She was not able to provide any other hx . Cough was present  but very congested and weak. She was in no resp  distress. Past Medical History:   Diagnosis Date    Dementia ([facility] [address]     Hypercholesteremia 2010    Hypertension     IBS (irritable bowel syndrome) 2010    OA (osteoarthritis) 2010    Osteoporosis 2010    Pulmonary embolism ([facility] [address]       History reviewed. No pertinent surgical history. Prior to Admission medications    Medication Sig Start Date End Date Taking?  Authorizing Provider   guaiFENesin (ROBITUSSIN) 100 mg/5 mL liquid Take 200 mg by mouth every four (4) hours as needed for Cough. Yes Rusty, MD Kendall   atorvastatin (Lipitor) 10 mg tablet Take 10 mg by mouth daily. Yes Kendall Ojeda MD   modafiniL (ProvigiL) 100 mg tablet Take 100 mg by mouth every morning. Yes Kendall Ojeda MD   loratadine (Claritin) 10 mg tablet Take 10 mg by mouth daily. Yes Rusty, MD Kendall   coal tar (NEUTROGENA T-GEL) 0.5 % shampoo Apply 1 Bottle to affected area nightly as needed (two times per week for dandruff). Yes Rusty, MD Kendall   cholecalciferol (Vitamin D3) (5000 Units/125 mcg) tab tablet Take 5,000 Units by mouth daily. Yes Kendall Ojeda MD   apixaban (ELIQUIS) 5 mg tablet Take 5 mg by mouth two (2) times a day. Indications: a clot in the lung   Yes Rusty, MD Kendall   acetaminophen (TYLENOL) 500 mg tablet Take 500 mg by mouth two (2) times daily as needed for Pain. Yes Provider, Historical   acetaminophen (TYLENOL) 325 mg tablet Take 650 mg by mouth two (2) times a day. Yes Provider, Historical   calcium-vitamin D (OS-LUCÍA +D3) 500 mg-200 unit per tablet Take 1 Tab by mouth daily (with breakfast). Yes Rusty, MD Kendall   multivitamin (ONE A DAY) tablet Take 1 Tab by mouth daily. Yes Rusty, MD Kendall   losartan (COZAAR) 50 mg tablet Take 50 mg by mouth daily. Yes Kendall Ojeda MD   memantine (NAMENDA) 10 mg tablet Take 10 mg by mouth two (2) times a day. Yes Rusty, MD Kendall     Allergies   Allergen Reactions    Aricept [Donepezil] Other (comments)     GI upset    Flexeril [Cyclobenzaprine] Other (comments)     delirium      Social History     Tobacco Use    Smoking status: Never    Smokeless tobacco: Never   Substance Use Topics    Alcohol use: Not Currently      History reviewed. No pertinent family history.    Came from [facility]     Current Facility-Administered Medications   Medication Dose Route Frequency    piperacillin-tazobactam (ZOSYN) 3.375 g in 0.9% sodium chloride (MBP/ADV) 100 mL MBP  3.375 g IntraVENous Q8H    0.9% sodium chloride infusion  75 mL/hr IntraVENous CONTINUOUS    Vancomycin Pharmacy Dosing   Other Rx Dosing/Monitoring    sodium chloride (NS) flush 5-40 mL  5-40 mL IntraVENous Q8H    vancomycin (VANCOCIN) 1,000 mg in 0.9% sodium chloride 250 mL (Inia2Zby)  1,000 mg IntraVENous Q24H    memantine (NAMENDA) tablet 10 mg  10 mg Oral BID    modafiniL (PROVIGIL) tablet 100 mg  100 mg Oral DAILY    therapeutic multivitamin (THERAGRAN) tablet 1 Tablet  1 Tablet Oral DAILY    atorvastatin (LIPITOR) tablet 10 mg  10 mg Oral DAILY    albuterol-ipratropium (DUO-NEB) 2.5 MG-0.5 MG/3 ML  3 mL Nebulization Q6H RT    apixaban (ELIQUIS) tablet 5 mg  5 mg Oral Q12H       Review of Systems:  Review of systems not obtained due to patient factors. Objective:   Vital Signs:    Visit Vitals  /68 (BP 1 Location: Left upper arm, BP Patient Position: At rest;Lying)   Pulse 79   Temp 99 °F (37.2 °C)   Resp 24   Ht 5' (1.524 m)   Wt 45.8 kg (100 lb 15.5 oz)   SpO2 99%   BMI 19.72 kg/m²       O2 Device: Nasal cannula   O2 Flow Rate (L/min): 3 l/min   Temp (24hrs), Av.8 °F (37.7 °C), Min:98 °F (36.7 °C), Max:102.3 °F (39.1 °C)       Intake/Output:   Last shift:      No intake/output data recorded. Last 3 shifts:  1901 -  0700  In: 1200 [I.V.:1200]  Out: -     Intake/Output Summary (Last 24 hours) at 2022 9185  Last data filed at 11/15/2022 2044  Gross per 24 hour   Intake 1200 ml   Output --   Net 1200 ml        Physical Exam:   General:  Alert, elderly lady . NO resp distress    Head:  Normocephalic, without obvious abnormality, atraumatic. Eyes:  Conjunctivae/corneas clear. PERRL, EOMs intact. Nose: Nares normal. Septum midline. Mucosa normal. No drainage or sinus tenderness. Throat: Moist mucosa   Neck: Supple, symmetrical   Back:   Did not turn    Lungs: On ant/ lat scattered rhonchi and congested cough that was very weak    Chest wall:  No tenderness or deformity.    Heart:  Regular rate and rhythm, S1, S2 normal, no murmur, click, rub or gallop. Abdomen:   Soft, non-tender    Extremities: Extremities normal, atraumatic, no cyanosis or edema. Pulses: Not examined    Skin: Skin color, texture, turgor normal. No rashes or lesions   Lymph nodes: Cervical, supraclavicular, nodes normal.   Neurologic: Grossly nonfocal as moves all but not nl mental status      Data review:     Recent Results (from the past 24 hour(s))   GLUCOSE, POC    Collection Time: 11/15/22 11:36 AM   Result Value Ref Range    Glucose (POC) 139 (H) 65 - 117 mg/dL    Performed by Vee Aleman, BASIC    Collection Time: 11/16/22 12:54 AM   Result Value Ref Range    Sodium 139 136 - 145 mmol/L    Potassium 3.2 (L) 3.5 - 5.1 mmol/L    Chloride 108 97 - 108 mmol/L    CO2 26 21 - 32 mmol/L    Anion gap 5 5 - 15 mmol/L    Glucose 163 (H) 65 - 100 mg/dL    BUN 13 6 - 20 MG/DL    Creatinine 0.77 0.55 - 1.02 MG/DL    BUN/Creatinine ratio 17 12 - 20      eGFR >60 >60 ml/min/1.73m2    Calcium 8.5 8.5 - 24.8 MG/DL   METABOLIC PANEL, COMPREHENSIVE    Collection Time: 11/16/22 12:54 AM   Result Value Ref Range    Sodium 140 136 - 145 mmol/L    Potassium 3.3 (L) 3.5 - 5.1 mmol/L    Chloride 109 (H) 97 - 108 mmol/L    CO2 26 21 - 32 mmol/L    Anion gap 5 5 - 15 mmol/L    Glucose 166 (H) 65 - 100 mg/dL    BUN 13 6 - 20 MG/DL    Creatinine 0.79 0.55 - 1.02 MG/DL    BUN/Creatinine ratio 16 12 - 20      eGFR >60 >60 ml/min/1.73m2    Calcium 8.4 (L) 8.5 - 10.1 MG/DL    Bilirubin, total 0.5 0.2 - 1.0 MG/DL    ALT (SGPT) 34 12 - 78 U/L    AST (SGOT) 31 15 - 37 U/L    Alk.  phosphatase 633 (H) 45 - 117 U/L    Protein, total 5.9 (L) 6.4 - 8.2 g/dL    Albumin 2.0 (L) 3.5 - 5.0 g/dL    Globulin 3.9 2.0 - 4.0 g/dL    A-G Ratio 0.5 (L) 1.1 - 2.2     MAGNESIUM    Collection Time: 11/16/22 12:54 AM   Result Value Ref Range    Magnesium 1.6 1.6 - 2.4 mg/dL   CBC WITH AUTOMATED DIFF    Collection Time: 11/16/22 12:54 AM   Result Value Ref Range    WBC 9.0 3.6 - 11.0 K/uL    RBC 3.73 (L) 3.80 - 5.20 M/uL    HGB 11.3 (L) 11.5 - 16.0 g/dL    HCT 35.1 35.0 - 47.0 %    MCV 94.1 80.0 - 99.0 FL    MCH 30.3 26.0 - 34.0 PG    MCHC 32.2 30.0 - 36.5 g/dL    RDW 13.2 11.5 - 14.5 %    PLATELET 683 316 - 603 K/uL    MPV 11.5 8.9 - 12.9 FL    NRBC 0.0 0  WBC    ABSOLUTE NRBC 0.00 0.00 - 0.01 K/uL    NEUTROPHILS 89 (H) 32 - 75 %    LYMPHOCYTES 6 (L) 12 - 49 %    MONOCYTES 5 5 - 13 %    EOSINOPHILS 0 0 - 7 %    BASOPHILS 0 0 - 1 %    IMMATURE GRANULOCYTES 0 0.0 - 0.5 %    ABS. NEUTROPHILS 8.0 1.8 - 8.0 K/UL    ABS. LYMPHOCYTES 0.5 (L) 0.8 - 3.5 K/UL    ABS. MONOCYTES 0.5 0.0 - 1.0 K/UL    ABS. EOSINOPHILS 0.0 0.0 - 0.4 K/UL    ABS. BASOPHILS 0.0 0.0 - 0.1 K/UL    ABS. IMM. GRANS. 0.0 0.00 - 0.04 K/UL    DF SMEAR SCANNED      RBC COMMENTS NORMOCYTIC, NORMOCHROMIC     PROTHROMBIN TIME + INR    Collection Time: 11/16/22 12:54 AM   Result Value Ref Range    INR 1.1 0.9 - 1.1      Prothrombin time 11.9 (H) 9.0 - 11.1 sec   PTT    Collection Time: 11/16/22 12:54 AM   Result Value Ref Range    aPTT 41.1 (H) 22.1 - 31.0 sec    aPTT, therapeutic range     58.0 - 77.0 SECS   URINALYSIS W/ RFLX MICROSCOPIC    Collection Time: 11/16/22  1:08 AM   Result Value Ref Range    Color YELLOW/STRAW      Appearance CLOUDY (A) CLEAR      Specific gravity 1.030      pH (UA) 5.0 5.0 - 8.0      Protein 30 (A) NEG mg/dL    Glucose Negative NEG mg/dL    Ketone 15 (A) NEG mg/dL    Bilirubin Negative NEG      Blood TRACE (A) NEG      Urobilinogen 1.0 0.2 - 1.0 EU/dL    Nitrites Negative NEG      Leukocyte Esterase Negative NEG         Imaging:  I have personally reviewed the patients radiographs and have reviewed the reports:  Have tried 3 computers. ON 3rd had some difficulty  pulling up  but view.   Cxr seen -/ agree with report         Abel Cox PA-C

## 2022-11-16 NOTE — WOUND CARE
Consulted for thigh. Admitted for cough & weakness. Lives at [facility]. Conversational today and able to turn onto her side. Wearing a brief and cleaned her of urinary incontinence. Scar tissue on inner thigh. Sacrum and buttocks intact and barrier cream applied. Bilateral heels are red and were offloaded with pillows. No wound care needs - pressure injury protocol placed. Will sign off.    KENTRELL Freitas

## 2022-11-16 NOTE — PROGRESS NOTES
Spiritual Care Partner Volunteer visited patient at [facility] [address] in [facility] [address] on 11/16/2022   Documented by:    Ramon Silverman MDiv, [facility]

## 2022-11-16 NOTE — PROGRESS NOTES
2000 Received patient from [facility].    5715 Bedside shift change report given to MABLE Mayfield (oncoming nurse) by Whitney Diaz RN. Report included the following information SBAR, Kardex, MAR, Recent Results, and Cardiac Rhythm ST/NSR. Problem: Falls - Risk of  Goal: *Absence of Falls  Description: Document Eligio Mcmillan Fall Risk and appropriate interventions in the flowsheet.   Outcome: Progressing Towards Goal  Note: Fall Risk Interventions:  Mobility Interventions: Communicate number of staff needed for ambulation/transfer, Bed/chair exit alarm    Mentation Interventions: Adequate sleep, hydration, pain control, Bed/chair exit alarm, Door open when patient unattended    Medication Interventions: Evaluate medications/consider consulting pharmacy    Elimination Interventions: Patient to call for help with toileting needs              Problem: Aspiration - Risk of  Goal: *Absence of aspiration  Outcome: Progressing Towards Goal

## 2022-11-16 NOTE — PROGRESS NOTES
[facility]  Hospitalist Group                                                                                          Hospitalist Progress Note  Nancy Gant MD  Answering service: [phone] OR [phone] from in house phone        Date of Service:  2022  NAME:  Ryan Freeman  :  10/14/1929  MRN:  [id number]      Admission Summary:   Ryan Freeman is a 80 y.o. female with past medical history of hypertension, hyperlipidemia, dementia, IBS, osteoarthritis, osteoporosis, pulmonary edema presented as a direct admission/transfer [facility] to [facility] with chief complaint of shortness of breath. Patient is a limited historian. Majority of history was obtained per review of ED and electronic medical records. Per these reports, patient lives at [facility] facility where staff noted the patient was having symptoms of cough, generalized weakness over the past 2 days. Symptoms noted remain constant, without specific alleviating factors and there was concern for possible sepsis. Patient was initially transferred to [facility] with initial recorded vital signs temperature 98.9 °F, /68, heart rate 111, respiratory 20, O2 saturation 90% on room air. Oxygen saturation decreased to 88%. Patient was placed on 2 L oxygen via nasal cannula. Patient's blood pressure decreased to 81/55. Abnormal labs included blood glucose 239, creatinine 1.13, GFR 45, albumin 2.7, AST 40, alkaline phosphatase 942, lactic acid 3.7. Initial high-sensitivity troponin 23. Chest x-ray portable showed left lower lobe atelectasis. CTA chest was negative for PE however showed large amount of near occlusive debris and dependent airways and bilateral lower lobes with mild dependent atelectasis. Patient was then transferred to [facility] for admission. Interval history / Subjective:    Follow up Pneumonia  Continues to have off and on fever  Now on 5l NC (yesterday on 3l NC)     Assessment & Plan:     Severe sepsis  Lactic acidosis/hypotension--now resolved  Aspiration Pneumonia  -Follow blood cultures, no growth so far  -urinalysis unremarkable  -Rapid covid negative  -Continue Vanc/Zosyn, continue    Acute respiratory failure with hypoxia--now better  History of PE on Eliquis  -Supplemental oxygen, wean as tolerated  -Titrate oxygen to keep O2 saturation greater than 94%  -COVID-19 test negative  -repeat CXR 11/16 shows worsening  -Will give small dose of IV lasix 11/16  -repeat CXR tomorrow  -Appreciate Pulmonary     Hypokalemia: replace as needed  Uncontrolled type 2 diabetes mellitus with hyperglycemia: on SSI  Elevated creatinine: now resolved  LFT elevation: now resolved  Generalized weakness: PT/OT  Dementia  -Patient's baseline mental status which she is currently disoriented on exam  -Order CT of the head without IV contrast without any acute process     Dysphagia diet       Code status: DNR  Prophylaxis: Eliquis  PTA: Sherry Palacios at [phone]    Plan: Continue antibiotics, wean off supp oxygen as tolerated, repeat CXR tomorrow  Care Plan discussed with: Patient, daughter, RN  Anticipated Disposition: [place] when stable, probably in 2-3 days     Hospital Problems  Date Reviewed: 12/21/2018            Codes Class Noted POA    Hypoxia ICD-10-CM: R09.02  ICD-9-CM: 799.02  11/14/2022 Unknown           Review of Systems:   A comprehensive review of systems was negative except for that written in the HPI. Vital Signs:    Last 24hrs VS reviewed since prior progress note.  Most recent are:  Visit Vitals  BP (!) 153/77 (BP 1 Location: Right upper arm, BP Patient Position: At rest;Lying)   Pulse 79   Temp 99.8 °F (37.7 °C)   Resp 24   Ht 5' (1.524 m)   Wt 45.8 kg (100 lb 15.5 oz)   SpO2 90%   BMI 19.72 kg/m²         Intake/Output Summary (Last 24 hours) at 11/16/2022 1600  Last data filed at 11/15/2022 2044  Gross per 24 hour   Intake 1200 ml   Output --   Net 1200 ml          Physical Examination:     I had a face to face encounter with this patient and independently examined them on 11/16/2022 as outlined below:          Constitutional:  No acute distress   ENT:  Oral mucosa moist, oropharynx benign. Resp:  CTA bilaterally. No wheezing/rhonchi/rales. No accessory muscle use. CV:  Regular rhythm, normal rate, no murmurs, gallops, rubs    GI:  Soft, non distended, non tender. normoactive bowel sounds, no hepatosplenomegaly     Musculoskeletal:  No edema, warm, 2+ pulses throughout    Neurologic:  Moves all extremities. AAOx3, CN II-XII reviewed            Data Review:    Review and/or order of clinical lab test      Labs:     Recent Labs     11/16/22  0054 11/15/22  0517   WBC 9.0 12.0*   HGB 11.3* 12.3   HCT 35.1 38.4    217       Recent Labs     11/16/22  0054 11/15/22  0517 11/14/22  1043     139 139 137   K 3.3*  3.2* 3.9 3.8   *  108 107 99   CO2 26  26 29 27   BUN 13  13 14 20   CREA 0.79  0.77 0.62 1.13*   *  163* 106* 239*   CA 8.4*  8.5 9.3 9.5   MG 1.6  --   --        Recent Labs     11/16/22  0054 11/15/22  0517 11/14/22  1043   ALT 34 48 59   * 713* 942*   TBILI 0.5 0.4 0.5   TP 5.9* 6.2* 7.3   ALB 2.0* 2.3* 2.7*   GLOB 3.9 3.9 4.6*       Recent Labs     11/16/22 0054   INR 1.1   PTP 11.9*   APTT 41.1*        No results for input(s): FE, TIBC, PSAT, FERR in the last 72 hours. No results found for: FOL, RBCF   No results for input(s): PH, PCO2, PO2 in the last 72 hours. No results for input(s): CPK, CKNDX, TROIQ in the last 72 hours.     No lab exists for component: CPKMB  Lab Results   Component Value Date/Time    Cholesterol, total 153 02/23/2010 08:30 AM    HDL Cholesterol 54 02/23/2010 08:30 AM    LDL, calculated 88 02/23/2010 08:30 AM    Triglyceride 55 02/23/2010 08:30 AM    CHOL/HDL Ratio 2.8 02/23/2010 08:30 AM     Lab Results   Component Value Date/Time    Glucose (POC) 139 (H) 11/15/2022 11:36 AM    Glucose (POC) 102 11/15/2022 08:49 AM     Lab Results   Component Value Date/Time    Color YELLOW/STRAW 11/16/2022 01:08 AM    Appearance CLOUDY (A) 11/16/2022 01:08 AM    Specific gravity 1.030 11/16/2022 01:08 AM    Specific gravity 1.028 02/09/2021 06:07 PM    pH (UA) 5.0 11/16/2022 01:08 AM    Protein 30 (A) 11/16/2022 01:08 AM    Glucose Negative 11/16/2022 01:08 AM    Ketone 15 (A) 11/16/2022 01:08 AM    Bilirubin Negative 11/16/2022 01:08 AM    Urobilinogen 1.0 11/16/2022 01:08 AM    Nitrites Negative 11/16/2022 01:08 AM    Leukocyte Esterase Negative 11/16/2022 01:08 AM    Epithelial cells FEW 02/09/2021 06:07 PM    Bacteria 1+ (A) 02/09/2021 06:07 PM    WBC 20-50 02/09/2021 06:07 PM    RBC 0-5 02/09/2021 06:07 PM         Medications Reviewed:     Current Facility-Administered Medications   Medication Dose Route Frequency    sodium chloride (NS) flush 5-10 mL  5-10 mL IntraVENous PRN    piperacillin-tazobactam (ZOSYN) 3.375 g in 0.9% sodium chloride (MBP/ADV) 100 mL MBP  3.375 g IntraVENous Q8H    0.9% sodium chloride infusion  75 mL/hr IntraVENous CONTINUOUS    Vancomycin Pharmacy Dosing   Other Rx Dosing/Monitoring    sodium chloride (NS) flush 5-40 mL  5-40 mL IntraVENous Q8H    sodium chloride (NS) flush 5-40 mL  5-40 mL IntraVENous PRN    acetaminophen (TYLENOL) tablet 650 mg  650 mg Oral Q6H PRN    Or    acetaminophen (TYLENOL) suppository 650 mg  650 mg Rectal Q6H PRN    polyethylene glycol (MIRALAX) packet 17 g  17 g Oral DAILY PRN    ondansetron (ZOFRAN ODT) tablet 4 mg  4 mg Oral Q8H PRN    Or    ondansetron (ZOFRAN) injection 4 mg  4 mg IntraVENous Q6H PRN    vancomycin (VANCOCIN) 1,000 mg in 0.9% sodium chloride 250 mL ([id number])  1,000 mg IntraVENous Q24H    memantine (NAMENDA) tablet 10 mg  10 mg Oral BID    modafiniL (PROVIGIL) tablet 100 mg  100 mg Oral DAILY    therapeutic multivitamin (THERAGRAN) tablet 1 Tablet  1 Tablet Oral DAILY    atorvastatin (LIPITOR) tablet 10 mg  10 mg Oral DAILY    albuterol-ipratropium (DUO-NEB) 2.5 MG-0.5 MG/3 ML  3 mL Nebulization Q6H RT    apixaban (ELIQUIS) tablet 5 mg  5 mg Oral Q12H    sodium chloride (NS) flush 5-10 mL  5-10 mL IntraVENous PRN     ______________________________________________________________________  EXPECTED LENGTH OF STAY: 4d 19h  ACTUAL LENGTH OF STAY:          2                 Erika Deal MD

## 2022-11-16 NOTE — PROGRESS NOTES
Bedside shift change report given to [address]  (oncoming nurse) by Umberto Sánchez  (offgoing nurse). Report included the following information SBAR, Kardex, MAR, and Recent Results.

## 2022-11-16 NOTE — PROGRESS NOTES
Day #2 of Vancomycin  Indication:  aspiration PNA  Current regimen:  vanc 1g q24h  Abx regimen:  vanc/pip-tazo  ID Following ?: NO  Concomitant nephrotoxic drugs (requires more frequent monitoring): Loop diuretics and Contrast agents   Frequency of BMP?: daily through     Recent Labs     22  0054 11/15/22  0517 22  1043   WBC 9.0 12.0* 9.5   CREA 0.79  0.77 0.62 1.13*   BUN 13  13 14 20     Est CrCl: 30-35 ml/min; UO: -- ml/kg/hr + 1x unmeasured void  Temp (24hrs), Av.8 °F (37.7 °C), Min:98 °F (36.7 °C), Max:102.3 °F (39.1 °C)    Cultures:    blood: NG x2 days; prelim  11/15 MRSA swab: ordered, not yet collected    MRSA Swab ordered (if applicable)? YES    Goal target range AUC/LOIDA 400-600        Plan: Change to vanc 750 mg q24h  as current regimen with predicted AUC/LOIDA exceeding goal levels.

## 2022-11-17 LAB
ANION GAP SERPL CALC-SCNC: 3 MMOL/L (ref 5–15)
BASOPHILS # BLD: 0 K/UL (ref 0–0.1)
BASOPHILS NFR BLD: 0 % (ref 0–1)
BUN SERPL-MCNC: 11 MG/DL (ref 6–20)
BUN/CREAT SERPL: 18 (ref 12–20)
CALCIUM SERPL-MCNC: 8.6 MG/DL (ref 8.5–10.1)
CHLORIDE SERPL-SCNC: 105 MMOL/L (ref 97–108)
CO2 SERPL-SCNC: 31 MMOL/L (ref 21–32)
CREAT SERPL-MCNC: 0.61 MG/DL (ref 0.55–1.02)
DIFFERENTIAL METHOD BLD: NORMAL
EOSINOPHIL # BLD: 0 K/UL (ref 0–0.4)
EOSINOPHIL NFR BLD: 0 % (ref 0–7)
ERYTHROCYTE [DISTWIDTH] IN BLOOD BY AUTOMATED COUNT: 13.2 % (ref 11.5–14.5)
GLUCOSE SERPL-MCNC: 96 MG/DL (ref 65–100)
HCT VFR BLD AUTO: 40.7 % (ref 35–47)
HGB BLD-MCNC: 12.8 G/DL (ref 11.5–16)
IMM GRANULOCYTES # BLD AUTO: 0 K/UL (ref 0–0.04)
IMM GRANULOCYTES NFR BLD AUTO: 0 % (ref 0–0.5)
LYMPHOCYTES # BLD: 1.2 K/UL (ref 0.8–3.5)
LYMPHOCYTES NFR BLD: 18 % (ref 12–49)
MCH RBC QN AUTO: 30 PG (ref 26–34)
MCHC RBC AUTO-ENTMCNC: 31.4 G/DL (ref 30–36.5)
MCV RBC AUTO: 95.5 FL (ref 80–99)
MONOCYTES # BLD: 0.5 K/UL (ref 0–1)
MONOCYTES NFR BLD: 7 % (ref 5–13)
NEUTS SEG # BLD: 5.2 K/UL (ref 1.8–8)
NEUTS SEG NFR BLD: 75 % (ref 32–75)
NRBC # BLD: 0 K/UL (ref 0–0.01)
NRBC BLD-RTO: 0 PER 100 WBC
PLATELET # BLD AUTO: 208 K/UL (ref 150–400)
PMV BLD AUTO: 11.1 FL (ref 8.9–12.9)
POTASSIUM SERPL-SCNC: 3.5 MMOL/L (ref 3.5–5.1)
RBC # BLD AUTO: 4.26 M/UL (ref 3.8–5.2)
SODIUM SERPL-SCNC: 139 MMOL/L (ref 136–145)
VANCOMYCIN SERPL-MCNC: 16.8 UG/ML
WBC # BLD AUTO: 7 K/UL (ref 3.6–11)

## 2022-11-17 PROCEDURE — 74011250636 HC RX REV CODE- 250/636: Performed by: FAMILY MEDICINE

## 2022-11-17 PROCEDURE — 80202 ASSAY OF VANCOMYCIN: CPT

## 2022-11-17 PROCEDURE — 74011000250 HC RX REV CODE- 250: Performed by: INTERNAL MEDICINE

## 2022-11-17 PROCEDURE — 94760 N-INVAS EAR/PLS OXIMETRY 1: CPT

## 2022-11-17 PROCEDURE — 85025 COMPLETE CBC W/AUTO DIFF WBC: CPT

## 2022-11-17 PROCEDURE — 94640 AIRWAY INHALATION TREATMENT: CPT

## 2022-11-17 PROCEDURE — 74011000258 HC RX REV CODE- 258: Performed by: FAMILY MEDICINE

## 2022-11-17 PROCEDURE — 74011000250 HC RX REV CODE- 250: Performed by: FAMILY MEDICINE

## 2022-11-17 PROCEDURE — 74011250636 HC RX REV CODE- 250/636: Performed by: HOSPITALIST

## 2022-11-17 PROCEDURE — 36415 COLL VENOUS BLD VENIPUNCTURE: CPT

## 2022-11-17 PROCEDURE — 77010033678 HC OXYGEN DAILY

## 2022-11-17 PROCEDURE — 74011250637 HC RX REV CODE- 250/637: Performed by: HOSPITALIST

## 2022-11-17 PROCEDURE — 65270000046 HC RM TELEMETRY

## 2022-11-17 PROCEDURE — 80048 BASIC METABOLIC PNL TOTAL CA: CPT

## 2022-11-17 RX ADMIN — IPRATROPIUM BROMIDE AND ALBUTEROL SULFATE 3 ML: .5; 3 SOLUTION RESPIRATORY (INHALATION) at 14:21

## 2022-11-17 RX ADMIN — PIPERACILLIN AND TAZOBACTAM 3.38 G: 3; .375 INJECTION, POWDER, FOR SOLUTION INTRAVENOUS at 11:49

## 2022-11-17 RX ADMIN — SODIUM CHLORIDE 50 ML/HR: 9 INJECTION, SOLUTION INTRAVENOUS at 04:01

## 2022-11-17 RX ADMIN — MEMANTINE 10 MG: 10 TABLET ORAL at 08:44

## 2022-11-17 RX ADMIN — MEMANTINE 10 MG: 10 TABLET ORAL at 18:14

## 2022-11-17 RX ADMIN — IPRATROPIUM BROMIDE AND ALBUTEROL SULFATE 3 ML: .5; 3 SOLUTION RESPIRATORY (INHALATION) at 22:00

## 2022-11-17 RX ADMIN — IPRATROPIUM BROMIDE AND ALBUTEROL SULFATE 3 ML: .5; 3 SOLUTION RESPIRATORY (INHALATION) at 08:43

## 2022-11-17 RX ADMIN — MODAFINIL 100 MG: 100 TABLET ORAL at 08:44

## 2022-11-17 RX ADMIN — APIXABAN 5 MG: 5 TABLET, FILM COATED ORAL at 08:43

## 2022-11-17 RX ADMIN — VANCOMYCIN HYDROCHLORIDE 750 MG: 750 INJECTION, POWDER, LYOPHILIZED, FOR SOLUTION INTRAVENOUS at 08:34

## 2022-11-17 RX ADMIN — ATORVASTATIN CALCIUM 10 MG: 10 TABLET, FILM COATED ORAL at 08:44

## 2022-11-17 RX ADMIN — THERA TABS 1 TABLET: TAB at 08:44

## 2022-11-17 RX ADMIN — APIXABAN 5 MG: 5 TABLET, FILM COATED ORAL at 21:54

## 2022-11-17 RX ADMIN — VANCOMYCIN HYDROCHLORIDE 750 MG: 750 INJECTION, POWDER, LYOPHILIZED, FOR SOLUTION INTRAVENOUS at 23:39

## 2022-11-17 RX ADMIN — PIPERACILLIN AND TAZOBACTAM 3.38 G: 3; .375 INJECTION, POWDER, FOR SOLUTION INTRAVENOUS at 18:14

## 2022-11-17 RX ADMIN — SODIUM CHLORIDE, PRESERVATIVE FREE 10 ML: 5 INJECTION INTRAVENOUS at 21:54

## 2022-11-17 RX ADMIN — PIPERACILLIN AND TAZOBACTAM 3.38 G: 3; .375 INJECTION, POWDER, FOR SOLUTION INTRAVENOUS at 02:40

## 2022-11-17 RX ADMIN — IPRATROPIUM BROMIDE AND ALBUTEROL SULFATE 3 ML: .5; 3 SOLUTION RESPIRATORY (INHALATION) at 02:40

## 2022-11-17 RX ADMIN — SODIUM CHLORIDE, PRESERVATIVE FREE 10 ML: 5 INJECTION INTRAVENOUS at 14:20

## 2022-11-17 NOTE — PROGRESS NOTES
TRANSFER - OUT REPORT:    Verbal report given to Akilah Green RN (name) on Girish Choi  being transferred to  (unit) for routine progression of care       Report consisted of patients Situation, Background, Assessment and   Recommendations(SBAR). Information from the following report(s) SBAR, Kardex, MAR, and Recent Results was reviewed with the receiving nurse. Lines:   Peripheral IV 11/15/22 Anterior; Left Forearm (Active)   Site Assessment Clean, dry, & intact 11/16/22 0935   Phlebitis Assessment 0 11/16/22 0935   Infiltration Assessment 0 11/16/22 0935   Dressing Status Clean, dry, & intact; Clean 11/16/22 0935   Dressing Type Transparent 11/16/22 0935   Hub Color/Line Status Pink; Infusing 11/16/22 0935   Action Taken Open ports on tubing capped 11/15/22 2044   Alcohol Cap Used Yes 11/16/22 0935       Peripheral IV 11/16/22 Posterior;Right Forearm (Active)   Site Assessment Clean, dry, & intact 11/16/22 0935   Phlebitis Assessment 0 11/16/22 0935   Infiltration Assessment 0 11/16/22 0935   Dressing Status Clean, dry, & intact 11/16/22 0935   Dressing Type Transparent 11/16/22 0935   Hub Color/Line Status Blue;Capped 11/16/22 0935   Alcohol Cap Used Yes 11/16/22 0935        Opportunity for questions and clarification was provided.       Patient transported with:   O2 @ 3 liters  Tech

## 2022-11-17 NOTE — PROGRESS NOTES
Bedside and Verbal shift change report given to Breanne Abel RN (oncoming nurse) by Mai Scales RN (offgoing nurse). Report included the following information SBAR, Kardex, ED Summary, Procedure Summary, Intake/Output, MAR, Recent Results, and Cardiac Rhythm NSR .

## 2022-11-17 NOTE — PROGRESS NOTES
[facility]  Pulmonary, Critical Care, and Sleep Medicine    Progress note    Name: Dianna Steele MRN: [id number]   : 10/14/1929 Hospital: [facility]    Date: 2022        IMPRESSION:   Acute hypoxic resp failure   Suspected aspiration pna   Decreased   respiratory secretion clearance with retained secretions in the basilar airways/ occlusive   Advanced age  Hypotensive on admit - resolved with ivf   Lll atx   Hx of dementia   Hx of uti   Per chart hx of pte  and chronic eliquis   Dm  Hx of ibs       RECOMMENDATIONS:   currently on zosyn and  vanco; can narrowing vanc  Fu bc - so far ng  Covid rapid negative    Speech eval / diet adjust as per there recs   Watch volume status - per chart hx of pulm edema/ last echo nl lvef  O2 titration above 90%   Continue eliquis  DNR status      Subjective:       Unlabored breathing   Chest x-ray   IMPRESSION  1. Improved aeration in the right base without significant change.   Resting   unlabored    11/15    This patient has been seen and evaluated at the request of Dr. Kyle Basurto for aspiration pna . Patient is a 80 y.o. female who was transferred from a snf to [facility] with 2 days of increased chest congestion . She arrived hypotensive and hypoxic . She was place on o2 and her bp responded to ivf . Pt reportedly has hx of dementia and upon my arrival acknowledged me , said hi and did ask if \" I am in the right place. \"  She was not able to provide any other hx . Cough was present  but very congested and weak. She was in no resp  distress. Past Medical History:   Diagnosis Date    Dementia ([facility] [address]     Hypercholesteremia 2010    Hypertension     IBS (irritable bowel syndrome) 2010    OA (osteoarthritis) 2010    Osteoporosis 2010    Pulmonary embolism ([facility] [address]       History reviewed. No pertinent surgical history. Prior to Admission medications    Medication Sig Start Date End Date Taking?  Authorizing Provider   guaiFENesin (ROBITUSSIN) 100 mg/5 mL liquid Take 200 mg by mouth every four (4) hours as needed for Cough. Yes Rusty, MD Kendall   atorvastatin (Lipitor) 10 mg tablet Take 10 mg by mouth daily. Yes Rusty, MD Kendall   modafiniL (ProvigiL) 100 mg tablet Take 100 mg by mouth every morning. Yes Rusty, MD Kendall   loratadine (Claritin) 10 mg tablet Take 10 mg by mouth daily. Yes Rusty, MD Kendall   coal tar (NEUTROGENA T-GEL) 0.5 % shampoo Apply 1 Bottle to affected area nightly as needed (two times per week for dandruff). Yes Rusty, MD Kendall   cholecalciferol (Vitamin D3) (5000 Units/125 mcg) tab tablet Take 5,000 Units by mouth daily. Yes Rusty, MD Kendall   apixaban (ELIQUIS) 5 mg tablet Take 5 mg by mouth two (2) times a day. Indications: a clot in the lung   Yes Rusty, MD Kendall   acetaminophen (TYLENOL) 500 mg tablet Take 500 mg by mouth two (2) times daily as needed for Pain. Yes Provider, Historical   acetaminophen (TYLENOL) 325 mg tablet Take 650 mg by mouth two (2) times a day. Yes Provider, Historical   calcium-vitamin D (OS-LUCÍA +D3) 500 mg-200 unit per tablet Take 1 Tab by mouth daily (with breakfast). Yes Rusty, MD Kendall   multivitamin (ONE A DAY) tablet Take 1 Tab by mouth daily. Yes Other, MD Kendall   losartan (COZAAR) 50 mg tablet Take 50 mg by mouth daily. Yes Rusty, MD Kendall   memantine (NAMENDA) 10 mg tablet Take 10 mg by mouth two (2) times a day. Yes Other, MD Kendall     Allergies   Allergen Reactions    Aricept [Donepezil] Other (comments)     GI upset    Flexeril [Cyclobenzaprine] Other (comments)     delirium      Social History     Tobacco Use    Smoking status: Never    Smokeless tobacco: Never   Substance Use Topics    Alcohol use: Not Currently      History reviewed. No pertinent family history. Came from [facility]     Current Facility-Administered Medications   Medication Dose Route Frequency    Vancomycin, Random - Please draw on Thursday, 11/17 @ 1100. Thanks!    Other ONCE    vancomycin (VANCOCIN) 750 mg in 0.9% sodium chloride 250 mL (Vnia6Ipt)  750 mg IntraVENous Q24H    piperacillin-tazobactam (ZOSYN) 3.375 g in 0.9% sodium chloride (MBP/ADV) 100 mL MBP  3.375 g IntraVENous Q8H    [Held by provider] 0.9% sodium chloride infusion  50 mL/hr IntraVENous CONTINUOUS    Vancomycin Pharmacy Dosing   Other Rx Dosing/Monitoring    sodium chloride (NS) flush 5-40 mL  5-40 mL IntraVENous Q8H    memantine (NAMENDA) tablet 10 mg  10 mg Oral BID    modafiniL (PROVIGIL) tablet 100 mg  100 mg Oral DAILY    therapeutic multivitamin (THERAGRAN) tablet 1 Tablet  1 Tablet Oral DAILY    atorvastatin (LIPITOR) tablet 10 mg  10 mg Oral DAILY    albuterol-ipratropium (DUO-NEB) 2.5 MG-0.5 MG/3 ML  3 mL Nebulization Q6H RT    apixaban (ELIQUIS) tablet 5 mg  5 mg Oral Q12H       Review of Systems:  Review of systems not obtained due to patient factors. Objective:   Vital Signs:    Visit Vitals  /66 (BP 1 Location: Right upper arm, BP Patient Position: At rest)   Pulse 88   Temp 98 °F (36.7 °C)   Resp 25   Ht 5' (1.524 m)   Wt 44 kg (97 lb)   SpO2 94%   BMI 18.94 kg/m²       O2 Device: Nasal cannula   O2 Flow Rate (L/min): 3 l/min   Temp (24hrs), Av.5 °F (36.9 °C), Min:97.5 °F (36.4 °C), Max:99.8 °F (37.7 °C)       Intake/Output:   Last shift:      No intake/output data recorded. Last 3 shifts: 11/15 1901 -  0700  In: 1200 [I.V.:1200]  Out: -   No intake or output data in the 24 hours ending 22 1049     Physical Exam:   General:  Alert, elderly lady . NO resp distress    Head:  Normocephalic, without obvious abnormality, atraumatic. Eyes:  Conjunctivae/corneas clear. PERRL, EOMs intact. Nose: Nares normal. Septum midline. Mucosa normal. No drainage or sinus tenderness. Throat: Moist mucosa   Neck: Supple, symmetrical   Back:   Did not turn    Lungs: On ant/ lat scattered rhonchi and congested cough that was very weak    Chest wall:  No tenderness or deformity.    Heart: Regular rate and rhythm, S1, S2 normal, no murmur, click, rub or gallop. Abdomen:   Soft, non-tender    Extremities: Extremities normal, atraumatic, no cyanosis or edema. Pulses: Not examined    Skin: Skin color, texture, turgor normal. No rashes or lesions   Lymph nodes: Cervical, supraclavicular, nodes normal.   Neurologic: Grossly nonfocal as moves all but not nl mental status      Data review:     Recent Results (from the past 24 hour(s))   CBC WITH AUTOMATED DIFF    Collection Time: 11/17/22  1:03 AM   Result Value Ref Range    WBC 7.0 3.6 - 11.0 K/uL    RBC 4.26 3.80 - 5.20 M/uL    HGB 12.8 11.5 - 16.0 g/dL    HCT 40.7 35.0 - 47.0 %    MCV 95.5 80.0 - 99.0 FL    MCH 30.0 26.0 - 34.0 PG    MCHC 31.4 30.0 - 36.5 g/dL    RDW 13.2 11.5 - 14.5 %    PLATELET 774 339 - 575 K/uL    MPV 11.1 8.9 - 12.9 FL    NRBC 0.0 0  WBC    ABSOLUTE NRBC 0.00 0.00 - 0.01 K/uL    NEUTROPHILS 75 32 - 75 %    LYMPHOCYTES 18 12 - 49 %    MONOCYTES 7 5 - 13 %    EOSINOPHILS 0 0 - 7 %    BASOPHILS 0 0 - 1 %    IMMATURE GRANULOCYTES 0 0.0 - 0.5 %    ABS. NEUTROPHILS 5.2 1.8 - 8.0 K/UL    ABS. LYMPHOCYTES 1.2 0.8 - 3.5 K/UL    ABS. MONOCYTES 0.5 0.0 - 1.0 K/UL    ABS. EOSINOPHILS 0.0 0.0 - 0.4 K/UL    ABS. BASOPHILS 0.0 0.0 - 0.1 K/UL    ABS. IMM. GRANS. 0.0 0.00 - 0.04 K/UL    DF AUTOMATED     METABOLIC PANEL, BASIC    Collection Time: 11/17/22  1:03 AM   Result Value Ref Range    Sodium 139 136 - 145 mmol/L    Potassium 3.5 3.5 - 5.1 mmol/L    Chloride 105 97 - 108 mmol/L    CO2 31 21 - 32 mmol/L    Anion gap 3 (L) 5 - 15 mmol/L    Glucose 96 65 - 100 mg/dL    BUN 11 6 - 20 MG/DL    Creatinine 0.61 0.55 - 1.02 MG/DL    BUN/Creatinine ratio 18 12 - 20      eGFR >60 >60 ml/min/1.73m2    Calcium 8.6 8.5 - 10.1 MG/DL       Imaging:  I have personally reviewed the patients radiographs and have reviewed the reports:  Have tried 3 computers. ON 3rd had some difficulty  pulling up  but view.   Cxr seen -/ agree with report Malvin Boxer, PA-C

## 2022-11-17 NOTE — PROGRESS NOTES
TRANSFER - IN REPORT:    Verbal report received from [facility], RN(name) on Mireille Villalobos  being received from [facility](unit) for routine progression of care      Report consisted of patients Situation, Background, Assessment and   Recommendations(SBAR). Information from the following report(s) SBAR, Kardex, ED Summary, Procedure Summary, Intake/Output, MAR, and Recent Results was reviewed with the receiving nurse. Opportunity for questions and clarification was provided. Assessment completed upon patients arrival to unit and care assumed.

## 2022-11-17 NOTE — PROGRESS NOTES
[address] [facility] Adult  Hospitalist Group                                                                                          Hospitalist Progress Note  Omari Marlow MD  Answering service: [phone] from in house phone        Date of Service:  2022  NAME:  Tobias Meeks  :  10/14/1929  MRN:  [id number]      Admission Summary: Pk Gordon is a 80 y.o. female with past medical history of hypertension, hyperlipidemia, dementia, IBS, osteoarthritis, osteoporosis, pulmonary edema presented as a direct admission/transfer [facility] to [facility] with chief complaint of shortness of breath. Patient is a limited historian. Majority of history was obtained per review of ED and electronic medical records. Per these reports, patient lives at [facility] facility where staff noted the patient was having symptoms of cough, generalized weakness over the past 2 days. Symptoms noted remain constant, without specific alleviating factors and there was concern for possible sepsis. Patient was initially transferred to [facility] with initial recorded vital signs temperature 98.9 °F, /68, heart rate 111, respiratory 20, O2 saturation 90% on room air. Oxygen saturation decreased to 88%. Patient was placed on 2 L oxygen via nasal cannula. Patient's blood pressure decreased to 81/55. Abnormal labs included blood glucose 239, creatinine 1.13, GFR 45, albumin 2.7, AST 40, alkaline phosphatase 942, lactic acid 3.7. Initial high-sensitivity troponin 23. Chest x-ray portable showed left lower lobe atelectasis. CTA chest was negative for PE however showed large amount of near occlusive debris and dependent airways and bilateral lower lobes with mild dependent atelectasis. Patient was then transferred to [facility] for admission. \"          Interval history / Subjective:   Seen earlier, 3 liters, on abx, no fever, no cough     Assessment & Plan:     Lactic acidosis/hypotension, POA--now resolved  Suspected Aspiration Pneumonia  -blood cx ngtd  -ua looks more like contaminant  -Rapid covid negative  -iv abx while inpt     Acute respiratory failure with hypoxia--now better  History of PE on home Eliquis  In setting of suspected Aspiration Pneumonia  -Supplemental oxygen, wean as tolerated  -Titrate oxygen to keep O2 saturation greater than 94%  -COVID-19 test negative  -lasix as needed  -home eliquis  -Appreciate Pulmonary     Hypokalemia: replace as needed  Uncontrolled type 2 diabetes mellitus with hyperglycemia: on SSI  Elevated creatinine: now resolved  LFT elevation: now resolved  Generalized weakness: PT/OT  Dementia  -Patient's baseline mental status which she is currently disoriented on exam  -Order CT of the head without IV contrast without any acute process     Dysphagia diet       Code status: DNR  Prophylaxis: Eliquis  PTA: Krystian    Plan: Continue antibiotics, wean off supp oxygen as tolerated  Care Plan discussed with: patient/staff  Anticipated Disposition: [place] when stable, possibly on Monday     Hospital Problems  Date Reviewed: 12/21/2018            Codes Class Noted POA    Hypoxia ICD-10-CM: R09.02  ICD-9-CM: 799.02  11/14/2022 Unknown         Review of Systems:   A comprehensive review of systems was negative except for that written in the HPI. Vital Signs:    Last 24hrs VS reviewed since prior progress note.  Most recent are:  Visit Vitals  /88 (BP 1 Location: Right upper arm, BP Patient Position: At rest)   Pulse (!) 106   Temp 98.1 °F (36.7 °C)   Resp 26   Ht 5' (1.524 m)   Wt 44 kg (97 lb)   SpO2 92%   BMI 18.94 kg/m²       No intake or output data in the 24 hours ending 11/17/22 1433       Physical Examination:     I had a face to face encounter with this patient and independently examined them on 11/17/2022 as outlined below:          Constitutional:  No acute distress   ENT:  Oral mucosa moist, oropharynx benign. Resp:  CTA bilaterally. No wheezing/rhonchi/rales. No accessory muscle use. CV:  Regular rhythm, normal rate, no murmurs, gallops, rubs    GI:  Soft, non distended, non tender. normoactive bowel sounds, no hepatosplenomegaly     Musculoskeletal:  No edema, warm, 2+ pulses throughout    Neurologic:  Moves all extremities. AAOx3, CN II-XII reviewed            Data Review:    Review and/or order of clinical lab test      Labs:     Recent Labs     11/17/22 0103 11/16/22 0054   WBC 7.0 9.0   HGB 12.8 11.3*   HCT 40.7 35.1    201       Recent Labs     11/17/22  0103 11/16/22  0054 11/15/22  0517    140  139 139   K 3.5 3.3*  3.2* 3.9    109*  108 107   CO2 31 26  26 29   BUN 11 13  13 14   CREA 0.61 0.79  0.77 0.62   GLU 96 166*  163* 106*   CA 8.6 8.4*  8.5 9.3   MG  --  1.6  --        Recent Labs     11/16/22  0054 11/15/22  0517   ALT 34 48   * 713*   TBILI 0.5 0.4   TP 5.9* 6.2*   ALB 2.0* 2.3*   GLOB 3.9 3.9       Recent Labs     11/16/22 0054   INR 1.1   PTP 11.9*   APTT 41.1*              Medications Reviewed:     Current Facility-Administered Medications   Medication Dose Route Frequency    Vancomycin, Random - Please draw on Thursday, 11/17 @ 1100. Thanks!    Other ONCE    [START ON 11/18/2022] vancomycin (VANCOCIN) 750 mg in 0.9% sodium chloride 250 mL ([id number])  750 mg IntraVENous Q18H    sodium chloride (NS) flush 5-10 mL  5-10 mL IntraVENous PRN    piperacillin-tazobactam (ZOSYN) 3.375 g in 0.9% sodium chloride (MBP/ADV) 100 mL MBP  3.375 g IntraVENous Q8H    [Held by provider] 0.9% sodium chloride infusion  50 mL/hr IntraVENous CONTINUOUS    Vancomycin Pharmacy Dosing   Other Rx Dosing/Monitoring    sodium chloride (NS) flush 5-40 mL  5-40 mL IntraVENous Q8H    sodium chloride (NS) flush 5-40 mL  5-40 mL IntraVENous PRN    acetaminophen (TYLENOL) tablet 650 mg  650 mg Oral Q6H PRN    Or    acetaminophen (TYLENOL) suppository 650 mg  650 mg Rectal Q6H PRN polyethylene glycol (MIRALAX) packet 17 g  17 g Oral DAILY PRN    ondansetron (ZOFRAN ODT) tablet 4 mg  4 mg Oral Q8H PRN    Or    ondansetron (ZOFRAN) injection 4 mg  4 mg IntraVENous Q6H PRN    memantine (NAMENDA) tablet 10 mg  10 mg Oral BID    modafiniL (PROVIGIL) tablet 100 mg  100 mg Oral DAILY    therapeutic multivitamin (THERAGRAN) tablet 1 Tablet  1 Tablet Oral DAILY    atorvastatin (LIPITOR) tablet 10 mg  10 mg Oral DAILY    albuterol-ipratropium (DUO-NEB) 2.5 MG-0.5 MG/3 ML  3 mL Nebulization Q6H RT    apixaban (ELIQUIS) tablet 5 mg  5 mg Oral Q12H    sodium chloride (NS) flush 5-10 mL  5-10 mL IntraVENous PRN     ______________________________________________________________________  EXPECTED LENGTH OF STAY: 4d 19h  ACTUAL LENGTH OF STAY:          3                 uSmi Pineda MD

## 2022-11-17 NOTE — PROGRESS NOTES
Pharmacist Note - Vancomycin Dosing  Therapy day 3  Indication: Aspiration PNA  Current regimen: 750 mg IV Q 24 hrs    Recent Labs     11/17/22  0103 11/16/22  0054 11/15/22  0517   WBC 7.0 9.0 12.0*   CREA 0.61 0.79  0.77 0.62   BUN 11 13  13 14     A random vancomycin level of 16.8 mcg/mL was obtained and from this level, the patient's AUC24 is calculated to be 339 with the current regimen. Goal target range AUC/LOIDA 400-600      Plan: Change to 750 mg IV Q 18hrs for a predicted AUC of 444 . Pharmacy will continue to monitor this patient daily for changes in clinical status and renal function. Mercedes Wallace, FinaD  Clinical Pharmacist, Orthopedics and [facility] ()      *Random vancomycin levels are used to calculate AUC/LOIDA, this level should not be interpreted as a trough. Vancomycin has been dosed using Bayesian kinetics software to target an AUC24:LOIDA of 400-600, which provides adequate exposure for as assumed infection due to MRSA with an LOIDA of 1 or less while reducing the risk of nephrotoxicity as seen with traditional trough based dosing goals.

## 2022-11-18 LAB
ANION GAP SERPL CALC-SCNC: 7 MMOL/L (ref 5–15)
BASOPHILS # BLD: 0 K/UL (ref 0–0.1)
BASOPHILS NFR BLD: 0 % (ref 0–1)
BUN SERPL-MCNC: 10 MG/DL (ref 6–20)
BUN/CREAT SERPL: 16 (ref 12–20)
CALCIUM SERPL-MCNC: 8.5 MG/DL (ref 8.5–10.1)
CHLORIDE SERPL-SCNC: 103 MMOL/L (ref 97–108)
CO2 SERPL-SCNC: 27 MMOL/L (ref 21–32)
CREAT SERPL-MCNC: 0.62 MG/DL (ref 0.55–1.02)
DIFFERENTIAL METHOD BLD: ABNORMAL
EOSINOPHIL # BLD: 0 K/UL (ref 0–0.4)
EOSINOPHIL NFR BLD: 0 % (ref 0–7)
ERYTHROCYTE [DISTWIDTH] IN BLOOD BY AUTOMATED COUNT: 13 % (ref 11.5–14.5)
GLUCOSE SERPL-MCNC: 101 MG/DL (ref 65–100)
HCT VFR BLD AUTO: 38.8 % (ref 35–47)
HGB BLD-MCNC: 12.2 G/DL (ref 11.5–16)
IMM GRANULOCYTES # BLD AUTO: 0 K/UL (ref 0–0.04)
IMM GRANULOCYTES NFR BLD AUTO: 0 % (ref 0–0.5)
LYMPHOCYTES # BLD: 1.1 K/UL (ref 0.8–3.5)
LYMPHOCYTES NFR BLD: 16 % (ref 12–49)
MCH RBC QN AUTO: 29.6 PG (ref 26–34)
MCHC RBC AUTO-ENTMCNC: 31.4 G/DL (ref 30–36.5)
MCV RBC AUTO: 94.2 FL (ref 80–99)
MONOCYTES # BLD: 0.5 K/UL (ref 0–1)
MONOCYTES NFR BLD: 8 % (ref 5–13)
NEUTS SEG # BLD: 4.9 K/UL (ref 1.8–8)
NEUTS SEG NFR BLD: 76 % (ref 32–75)
NRBC # BLD: 0 K/UL (ref 0–0.01)
NRBC BLD-RTO: 0 PER 100 WBC
PLATELET # BLD AUTO: 203 K/UL (ref 150–400)
PMV BLD AUTO: 11.2 FL (ref 8.9–12.9)
POTASSIUM SERPL-SCNC: 3.2 MMOL/L (ref 3.5–5.1)
RBC # BLD AUTO: 4.12 M/UL (ref 3.8–5.2)
SODIUM SERPL-SCNC: 137 MMOL/L (ref 136–145)
WBC # BLD AUTO: 6.5 K/UL (ref 3.6–11)

## 2022-11-18 PROCEDURE — 74011250637 HC RX REV CODE- 250/637: Performed by: FAMILY MEDICINE

## 2022-11-18 PROCEDURE — 74011000258 HC RX REV CODE- 258: Performed by: FAMILY MEDICINE

## 2022-11-18 PROCEDURE — 97530 THERAPEUTIC ACTIVITIES: CPT

## 2022-11-18 PROCEDURE — 85025 COMPLETE CBC W/AUTO DIFF WBC: CPT

## 2022-11-18 PROCEDURE — 74011250636 HC RX REV CODE- 250/636: Performed by: FAMILY MEDICINE

## 2022-11-18 PROCEDURE — 94640 AIRWAY INHALATION TREATMENT: CPT

## 2022-11-18 PROCEDURE — 80048 BASIC METABOLIC PNL TOTAL CA: CPT

## 2022-11-18 PROCEDURE — 74011000250 HC RX REV CODE- 250: Performed by: INTERNAL MEDICINE

## 2022-11-18 PROCEDURE — 36415 COLL VENOUS BLD VENIPUNCTURE: CPT

## 2022-11-18 PROCEDURE — 74011000250 HC RX REV CODE- 250: Performed by: FAMILY MEDICINE

## 2022-11-18 PROCEDURE — 65270000046 HC RM TELEMETRY

## 2022-11-18 PROCEDURE — 74011250637 HC RX REV CODE- 250/637: Performed by: INTERNAL MEDICINE

## 2022-11-18 PROCEDURE — 77010033678 HC OXYGEN DAILY

## 2022-11-18 PROCEDURE — 74011250637 HC RX REV CODE- 250/637: Performed by: HOSPITALIST

## 2022-11-18 RX ORDER — SODIUM,POTASSIUM PHOSPHATES 280-250MG
2 POWDER IN PACKET (EA) ORAL ONCE
Status: COMPLETED | OUTPATIENT
Start: 2022-11-18 | End: 2022-11-18

## 2022-11-18 RX ORDER — GUAIFENESIN/DEXTROMETHORPHAN 100-10MG/5
10 SYRUP ORAL
Status: DISCONTINUED | OUTPATIENT
Start: 2022-11-18 | End: 2022-11-25 | Stop reason: HOSPADM

## 2022-11-18 RX ADMIN — SODIUM CHLORIDE, PRESERVATIVE FREE 10 ML: 5 INJECTION INTRAVENOUS at 06:00

## 2022-11-18 RX ADMIN — APIXABAN 5 MG: 5 TABLET, FILM COATED ORAL at 09:02

## 2022-11-18 RX ADMIN — PIPERACILLIN AND TAZOBACTAM 3.38 G: 3; .375 INJECTION, POWDER, FOR SOLUTION INTRAVENOUS at 01:34

## 2022-11-18 RX ADMIN — IPRATROPIUM BROMIDE AND ALBUTEROL SULFATE 3 ML: .5; 3 SOLUTION RESPIRATORY (INHALATION) at 13:22

## 2022-11-18 RX ADMIN — SODIUM CHLORIDE, PRESERVATIVE FREE 10 ML: 5 INJECTION INTRAVENOUS at 17:49

## 2022-11-18 RX ADMIN — MODAFINIL 100 MG: 100 TABLET ORAL at 09:02

## 2022-11-18 RX ADMIN — PIPERACILLIN AND TAZOBACTAM 3.38 G: 3; .375 INJECTION, POWDER, FOR SOLUTION INTRAVENOUS at 17:49

## 2022-11-18 RX ADMIN — PIPERACILLIN AND TAZOBACTAM 3.38 G: 3; .375 INJECTION, POWDER, FOR SOLUTION INTRAVENOUS at 09:23

## 2022-11-18 RX ADMIN — APIXABAN 5 MG: 5 TABLET, FILM COATED ORAL at 21:04

## 2022-11-18 RX ADMIN — IPRATROPIUM BROMIDE AND ALBUTEROL SULFATE 3 ML: .5; 3 SOLUTION RESPIRATORY (INHALATION) at 07:38

## 2022-11-18 RX ADMIN — SODIUM CHLORIDE, PRESERVATIVE FREE 10 ML: 5 INJECTION INTRAVENOUS at 22:10

## 2022-11-18 RX ADMIN — ATORVASTATIN CALCIUM 10 MG: 10 TABLET, FILM COATED ORAL at 09:02

## 2022-11-18 RX ADMIN — GUAIFENESIN AND DEXTROMETHORPHAN 10 ML: 100; 10 SYRUP ORAL at 17:48

## 2022-11-18 RX ADMIN — IPRATROPIUM BROMIDE AND ALBUTEROL SULFATE 3 ML: .5; 3 SOLUTION RESPIRATORY (INHALATION) at 19:24

## 2022-11-18 RX ADMIN — THERA TABS 1 TABLET: TAB at 09:02

## 2022-11-18 RX ADMIN — MEMANTINE 10 MG: 10 TABLET ORAL at 17:48

## 2022-11-18 RX ADMIN — POTASSIUM & SODIUM PHOSPHATES POWDER PACK 280-160-250 MG 2 PACKET: 280-160-250 PACK at 09:02

## 2022-11-18 RX ADMIN — ACETAMINOPHEN 650 MG: 325 TABLET ORAL at 18:26

## 2022-11-18 RX ADMIN — MEMANTINE 10 MG: 10 TABLET ORAL at 09:02

## 2022-11-18 NOTE — PROGRESS NOTES
Problem: Mobility Impaired (Adult and Pediatric)  Goal: *Acute Goals and Plan of Care (Insert Text)  Description: FUNCTIONAL STATUS PRIOR TO ADMISSION: The patient utilized wheelchair and required maximum assistance for transfers to the chair. Per pt's daughter she participated with transfers using her UE to transfer to wheelchair and occasionally assisted with standing. HOME SUPPORT PRIOR TO ADMISSION: Patient admitted from [facility] and received PT services recently. She has a supportive daughter. Physical Therapy Goals  Initiated 11/16/2022  1. Patient will move from supine to sit and sit to supine  and roll side to side in bed with maximal assistance within 7 day(s). 2.  Patient will transfer from bed to chair and chair to bed with maximal assistance using the least restrictive device within 7 day(s). 3.  Patient will perform sit to stand with maximal assistance within 7 day(s). Outcome: Progressing Towards Goal   PHYSICAL THERAPY TREATMENT  Patient: Chayo Palm (22 y.o. female)  Date: 11/18/2022  Diagnosis: Hypoxia [R09.02] <principal problem not specified>      Precautions:    Chart, physical therapy assessment, plan of care and goals were reviewed. ASSESSMENT  Patient continues with skilled PT services and is minimally progressing towards goals. Patient remains oriented to only self with minimal command following. Requiring total A to transition from supine to sitting EOB with significant extensor tone/reaction to attempts to facilitate moving her to EOB . Able to facilitate hips to flex with maximal effort on part of therapist to allow patient to semi sit at EOB. Continued with posterior lean but able to intermittently get her nearly to full upright trunk posture. Continued to place patient's feet on floor but repeatedly lifting them off, further exacerbating posterior lean. Returned to supine after 5-6 minutes of trying to maintain posture EOB.   Recommend 2 persons for future attempts even though patient very small but extension of trunk very strong and will need two persons for more effective facilitation of sitting. Current Level of Function Impacting Discharge (mobility/balance): Total A x 2 for bed mob, sitting    Other factors to consider for discharge: PLOF         PLAN :  Patient continues to benefit from skilled intervention to address the above impairments. Continue treatment per established plan of care. to address goals. Recommendation for discharge: (in order for the patient to meet his/her long term goals)  Therapy up to 5 days/week in SNF setting    This discharge recommendation:  Has been made in collaboration with the attending provider and/or case management    IF patient discharges home will need the following DME: to be determined (TBD)       SUBJECTIVE:   Patient stated I can't I can't.     OBJECTIVE DATA SUMMARY:   Critical Behavior:  Neurologic State: Confused, Alert  Orientation Level: Oriented to person  Cognition: Decreased command following  Safety/Judgement: Decreased awareness of environment, Decreased awareness of need for assistance, Decreased awareness of need for safety, Decreased insight into deficits  Functional Mobility Training:  Bed Mobility:     Supine to Sit: Total assistance  Sit to Supine: Total assistance  Scooting: Total assistance        Transfers:                                   Balance:  Sitting: Impaired  Sitting - Static: Poor (constant support)  Sitting - Dynamic: Poor (constant support)      Pain Rating:  None observed    Activity Tolerance:   Fair and Poor    After treatment patient left in no apparent distress:   Supine in bed, Call bell within reach, and Bed / chair alarm activated    COMMUNICATION/COLLABORATION:   The patients plan of care was discussed with: Registered nurse.      Russell Mathis, PT   Time Calculation: 13 mins

## 2022-11-18 NOTE — PROGRESS NOTES
[facility]  Pulmonary, Critical Care, and Sleep Medicine    Progress note    Name: Vnaia Vergara MRN: [id number]   : 10/14/1929 Hospital: [facility]    Date: 2022        IMPRESSION:   Acute hypoxic resp failure   Suspected aspiration pna   Decreased   respiratory secretion clearance with retained secretions in the basilar airways/ occlusive   Advanced age  Hypotensive on admit - resolved with ivf   Lll atx   Hx of dementia   Hx of uti   Per chart hx of pte  and chronic eliquis   Dm  Hx of ibs       RECOMMENDATIONS:   Can transition abx to augmentin   Fu bc - so far ng  Covid rapid negative    Speech eval / diet adjust as per there recs   Watch volume status - per chart hx of pulm edema/ last echo nl lvef  O2 titration above 90%   Continue eliquis  Follow up chest x-ray in 4-6 wks in pulm clinic  We will be available again to see if needed   DNR status      Subjective:       Resting bed       Unlabored breathing   Chest x-ray   IMPRESSION  1. Improved aeration in the right base without significant change.   Resting   unlabored    11/15    This patient has been seen and evaluated at the request of Dr. Jordyn Cabrera for aspiration pna . Patient is a 80 y.o. female who was transferred from a snf to [facility] with 2 days of increased chest congestion . She arrived hypotensive and hypoxic . She was place on o2 and her bp responded to ivf . Pt reportedly has hx of dementia and upon my arrival acknowledged me , said hi and did ask if \" I am in the right place. \"  She was not able to provide any other hx . Cough was present  but very congested and weak. She was in no resp  distress. Past Medical History:   Diagnosis Date    Dementia ([address]     Hypercholesteremia 2010    Hypertension     IBS (irritable bowel syndrome) 2010    OA (osteoarthritis) 2010    Osteoporosis 2010    Pulmonary embolism ([address]       History reviewed.  No pertinent surgical history. Prior to Admission medications    Medication Sig Start Date End Date Taking? Authorizing Provider   guaiFENesin (ROBITUSSIN) 100 mg/5 mL liquid Take 200 mg by mouth every four (4) hours as needed for Cough. Yes Rusty, MD Kendall   atorvastatin (Lipitor) 10 mg tablet Take 10 mg by mouth daily. Yes Rusty, MD Kendall   modafiniL (ProvigiL) 100 mg tablet Take 100 mg by mouth every morning. Yes Rusty, MD Kendall   loratadine (Claritin) 10 mg tablet Take 10 mg by mouth daily. Yes Rusty, MD Kendall   coal tar (NEUTROGENA T-GEL) 0.5 % shampoo Apply 1 Bottle to affected area nightly as needed (two times per week for dandruff). Yes Rusty, MD Kendall   cholecalciferol (Vitamin D3) (5000 Units/125 mcg) tab tablet Take 5,000 Units by mouth daily. Yes Other, MD Kendall   apixaban (ELIQUIS) 5 mg tablet Take 5 mg by mouth two (2) times a day. Indications: a clot in the lung   Yes Other, MD Kendall   acetaminophen (TYLENOL) 500 mg tablet Take 500 mg by mouth two (2) times daily as needed for Pain. Yes Provider, Historical   acetaminophen (TYLENOL) 325 mg tablet Take 650 mg by mouth two (2) times a day. Yes Provider, Historical   calcium-vitamin D (OS-LUCÍA +D3) 500 mg-200 unit per tablet Take 1 Tab by mouth daily (with breakfast). Yes Other, MD Kendall   multivitamin (ONE A DAY) tablet Take 1 Tab by mouth daily. Yes Other, MD Kendall   losartan (COZAAR) 50 mg tablet Take 50 mg by mouth daily. Yes Other, MD Kendall   memantine (NAMENDA) 10 mg tablet Take 10 mg by mouth two (2) times a day. Yes Other, MD Kendall     Allergies   Allergen Reactions    Aricept [Donepezil] Other (comments)     GI upset    Flexeril [Cyclobenzaprine] Other (comments)     delirium      Social History     Tobacco Use    Smoking status: Never    Smokeless tobacco: Never   Substance Use Topics    Alcohol use: Not Currently      History reviewed. No pertinent family history.    Came from [facility]     Current Facility-Administered Medications   Medication Dose Route Frequency    vancomycin (VANCOCIN) 750 mg in 0.9% sodium chloride 250 mL ([id number])  750 mg IntraVENous Q18H    piperacillin-tazobactam (ZOSYN) 3.375 g in 0.9% sodium chloride (MBP/ADV) 100 mL MBP  3.375 g IntraVENous Q8H    [Held by provider] 0.9% sodium chloride infusion  50 mL/hr IntraVENous CONTINUOUS    Vancomycin Pharmacy Dosing   Other Rx Dosing/Monitoring    sodium chloride (NS) flush 5-40 mL  5-40 mL IntraVENous Q8H    memantine (NAMENDA) tablet 10 mg  10 mg Oral BID    modafiniL (PROVIGIL) tablet 100 mg  100 mg Oral DAILY    therapeutic multivitamin (THERAGRAN) tablet 1 Tablet  1 Tablet Oral DAILY    atorvastatin (LIPITOR) tablet 10 mg  10 mg Oral DAILY    albuterol-ipratropium (DUO-NEB) 2.5 MG-0.5 MG/3 ML  3 mL Nebulization Q6H RT    apixaban (ELIQUIS) tablet 5 mg  5 mg Oral Q12H       Review of Systems:  Review of systems not obtained due to patient factors. Objective:   Vital Signs:    Visit Vitals  /71 (BP 1 Location: Right upper arm, BP Patient Position: At rest)   Pulse 77   Temp 97.9 °F (36.6 °C)   Resp 21   Ht 5' (1.524 m)   Wt 44 kg (97 lb)   SpO2 95%   BMI 18.94 kg/m²       O2 Device: Nasal cannula   O2 Flow Rate (L/min): 4 l/min   Temp (24hrs), Av °F (36.7 °C), Min:97.9 °F (36.6 °C), Max:98.2 °F (36.8 °C)       Intake/Output:   Last shift:      No intake/output data recorded. Last 3 shifts: No intake/output data recorded. No intake or output data in the 24 hours ending 22 0955     Physical Exam:   General:  Alert, elderly lady . NO resp distress    Head:  Normocephalic, without obvious abnormality, atraumatic. Eyes:  Conjunctivae/corneas clear. PERRL, EOMs intact. Nose: Nares normal. Septum midline. Mucosa normal. No drainage or sinus tenderness. Throat: Moist mucosa   Neck: Supple, symmetrical   Back:   Did not turn    Lungs: On ant/ lat scattered rhonchi and congested cough that was very weak    Chest wall:  No tenderness or deformity.    Heart:  Regular rate and rhythm, S1, S2 normal, no murmur, click, rub or gallop. Abdomen:   Soft, non-tender    Extremities: Extremities normal, atraumatic, no cyanosis or edema. Pulses: Not examined    Skin: Skin color, texture, turgor normal. No rashes or lesions   Lymph nodes: Cervical, supraclavicular, nodes normal.   Neurologic: Grossly nonfocal as moves all but not nl mental status      Data review:     Recent Results (from the past 24 hour(s))   VANCOMYCIN, RANDOM    Collection Time: 11/17/22 11:53 AM   Result Value Ref Range    Vancomycin, random 14.6 UG/ML   METABOLIC PANEL, BASIC    Collection Time: 11/18/22 12:38 AM   Result Value Ref Range    Sodium 137 136 - 145 mmol/L    Potassium 3.2 (L) 3.5 - 5.1 mmol/L    Chloride 103 97 - 108 mmol/L    CO2 27 21 - 32 mmol/L    Anion gap 7 5 - 15 mmol/L    Glucose 101 (H) 65 - 100 mg/dL    BUN 10 6 - 20 MG/DL    Creatinine 0.62 0.55 - 1.02 MG/DL    BUN/Creatinine ratio 16 12 - 20      eGFR >60 >60 ml/min/1.73m2    Calcium 8.5 8.5 - 10.1 MG/DL   CBC WITH AUTOMATED DIFF    Collection Time: 11/18/22 12:38 AM   Result Value Ref Range    WBC 6.5 3.6 - 11.0 K/uL    RBC 4.12 3.80 - 5.20 M/uL    HGB 12.2 11.5 - 16.0 g/dL    HCT 38.8 35.0 - 47.0 %    MCV 94.2 80.0 - 99.0 FL    MCH 29.6 26.0 - 34.0 PG    MCHC 31.4 30.0 - 36.5 g/dL    RDW 13.0 11.5 - 14.5 %    PLATELET 701 627 - 292 K/uL    MPV 11.2 8.9 - 12.9 FL    NRBC 0.0 0  WBC    ABSOLUTE NRBC 0.00 0.00 - 0.01 K/uL    NEUTROPHILS 76 (H) 32 - 75 %    LYMPHOCYTES 16 12 - 49 %    MONOCYTES 8 5 - 13 %    EOSINOPHILS 0 0 - 7 %    BASOPHILS 0 0 - 1 %    IMMATURE GRANULOCYTES 0 0.0 - 0.5 %    ABS. NEUTROPHILS 4.9 1.8 - 8.0 K/UL    ABS. LYMPHOCYTES 1.1 0.8 - 3.5 K/UL    ABS. MONOCYTES 0.5 0.0 - 1.0 K/UL    ABS. EOSINOPHILS 0.0 0.0 - 0.4 K/UL    ABS. BASOPHILS 0.0 0.0 - 0.1 K/UL    ABS. IMM.  GRANS. 0.0 0.00 - 0.04 K/UL    DF AUTOMATED         Imaging:  I have personally reviewed the patients radiographs and have reviewed the reports:  Have tried 3 computers. ON 3rd had some difficulty  pulling up  but view.   Cxr seen -/ agree with report         Natanael Quintana PA-C

## 2022-11-18 NOTE — PROGRESS NOTES
[address] [facility] Adult  Hospitalist Group                                                                                          Hospitalist Progress Note  Brian Rey MD  Answering service: [phone] from in house phone        Date of Service:  2022  NAME:  Shelley Sotomayor  :  10/14/1929  MRN:  [id number]      Admission Summary: Aby West is a 80 y.o. female with past medical history of hypertension, hyperlipidemia, dementia, IBS, osteoarthritis, osteoporosis, pulmonary edema presented as a direct admission/[facility] with chief complaint of shortness of breath. Patient is a limited historian. Majority of history was obtained per review of ED and electronic medical records. Per these reports, patient lives at [facility] facility where staff noted the patient was having symptoms of cough, generalized weakness over the past 2 days. Symptoms noted remain constant, without specific alleviating factors and there was concern for possible sepsis. Patient was initially transferred to [facility] with initial recorded vital signs temperature 98.9 °F, /68, heart rate 111, respiratory 20, O2 saturation 90% on room air. Oxygen saturation decreased to 88%. Patient was placed on 2 L oxygen via nasal cannula. Patient's blood pressure decreased to 81/55. Abnormal labs included blood glucose 239, creatinine 1.13, GFR 45, albumin 2.7, AST 40, alkaline phosphatase 942, lactic acid 3.7. Initial high-sensitivity troponin 23. Chest x-ray portable showed left lower lobe atelectasis. CTA chest was negative for PE however showed large amount of near occlusive debris and dependent airways and bilateral lower lobes with mild dependent atelectasis. Patient was then transferred to [facility] for admission. \"          Interval history / Subjective:   Still on 3 liters when seen earlier, no rales or wheezing but rather more adventitous sounds, pending echo to be done, getting K repletion     Assessment & Plan:     Lactic acidosis/hypotension, POA--now resolved  Suspected Aspiration Pneumonia  -blood cx ngtd  -ua looks more like contaminant  -Rapid covid negative  -iv abx while inpt --can change to po abx when ready for discharge  -need outpatient followup with pulmonary service    Acute respiratory failure with hypoxia--now better  History of PE on home Eliquis  In setting of suspected Aspiration Pneumonia  -Supplemental oxygen, wean as tolerated  -Titrate oxygen to keep O2 saturation greater than 94%  -COVID-19 test negative  -lasix as needed  -home eliquis  -Appreciate Pulmonary  -iv abx while inpt --can change to po abx when ready for discharge  -need outpatient followup with pulmonary service     Hypokalemia: replace as needed  Uncontrolled type 2 diabetes mellitus with hyperglycemia: on SSI  Elevated creatinine: now resolved  LFT elevation: now resolved  Generalized weakness: PT/OT  Dementia  -Patient's baseline mental status which she is currently disoriented on exam  -head CT without any acute process       Code status: DNR  Prophylaxis: Eliquis  PTA: [place]    Plan: Continue antibiotics, wean off supp oxygen as tolerated  Care Plan discussed with: patient/staff (spoke with daughter on 11/17)  Anticipated Disposition: [place] when stable, possibly on Monday     Hospital Problems  Date Reviewed: 12/21/2018            Codes Class Noted POA    Hypoxia ICD-10-CM: R09.02  ICD-9-CM: 799.02  11/14/2022 Unknown         Review of Systems:   A comprehensive review of systems was negative except for that written in the HPI. Vital Signs:    Last 24hrs VS reviewed since prior progress note.  Most recent are:  Visit Vitals  /71 (BP 1 Location: Right upper arm, BP Patient Position: At rest)   Pulse 91   Temp 97.9 °F (36.6 °C)   Resp 21   Ht 5' (1.524 m)   Wt 44 kg (97 lb)   SpO2 95%   BMI 18.94 kg/m²       No intake or output data in the 24 hours ending 11/18/22 1119       Physical Examination:     I had a face to face encounter with this patient and independently examined them on 11/18/2022 as outlined below:          Constitutional:  No acute distress   ENT:  Oral mucosa moist, oropharynx benign. Resp:  CTA bilaterally. No wheezing/rales but rather adventitous sounds. No accessory muscle use. CV:  Regular rhythm, normal rate, no rubs    GI:  Soft, non distended, non tender. normoactive bowel sounds    Musculoskeletal:  No edema, warm, 2+ pulses throughout    Neurologic:  Moves all extremities. Awake, responds appropriately to questions asked.             Data Review:    Review and/or order of clinical lab test      Labs:     Recent Labs     11/18/22 0038 11/17/22 0103   WBC 6.5 7.0   HGB 12.2 12.8   HCT 38.8 40.7    208       Recent Labs     11/18/22 0038 11/17/22 0103 11/16/22  0054    139 140  139   K 3.2* 3.5 3.3*  3.2*    105 109*  108   CO2 27 31 26  26   BUN 10 11 13  13   CREA 0.62 0.61 0.79  0.77   * 96 166*  163*   CA 8.5 8.6 8.4*  8.5   MG  --   --  1.6       Recent Labs     11/16/22  0054   ALT 34   *   TBILI 0.5   TP 5.9*   ALB 2.0*   GLOB 3.9       Recent Labs     11/16/22  0054   INR 1.1   PTP 11.9*   APTT 41.1*              Medications Reviewed:     Current Facility-Administered Medications   Medication Dose Route Frequency    guaiFENesin-dextromethorphan (ROBITUSSIN DM) 100-10 mg/5 mL syrup 10 mL  10 mL Oral Q6H PRN    sodium chloride (NS) flush 5-10 mL  5-10 mL IntraVENous PRN    piperacillin-tazobactam (ZOSYN) 3.375 g in 0.9% sodium chloride (MBP/ADV) 100 mL MBP  3.375 g IntraVENous Q8H    [Held by provider] 0.9% sodium chloride infusion  50 mL/hr IntraVENous CONTINUOUS    sodium chloride (NS) flush 5-40 mL  5-40 mL IntraVENous Q8H    sodium chloride (NS) flush 5-40 mL  5-40 mL IntraVENous PRN    acetaminophen (TYLENOL) tablet 650 mg  650 mg Oral Q6H PRN    Or acetaminophen (TYLENOL) suppository 650 mg  650 mg Rectal Q6H PRN    polyethylene glycol (MIRALAX) packet 17 g  17 g Oral DAILY PRN    ondansetron (ZOFRAN ODT) tablet 4 mg  4 mg Oral Q8H PRN    Or    ondansetron (ZOFRAN) injection 4 mg  4 mg IntraVENous Q6H PRN    memantine (NAMENDA) tablet 10 mg  10 mg Oral BID    modafiniL (PROVIGIL) tablet 100 mg  100 mg Oral DAILY    therapeutic multivitamin (THERAGRAN) tablet 1 Tablet  1 Tablet Oral DAILY    atorvastatin (LIPITOR) tablet 10 mg  10 mg Oral DAILY    albuterol-ipratropium (DUO-NEB) 2.5 MG-0.5 MG/3 ML  3 mL Nebulization Q6H RT    apixaban (ELIQUIS) tablet 5 mg  5 mg Oral Q12H    sodium chloride (NS) flush 5-10 mL  5-10 mL IntraVENous PRN     ______________________________________________________________________  EXPECTED LENGTH OF STAY: 4d 19h  ACTUAL LENGTH OF STAY:          585 Terrell Solorzano MD

## 2022-11-18 NOTE — PROGRESS NOTES
Bedside shift change report given to Stella (oncoming nurse) by GEO Fernandez (offgoing nurse). Report included the following information SBAR, Kardex, Procedure Summary, MAR, and Recent Results.

## 2022-11-19 LAB
ANION GAP SERPL CALC-SCNC: 7 MMOL/L (ref 5–15)
BACTERIA SPEC CULT: NORMAL
BUN SERPL-MCNC: 7 MG/DL (ref 6–20)
BUN/CREAT SERPL: 14 (ref 12–20)
CALCIUM SERPL-MCNC: 7.8 MG/DL (ref 8.5–10.1)
CHLORIDE SERPL-SCNC: 103 MMOL/L (ref 97–108)
CO2 SERPL-SCNC: 28 MMOL/L (ref 21–32)
CREAT SERPL-MCNC: 0.51 MG/DL (ref 0.55–1.02)
GLUCOSE SERPL-MCNC: 96 MG/DL (ref 65–100)
MAGNESIUM SERPL-MCNC: 1.7 MG/DL (ref 1.6–2.4)
PHOSPHATE SERPL-MCNC: 3.2 MG/DL (ref 2.6–4.7)
POTASSIUM SERPL-SCNC: 3 MMOL/L (ref 3.5–5.1)
SERVICE CMNT-IMP: NORMAL
SODIUM SERPL-SCNC: 138 MMOL/L (ref 136–145)

## 2022-11-19 PROCEDURE — 74011000258 HC RX REV CODE- 258: Performed by: FAMILY MEDICINE

## 2022-11-19 PROCEDURE — 80048 BASIC METABOLIC PNL TOTAL CA: CPT

## 2022-11-19 PROCEDURE — 74011250637 HC RX REV CODE- 250/637: Performed by: INTERNAL MEDICINE

## 2022-11-19 PROCEDURE — 74011250636 HC RX REV CODE- 250/636: Performed by: FAMILY MEDICINE

## 2022-11-19 PROCEDURE — 83735 ASSAY OF MAGNESIUM: CPT

## 2022-11-19 PROCEDURE — 94640 AIRWAY INHALATION TREATMENT: CPT

## 2022-11-19 PROCEDURE — 74011250637 HC RX REV CODE- 250/637: Performed by: HOSPITALIST

## 2022-11-19 PROCEDURE — 77010033678 HC OXYGEN DAILY

## 2022-11-19 PROCEDURE — 74011000250 HC RX REV CODE- 250: Performed by: INTERNAL MEDICINE

## 2022-11-19 PROCEDURE — 65270000046 HC RM TELEMETRY

## 2022-11-19 PROCEDURE — 74011250636 HC RX REV CODE- 250/636: Performed by: INTERNAL MEDICINE

## 2022-11-19 PROCEDURE — 84100 ASSAY OF PHOSPHORUS: CPT

## 2022-11-19 PROCEDURE — 74011000250 HC RX REV CODE- 250: Performed by: FAMILY MEDICINE

## 2022-11-19 RX ORDER — MAGNESIUM SULFATE HEPTAHYDRATE 40 MG/ML
2 INJECTION, SOLUTION INTRAVENOUS ONCE
Status: COMPLETED | OUTPATIENT
Start: 2022-11-19 | End: 2022-11-19

## 2022-11-19 RX ADMIN — MEMANTINE 10 MG: 10 TABLET ORAL at 09:36

## 2022-11-19 RX ADMIN — SODIUM CHLORIDE, PRESERVATIVE FREE 10 ML: 5 INJECTION INTRAVENOUS at 06:50

## 2022-11-19 RX ADMIN — SODIUM CHLORIDE, PRESERVATIVE FREE 10 ML: 5 INJECTION INTRAVENOUS at 17:41

## 2022-11-19 RX ADMIN — MODAFINIL 100 MG: 100 TABLET ORAL at 09:36

## 2022-11-19 RX ADMIN — IPRATROPIUM BROMIDE AND ALBUTEROL SULFATE 3 ML: .5; 3 SOLUTION RESPIRATORY (INHALATION) at 14:44

## 2022-11-19 RX ADMIN — MEMANTINE 10 MG: 10 TABLET ORAL at 17:43

## 2022-11-19 RX ADMIN — IPRATROPIUM BROMIDE AND ALBUTEROL SULFATE 3 ML: .5; 3 SOLUTION RESPIRATORY (INHALATION) at 20:18

## 2022-11-19 RX ADMIN — PIPERACILLIN AND TAZOBACTAM 3.38 G: 3; .375 INJECTION, POWDER, FOR SOLUTION INTRAVENOUS at 17:53

## 2022-11-19 RX ADMIN — PIPERACILLIN AND TAZOBACTAM 3.38 G: 3; .375 INJECTION, POWDER, FOR SOLUTION INTRAVENOUS at 09:47

## 2022-11-19 RX ADMIN — ATORVASTATIN CALCIUM 10 MG: 10 TABLET, FILM COATED ORAL at 09:36

## 2022-11-19 RX ADMIN — MAGNESIUM SULFATE HEPTAHYDRATE 2 G: 40 INJECTION, SOLUTION INTRAVENOUS at 09:37

## 2022-11-19 RX ADMIN — DIBASIC SODIUM PHOSPHATE, MONOBASIC POTASSIUM PHOSPHATE AND MONOBASIC SODIUM PHOSPHATE 2 TABLET: 852; 155; 130 TABLET ORAL at 09:37

## 2022-11-19 RX ADMIN — PIPERACILLIN AND TAZOBACTAM 3.38 G: 3; .375 INJECTION, POWDER, FOR SOLUTION INTRAVENOUS at 02:28

## 2022-11-19 RX ADMIN — DIBASIC SODIUM PHOSPHATE, MONOBASIC POTASSIUM PHOSPHATE AND MONOBASIC SODIUM PHOSPHATE 2 TABLET: 852; 155; 130 TABLET ORAL at 17:43

## 2022-11-19 RX ADMIN — APIXABAN 5 MG: 5 TABLET, FILM COATED ORAL at 21:13

## 2022-11-19 RX ADMIN — APIXABAN 5 MG: 5 TABLET, FILM COATED ORAL at 09:36

## 2022-11-19 RX ADMIN — IPRATROPIUM BROMIDE AND ALBUTEROL SULFATE 3 ML: .5; 3 SOLUTION RESPIRATORY (INHALATION) at 08:19

## 2022-11-19 RX ADMIN — GUAIFENESIN AND DEXTROMETHORPHAN 10 ML: 100; 10 SYRUP ORAL at 09:36

## 2022-11-19 RX ADMIN — IPRATROPIUM BROMIDE AND ALBUTEROL SULFATE 3 ML: .5; 3 SOLUTION RESPIRATORY (INHALATION) at 02:25

## 2022-11-19 RX ADMIN — THERA TABS 1 TABLET: TAB at 09:36

## 2022-11-19 NOTE — PROGRESS NOTES
Bedside and Verbal shift change report given to [facility] (oncoming nurse) by Martha Rey (offgoing nurse). Report included the following information SBAR, Kardex, ED Summary, Procedure Summary, Intake/Output, MAR, Recent Results, and Cardiac Rhythm NSR .

## 2022-11-19 NOTE — PROGRESS NOTES
[facility] [facility] Adult  Hospitalist Group                                                                                          Hospitalist Progress Note  Zakiya Baldwin MD  Answering service: [phone] from in house phone        Date of Service:  2022  NAME:  Vania Vergara  :  10/14/1929  MRN:  [id number]      Admission Summary: Karen Gonzalez is a 80 y.o. female with past medical history of hypertension, hyperlipidemia, dementia, IBS, osteoarthritis, osteoporosis, pulmonary edema presented as a direct admission/[facility] with chief complaint of shortness of breath. Patient is a limited historian. Majority of history was obtained per review of ED and electronic medical records. Per these reports, patient lives at [facility] facility where staff noted the patient was having symptoms of cough, generalized weakness over the past 2 days. Symptoms noted remain constant, without specific alleviating factors and there was concern for possible sepsis. Patient was initially transferred to [facility] with initial recorded vital signs temperature 98.9 °F, /68, heart rate 111, respiratory 20, O2 saturation 90% on room air. Oxygen saturation decreased to 88%. Patient was placed on 2 L oxygen via nasal cannula. Patient's blood pressure decreased to 81/55. Abnormal labs included blood glucose 239, creatinine 1.13, GFR 45, albumin 2.7, AST 40, alkaline phosphatase 942, lactic acid 3.7. Initial high-sensitivity troponin 23. Chest x-ray portable showed left lower lobe atelectasis. CTA chest was negative for PE however showed large amount of near occlusive debris and dependent airways and bilateral lower lobes with mild dependent atelectasis. Patient was then transferred to [facility] for admission. \"          Interval history / Subjective:   Seen earlier, remains on 3 liters, assisted in providing her juice, getting K repletion     Assessment & Plan:     Lactic acidosis/hypotension, POA--now resolved  Suspected Aspiration Pneumonia  -blood cx ngtd  -ua looks more like contaminant  -Rapid covid negative  -iv abx while inpt --can change to po abx when ready for discharge  -need outpatient followup with pulmonary service    Acute respiratory failure with hypoxia--now better  History of PE on home Eliquis  In setting of suspected Aspiration Pneumonia  -Supplemental oxygen, wean as tolerated  -Titrate oxygen to keep O2 saturation greater than 94%  -COVID-19 test negative  -lasix as needed  -home eliquis  -Appreciate Pulmonary  -iv abx while inpt --can change to po abx when ready for discharge  -need outpatient followup with pulmonary service     Hypokalemia: replace as needed  Uncontrolled type 2 diabetes mellitus with hyperglycemia: on SSI  Elevated creatinine: now resolved  LFT elevation: now resolved  Generalized weakness: PT/OT  Dementia  -Patient's baseline mental status which she is currently disoriented on exam  -head CT without any acute process       Code status: DNR  Prophylaxis: Eliquis  PTA: [place]    Plan: Continue antibiotics, wean off supp oxygen as tolerated  Care Plan discussed with: patient/staff (spoke with daughter on 11/17)  Anticipated Disposition: [place] when stable, possibly on Monday     Hospital Problems  Date Reviewed: 12/21/2018            Codes Class Noted POA    Hypoxia ICD-10-CM: R09.02  ICD-9-CM: 799.02  11/14/2022 Unknown       Review of Systems:   A comprehensive review of systems was negative except for that written in the HPI. Vital Signs:    Last 24hrs VS reviewed since prior progress note.  Most recent are:  Visit Vitals  /86 (BP 1 Location: Right upper arm, BP Patient Position: At rest)   Pulse (!) 104   Temp 98.3 °F (36.8 °C)   Resp 29   Ht 5' (1.524 m)   Wt 43.8 kg (96 lb 9 oz)   SpO2 94%   BMI 18.86 kg/m²         Intake/Output Summary (Last 24 hours) at 11/19/2022 [address] at 11/19/2022 0947  Gross per 24 hour   Intake 395 ml   Output 150 ml   Net 245 ml          Physical Examination:     I had a face to face encounter with this patient and independently examined them on 11/19/2022 as outlined below:          Constitutional:  No acute distress   ENT:  Oral mucosa moist, oropharynx benign. Resp:  CTA bilaterally. No wheezing/rales but rather adventitous sounds. No accessory muscle use. CV:  Regular rhythm, normal rate, no rubs    GI:  Soft, non distended, non tender. normoactive bowel sounds    Musculoskeletal:  No edema, warm, 2+ pulses throughout    Neurologic:  Moves all extremities. Awake, responds appropriately to questions asked.             Data Review:    Review and/or order of clinical lab test      Labs:     Recent Labs     11/18/22 0038 11/17/22 0103   WBC 6.5 7.0   HGB 12.2 12.8   HCT 38.8 40.7    208       Recent Labs     11/19/22 0059 11/19/22 0058 11/18/22 0038 11/17/22 0103     --  137 139   K 3.0*  --  3.2* 3.5     --  103 105   CO2 28  --  27 31   BUN 7  --  10 11   CREA 0.51*  --  0.62 0.61   GLU 96  --  101* 96   CA 7.8*  --  8.5 8.6   MG  --  1.7  --   --    PHOS 3.2  --   --   --          Medications Reviewed:     Current Facility-Administered Medications   Medication Dose Route Frequency    phosphorus (K PHOS NEUTRAL) 250 mg tablet 2 Tablet  2 Tablet Oral BID    guaiFENesin-dextromethorphan (ROBITUSSIN DM) 100-10 mg/5 mL syrup 10 mL  10 mL Oral Q6H PRN    sodium chloride (NS) flush 5-10 mL  5-10 mL IntraVENous PRN    piperacillin-tazobactam (ZOSYN) 3.375 g in 0.9% sodium chloride (MBP/ADV) 100 mL MBP  3.375 g IntraVENous Q8H    [Held by provider] 0.9% sodium chloride infusion  50 mL/hr IntraVENous CONTINUOUS    sodium chloride (NS) flush 5-40 mL  5-40 mL IntraVENous Q8H    sodium chloride (NS) flush 5-40 mL  5-40 mL IntraVENous PRN    acetaminophen (TYLENOL) tablet 650 mg  650 mg Oral Q6H PRN    Or acetaminophen (TYLENOL) suppository 650 mg  650 mg Rectal Q6H PRN    polyethylene glycol (MIRALAX) packet 17 g  17 g Oral DAILY PRN    ondansetron (ZOFRAN ODT) tablet 4 mg  4 mg Oral Q8H PRN    Or    ondansetron (ZOFRAN) injection 4 mg  4 mg IntraVENous Q6H PRN    memantine (NAMENDA) tablet 10 mg  10 mg Oral BID    modafiniL (PROVIGIL) tablet 100 mg  100 mg Oral DAILY    therapeutic multivitamin (THERAGRAN) tablet 1 Tablet  1 Tablet Oral DAILY    atorvastatin (LIPITOR) tablet 10 mg  10 mg Oral DAILY    albuterol-ipratropium (DUO-NEB) 2.5 MG-0.5 MG/3 ML  3 mL Nebulization Q6H RT    apixaban (ELIQUIS) tablet 5 mg  5 mg Oral Q12H    sodium chloride (NS) flush 5-10 mL  5-10 mL IntraVENous PRN     ______________________________________________________________________  EXPECTED LENGTH OF STAY: 4d 19h  ACTUAL LENGTH OF STAY:          5                 Jareth Duong MD Patient/Caregiver provided printed discharge information.

## 2022-11-19 NOTE — PROGRESS NOTES
Bedside and Verbal shift change report given to Cm Fleming RN (oncoming nurse) by Dee Dee Turner RN (offgoing nurse). Report included the following information SBAR, Kardex, ED Summary, Intake/Output, MAR, Recent Results, Med Rec Status, and Cardiac Rhythm NSR .

## 2022-11-20 ENCOUNTER — APPOINTMENT (OUTPATIENT)
Dept: GENERAL RADIOLOGY | Age: 87
DRG: 871 | End: 2022-11-20
Attending: INTERNAL MEDICINE
Payer: MEDICARE

## 2022-11-20 LAB
B PERT DNA SPEC QL NAA+PROBE: NOT DETECTED
BORDETELLA PARAPERTUSSIS PCR, BORPAR: NOT DETECTED
C PNEUM DNA SPEC QL NAA+PROBE: NOT DETECTED
FLUAV H3 RNA SPEC QL NAA+PROBE: DETECTED
FLUBV RNA SPEC QL NAA+PROBE: NOT DETECTED
HADV DNA SPEC QL NAA+PROBE: NOT DETECTED
HCOV 229E RNA SPEC QL NAA+PROBE: NOT DETECTED
HCOV HKU1 RNA SPEC QL NAA+PROBE: NOT DETECTED
HCOV NL63 RNA SPEC QL NAA+PROBE: NOT DETECTED
HCOV OC43 RNA SPEC QL NAA+PROBE: NOT DETECTED
HMPV RNA SPEC QL NAA+PROBE: NOT DETECTED
HPIV1 RNA SPEC QL NAA+PROBE: NOT DETECTED
HPIV2 RNA SPEC QL NAA+PROBE: NOT DETECTED
HPIV3 RNA SPEC QL NAA+PROBE: NOT DETECTED
HPIV4 RNA SPEC QL NAA+PROBE: NOT DETECTED
M PNEUMO DNA SPEC QL NAA+PROBE: NOT DETECTED
RSV RNA SPEC QL NAA+PROBE: NOT DETECTED
RV+EV RNA SPEC QL NAA+PROBE: NOT DETECTED
SARS-COV-2 PCR, COVPCR: NOT DETECTED

## 2022-11-20 PROCEDURE — 74011250637 HC RX REV CODE- 250/637: Performed by: HOSPITALIST

## 2022-11-20 PROCEDURE — 0202U NFCT DS 22 TRGT SARS-COV-2: CPT

## 2022-11-20 PROCEDURE — 74011000258 HC RX REV CODE- 258: Performed by: FAMILY MEDICINE

## 2022-11-20 PROCEDURE — 77010033678 HC OXYGEN DAILY

## 2022-11-20 PROCEDURE — 74011250636 HC RX REV CODE- 250/636: Performed by: INTERNAL MEDICINE

## 2022-11-20 PROCEDURE — 51798 US URINE CAPACITY MEASURE: CPT

## 2022-11-20 PROCEDURE — 94640 AIRWAY INHALATION TREATMENT: CPT

## 2022-11-20 PROCEDURE — 71045 X-RAY EXAM CHEST 1 VIEW: CPT

## 2022-11-20 PROCEDURE — 94760 N-INVAS EAR/PLS OXIMETRY 1: CPT

## 2022-11-20 PROCEDURE — 74011000250 HC RX REV CODE- 250: Performed by: INTERNAL MEDICINE

## 2022-11-20 PROCEDURE — 74011250636 HC RX REV CODE- 250/636: Performed by: FAMILY MEDICINE

## 2022-11-20 PROCEDURE — 74011000250 HC RX REV CODE- 250: Performed by: FAMILY MEDICINE

## 2022-11-20 PROCEDURE — 65270000046 HC RM TELEMETRY

## 2022-11-20 PROCEDURE — 74011250637 HC RX REV CODE- 250/637: Performed by: INTERNAL MEDICINE

## 2022-11-20 RX ORDER — FUROSEMIDE 10 MG/ML
10 INJECTION INTRAMUSCULAR; INTRAVENOUS
Status: COMPLETED | OUTPATIENT
Start: 2022-11-20 | End: 2022-11-20

## 2022-11-20 RX ADMIN — IPRATROPIUM BROMIDE AND ALBUTEROL SULFATE 3 ML: .5; 3 SOLUTION RESPIRATORY (INHALATION) at 07:16

## 2022-11-20 RX ADMIN — IPRATROPIUM BROMIDE AND ALBUTEROL SULFATE 3 ML: .5; 3 SOLUTION RESPIRATORY (INHALATION) at 02:17

## 2022-11-20 RX ADMIN — PIPERACILLIN AND TAZOBACTAM 3.38 G: 3; .375 INJECTION, POWDER, FOR SOLUTION INTRAVENOUS at 02:41

## 2022-11-20 RX ADMIN — SODIUM CHLORIDE, PRESERVATIVE FREE 10 ML: 5 INJECTION INTRAVENOUS at 15:29

## 2022-11-20 RX ADMIN — PIPERACILLIN AND TAZOBACTAM 3.38 G: 3; .375 INJECTION, POWDER, FOR SOLUTION INTRAVENOUS at 09:17

## 2022-11-20 RX ADMIN — SODIUM CHLORIDE, PRESERVATIVE FREE 10 ML: 5 INJECTION INTRAVENOUS at 21:09

## 2022-11-20 RX ADMIN — MEMANTINE 10 MG: 10 TABLET ORAL at 09:09

## 2022-11-20 RX ADMIN — PIPERACILLIN AND TAZOBACTAM 3.38 G: 3; .375 INJECTION, POWDER, FOR SOLUTION INTRAVENOUS at 17:32

## 2022-11-20 RX ADMIN — APIXABAN 5 MG: 5 TABLET, FILM COATED ORAL at 21:09

## 2022-11-20 RX ADMIN — MEMANTINE 10 MG: 10 TABLET ORAL at 17:32

## 2022-11-20 RX ADMIN — APIXABAN 5 MG: 5 TABLET, FILM COATED ORAL at 09:09

## 2022-11-20 RX ADMIN — IPRATROPIUM BROMIDE AND ALBUTEROL SULFATE 3 ML: .5; 3 SOLUTION RESPIRATORY (INHALATION) at 20:24

## 2022-11-20 RX ADMIN — THERA TABS 1 TABLET: TAB at 09:09

## 2022-11-20 RX ADMIN — FUROSEMIDE 10 MG: 10 INJECTION, SOLUTION INTRAMUSCULAR; INTRAVENOUS at 15:29

## 2022-11-20 RX ADMIN — IPRATROPIUM BROMIDE AND ALBUTEROL SULFATE 3 ML: .5; 3 SOLUTION RESPIRATORY (INHALATION) at 13:41

## 2022-11-20 RX ADMIN — MODAFINIL 100 MG: 100 TABLET ORAL at 09:09

## 2022-11-20 RX ADMIN — GUAIFENESIN AND DEXTROMETHORPHAN 10 ML: 100; 10 SYRUP ORAL at 17:32

## 2022-11-20 RX ADMIN — ATORVASTATIN CALCIUM 10 MG: 10 TABLET, FILM COATED ORAL at 09:09

## 2022-11-20 NOTE — PROGRESS NOTES
[facility] Adult  Hospitalist Group                                                                                          Hospitalist Progress Note  Tre Rivero MD  Answering service: [phone] from in house phone        Date of Service:  2022  NAME:  Karlos Sidhu  :  10/14/1929  MRN:  [id number]      Admission Summary: Laura Garrett is a 80 y.o. female with past medical history of hypertension, hyperlipidemia, dementia, IBS, osteoarthritis, osteoporosis, pulmonary edema presented [facility] with chief complaint of shortness of breath. Patient is a limited historian. Majority of history was obtained per review of ED and electronic medical records. Per these reports, patient lives at [facility] facility where staff noted the patient was having symptoms of cough, generalized weakness over the past 2 days. Symptoms noted remain constant, without specific alleviating factors and there was concern for possible sepsis. Patient was initially transferred to [address] [place] [address] with initial recorded vital signs temperature 98.9 °F, /68, heart rate 111, respiratory 20, O2 saturation 90% on room air. Oxygen saturation decreased to 88%. Patient was placed on 2 L oxygen via nasal cannula. Patient's blood pressure decreased to 81/55. Abnormal labs included blood glucose 239, creatinine 1.13, GFR 45, albumin 2.7, AST 40, alkaline phosphatase 942, lactic acid 3.7. Initial high-sensitivity troponin 23. Chest x-ray portable showed left lower lobe atelectasis. CTA chest was negative for PE however showed large amount of near occlusive debris and dependent airways and bilateral lower lobes with mild dependent atelectasis. Patient was then transferred to [facility] for admission. \"          Interval history / Subjective:   Crackles/course breath sounds at bedside, O2 requirements unchanged, ordered cxr to assess, awake, readjusted bed that seemed to have improved. Assessment & Plan:     Lactic acidosis/hypotension, POA--now resolved  Suspected Aspiration Pneumonia  -blood cx ngtd  -ua looks more like contaminant  -Rapid covid negative  -iv abx while inpt --can change to po abx when ready for discharge  -need outpatient followup with pulmonary service    11/20: Crackles/course breath sounds at bedside, O2 requirements unchanged, ordered cxr to assess, awake, readjusted bed that seemed to have improved. Acute respiratory failure with hypoxia--now better  History of PE on home Eliquis  In setting of suspected Aspiration Pneumonia  -Supplemental oxygen, wean as tolerated  -Titrate oxygen to keep O2 saturation greater than 94%  -COVID-19 test negative  -lasix as needed  -home eliquis  -Appreciate Pulmonary  -iv abx while inpt --can change to po abx when ready for discharge  -need outpatient followup with pulmonary service     Hypokalemia: replace as needed  Uncontrolled type 2 diabetes mellitus with hyperglycemia: on SSI  Elevated creatinine: now resolved  LFT elevation: now resolved  Generalized weakness: PT/OT  Dementia  -Patient's baseline mental status which she is currently disoriented on exam  -head CT without any acute process       Code status: DNR  Prophylaxis: Eliquis  PTA: [place]    Plan: Continue antibiotics, wean off supp oxygen as tolerated  Care Plan discussed with: patient/staff (spoke with daughter on 11/17)  Anticipated Disposition: [place] when stable, possibly on Monday     Hospital Problems  Date Reviewed: 12/21/2018            Codes Class Noted POA    Hypoxia ICD-10-CM: R09.02  ICD-9-CM: 799.02  11/14/2022 Unknown       Review of Systems:   A comprehensive review of systems was negative except for that written in the HPI. Vital Signs:    Last 24hrs VS reviewed since prior progress note.  Most recent are:  Visit Vitals  BP (!) 125/99 (BP 1 Location: Right upper arm, BP Patient Position: At rest)   Pulse 100   Temp 99.5 °F (37.5 °C)   Resp 20   Ht 5' (1.524 m)   Wt 47.1 kg (103 lb 13.4 oz)   SpO2 97%   BMI 20.28 kg/m²         Intake/Output Summary (Last 24 hours) at 11/20/2022 1208  Last data filed at 11/20/2022 2204  Gross per 24 hour   Intake 150 ml   Output 400 ml   Net -250 ml          Physical Examination:     I had a face to face encounter with this patient and independently examined them on 11/20/2022 as outlined below:          Constitutional:  No acute distress   ENT:  Oral mucosa moist   Resp:  Crackles, no wheezing. No accessory muscle use. CV:  Regular rhythm, normal rate, no rubs    GI:  Soft, non distended, non tender. normoactive bowel sounds    Musculoskeletal:  No edema, warm, 2+ pulses throughout    Neurologic:  Moves all extremities. Awake, responds appropriately to questions asked.             Data Review:    Review and/or order of clinical lab test      Labs:     Recent Labs     11/18/22 0038   WBC 6.5   HGB 12.2   HCT 38.8          Recent Labs     11/19/22 0059 11/19/22 0058 11/18/22 0038     --  137   K 3.0*  --  3.2*     --  103   CO2 28  --  27   BUN 7  --  10   CREA 0.51*  --  0.62   GLU 96  --  101*   CA 7.8*  --  8.5   MG  --  1.7  --    PHOS 3.2  --   --          Medications Reviewed:     Current Facility-Administered Medications   Medication Dose Route Frequency    guaiFENesin-dextromethorphan (ROBITUSSIN DM) 100-10 mg/5 mL syrup 10 mL  10 mL Oral Q6H PRN    sodium chloride (NS) flush 5-10 mL  5-10 mL IntraVENous PRN    piperacillin-tazobactam (ZOSYN) 3.375 g in 0.9% sodium chloride (MBP/ADV) 100 mL MBP  3.375 g IntraVENous Q8H    [Held by provider] 0.9% sodium chloride infusion  50 mL/hr IntraVENous CONTINUOUS    sodium chloride (NS) flush 5-40 mL  5-40 mL IntraVENous Q8H    sodium chloride (NS) flush 5-40 mL  5-40 mL IntraVENous PRN    acetaminophen (TYLENOL) tablet 650 mg  650 mg Oral Q6H PRN    Or    acetaminophen (TYLENOL) suppository 650 mg  650 mg Rectal Q6H PRN    polyethylene glycol (MIRALAX) packet 17 g  17 g Oral DAILY PRN    ondansetron (ZOFRAN ODT) tablet 4 mg  4 mg Oral Q8H PRN    Or    ondansetron (ZOFRAN) injection 4 mg  4 mg IntraVENous Q6H PRN    memantine (NAMENDA) tablet 10 mg  10 mg Oral BID    modafiniL (PROVIGIL) tablet 100 mg  100 mg Oral DAILY    therapeutic multivitamin (THERAGRAN) tablet 1 Tablet  1 Tablet Oral DAILY    atorvastatin (LIPITOR) tablet 10 mg  10 mg Oral DAILY    albuterol-ipratropium (DUO-NEB) 2.5 MG-0.5 MG/3 ML  3 mL Nebulization Q6H RT    apixaban (ELIQUIS) tablet 5 mg  5 mg Oral Q12H    sodium chloride (NS) flush 5-10 mL  5-10 mL IntraVENous PRN     ______________________________________________________________________  EXPECTED LENGTH OF STAY: 4d 19h  ACTUAL LENGTH OF STAY:          6                 Yuki Au MD

## 2022-11-20 NOTE — PROGRESS NOTES
Bedside and Verbal shift change report given to [facility] (oncoming nurse) by Vianey Contreras (offgoing nurse). Report included the following information SBAR, Kardex, ED Summary, Procedure Summary, Intake/Output, MAR, Recent Results, and Cardiac Rhythm NSR .

## 2022-11-21 ENCOUNTER — APPOINTMENT (OUTPATIENT)
Dept: GENERAL RADIOLOGY | Age: 87
DRG: 871 | End: 2022-11-21
Attending: INTERNAL MEDICINE
Payer: MEDICARE

## 2022-11-21 LAB
ANION GAP SERPL CALC-SCNC: 5 MMOL/L (ref 5–15)
BUN SERPL-MCNC: 6 MG/DL (ref 6–20)
BUN/CREAT SERPL: 13 (ref 12–20)
CALCIUM SERPL-MCNC: 8 MG/DL (ref 8.5–10.1)
CHLORIDE SERPL-SCNC: 103 MMOL/L (ref 97–108)
CO2 SERPL-SCNC: 32 MMOL/L (ref 21–32)
CREAT SERPL-MCNC: 0.47 MG/DL (ref 0.55–1.02)
GLUCOSE SERPL-MCNC: 90 MG/DL (ref 65–100)
MAGNESIUM SERPL-MCNC: 2 MG/DL (ref 1.6–2.4)
POTASSIUM SERPL-SCNC: 3 MMOL/L (ref 3.5–5.1)
SODIUM SERPL-SCNC: 140 MMOL/L (ref 136–145)

## 2022-11-21 PROCEDURE — 36415 COLL VENOUS BLD VENIPUNCTURE: CPT

## 2022-11-21 PROCEDURE — 74011000258 HC RX REV CODE- 258: Performed by: FAMILY MEDICINE

## 2022-11-21 PROCEDURE — 74011000250 HC RX REV CODE- 250: Performed by: INTERNAL MEDICINE

## 2022-11-21 PROCEDURE — 83735 ASSAY OF MAGNESIUM: CPT

## 2022-11-21 PROCEDURE — 80048 BASIC METABOLIC PNL TOTAL CA: CPT

## 2022-11-21 PROCEDURE — 74011250636 HC RX REV CODE- 250/636: Performed by: INTERNAL MEDICINE

## 2022-11-21 PROCEDURE — 71045 X-RAY EXAM CHEST 1 VIEW: CPT

## 2022-11-21 PROCEDURE — 74011000250 HC RX REV CODE- 250: Performed by: FAMILY MEDICINE

## 2022-11-21 PROCEDURE — 65270000046 HC RM TELEMETRY

## 2022-11-21 PROCEDURE — 74011250637 HC RX REV CODE- 250/637: Performed by: INTERNAL MEDICINE

## 2022-11-21 PROCEDURE — 74011250637 HC RX REV CODE- 250/637: Performed by: HOSPITALIST

## 2022-11-21 PROCEDURE — 77010033678 HC OXYGEN DAILY

## 2022-11-21 PROCEDURE — 94640 AIRWAY INHALATION TREATMENT: CPT

## 2022-11-21 PROCEDURE — 74011250636 HC RX REV CODE- 250/636: Performed by: FAMILY MEDICINE

## 2022-11-21 RX ORDER — IPRATROPIUM BROMIDE AND ALBUTEROL SULFATE 2.5; .5 MG/3ML; MG/3ML
3 SOLUTION RESPIRATORY (INHALATION)
Status: DISCONTINUED | OUTPATIENT
Start: 2022-11-21 | End: 2022-11-23

## 2022-11-21 RX ORDER — IPRATROPIUM BROMIDE AND ALBUTEROL SULFATE 2.5; .5 MG/3ML; MG/3ML
3 SOLUTION RESPIRATORY (INHALATION)
Status: DISCONTINUED | OUTPATIENT
Start: 2022-11-21 | End: 2022-11-21

## 2022-11-21 RX ORDER — POTASSIUM CHLORIDE 750 MG/1
40 TABLET, FILM COATED, EXTENDED RELEASE ORAL
Status: COMPLETED | OUTPATIENT
Start: 2022-11-21 | End: 2022-11-21

## 2022-11-21 RX ORDER — FUROSEMIDE 20 MG/1
10 TABLET ORAL
Status: DISCONTINUED | OUTPATIENT
Start: 2022-11-21 | End: 2022-11-21

## 2022-11-21 RX ORDER — FUROSEMIDE 10 MG/ML
10 INJECTION INTRAMUSCULAR; INTRAVENOUS 2 TIMES DAILY
Status: DISCONTINUED | OUTPATIENT
Start: 2022-11-21 | End: 2022-11-25

## 2022-11-21 RX ORDER — FUROSEMIDE 10 MG/ML
10 INJECTION INTRAMUSCULAR; INTRAVENOUS DAILY
Status: DISCONTINUED | OUTPATIENT
Start: 2022-11-21 | End: 2022-11-21

## 2022-11-21 RX ADMIN — SODIUM CHLORIDE, PRESERVATIVE FREE 10 ML: 5 INJECTION INTRAVENOUS at 14:21

## 2022-11-21 RX ADMIN — PIPERACILLIN AND TAZOBACTAM 3.38 G: 3; .375 INJECTION, POWDER, FOR SOLUTION INTRAVENOUS at 11:29

## 2022-11-21 RX ADMIN — MODAFINIL 100 MG: 100 TABLET ORAL at 09:00

## 2022-11-21 RX ADMIN — APIXABAN 5 MG: 5 TABLET, FILM COATED ORAL at 20:14

## 2022-11-21 RX ADMIN — MEMANTINE 10 MG: 10 TABLET ORAL at 18:30

## 2022-11-21 RX ADMIN — PIPERACILLIN AND TAZOBACTAM 3.38 G: 3; .375 INJECTION, POWDER, FOR SOLUTION INTRAVENOUS at 01:35

## 2022-11-21 RX ADMIN — PIPERACILLIN AND TAZOBACTAM 3.38 G: 3; .375 INJECTION, POWDER, FOR SOLUTION INTRAVENOUS at 18:20

## 2022-11-21 RX ADMIN — SODIUM CHLORIDE, PRESERVATIVE FREE 10 ML: 5 INJECTION INTRAVENOUS at 05:30

## 2022-11-21 RX ADMIN — APIXABAN 5 MG: 5 TABLET, FILM COATED ORAL at 08:41

## 2022-11-21 RX ADMIN — IPRATROPIUM BROMIDE AND ALBUTEROL SULFATE 3 ML: .5; 3 SOLUTION RESPIRATORY (INHALATION) at 19:27

## 2022-11-21 RX ADMIN — FUROSEMIDE 10 MG: 20 TABLET ORAL at 08:41

## 2022-11-21 RX ADMIN — IPRATROPIUM BROMIDE AND ALBUTEROL SULFATE 3 ML: .5; 3 SOLUTION RESPIRATORY (INHALATION) at 15:50

## 2022-11-21 RX ADMIN — IPRATROPIUM BROMIDE AND ALBUTEROL SULFATE 3 ML: .5; 3 SOLUTION RESPIRATORY (INHALATION) at 08:21

## 2022-11-21 RX ADMIN — SODIUM CHLORIDE, PRESERVATIVE FREE 10 ML: 5 INJECTION INTRAVENOUS at 20:16

## 2022-11-21 RX ADMIN — MEMANTINE 10 MG: 10 TABLET ORAL at 08:41

## 2022-11-21 RX ADMIN — POTASSIUM CHLORIDE 40 MEQ: 750 TABLET, FILM COATED, EXTENDED RELEASE ORAL at 08:40

## 2022-11-21 RX ADMIN — ATORVASTATIN CALCIUM 10 MG: 10 TABLET, FILM COATED ORAL at 08:41

## 2022-11-21 RX ADMIN — FUROSEMIDE 10 MG: 10 INJECTION, SOLUTION INTRAMUSCULAR; INTRAVENOUS at 18:20

## 2022-11-21 RX ADMIN — IPRATROPIUM BROMIDE AND ALBUTEROL SULFATE 3 ML: .5; 3 SOLUTION RESPIRATORY (INHALATION) at 02:15

## 2022-11-21 NOTE — PROGRESS NOTES
Bedside and Verbal shift change report given to MABLE Zelaya (oncoming nurse) by Cary Singh (offgoing nurse). Report included the following information SBAR, Kardex, ED Summary, Procedure Summary, Intake/Output, MAR, Recent Results, and Cardiac Rhythm NSR .

## 2022-11-21 NOTE — PROGRESS NOTES
[address] [facility] Adult  Hospitalist Group                                                                                          Hospitalist Progress Note  Terri Tsai MD  Answering service: [phone] from in house phone        Date of Service:  2022  NAME:  Omar Guadalupe  :  10/14/1929  MRN:  [id number]      Admission Summary: Sven Ward is a 80 y.o. female with past medical history of hypertension, hyperlipidemia, dementia, IBS, osteoarthritis, osteoporosis, pulmonary edema presented as a direct admission/transfer from [facility] to [facility] with chief complaint of shortness of breath. Patient is a limited historian. Majority of history was obtained per review of ED and electronic medical records. Per these reports, patient lives at [facility] facility where staff noted the patient was having symptoms of cough, generalized weakness over the past 2 days. Symptoms noted remain constant, without specific alleviating factors and there was concern for possible sepsis. Patient was initially transferred to [facility] with initial recorded vital signs temperature 98.9 °F, /68, heart rate 111, respiratory 20, O2 saturation 90% on room air. Oxygen saturation decreased to 88%. Patient was placed on 2 L oxygen via nasal cannula. Patient's blood pressure decreased to 81/55. Abnormal labs included blood glucose 239, creatinine 1.13, GFR 45, albumin 2.7, AST 40, alkaline phosphatase 942, lactic acid 3.7. Initial high-sensitivity troponin 23. Chest x-ray portable showed left lower lobe atelectasis. CTA chest was negative for PE however showed large amount of near occlusive debris and dependent airways and bilateral lower lobes with mild dependent atelectasis. Patient was then transferred to [facility] for admission. \"          Interval history / Subjective:   Slight improvement of edema, and supplemental O2 down to 1 liter when seen earlier at bedside. Influenza positive on droplet precautions. Pt remains frail.      Assessment & Plan:     Lactic acidosis/hypotension, POA--now resolved  Suspecting Aspiration Pneumonia and positive for Influenza A  -blood cx ngtd  -ua looks more like contaminant  -Rapid covid negative  -iv abx while inpt --can change to po abx when ready for discharge  -pulmonary edema persists but improved with diuresis  -supportive measures  -need outpatient followup with pulmonary service    11/20: Crackles/course breath sounds at bedside, O2 requirements unchanged, ordered cxr to assess, awake, readjusted bed that seemed to have improved along with diuresis    Acute respiratory failure with hypoxia--now better  History of PE on home Eliquis  In setting of suspected Aspiration Pneumonia and positive for Influenza A  -Supplemental oxygen, wean as tolerated  -Titrate oxygen to keep O2 saturation greater than 94%  -COVID-19 test negative  -lasix as needed  -home eliquis  -Appreciate Pulmonary service  -iv abx while inpt --can change to po abx when ready for discharge  -chest therapy  -scheduled nebs  -need outpatient followup with pulmonary service     Hypokalemia: replace as needed  Uncontrolled type 2 diabetes mellitus with hyperglycemia: on SSI  Elevated creatinine: now resolved  LFT elevation: now resolved  Generalized weakness: PT/OT  Dementia  -Patient's baseline mental status which she is currently disoriented on exam  -head CT without any acute process    Frailty, failure to thrive  -no improvement of this pleasant 81 y/o patient  -ordered Palliative care consult     Code status: DNR  Prophylaxis: Eliquis  PTA: [place]    Plan: Continue antibiotics and transition to po when ready for discharge, wean down/off supp oxygen as tolerated, would lean to keep supplemental O2 at [facility], 11/21 ordered palliative care consult given continued frailty/failure to thrive  Care Plan discussed with: patient/staff    Anticipated Disposition: [place] when stable, ?? ??Wednesday? ?? Hospital Problems  Date Reviewed: 12/21/2018            Codes Class Noted POA    Hypoxia ICD-10-CM: R09.02  ICD-9-CM: 799.02  11/14/2022 Unknown       Review of Systems:   A comprehensive review of systems was negative except for that written in the HPI. Vital Signs:    Last 24hrs VS reviewed since prior progress note. Most recent are:  Visit Vitals  BP (!) 136/90 (BP 1 Location: Right upper arm, BP Patient Position: At rest)   Pulse (!) 105   Temp 98.7 °F (37.1 °C)   Resp 19   Ht 5' (1.524 m)   Wt 46.8 kg (103 lb 2.8 oz)   SpO2 95%   BMI 20.15 kg/m²         Intake/Output Summary (Last 24 hours) at 11/21/2022 1255  Last data filed at 11/21/2022 1111  Gross per 24 hour   Intake --   Output 1 ml   Net -1 ml          Physical Examination:     I had a face to face encounter with this patient and independently examined them on 11/21/2022 as outlined below:          Constitutional:  No acute distress   ENT:  Oral mucosa moist   Resp:  Crackles persist, ?faint expiratory wheeze intermittent. No accessory muscle use. CV:  Regular rhythm, normal rate, no rubs    GI:  Soft, non distended, non tender. normoactive bowel sounds    Musculoskeletal:  No edema, warm, 2+ pulses throughout    Neurologic:  Moves all extremities. Awake, responds appropriately to questions asked. Data Review:    Review and/or order of clinical lab test      Labs:     No results for input(s): WBC, HGB, HCT, PLT, HGBEXT, HCTEXT, PLTEXT, HGBEXT, HCTEXT, PLTEXT in the last 72 hours.     Recent Labs     11/21/22  0143 11/19/22  0059 11/19/22  0058    138  --    K 3.0* 3.0*  --     103  --    CO2 32 28  --    BUN 6 7  --    CREA 0.47* 0.51*  --    GLU 90 96  --    CA 8.0* 7.8*  --    MG 2.0  --  1.7   PHOS  --  3.2  --          Medications Reviewed:     Current Facility-Administered Medications   Medication Dose Route Frequency    albuterol-ipratropium (DUO-NEB) 2.5 MG-0.5 MG/3 ML  3 mL Nebulization Q4H RT    furosemide (LASIX) injection 10 mg  10 mg IntraVENous BID    guaiFENesin-dextromethorphan (ROBITUSSIN DM) 100-10 mg/5 mL syrup 10 mL  10 mL Oral Q6H PRN    sodium chloride (NS) flush 5-10 mL  5-10 mL IntraVENous PRN    piperacillin-tazobactam (ZOSYN) 3.375 g in 0.9% sodium chloride (MBP/ADV) 100 mL MBP  3.375 g IntraVENous Q8H    sodium chloride (NS) flush 5-40 mL  5-40 mL IntraVENous Q8H    sodium chloride (NS) flush 5-40 mL  5-40 mL IntraVENous PRN    acetaminophen (TYLENOL) tablet 650 mg  650 mg Oral Q6H PRN    Or    acetaminophen (TYLENOL) suppository 650 mg  650 mg Rectal Q6H PRN    polyethylene glycol (MIRALAX) packet 17 g  17 g Oral DAILY PRN    ondansetron (ZOFRAN ODT) tablet 4 mg  4 mg Oral Q8H PRN    Or    ondansetron (ZOFRAN) injection 4 mg  4 mg IntraVENous Q6H PRN    memantine (NAMENDA) tablet 10 mg  10 mg Oral BID    modafiniL (PROVIGIL) tablet 100 mg  100 mg Oral DAILY    therapeutic multivitamin (THERAGRAN) tablet 1 Tablet  1 Tablet Oral DAILY    atorvastatin (LIPITOR) tablet 10 mg  10 mg Oral DAILY    apixaban (ELIQUIS) tablet 5 mg  5 mg Oral Q12H    sodium chloride (NS) flush 5-10 mL  5-10 mL IntraVENous PRN     ______________________________________________________________________  EXPECTED LENGTH OF STAY: 4d 19h  ACTUAL LENGTH OF STAY:          7                 Amena Olmos MD

## 2022-11-21 NOTE — CONSULTS
Palliative Medicine Consult  Jose: [phone] ([phone])    Patient Name: Antwon Connor  YOB: 1929    Date of Initial Consult: 11/21/2022  Reason for Consult: overwhelming symptoms  Requesting Provider: Montse Jj   Primary Care Physician: Kiara Spicer MD     SUMMARY:   Antwon Connor is a 80 y.o. admitted on 11/14/2022 from [address] [place] [address] to the ED with SOB, cough, generalized weakness for 2 days. Admitted with a diagnosis of acute respiratory failure with hypoxia, severe sepsis, lactic acidosis, suspected aspiration pneumonia, uncontrolled DM, elevated creatinine and LFTs. PMH:  HTN, hyperlipidemia, dementia, IBS, DM, OA, osteoporosis, PE and pulmonary edema    Prior hospitalizations:     2/9/2021-2/16/2021:   fall and right intertrochanteric fx     Current medical issues leading to Palliative Medicine involvement include:  returning to SNF soon, on oxygen, frailty and FTT. Patient does have DDNR    Social:  [facility]     Dtr:  Oneil Medrano     Patient has DDNR, has a living will stating no prolonged interventions if condition is terminal, and has financial POA documents on the chart. PALLIATIVE DIAGNOSES:   Palliative care encounter  Dementia  Weight loss  Shortness of breath      PLAN:   Examined patient. She is resting in bed. Now on 1 LPM. She is unable to answer most questions. She is pleasant and does respond. Was not aware she has the flu. She was unable to tell me her daughter's name   Chart reviewed.     Called and left message for her daughter, Oneil Medrano   Left message for her to call me back  Initial consult note routed to primary continuity provider and/or primary health care team members  Communicated plan of care with: Palliative [facility] Team     GOALS OF CARE / TREATMENT PREFERENCES:     GOALS OF CARE:       TREATMENT PREFERENCES:   Code Status: DNR    Patient and family's personal goals include:  [x] Unable to obtain this information    Important upcoming milestones or family events:  [x] Unable to obtain this information    The patient identifies the following as important for living well:  [x] Unable to obtain this information      Advance Care Planning:  [] [facility] Interdisciplinary Team has updated the ACP Navigator with [address] and Patient Capacity      Advance Care Planning 12/15/2018   Patient's Healthcare Decision Maker is: -   Confirm Advance Directive None     Sander Hickey (dtr)   [phone]          Other:    As far as possible, the palliative care team has discussed with patient / health care proxy about goals of care / treatment preferences for patient. HISTORY:     History obtained from: chart, team, dtr    CHIEF COMPLAINT: Pt admitted with cough and SOB     HPI/SUBJECTIVE:    The patient is:   [] Verbal and participatory  [x] Non-participatory due to:  dementia        Clinical Pain Assessment (nonverbal scale for severity on nonverbal patients):          Activity (Movement): Lying quietly, normal position    Duration: for how long has pt been experiencing pain (e.g., 2 days, 1 month, years)  Frequency: how often pain is an issue (e.g., several times per day, once every few days, constant)     FUNCTIONAL ASSESSMENT:     Palliative Performance Scale (PPS):          PSYCHOSOCIAL/SPIRITUAL SCREENING:     Palliative IDT has assessed this patient for cultural preferences / practices and a referral made as appropriate to needs (Cultural Services, Patient Advocacy, Ethics, etc.)    Any spiritual / Orthodox concerns:  [] Yes /  [x] No  [] Unable to obtain this information  If \"Yes\" to discuss with pastoral care during IDT     Does caregiver feel burdened by caring for their loved one:   [] Yes /  [x] No /  [] No Caregiver Present/Available [] No Caregiver [] Pt Lives at Facility  If \"Yes\" to discuss with social work during IDT    Anticipatory grief assessment:   [x] Normal  / [] Maladaptive   [] Unable to obtain this information  [] n/a  If \"Maladaptive\" to discuss with social work during IDT    ESAS Anxiety: Anxiety: 0    ESAS Depression:          REVIEW OF SYSTEMS:     Positive and pertinent negative findings in ROS are noted above in HPI. The following systems were [x] reviewed / [] unable to be reviewed as noted in HPI  Other findings are noted below. Systems: constitutional, ears/nose/mouth/throat, respiratory, gastrointestinal, genitourinary, musculoskeletal, integumentary, neurologic, psychiatric, endocrine. Positive findings noted below. Modified ESAS Completed by: provider     Drowsiness: 0         Anxiety: 0       Dyspnea: 2     Constipation: No     Stool Occurrence(s): 1        PHYSICAL EXAM:     From RN flowsheet:  Wt Readings from Last 3 Encounters:   11/21/22 103 lb 2.8 oz (46.8 kg)   02/13/21 152 lb (68.9 kg)   06/09/20 160 lb 11.5 oz (72.9 kg)     Blood pressure (!) 136/90, pulse (!) 105, temperature 98.7 °F (37.1 °C), resp. rate 19, height 5' (1.524 m), weight 103 lb 2.8 oz (46.8 kg), SpO2 95 %.     Pain Scale 1: Numeric (0 - 10)  Pain Intensity 1: 0                 Last bowel movement, if known: 11/21    Constitutional: patient resting in bed, no acute distress  Eyes: pupils equal, anicteric  ENMT: no nasal discharge, moist mucous membranes  Cardiovascular: regular rhythm,   Respiratory:   breathing:  using accessory muscles to a small degree,  symmetric  Gastrointestinal: soft non-tender,   Musculoskeletal: no deformity, no tenderness to palpation  Skin: warm, dry  Neurologic: following commands, moving all extremities  Psychiatric:  no hallucinations  Other:       HISTORY:     Active Problems:    Hypoxia (11/14/2022)    Past Medical History:   Diagnosis Date    Dementia ([address]     Hypercholesteremia 5/27/2010    Hypertension     IBS (irritable bowel syndrome) 5/27/2010    OA (osteoarthritis) 5/27/2010    Osteoporosis 5/27/2010    Pulmonary embolism ([address] History reviewed. No pertinent surgical history. History reviewed. No pertinent family history. History reviewed, no pertinent family history.   Social History     Tobacco Use    Smoking status: Never    Smokeless tobacco: Never   Substance Use Topics    Alcohol use: Not Currently     Allergies   Allergen Reactions    Aricept [Donepezil] Other (comments)     GI upset    Flexeril [Cyclobenzaprine] Other (comments)     delirium      Current Facility-Administered Medications   Medication Dose Route Frequency    furosemide (LASIX) injection 10 mg  10 mg IntraVENous BID    albuterol-ipratropium (DUO-NEB) 2.5 MG-0.5 MG/3 ML  3 mL Nebulization Q4HWA RT    guaiFENesin-dextromethorphan (ROBITUSSIN DM) 100-10 mg/5 mL syrup 10 mL  10 mL Oral Q6H PRN    sodium chloride (NS) flush 5-10 mL  5-10 mL IntraVENous PRN    piperacillin-tazobactam (ZOSYN) 3.375 g in 0.9% sodium chloride (MBP/ADV) 100 mL MBP  3.375 g IntraVENous Q8H    sodium chloride (NS) flush 5-40 mL  5-40 mL IntraVENous Q8H    sodium chloride (NS) flush 5-40 mL  5-40 mL IntraVENous PRN    acetaminophen (TYLENOL) tablet 650 mg  650 mg Oral Q6H PRN    Or    acetaminophen (TYLENOL) suppository 650 mg  650 mg Rectal Q6H PRN    polyethylene glycol (MIRALAX) packet 17 g  17 g Oral DAILY PRN    ondansetron (ZOFRAN ODT) tablet 4 mg  4 mg Oral Q8H PRN    Or    ondansetron (ZOFRAN) injection 4 mg  4 mg IntraVENous Q6H PRN    memantine (NAMENDA) tablet 10 mg  10 mg Oral BID    modafiniL (PROVIGIL) tablet 100 mg  100 mg Oral DAILY    therapeutic multivitamin (THERAGRAN) tablet 1 Tablet  1 Tablet Oral DAILY    atorvastatin (LIPITOR) tablet 10 mg  10 mg Oral DAILY    apixaban (ELIQUIS) tablet 5 mg  5 mg Oral Q12H    sodium chloride (NS) flush 5-10 mL  5-10 mL IntraVENous PRN          LAB AND IMAGING FINDINGS:     Lab Results   Component Value Date/Time    WBC 6.5 11/18/2022 12:38 AM    HGB 12.2 11/18/2022 12:38 AM    PLATELET 364 13/75/0220 12:38 AM     Lab Results Component Value Date/Time    Sodium 140 11/21/2022 01:43 AM    Potassium 3.0 (L) 11/21/2022 01:43 AM    Chloride 103 11/21/2022 01:43 AM    CO2 32 11/21/2022 01:43 AM    BUN 6 11/21/2022 01:43 AM    Creatinine 0.47 (L) 11/21/2022 01:43 AM    Calcium 8.0 (L) 11/21/2022 01:43 AM    Magnesium 2.0 11/21/2022 01:43 AM    Phosphorus 3.2 11/19/2022 12:59 AM      Lab Results   Component Value Date/Time    Alk. phosphatase 633 (H) 11/16/2022 12:54 AM    Protein, total 5.9 (L) 11/16/2022 12:54 AM    Albumin 2.0 (L) 11/16/2022 12:54 AM    Globulin 3.9 11/16/2022 12:54 AM     Lab Results   Component Value Date/Time    INR 1.1 11/16/2022 12:54 AM    Prothrombin time 11.9 (H) 11/16/2022 12:54 AM    aPTT 41.1 (H) 11/16/2022 12:54 AM      No results found for: IRON, FE, TIBC, IBCT, PSAT, FERR   No results found for: PH, PCO2, PO2  No components found for: [place]   Lab Results   Component Value Date/Time     08/22/2018 12:38 PM    CK - MB 1.9 01/11/2017 11:07 PM                Total time:   30 minutes  Counseling / coordination time, spent as noted above:   25  minutes  > 50% counseling / coordination?: yes    Prolonged service was provided for  []30 min   []75 min in face to face time in the presence of the patient, spent as noted above. Time Start:   Time End:   Note: this can only be billed with 78489 (initial) or [id number] (follow up). If multiple start / stop times, list each separately.

## 2022-11-21 NOTE — PROGRESS NOTES
Problem: Falls - Risk of  Goal: *Absence of Falls  Description: Document Jasmin Burnette Fall Risk and appropriate interventions in the flowsheet.   Outcome: Progressing Towards Goal  Note: Fall Risk Interventions:  Mobility Interventions: Bed/chair exit alarm    Mentation Interventions: Door open when patient unattended    Medication Interventions: Teach patient to arise slowly    Elimination Interventions: Call light in reach              Problem: Patient Education: Go to Patient Education Activity  Goal: Patient/Family Education  Outcome: Progressing Towards Goal     Problem: Aspiration - Risk of  Goal: *Absence of aspiration  Outcome: Progressing Towards Goal     Problem: Patient Education: Go to Patient Education Activity  Goal: Patient/Family Education  Outcome: Progressing Towards Goal     Problem: Patient Education: Go to Patient Education Activity  Goal: Patient/Family Education  Outcome: Progressing Towards Goal     Problem: Pneumonia: Day 1  Goal: Off Pathway (Use only if patient is Off Pathway)  Outcome: Progressing Towards Goal  Goal: Activity/Safety  Outcome: Progressing Towards Goal  Goal: Consults, if ordered  Outcome: Progressing Towards Goal  Goal: Diagnostic Test/Procedures  Outcome: Progressing Towards Goal  Goal: Nutrition/Diet  Outcome: Progressing Towards Goal  Goal: Medications  Outcome: Progressing Towards Goal  Goal: Respiratory  Outcome: Progressing Towards Goal  Goal: Treatments/Interventions/Procedures  Outcome: Progressing Towards Goal  Goal: Psychosocial  Outcome: Progressing Towards Goal  Goal: *Oxygen saturation within defined limits  Outcome: Progressing Towards Goal  Goal: *Influenza vaccine administered (October-March)  Outcome: Progressing Towards Goal  Goal: *Pneumoccocal vaccine administered  Outcome: Progressing Towards Goal  Goal: *Hemodynamically stable  Outcome: Progressing Towards Goal  Goal: *Demonstrates progressive activity  Outcome: Progressing Towards Goal  Goal: *Tolerating diet  Outcome: Progressing Towards Goal     Problem: Pneumonia: Day 2  Goal: Off Pathway (Use only if patient is Off Pathway)  Outcome: Progressing Towards Goal  Goal: Activity/Safety  Outcome: Progressing Towards Goal  Goal: Consults, if ordered  Outcome: Progressing Towards Goal  Goal: Diagnostic Test/Procedures  Outcome: Progressing Towards Goal  Goal: Nutrition/Diet  Outcome: Progressing Towards Goal  Goal: Discharge Planning  Outcome: Progressing Towards Goal  Goal: Medications  Outcome: Progressing Towards Goal  Goal: Respiratory  Outcome: Progressing Towards Goal  Goal: Treatments/Interventions/Procedures  Outcome: Progressing Towards Goal  Goal: Psychosocial  Outcome: Progressing Towards Goal  Goal: *Oxygen saturation within defined limits  Outcome: Progressing Towards Goal  Goal: *Hemodynamically stable  Outcome: Progressing Towards Goal  Goal: *Demonstrates progressive activity  Outcome: Progressing Towards Goal  Goal: *Tolerating diet  Outcome: Progressing Towards Goal  Goal: *Optimal pain control at patient's stated goal  Outcome: Progressing Towards Goal     Problem: Pneumonia: Day 3  Goal: Off Pathway (Use only if patient is Off Pathway)  Outcome: Progressing Towards Goal  Goal: Activity/Safety  Outcome: Progressing Towards Goal  Goal: Consults, if ordered  Outcome: Progressing Towards Goal  Goal: Diagnostic Test/Procedures  Outcome: Progressing Towards Goal  Goal: Nutrition/Diet  Outcome: Progressing Towards Goal  Goal: Discharge Planning  Outcome: Progressing Towards Goal  Goal: Medications  Outcome: Progressing Towards Goal  Goal: Respiratory  Outcome: Progressing Towards Goal  Goal: Treatments/Interventions/Procedures  Outcome: Progressing Towards Goal  Goal: Psychosocial  Outcome: Progressing Towards Goal  Goal: *Oxygen saturation within defined limits  Outcome: Progressing Towards Goal  Goal: *Hemodynamically stable  Outcome: Progressing Towards Goal  Goal: *Demonstrates progressive activity  Outcome: Progressing Towards Goal  Goal: *Tolerating diet  Outcome: Progressing Towards Goal  Goal: *Describes available resources and support systems  Outcome: Progressing Towards Goal  Goal: *Optimal pain control at patient's stated goal  Outcome: Progressing Towards Goal     Problem: Pneumonia: Day 4  Goal: Off Pathway (Use only if patient is Off Pathway)  Outcome: Progressing Towards Goal  Goal: Activity/Safety  Outcome: Progressing Towards Goal  Goal: Nutrition/Diet  Outcome: Progressing Towards Goal  Goal: Discharge Planning  Outcome: Progressing Towards Goal  Goal: Medications  Outcome: Progressing Towards Goal  Goal: Respiratory  Outcome: Progressing Towards Goal  Goal: Treatments/Interventions/Procedures  Outcome: Progressing Towards Goal  Goal: Psychosocial  Outcome: Progressing Towards Goal     Problem: Pneumonia: Discharge Outcomes  Goal: *Demonstrates progressive activity  Outcome: Progressing Towards Goal  Goal: *Describes follow-up/return visits to physicians  Outcome: Progressing Towards Goal  Goal: *Tolerating diet  Outcome: Progressing Towards Goal  Goal: *Verbalizes name, dosage, time, side effects, and number of days to continue medications  Outcome: Progressing Towards Goal  Goal: *Influenza immunization  Outcome: Progressing Towards Goal  Goal: *Pneumococcal immunization  Outcome: Progressing Towards Goal  Goal: *Respiratory status at baseline  Outcome: Progressing Towards Goal  Goal: *Vital signs within defined limits  Outcome: Progressing Towards Goal  Goal: *Describes available resources and support systems  Outcome: Progressing Towards Goal  Goal: *Optimal pain control at patient's stated goal  Outcome: Progressing Towards Goal

## 2022-11-21 NOTE — PROGRESS NOTES
Spiritual Care Assessment/Progress Note  [facility]      NAME: Jason Alexander      MRN: [id number]  AGE: 80 y.o. SEX: female  Anabaptist Affiliation: Non Anabaptist   Language: English     11/21/2022     Total Time (in minutes): 15     Spiritual Assessment begun in [facility] through conversation with:         []Patient        [] Family    [] Friend(s)        Reason for Consult: Palliative Care, Initial/Spiritual Assessment     Spiritual beliefs: (Please include comment if needed)     [] Identifies with a minoo tradition:         [] Supported by a minoo community:            [] Claims no spiritual orientation:           [] Seeking spiritual identity:                [] Adheres to an individual form of spirituality:           [x] Not able to assess:                           Identified resources for coping:      [] Prayer                               [] Music                  [] Guided Imagery     [] Family/friends                 [] Pet visits     [] Devotional reading                         [x] Unknown     [] Other:                                               Interventions offered during this visit: (See comments for more details)    Patient Interventions: Initial visit           Plan of Care:     [] Support spiritual and/or cultural needs    [] Support AMD and/or advance care planning process      [] Support grieving process   [] Coordinate Rites and/or Rituals    [] Coordination with community clergy   [] No spiritual needs identified at this time   [] Detailed Plan of Care below (See Comments)  [] Make referral to Music Therapy  [] Make referral to Pet Therapy     [] Make referral to Addiction services  [] Make referral to [facility]  [] Make referral to Spiritual Care Partner  [] No future visits requested        [x] Contact Spiritual Care for further referrals     Attempted visit for palliative initial spiritual assessment. Unable to visit patient at this time.  Patient was sleeping and did not awake. No family present. Patient's chart was consulted.   Chaplain Mckeon MDiv, MS, [facility]

## 2022-11-22 ENCOUNTER — HOSPICE ADMISSION (OUTPATIENT)
Dept: HOSPICE | Facility: HOSPICE | Age: 87
End: 2022-11-22

## 2022-11-22 ENCOUNTER — APPOINTMENT (OUTPATIENT)
Dept: GENERAL RADIOLOGY | Age: 87
DRG: 871 | End: 2022-11-22
Attending: INTERNAL MEDICINE
Payer: MEDICARE

## 2022-11-22 PROCEDURE — 74011000250 HC RX REV CODE- 250: Performed by: INTERNAL MEDICINE

## 2022-11-22 PROCEDURE — 94640 AIRWAY INHALATION TREATMENT: CPT

## 2022-11-22 PROCEDURE — 74011000250 HC RX REV CODE- 250: Performed by: FAMILY MEDICINE

## 2022-11-22 PROCEDURE — 77010033678 HC OXYGEN DAILY

## 2022-11-22 PROCEDURE — 94760 N-INVAS EAR/PLS OXIMETRY 1: CPT

## 2022-11-22 PROCEDURE — 74011250636 HC RX REV CODE- 250/636: Performed by: INTERNAL MEDICINE

## 2022-11-22 PROCEDURE — 74011250637 HC RX REV CODE- 250/637: Performed by: HOSPITALIST

## 2022-11-22 PROCEDURE — 74011250636 HC RX REV CODE- 250/636: Performed by: FAMILY MEDICINE

## 2022-11-22 PROCEDURE — 74011250637 HC RX REV CODE- 250/637: Performed by: INTERNAL MEDICINE

## 2022-11-22 PROCEDURE — 65270000046 HC RM TELEMETRY

## 2022-11-22 PROCEDURE — 74011000258 HC RX REV CODE- 258: Performed by: FAMILY MEDICINE

## 2022-11-22 PROCEDURE — 71045 X-RAY EXAM CHEST 1 VIEW: CPT

## 2022-11-22 RX ORDER — FUROSEMIDE 20 MG/1
20 TABLET ORAL DAILY
Status: DISCONTINUED | OUTPATIENT
Start: 2022-11-23 | End: 2022-11-25 | Stop reason: HOSPADM

## 2022-11-22 RX ORDER — OSELTAMIVIR PHOSPHATE 30 MG/1
30 CAPSULE ORAL EVERY 12 HOURS
Status: DISCONTINUED | OUTPATIENT
Start: 2022-11-22 | End: 2022-11-25 | Stop reason: HOSPADM

## 2022-11-22 RX ADMIN — PIPERACILLIN AND TAZOBACTAM 3.38 G: 3; .375 INJECTION, POWDER, FOR SOLUTION INTRAVENOUS at 02:00

## 2022-11-22 RX ADMIN — SODIUM CHLORIDE, PRESERVATIVE FREE 10 ML: 5 INJECTION INTRAVENOUS at 14:00

## 2022-11-22 RX ADMIN — IPRATROPIUM BROMIDE AND ALBUTEROL SULFATE 3 ML: .5; 3 SOLUTION RESPIRATORY (INHALATION) at 19:21

## 2022-11-22 RX ADMIN — AZITHROMYCIN DIHYDRATE 500 MG: 500 INJECTION, POWDER, LYOPHILIZED, FOR SOLUTION INTRAVENOUS at 17:36

## 2022-11-22 RX ADMIN — PIPERACILLIN AND TAZOBACTAM 3.38 G: 3; .375 INJECTION, POWDER, FOR SOLUTION INTRAVENOUS at 17:35

## 2022-11-22 RX ADMIN — FUROSEMIDE 10 MG: 10 INJECTION, SOLUTION INTRAMUSCULAR; INTRAVENOUS at 09:23

## 2022-11-22 RX ADMIN — APIXABAN 5 MG: 5 TABLET, FILM COATED ORAL at 20:48

## 2022-11-22 RX ADMIN — MODAFINIL 100 MG: 100 TABLET ORAL at 09:23

## 2022-11-22 RX ADMIN — APIXABAN 5 MG: 5 TABLET, FILM COATED ORAL at 09:23

## 2022-11-22 RX ADMIN — OSELTAMIVIR PHOSPHATE 30 MG: 30 CAPSULE ORAL at 17:35

## 2022-11-22 RX ADMIN — MEMANTINE 10 MG: 10 TABLET ORAL at 09:23

## 2022-11-22 RX ADMIN — IPRATROPIUM BROMIDE AND ALBUTEROL SULFATE 3 ML: .5; 3 SOLUTION RESPIRATORY (INHALATION) at 16:26

## 2022-11-22 RX ADMIN — ATORVASTATIN CALCIUM 10 MG: 10 TABLET, FILM COATED ORAL at 09:23

## 2022-11-22 RX ADMIN — SODIUM CHLORIDE, PRESERVATIVE FREE 10 ML: 5 INJECTION INTRAVENOUS at 20:49

## 2022-11-22 RX ADMIN — GUAIFENESIN AND DEXTROMETHORPHAN 10 ML: 100; 10 SYRUP ORAL at 13:32

## 2022-11-22 RX ADMIN — SODIUM CHLORIDE, PRESERVATIVE FREE 10 ML: 5 INJECTION INTRAVENOUS at 05:56

## 2022-11-22 RX ADMIN — PIPERACILLIN AND TAZOBACTAM 3.38 G: 3; .375 INJECTION, POWDER, FOR SOLUTION INTRAVENOUS at 09:25

## 2022-11-22 RX ADMIN — IPRATROPIUM BROMIDE AND ALBUTEROL SULFATE 3 ML: .5; 3 SOLUTION RESPIRATORY (INHALATION) at 11:53

## 2022-11-22 RX ADMIN — MEMANTINE 10 MG: 10 TABLET ORAL at 17:35

## 2022-11-22 RX ADMIN — IPRATROPIUM BROMIDE AND ALBUTEROL SULFATE 3 ML: .5; 3 SOLUTION RESPIRATORY (INHALATION) at 07:55

## 2022-11-22 RX ADMIN — THERA TABS 1 TABLET: TAB at 09:23

## 2022-11-22 NOTE — ACP (ADVANCE CARE PLANNING)
Met today with Héctor Shaikh, the patient's daughter and legal NOK. Spouse is  and patient has no other children. Patient does have a living will indicating no prolonged care if her condition is terminal.      Patient has a DDNR on file. No CPR and no intubation. Daniele Kramer does her mother to have PT to try to regain some of her strength.   PT would be provided at [facility] does not want a feeding tube for her mother (neither short term nor long term)

## 2022-11-22 NOTE — PROGRESS NOTES
1:46 PM  CM consult received. Informed patient's family is requesting an informational meeting for Hospice. Referral placed to [facility] for review. CM awaiting receipt of referral and updates at this time. CM will continue to follow and assist with NATALIE needs as they arise.     SHUN Castillo/CRM  Care Management

## 2022-11-22 NOTE — PROGRESS NOTES
Problem: Falls - Risk of  Goal: *Absence of Falls  Description: Document Nat Embs Fall Risk and appropriate interventions in the flowsheet.   Outcome: Progressing Towards Goal  Note: Fall Risk Interventions:  Mobility Interventions: Bed/chair exit alarm, Patient to call before getting OOB    Mentation Interventions: Bed/chair exit alarm, Reorient patient    Medication Interventions: Teach patient to arise slowly, Bed/chair exit alarm, Patient to call before getting OOB    Elimination Interventions: Bed/chair exit alarm, Call light in reach              Problem: Patient Education: Go to Patient Education Activity  Goal: Patient/Family Education  Outcome: Progressing Towards Goal     Problem: Aspiration - Risk of  Goal: *Absence of aspiration  Outcome: Progressing Towards Goal     Problem: Patient Education: Go to Patient Education Activity  Goal: Patient/Family Education  Outcome: Progressing Towards Goal

## 2022-11-22 NOTE — PROGRESS NOTES
Palliative Medicine Consult  Jose: [phone] ([phone])    Patient Name: Toya Orosco  YOB: 1929    Date of Initial Consult: 2022  Reason for Consult: overwhelming symptoms  Requesting Provider: Merced Bhatt   Primary Care Physician: Johanne Hurd MD     SUMMARY:   Toya Orosco is a 80 y.o. admitted on 2022 from [address] [place] [address] to the ED with SOB, cough, generalized weakness for 2 days. Admitted with a diagnosis of acute respiratory failure with hypoxia, severe sepsis, lactic acidosis, suspected aspiration pneumonia, uncontrolled DM, elevated creatinine and LFTs. PMH:  HTN, hyperlipidemia, dementia, IBS, DM, OA, osteoporosis, PE and pulmonary edema    Prior hospitalizations:     2021-2021:   fall and right intertrochanteric fx     Current medical issues leading to Palliative Medicine involvement include:  returning to SNF soon, on oxygen, frailty and FTT. Patient does have DDNR    Social:  Spouse  a number of years ago. She did not remarry. One dtr. Anne Goel. Patient has been living at [address] [place] [address] facility since she was 66. She was in independent living. She did move to assisted living in 2017 and soon after she was in AL, she moved to the memory care unit. 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 She liked to read, knit and travel in the past.  She often went on many of the outings af [facility]. Kayy Quevedo is well known at [facility]    Patient has DDNR, has a living will stating no prolonged interventions if condition is terminal, and has financial POA documents on the chart. Anne Goel is the legal next of kin. PALLIATIVE DIAGNOSES:   Palliative care encounter  Dementia, severe  Weight loss  Shortness of breath   Aspiration pneumonia     PLAN:    Met with Anne Goel, patient's dtr and legal NOK. Patient has a living will and DDNR on file.    Reviewed the patient's current medical issues. Dtr concerned about having her mother stable from the current respiratory condition/flu before she returns to [address] [place] [address] the patient's progression with dementia. Diagnosed in 2017. Went to memory care at that time. Seen by Dr. Johnathan Bhatt in December 2018 during a hospitalization and at that time, patient was a 5 on the FAST scale for dementia. Daughter reports there has been a progressive decline in her mother's function since she fell and fractured her hip in 2021. She was hospitalized from 2/9/2022-2/16/2022. Patient did go to skilled care at that time and had PT/OT. She has mostly been in a wheelchair for the past year. FAST scale at this time 7a  severe dementia   Patient has to be fed. She has been declining with eating for some time per the daughter. Noted her weight in Feb 2021 was 152 pounds. Tigre Enriqueta reports her weight has been 110 recently  Tigre Robison often will go to [facility] to make sure her mother eats. The aides to assist with feeding her. Tigre Robison is usually able to get her mother to eat more. Tigre Robison did state her mother would not want a feeding tube. Tigre Robison and I then discussed the fact that the patient's dementia is now in the moderately severe to severe area. The patient is also not eating well and is frail. We talked about what frailty means. Tigre Robison had said earlier she wants her mother to return to [facility] and to have PT  Tigrejulia Robison did have questions about comfort care and hospice  She said at first she thought Palliative was consulted because it meant the patient was dying soon   I explained the differences/similarities in palliative medicine and hospice. We talked more about what hospice includes. At this time, the patient's granddaughter, Moises Luis was present. Tigre Enriqueta had concerns about not continuing with PT if they opt for hospice. Tigre Robison also had questions about continuing some of her medications including the Xarelto.     Tigre Robison and Fabio Stahl open to informational session with hospice   Hospice consult placed  Update to Dr. Paige Mcgowan. The daughter requested that Dr. Paige Mcgowan come and talk with her   11/21/2022  Examined patient. She is resting in bed. Now on 1 LPM. She is unable to answer most questions. She is pleasant and does respond. Was not aware she has the flu. She was unable to tell me her daughter's name   Chart reviewed. Called and left message for her daughter, Darylene Bow   Left message for her to call me back  Initial consult note routed to primary continuity provider and/or primary health care team members  Communicated plan of care with: Palliative IDT, [address] Team     GOALS OF CARE / TREATMENT PREFERENCES:     GOALS OF CARE:  Patient/Health Care Proxy Stated Goals:  (dtr is considering comfort care)    TREATMENT PREFERENCES:   Code Status: DNR,  DDNR on file     Patient and family's personal goals include: dtr would like to consider focusing on comfort     Important upcoming milestones or family events:  none reported     The patient identifies the following as important for living well:  [x] Unable to obtain this information      Advance Care Planning:  [x] [facility] Interdisciplinary Team has updated the ACP Navigator with [address] and Patient Capacity      Advance Care Planning 11/22/2022   Patient's Healthcare Decision Maker is: Legal Next of [facility] Directive (No Data)   Does the patient have other document types Do Not Resuscitate     Darylene Bow (dtr)   [phone]    [facility]          Other:    As far as possible, the palliative care team has discussed with patient / health care proxy about goals of care / treatment preferences for patient.      HISTORY:     History obtained from: chart, team, dtr    CHIEF COMPLAINT: Pt admitted with cough and SOB     HPI/SUBJECTIVE:    The patient is:   [] Verbal and participatory  [x] Non-participatory due to:  dementia        Clinical Pain Assessment (nonverbal scale for severity on nonverbal patients): Activity (Movement): Lying quietly, normal position    Duration: for how long has pt been experiencing pain (e.g., 2 days, 1 month, years)  Frequency: how often pain is an issue (e.g., several times per day, once every few days, constant)     FUNCTIONAL ASSESSMENT:     Palliative Performance Scale (PPS):  PPS: 30       PSYCHOSOCIAL/SPIRITUAL SCREENING:     Palliative IDT has assessed this patient for cultural preferences / practices and a referral made as appropriate to needs (Cultural Services, Patient Advocacy, Ethics, etc.)    Any spiritual / Mandaen concerns:  [] Yes /  [x] No  [] Unable to obtain this information  If \"Yes\" to discuss with pastoral care during IDT     Does caregiver feel burdened by caring for their loved one:   [] Yes /  [x] No /  [] No Caregiver Present/Available [] No Caregiver [] Pt Lives at Facility  If \"Yes\" to discuss with social work during IDT    Anticipatory grief assessment:   [x] Normal  / [] Maladaptive   [] Unable to obtain this information  [] n/a  If \"Maladaptive\" to discuss with social work during IDT    ESAS Anxiety: Anxiety: 0    ESAS Depression:          REVIEW OF SYSTEMS:     Positive and pertinent negative findings in ROS are noted above in HPI. The following systems were [x] reviewed / [] unable to be reviewed as noted in HPI  Other findings are noted below. Systems: constitutional, ears/nose/mouth/throat, respiratory, gastrointestinal, genitourinary, musculoskeletal, integumentary, neurologic, psychiatric, endocrine. Positive findings noted below.   Modified ESAS Completed by: provider     Drowsiness: 1         Anxiety: 0     Anorexia: 1 Dyspnea: 1     Constipation: No     Stool Occurrence(s): 1        PHYSICAL EXAM:     From RN flowsheet:  Wt Readings from Last 3 Encounters:   11/22/22 103 lb 13.4 oz (47.1 kg)   02/13/21 152 lb (68.9 kg)   06/09/20 160 lb 11.5 oz (72.9 kg)     Blood pressure 137/83, pulse (!) 114, temperature 98.7 °F (37.1 °C), resp. rate 24, height 5' (1.524 m), weight 103 lb 13.4 oz (47.1 kg), SpO2 98 %. Pain Scale 1: Numeric (0 - 10)  Pain Intensity 1: 0                 Last bowel movement, if known: 11/21    Constitutional: patient resting in bed, no acute distress  Eyes: pupils equal, anicteric  ENMT: no nasal discharge, moist mucous membranes  Cardiovascular: regular rhythm, pulse 116  Respiratory:   breathing:   symmetric, O2 1LPM  Gastrointestinal: soft non-tender,   Musculoskeletal: no deformity, no tenderness to palpation  Skin: warm, dry  Neurologic: following commands, moving all extremities  Psychiatric:  no hallucinations  Other:       HISTORY:     Active Problems:    Hypoxia (11/14/2022)    Past Medical History:   Diagnosis Date    Dementia ([facility] [address]     Hypercholesteremia 5/27/2010    Hypertension     IBS (irritable bowel syndrome) 5/27/2010    OA (osteoarthritis) 5/27/2010    Osteoporosis 5/27/2010    Pulmonary embolism ([facility] [address]       History reviewed. No pertinent surgical history. History reviewed. No pertinent family history. History reviewed, no pertinent family history.   Social History     Tobacco Use    Smoking status: Never    Smokeless tobacco: Never   Substance Use Topics    Alcohol use: Not Currently     Allergies   Allergen Reactions    Aricept [Donepezil] Other (comments)     GI upset    Flexeril [Cyclobenzaprine] Other (comments)     delirium      Current Facility-Administered Medications   Medication Dose Route Frequency    [Held by provider] furosemide (LASIX) injection 10 mg  10 mg IntraVENous BID    albuterol-ipratropium (DUO-NEB) 2.5 MG-0.5 MG/3 ML  3 mL Nebulization Q4HWA RT    guaiFENesin-dextromethorphan (ROBITUSSIN DM) 100-10 mg/5 mL syrup 10 mL  10 mL Oral Q6H PRN    sodium chloride (NS) flush 5-10 mL  5-10 mL IntraVENous PRN    piperacillin-tazobactam (ZOSYN) 3.375 g in 0.9% sodium chloride (MBP/ADV) 100 mL MBP  3.375 g IntraVENous Q8H    sodium chloride (NS) flush 5-40 mL  5-40 mL IntraVENous Q8H    sodium chloride (NS) flush 5-40 mL  5-40 mL IntraVENous PRN    acetaminophen (TYLENOL) tablet 650 mg  650 mg Oral Q6H PRN    Or    acetaminophen (TYLENOL) suppository 650 mg  650 mg Rectal Q6H PRN    polyethylene glycol (MIRALAX) packet 17 g  17 g Oral DAILY PRN    ondansetron (ZOFRAN ODT) tablet 4 mg  4 mg Oral Q8H PRN    Or    ondansetron (ZOFRAN) injection 4 mg  4 mg IntraVENous Q6H PRN    memantine (NAMENDA) tablet 10 mg  10 mg Oral BID    modafiniL (PROVIGIL) tablet 100 mg  100 mg Oral DAILY    therapeutic multivitamin (THERAGRAN) tablet 1 Tablet  1 Tablet Oral DAILY    atorvastatin (LIPITOR) tablet 10 mg  10 mg Oral DAILY    apixaban (ELIQUIS) tablet 5 mg  5 mg Oral Q12H    sodium chloride (NS) flush 5-10 mL  5-10 mL IntraVENous PRN          LAB AND IMAGING FINDINGS:     Lab Results   Component Value Date/Time    WBC 6.5 11/18/2022 12:38 AM    HGB 12.2 11/18/2022 12:38 AM    PLATELET 599 54/61/9091 12:38 AM     Lab Results   Component Value Date/Time    Sodium 140 11/21/2022 01:43 AM    Potassium 3.0 (L) 11/21/2022 01:43 AM    Chloride 103 11/21/2022 01:43 AM    CO2 32 11/21/2022 01:43 AM    BUN 6 11/21/2022 01:43 AM    Creatinine 0.47 (L) 11/21/2022 01:43 AM    Calcium 8.0 (L) 11/21/2022 01:43 AM    Magnesium 2.0 11/21/2022 01:43 AM    Phosphorus 3.2 11/19/2022 12:59 AM      Lab Results   Component Value Date/Time    Alk.  phosphatase 633 (H) 11/16/2022 12:54 AM    Protein, total 5.9 (L) 11/16/2022 12:54 AM    Albumin 2.0 (L) 11/16/2022 12:54 AM    Globulin 3.9 11/16/2022 12:54 AM     Lab Results   Component Value Date/Time    INR 1.1 11/16/2022 12:54 AM    Prothrombin time 11.9 (H) 11/16/2022 12:54 AM    aPTT 41.1 (H) 11/16/2022 12:54 AM      No results found for: IRON, FE, TIBC, IBCT, PSAT, FERR   No results found for: PH, PCO2, PO2  No components found for: [place]   Lab Results   Component Value Date/Time     08/22/2018 12:38 PM    CK - MB 1.9 01/11/2017 11:07 PM                Total time:   35 minutes  Counseling / coordination time, spent as noted above:   25  minutes  > 50% counseling / coordination?: yes    Prolonged service was provided for  []30 min   []75 min in face to face time in the presence of the patient, spent as noted above. Time Start:   Time End:   Note: this can only be billed with 51374 (initial) or 51691 (follow up). If multiple start / stop times, list each separately.

## 2022-11-22 NOTE — PROGRESS NOTES
Transition of Care  RUR 12% Low  Disposition Return to [facility]   DME Wheelchair  Transportation BLS  Follow Up PCP, Specialist    CM continuing to follow patient for transitional care planning needs. Patient is planned for return to [facility]. CM to monitor.      Alexis Valdes MS

## 2022-11-22 NOTE — PROGRESS NOTES
[address] [facility] Adult  Hospitalist Group                                                                                          Hospitalist Progress Note  Manan Bernabe MD  Answering service: [phone] from in house phone        Date of Service:  2022  NAME:  Sd Carrillo  :  10/14/1929  MRN:  [id number]      Admission Summary: Chang Powell is a 80 y.o. female with past medical history of hypertension, hyperlipidemia, dementia, IBS, osteoarthritis, osteoporosis, pulmonary edema presented as a direct admission/transfer from [facility] to [facility] with chief complaint of shortness of breath. Patient is a limited historian. Majority of history was obtained per review of ED and electronic medical records. Per these reports, patient lives at [facility] facility where staff noted the patient was having symptoms of cough, generalized weakness over the past 2 days. Symptoms noted remain constant, without specific alleviating factors and there was concern for possible sepsis. Patient was initially transferred to [facility] with initial recorded vital signs temperature 98.9 °F, /68, heart rate 111, respiratory 20, O2 saturation 90% on room air. Oxygen saturation decreased to 88%. Patient was placed on 2 L oxygen via nasal cannula. Patient's blood pressure decreased to 81/55. Abnormal labs included blood glucose 239, creatinine 1.13, GFR 45, albumin 2.7, AST 40, alkaline phosphatase 942, lactic acid 3.7. Initial high-sensitivity troponin 23. Chest x-ray portable showed left lower lobe atelectasis. CTA chest was negative for PE however showed large amount of near occlusive debris and dependent airways and bilateral lower lobes with mild dependent atelectasis. Patient was then transferred to [facility] for admission. \"          Interval history / Subjective:   Seen earlier, awake, frail, less edema on exam, ordered cxr and hold afternoon lasix dose      Assessment & Plan:     Lactic acidosis/hypotension, POA--now resolved  Suspecting Aspiration Pneumonia and positive for Influenza A  -blood cx ngtd  -ua looks more like contaminant  -Rapid covid negative  -iv abx while inpt --can change to po abx when ready for discharge (if not already completed course by then)  -pulmonary edema persists but improved with diuresis  -supportive measures  -need outpatient followup with pulmonary service    11/20: Crackles/course breath sounds at bedside, O2 requirements unchanged, ordered cxr to assess, awake, readjusted bed that seemed to have improved along with diuresis    Acute respiratory failure with hypoxia--now better  History of PE on home Eliquis  In setting of suspected Aspiration Pneumonia and positive for Influenza A  -tamiflu  -Supplemental oxygen, wean as tolerated  -Titrate oxygen to keep O2 saturation greater than 94%  -COVID-19 test negative  -lasix as needed  -home eliquis  -Appreciate Pulmonary service  -iv abx while inpt --can change to po abx when ready for discharge  -chest therapy  -scheduled nebs  -need outpatient followup with pulmonary service  -11/22 worsening cxr, ordered pulmonary consult for revisit     Hypokalemia: replace as needed  Uncontrolled type 2 diabetes mellitus with hyperglycemia: on SSI  Elevated creatinine: now resolved  LFT elevation: now resolved  Generalized weakness: PT/OT  Dementia  -Patient's baseline mental status which she is currently disoriented on exam  -head CT without any acute process    Frailty, failure to thrive  -no improvement of this pleasant 79 y/o patient  -ordered Palliative care consult     Code status: DNR  Prophylaxis: Eliquis  PTA: Krystian    Plan: Continue antibiotics and transition to po when ready for discharge, wean down/off supp oxygen as tolerated, would lean to keep supplemental O2 at [facility], 11/21 ordered palliative care consult given continued frailty/failure to thrive  Care Plan discussed with: patient/staff    Anticipated Disposition: [place] when stable----11/22 repeat cxr looks worse and unexpected--->spoke with daughter who wishes pulmonary service to revisit     Hospital Problems  Date Reviewed: 12/21/2018            Codes Class Noted POA    Hypoxia ICD-10-CM: R09.02  ICD-9-CM: 799.02  11/14/2022 Unknown       Review of Systems:   A comprehensive review of systems was negative except for that written in the HPI. Vital Signs:    Last 24hrs VS reviewed since prior progress note. Most recent are:  Visit Vitals  /84 (BP 1 Location: Right upper arm, BP Patient Position: At rest)   Pulse (!) 111   Temp 98.7 °F (37.1 °C)   Resp 26   Ht 5' (1.524 m)   Wt 47.1 kg (103 lb 13.4 oz)   SpO2 92%   BMI 20.28 kg/m²       No intake or output data in the 24 hours ending 11/22/22 1557       Physical Examination:     I had a face to face encounter with this patient and independently examined them on 11/22/2022 as outlined below:          Constitutional:  No acute distress   ENT:  Oral mucosa moist   Resp: No wheezing, improved crackles. No accessory muscle use. CV:  Regular rhythm, normal rate, no rubs    GI:  Soft, non distended, non tender. normoactive bowel sounds    Musculoskeletal:  No edema, warm, 2+ pulses throughout    Neurologic:  Moves all extremities. Awake, responds appropriately to questions asked. Data Review:    Review and/or order of clinical lab test      Labs:     No results for input(s): WBC, HGB, HCT, PLT, HGBEXT, HCTEXT, PLTEXT, HGBEXT, HCTEXT, PLTEXT in the last 72 hours.     Recent Labs     11/21/22  0143      K 3.0*      CO2 32   BUN 6   CREA 0.47*   GLU 90   CA 8.0*   MG 2.0       Medications Reviewed:     Current Facility-Administered Medications   Medication Dose Route Frequency    azithromycin (ZITHROMAX) 500 mg in 0.9% sodium chloride 250 mL ([id number])  500 mg IntraVENous Q24H    oseltamivir (TAMIFLU) capsule 30 mg  30 mg Oral DAILY [Held by provider] furosemide (LASIX) injection 10 mg  10 mg IntraVENous BID    albuterol-ipratropium (DUO-NEB) 2.5 MG-0.5 MG/3 ML  3 mL Nebulization Q4HWA RT    guaiFENesin-dextromethorphan (ROBITUSSIN DM) 100-10 mg/5 mL syrup 10 mL  10 mL Oral Q6H PRN    sodium chloride (NS) flush 5-10 mL  5-10 mL IntraVENous PRN    piperacillin-tazobactam (ZOSYN) 3.375 g in 0.9% sodium chloride (MBP/ADV) 100 mL MBP  3.375 g IntraVENous Q8H    sodium chloride (NS) flush 5-40 mL  5-40 mL IntraVENous Q8H    sodium chloride (NS) flush 5-40 mL  5-40 mL IntraVENous PRN    acetaminophen (TYLENOL) tablet 650 mg  650 mg Oral Q6H PRN    Or    acetaminophen (TYLENOL) suppository 650 mg  650 mg Rectal Q6H PRN    polyethylene glycol (MIRALAX) packet 17 g  17 g Oral DAILY PRN    ondansetron (ZOFRAN ODT) tablet 4 mg  4 mg Oral Q8H PRN    Or    ondansetron (ZOFRAN) injection 4 mg  4 mg IntraVENous Q6H PRN    memantine (NAMENDA) tablet 10 mg  10 mg Oral BID    modafiniL (PROVIGIL) tablet 100 mg  100 mg Oral DAILY    therapeutic multivitamin (THERAGRAN) tablet 1 Tablet  1 Tablet Oral DAILY    atorvastatin (LIPITOR) tablet 10 mg  10 mg Oral DAILY    apixaban (ELIQUIS) tablet 5 mg  5 mg Oral Q12H    sodium chloride (NS) flush 5-10 mL  5-10 mL IntraVENous PRN     ______________________________________________________________________  EXPECTED LENGTH OF STAY: 4d 19h  ACTUAL LENGTH OF STAY:          8                 Markus Alvarez MD

## 2022-11-22 NOTE — PROGRESS NOTES
Pharmacy Note - Oseltamivir    30 mg q 24 h Oseltamivir (Tamiflu) ordered for treatment of Influenza. Per [facility] Renal Policy, Oseltamivir (Tamiflu) will be changed to 30  mg q 12 h. Estimated Creatinine Clearance: Estimated Creatinine Clearance: 36.1 mL/min (A) (by C-G formula based on SCr of 0.47 mg/dL (L)). Dialysis Status, KOLBY, CKD: n/a    BMI:  Body mass index is 20.28 kg/m². No results for input(s): WBC in the last 72 hours. Temp (24hrs), Av.3 °F (36.8 °C), Min:98 °F (36.7 °C), Max:98.7 °F (37.1 °C)      Rationale for Adjustment:  Renal dosing is because drug is renally eliminated. CrCl ~ 36 ml/min. Pharmacy will continue to monitor and adjust dose as necessary. Please call Inpatient Pharmacy with any questions. Thank you,  Issa Mcclelland MS R. Ph 2 = assistive person 0 = independent

## 2022-11-22 NOTE — CONSULTS
[facility]  Pulmonary, Critical Care, and Sleep Medicine    Initial Patient Consult    Name: Vick Powers MRN: [id number]   : 10/14/1929 Hospital: [facility]    Date: 2022        IMPRESSION:   Acute hypoxic resp failure - better and now on 1liters nc with sats 96 %  Suspected aspiration pna and recurrent aspiration   Decreased   respiratory secretion clearance with retained secretions in the basilar airways/ occlusive and decreased cough effort noted   Advanced age  Hypotensive on admit - resolved with ivf   Lll atx - progression of infiltrates in the interim since last seen  with pulm edema and some effusions-- waxing and waning and  per notes responded  to diuretic.no eosinophilia. No meds on file that would be expected to cause infiltrates. Hx of dementia   Hx of uti   Per chart hx of pte  and chronic eliquis   Dm  Hx of ibs   Debility - very frail   Malnutrition with low albumin  - could be expected to have have tendency for 3rd spacing. Sinus tachy         RECOMMENDATIONS:   Agree with ab - currently on zosyn and  zithromax-- - day 8 of  zosyn and zithro just added   Speech eval / diet adjust as per there recs- did not see speech eval in chart _ ? Seen - just recs as with her advanced age and overall condition I would consider peg too aggressive . Decreased frequency  of the nebs - due to tachycardia   Watch volume status - per chart hx of pulm edema/ last echo nl lvef- will repeat echo  given the appearance on cxr   Dementia- does not engage in conversation nor follow commands for me   Continue eliquis   Nebs to help with secretion clearance= as above    Echo ordered    Bnp ordered    She was covid neg   Cxr ordered for am post diuretic   Started a low dose oral lasix and rec trend cxr imaging      Subjective: This patient has been seen and evaluated at the request of Dr. Javier Dawson for aspiration pna .  Patient is a 80 y.o. female who was transferred from a snf to [facility] with 2 days of increased chest congestion . She arrived hypotensive and hypoxic . She was place on o2 and her bp responded to ivf . Pt reportedly has hx of dementia and upon my arrival acknowledged me , said hi and did ask if \" I am in the right place. \"  She was not able to provide any other hx . Cough was present  but very congested and weak. She was in no resp  distress. -- 11/22  reconsulted by Dr Jose Parra to see this lady again for abnl chest xray with what is described as waxing and waning  infitlrates - worse left perihilar area and bilateral and at  times suggesiton of pleural effusions. Pt actually look much better that when I first saw her but she remains frial and with  shallow resp . Congestion has sig improved. She could not turn nor sit up for post exam but much clearer on ant/lat . Now on 1 liter nc with sats 96 %. Past Medical History:   Diagnosis Date    Dementia ([facility] [address]     Hypercholesteremia 5/27/2010    Hypertension     IBS (irritable bowel syndrome) 5/27/2010    OA (osteoarthritis) 5/27/2010    Osteoporosis 5/27/2010    Pulmonary embolism (H[facility])     Hx of  pulm edema  and nl echo   History reviewed. No pertinent surgical history. Prior to Admission medications    Medication Sig Start Date End Date Taking? Authorizing Provider   guaiFENesin (ROBITUSSIN) 100 mg/5 mL liquid Take 200 mg by mouth every four (4) hours as needed for Cough. Yes Other, MD Kendall   atorvastatin (Lipitor) 10 mg tablet Take 10 mg by mouth daily. Yes Other, MD Kendall   modafiniL (ProvigiL) 100 mg tablet Take 100 mg by mouth every morning. Yes Other, MD Kendall   loratadine (Claritin) 10 mg tablet Take 10 mg by mouth daily. Yes Other, MD Kendall   coal tar (NEUTROGENA T-GEL) 0.5 % shampoo Apply 1 Bottle to affected area nightly as needed (two times per week for dandruff). Yes Rusty, MD Kendall   cholecalciferol (Vitamin D3) (5000 Units/125 mcg) tab tablet Take 5,000 Units by mouth daily.    Yes Other, MD Kendall   apixaban (ELIQUIS) 5 mg tablet Take 5 mg by mouth two (2) times a day. Indications: a clot in the lung   Yes Kendall Ojeda MD   acetaminophen (TYLENOL) 500 mg tablet Take 500 mg by mouth two (2) times daily as needed for Pain. Yes Provider, Historical   acetaminophen (TYLENOL) 325 mg tablet Take 650 mg by mouth two (2) times a day. Yes Provider, Historical   calcium-vitamin D (OS-LUCÍA +D3) 500 mg-200 unit per tablet Take 1 Tab by mouth daily (with breakfast). Yes Kendall Ojeda MD   multivitamin (ONE A DAY) tablet Take 1 Tab by mouth daily. Yes Kendall Ojeda MD   losartan (COZAAR) 50 mg tablet Take 50 mg by mouth daily. Yes Kendall Ojeda MD   memantine (NAMENDA) 10 mg tablet Take 10 mg by mouth two (2) times a day. Yes Rusty, MD Kendall     Allergies   Allergen Reactions    Aricept [Donepezil] Other (comments)     GI upset    Flexeril [Cyclobenzaprine] Other (comments)     delirium      Social History     Tobacco Use    Smoking status: Never    Smokeless tobacco: Never   Substance Use Topics    Alcohol use: Not Currently      History reviewed. No pertinent family history.    Came from [facility]     Current Facility-Administered Medications   Medication Dose Route Frequency    azithromycin (ZITHROMAX) 500 mg in 0.9% sodium chloride 250 mL ([id number])  500 mg IntraVENous Q24H    oseltamivir (TAMIFLU) capsule 30 mg  30 mg Oral Q12H    [Held by provider] furosemide (LASIX) injection 10 mg  10 mg IntraVENous BID    albuterol-ipratropium (DUO-NEB) 2.5 MG-0.5 MG/3 ML  3 mL Nebulization Q4HWA RT    piperacillin-tazobactam (ZOSYN) 3.375 g in 0.9% sodium chloride (MBP/ADV) 100 mL MBP  3.375 g IntraVENous Q8H    sodium chloride (NS) flush 5-40 mL  5-40 mL IntraVENous Q8H    memantine (NAMENDA) tablet 10 mg  10 mg Oral BID    modafiniL (PROVIGIL) tablet 100 mg  100 mg Oral DAILY    therapeutic multivitamin (THERAGRAN) tablet 1 Tablet  1 Tablet Oral DAILY    atorvastatin (LIPITOR) tablet 10 mg  10 mg Oral DAILY apixaban (ELIQUIS) tablet 5 mg  5 mg Oral Q12H       Review of Systems:  Review of systems not obtained due to patient factors. Objective:   Vital Signs:    Visit Vitals  /84 (BP 1 Location: Right upper arm, BP Patient Position: At rest)   Pulse (!) 111   Temp 98.7 °F (37.1 °C)   Resp 26   Ht 5' (1.524 m)   Wt 47.1 kg (103 lb 13.4 oz)   SpO2 95%   BMI 20.28 kg/m²       O2 Device: Nasal cannula   O2 Flow Rate (L/min): 1 l/min   Temp (24hrs), Av.3 °F (36.8 °C), Min:98 °F (36.7 °C), Max:98.7 °F (37.1 °C)       Intake/Output:   Last shift:      No intake/output data recorded. Last 3 shifts:  1901 -  0700  In: -   Out: 1   No intake or output data in the 24 hours ending 22 1727     Physical Exam:   General:  Alert, elderly lady . NO resp distress , very thin   Head:   Temporal muscle wasting    Eyes:  Conjunctivae/corneas clear. PERRL, EOMs intact. Nose: Nares normal.   Throat: Did not open mouth    Neck: Supple, symmetrical   Back:   Did not turn nor sit up for me    Lungs:   Clear ant/ few  rales lat - limited exam ,. No distress , shallow but unlabored respirations   Chest wall:  No tenderness or deformity. Heart:  Regular rate and rhythm. Sinus tachy    Abdomen:   Soft, non-tender    Extremities: Extremities normal, atraumatic, no cyanosis or edema. , msk wasting    Pulses: Not examined    Skin: Skin color, texture, turgor normal. No rashes or lesions   Lymph nodes: Cervical, supraclavicular, nodes normal.   Neurologic: Looks at me . Does not speak , tremors of hands      Data review:     No results found for this or any previous visit (from the past 24 hour(s)). Imaging:  I have personally reviewed the patients radiographs and have reviewed the reports:  Xrays viewed   since last seen.          Gallo Cedeno MD

## 2022-11-22 NOTE — HOSPICE
[address] [facility] Those in Need  [phone]    Patient Name: Augusto Jeffrey  YOB: 1929  Age: 80 y.o. [facility] RN Note:  Hospice consult noted. Chart reviewed. Plan of care discussed with patients nurse & care manager. Phone info session with patient's daughter, Alisa Brown. Discussed Hospice philosophy, general plan of care, levels of care, services and on call procedures. Alisa Brown is open to hospice but at this time would like her mother to return to [facility] for some PT before converting back to LTC with hospice. She is leaving the hospital shortly and said she would be open to meeting with our liaison in person if someone wants to reach out to her tomorrow. [facility] does have a contract with [facility] if she desires hospice admission after rehab. Thank you for the opportunity to be of service to this patient.      Angela Foote RN  Clinical Nurse Liaison   [facility]  (N)[phone] (W) [phone]

## 2022-11-23 ENCOUNTER — APPOINTMENT (OUTPATIENT)
Dept: NON INVASIVE DIAGNOSTICS | Age: 87
DRG: 871 | End: 2022-11-23
Attending: INTERNAL MEDICINE
Payer: MEDICARE

## 2022-11-23 LAB
ANION GAP SERPL CALC-SCNC: 8 MMOL/L (ref 5–15)
BASOPHILS # BLD: 0 K/UL (ref 0–0.1)
BASOPHILS NFR BLD: 0 % (ref 0–1)
BNP SERPL-MCNC: 702 PG/ML
BUN SERPL-MCNC: 9 MG/DL (ref 6–20)
BUN/CREAT SERPL: 17 (ref 12–20)
CALCIUM SERPL-MCNC: 8.7 MG/DL (ref 8.5–10.1)
CHLORIDE SERPL-SCNC: 105 MMOL/L (ref 97–108)
CO2 SERPL-SCNC: 29 MMOL/L (ref 21–32)
CREAT SERPL-MCNC: 0.52 MG/DL (ref 0.55–1.02)
DIFFERENTIAL METHOD BLD: ABNORMAL
ECHO AO ROOT DIAM: 3.5 CM
ECHO AO ROOT INDEX: 2.48 CM/M2
ECHO AR MAX VEL PISA: 3.9 M/S
ECHO AV PEAK GRADIENT: 10 MMHG
ECHO AV PEAK VELOCITY: 1.6 M/S
ECHO AV REGURGITANT PHT: 1694.1 MILLISECOND
ECHO AV VELOCITY RATIO: 0.81
ECHO LA DIAMETER INDEX: 1.49 CM/M2
ECHO LA DIAMETER: 2.1 CM
ECHO LA TO AORTIC ROOT RATIO: 0.6
ECHO LV E' LATERAL VELOCITY: 8 CM/S
ECHO LV E' SEPTAL VELOCITY: 5 CM/S
ECHO LV FRACTIONAL SHORTENING: 30 % (ref 28–44)
ECHO LV INTERNAL DIMENSION DIASTOLE INDEX: 2.34 CM/M2
ECHO LV INTERNAL DIMENSION DIASTOLIC: 3.3 CM (ref 3.9–5.3)
ECHO LV INTERNAL DIMENSION SYSTOLIC INDEX: 1.63 CM/M2
ECHO LV INTERNAL DIMENSION SYSTOLIC: 2.3 CM
ECHO LV IVSD: 0.9 CM (ref 0.6–0.9)
ECHO LV MASS 2D: 81.1 G (ref 67–162)
ECHO LV MASS INDEX 2D: 57.5 G/M2 (ref 43–95)
ECHO LV POSTERIOR WALL DIASTOLIC: 0.9 CM (ref 0.6–0.9)
ECHO LV RELATIVE WALL THICKNESS RATIO: 0.55
ECHO LVOT PEAK GRADIENT: 7 MMHG
ECHO LVOT PEAK VELOCITY: 1.3 M/S
ECHO MV A VELOCITY: 1.15 M/S
ECHO MV AREA PHT: 2.4 CM2
ECHO MV E DECELERATION TIME (DT): 311.9 MS
ECHO MV E VELOCITY: 0.79 M/S
ECHO MV E/A RATIO: 0.69
ECHO MV E/E' LATERAL: 9.88
ECHO MV E/E' RATIO (AVERAGED): 12.84
ECHO MV E/E' SEPTAL: 15.8
ECHO MV PRESSURE HALF TIME (PHT): 90.5 MS
ECHO PULMONARY ARTERY END DIASTOLIC PRESSURE: 12 MMHG
ECHO PV MAX VELOCITY: 0.9 M/S
ECHO PV PEAK GRADIENT: 3 MMHG
ECHO RV TAPSE: 1.5 CM (ref 1.7–?)
ECHO TV REGURGITANT MAX VELOCITY: 2.26 M/S
ECHO TV REGURGITANT PEAK GRADIENT: 20 MMHG
EOSINOPHIL # BLD: 0.4 K/UL (ref 0–0.4)
EOSINOPHIL NFR BLD: 4 % (ref 0–7)
ERYTHROCYTE [DISTWIDTH] IN BLOOD BY AUTOMATED COUNT: 13.1 % (ref 11.5–14.5)
GLUCOSE SERPL-MCNC: 86 MG/DL (ref 65–100)
HCT VFR BLD AUTO: 34.3 % (ref 35–47)
HGB BLD-MCNC: 11 G/DL (ref 11.5–16)
IMM GRANULOCYTES # BLD AUTO: 0 K/UL (ref 0–0.04)
IMM GRANULOCYTES NFR BLD AUTO: 0 % (ref 0–0.5)
LYMPHOCYTES # BLD: 0.9 K/UL (ref 0.8–3.5)
LYMPHOCYTES NFR BLD: 8 % (ref 12–49)
MAGNESIUM SERPL-MCNC: 1.9 MG/DL (ref 1.6–2.4)
MCH RBC QN AUTO: 29.5 PG (ref 26–34)
MCHC RBC AUTO-ENTMCNC: 32.1 G/DL (ref 30–36.5)
MCV RBC AUTO: 92 FL (ref 80–99)
MONOCYTES # BLD: 0.8 K/UL (ref 0–1)
MONOCYTES NFR BLD: 7 % (ref 5–13)
NEUTS SEG # BLD: 8.4 K/UL (ref 1.8–8)
NEUTS SEG NFR BLD: 81 % (ref 32–75)
NRBC # BLD: 0 K/UL (ref 0–0.01)
NRBC BLD-RTO: 0 PER 100 WBC
PLATELET # BLD AUTO: 376 K/UL (ref 150–400)
PMV BLD AUTO: 10.5 FL (ref 8.9–12.9)
POTASSIUM SERPL-SCNC: 3.3 MMOL/L (ref 3.5–5.1)
RBC # BLD AUTO: 3.73 M/UL (ref 3.8–5.2)
SODIUM SERPL-SCNC: 142 MMOL/L (ref 136–145)
WBC # BLD AUTO: 10.5 K/UL (ref 3.6–11)

## 2022-11-23 PROCEDURE — 74011250636 HC RX REV CODE- 250/636: Performed by: FAMILY MEDICINE

## 2022-11-23 PROCEDURE — 74011000250 HC RX REV CODE- 250: Performed by: FAMILY MEDICINE

## 2022-11-23 PROCEDURE — 74011250636 HC RX REV CODE- 250/636: Performed by: INTERNAL MEDICINE

## 2022-11-23 PROCEDURE — 97530 THERAPEUTIC ACTIVITIES: CPT | Performed by: PHYSICAL THERAPIST

## 2022-11-23 PROCEDURE — 74011250637 HC RX REV CODE- 250/637: Performed by: HOSPITALIST

## 2022-11-23 PROCEDURE — 74011000250 HC RX REV CODE- 250: Performed by: INTERNAL MEDICINE

## 2022-11-23 PROCEDURE — 93306 TTE W/DOPPLER COMPLETE: CPT | Performed by: SPECIALIST

## 2022-11-23 PROCEDURE — 74011250637 HC RX REV CODE- 250/637: Performed by: INTERNAL MEDICINE

## 2022-11-23 PROCEDURE — 83735 ASSAY OF MAGNESIUM: CPT

## 2022-11-23 PROCEDURE — 83880 ASSAY OF NATRIURETIC PEPTIDE: CPT

## 2022-11-23 PROCEDURE — 93306 TTE W/DOPPLER COMPLETE: CPT

## 2022-11-23 PROCEDURE — 74011000258 HC RX REV CODE- 258: Performed by: FAMILY MEDICINE

## 2022-11-23 PROCEDURE — 65270000046 HC RM TELEMETRY

## 2022-11-23 PROCEDURE — 80048 BASIC METABOLIC PNL TOTAL CA: CPT

## 2022-11-23 PROCEDURE — 85025 COMPLETE CBC W/AUTO DIFF WBC: CPT

## 2022-11-23 PROCEDURE — 92610 EVALUATE SWALLOWING FUNCTION: CPT

## 2022-11-23 PROCEDURE — 97530 THERAPEUTIC ACTIVITIES: CPT

## 2022-11-23 PROCEDURE — 36415 COLL VENOUS BLD VENIPUNCTURE: CPT

## 2022-11-23 PROCEDURE — 77010033678 HC OXYGEN DAILY

## 2022-11-23 PROCEDURE — 94640 AIRWAY INHALATION TREATMENT: CPT

## 2022-11-23 RX ORDER — IPRATROPIUM BROMIDE AND ALBUTEROL SULFATE 2.5; .5 MG/3ML; MG/3ML
3 SOLUTION RESPIRATORY (INHALATION)
Status: DISCONTINUED | OUTPATIENT
Start: 2022-11-23 | End: 2022-11-25 | Stop reason: HOSPADM

## 2022-11-23 RX ORDER — POTASSIUM CHLORIDE 7.45 MG/ML
10 INJECTION INTRAVENOUS
Status: COMPLETED | OUTPATIENT
Start: 2022-11-23 | End: 2022-11-23

## 2022-11-23 RX ADMIN — FUROSEMIDE 20 MG: 20 TABLET ORAL at 09:00

## 2022-11-23 RX ADMIN — IPRATROPIUM BROMIDE AND ALBUTEROL SULFATE 3 ML: .5; 3 SOLUTION RESPIRATORY (INHALATION) at 08:16

## 2022-11-23 RX ADMIN — SODIUM CHLORIDE, PRESERVATIVE FREE 10 ML: 5 INJECTION INTRAVENOUS at 14:00

## 2022-11-23 RX ADMIN — MODAFINIL 100 MG: 100 TABLET ORAL at 08:41

## 2022-11-23 RX ADMIN — PIPERACILLIN AND TAZOBACTAM 3.38 G: 3; .375 INJECTION, POWDER, FOR SOLUTION INTRAVENOUS at 13:00

## 2022-11-23 RX ADMIN — APIXABAN 5 MG: 5 TABLET, FILM COATED ORAL at 21:55

## 2022-11-23 RX ADMIN — MEMANTINE 10 MG: 10 TABLET ORAL at 08:42

## 2022-11-23 RX ADMIN — IPRATROPIUM BROMIDE AND ALBUTEROL SULFATE 3 ML: .5; 3 SOLUTION RESPIRATORY (INHALATION) at 16:54

## 2022-11-23 RX ADMIN — OSELTAMIVIR PHOSPHATE 30 MG: 30 CAPSULE ORAL at 21:55

## 2022-11-23 RX ADMIN — PIPERACILLIN AND TAZOBACTAM 3.38 G: 3; .375 INJECTION, POWDER, FOR SOLUTION INTRAVENOUS at 18:00

## 2022-11-23 RX ADMIN — SODIUM CHLORIDE, PRESERVATIVE FREE 10 ML: 5 INJECTION INTRAVENOUS at 04:28

## 2022-11-23 RX ADMIN — THERA TABS 1 TABLET: TAB at 08:41

## 2022-11-23 RX ADMIN — APIXABAN 5 MG: 5 TABLET, FILM COATED ORAL at 08:41

## 2022-11-23 RX ADMIN — MEMANTINE 10 MG: 10 TABLET ORAL at 16:47

## 2022-11-23 RX ADMIN — AZITHROMYCIN DIHYDRATE 500 MG: 500 INJECTION, POWDER, LYOPHILIZED, FOR SOLUTION INTRAVENOUS at 16:47

## 2022-11-23 RX ADMIN — SODIUM CHLORIDE, PRESERVATIVE FREE 10 ML: 5 INJECTION INTRAVENOUS at 21:59

## 2022-11-23 RX ADMIN — POTASSIUM CHLORIDE 10 MEQ: 7.46 INJECTION, SOLUTION INTRAVENOUS at 11:22

## 2022-11-23 RX ADMIN — OSELTAMIVIR PHOSPHATE 30 MG: 30 CAPSULE ORAL at 08:42

## 2022-11-23 RX ADMIN — POTASSIUM CHLORIDE 10 MEQ: 7.46 INJECTION, SOLUTION INTRAVENOUS at 08:41

## 2022-11-23 RX ADMIN — PIPERACILLIN AND TAZOBACTAM 3.38 G: 3; .375 INJECTION, POWDER, FOR SOLUTION INTRAVENOUS at 04:27

## 2022-11-23 RX ADMIN — ATORVASTATIN CALCIUM 10 MG: 10 TABLET, FILM COATED ORAL at 08:42

## 2022-11-23 NOTE — PROGRESS NOTES
Problem: Self Care Deficits Care Plan (Adult)  Goal: *Acute Goals and Plan of Care (Insert Text)  Description: FUNCTIONAL STATUS PRIOR TO ADMISSION: Pt is a poor historian d/t baseline dementia. Per limited information obtained from chart review pt utilizes wheelchair for functional mobility. HOME SUPPORT: Patient admitted from [address] and per chart review, requires assistance with all ADL tasks (assist level unclear at time of evaluation). Occupational Therapy Goals  11/23/2022; patient not making progress toward goals and per chart review, at functional baseline dependence. DC all goals    Initiated 11/15/2022  1. Patient will perform basic seated grooming routine with supervision/set-up within 7 day(s). 2.  Patient will perform seated anterior neck to thigh bathing with moderate assistance within 7 day(s). 3.  Patient will perform lateral rolling in preparation for completion of toileting routine with moderate assistance  within 7 day(s). 4.  Patient will participate in upper extremity therapeutic exercise/activities with supervision and minimal multimodal cues for 5 minutes within 7 day(s). Outcome: Resolved/Not Met   OCCUPATIONAL THERAPY TREATMENT/DISCHARGE  Patient: Bhumika Matos (46 y.o. female)  Date: 11/23/2022  Diagnosis: Hypoxia [R09.02] <principal problem not specified>      Precautions:    Chart, occupational therapy assessment, plan of care, and goals were reviewed. ASSESSMENT  Patient continues with skilled OT services and has not progressed towards goals. Patient received semi supine in bed, agreeable to working with therapy. Attempting orientation questions but patient not answering at this time. Patient assisted to transfer to sitting at edge of bed and continues to require total assist for transfers. Noted extensor tone in bed and when coming to sitting requiring total A to maintain balance at edge of bed.  Chart reviewed and patient admitted from LTC facility, requires assist for all ADLs and wheelchair bound at baseline. Patient is likely at functional baseline dependence at this time and has not made progress towards initial goals. Will discharge from acute OT services at this time, please re-consult if functional status changes. Noted in chart, possible discharge with hospice pending family decisions. Current Level of Function (ADLs/self-care): total A for lower extremity dressing and mobility/transfers    Other factors to consider for discharge: prior level of function requires assist, wheelchair bound, family involved in care         PLAN :  Rationale for discharge: Patient not participating in therapy  Recommend with staff: bed in modified chair position for meals, assist with all PO intake when alert, assist for all ADLs, bedpan for toileting  Recommendation for discharge: (in order for the patient to meet his/her long term goals)  Back to LTC facility with prior level of care    This discharge recommendation:  Has been made in collaboration with the attending provider and/or case management    IF patient discharges home will need the following DME:would require total care, demetrius lift, wheelchair       SUBJECTIVE:   Patient stated Sure.  when asked to transfer to sitting edge of bed     OBJECTIVE DATA SUMMARY:   Cognitive/Behavioral Status:  Neurologic State: Confused;Drowsy  Orientation Level: Disoriented X4  Cognition: Decreased attention/concentration;Decreased command following             Functional Mobility and Transfers for ADLs:  Bed Mobility:  Rolling: Total assistance;Assist x2  Supine to Sit: Total assistance;Assist x2  Sit to Supine: Total assistance;Assist x2  Scooting: Total assistance;Assist x2    Transfers:             Balance:  Sitting: Impaired  Sitting - Static: Poor (constant support)  Sitting - Dynamic: Poor (constant support)    ADL Intervention:                                Lower Body Dressing Assistance  Socks:  Total assistance (dependent)  Leg Crossed Method Used: No  Position Performed: Supine              Therapeutic Exercises:   Attempted for patient participation in upper extremity therapeutic exercise, noted grossly decreased range with tremors and limited command following    Pain:  Some pain behaviors during mobility, pt unable to rate    Activity Tolerance:   Poor    After treatment patient left in no apparent distress:   Supine in bed, Heels elevated for pressure relief, Call bell within reach, Bed / chair alarm activated, and Side rails x 3    COMMUNICATION/COLLABORATION:   The patients plan of care was discussed with: Physical therapist.     ALLEN Dean/L  Time Calculation: 9 mins

## 2022-11-23 NOTE — PROGRESS NOTES
[address] [facility] Adult  Hospitalist Group                                                                                          Hospitalist Progress Note  Adelina Mijares MD  Answering service: [phone] from in house phone        Date of Service:  2022  NAME:  Nan Aviles  :  10/14/1929  MRN:  [id number]      Admission Summary: Sharen Goltz is a 80 y.o. female with past medical history of hypertension, hyperlipidemia, dementia, IBS, osteoarthritis, osteoporosis, pulmonary edema presented as a direct admission/transfer [facility] to [facility] with chief complaint of shortness of breath. Patient is a limited historian. Majority of history was obtained per review of ED and electronic medical records. Per these reports, patient lives at [facility] facility where staff noted the patient was having symptoms of cough, generalized weakness over the past 2 days. Symptoms noted remain constant, without specific alleviating factors and there was concern for possible sepsis. Patient was initially transferred to [facility] with initial recorded vital signs temperature 98.9 °F, /68, heart rate 111, respiratory 20, O2 saturation 90% on room air. Oxygen saturation decreased to 88%. Patient was placed on 2 L oxygen via nasal cannula. Patient's blood pressure decreased to 81/55. Abnormal labs included blood glucose 239, creatinine 1.13, GFR 45, albumin 2.7, AST 40, alkaline phosphatase 942, lactic acid 3.7. Initial high-sensitivity troponin 23. Chest x-ray portable showed left lower lobe atelectasis. CTA chest was negative for PE however showed large amount of near occlusive debris and dependent airways and bilateral lower lobes with mild dependent atelectasis. Patient was then transferred to [facility] for admission. \"          Interval history / Subjective:   Seen earlier and spoke with daughter later in the day to recap and discuss current state.  Still frail, slowly downward trajection     Assessment & Plan:     Lactic acidosis/hypotension, POA--now resolved  Suspecting Aspiration Pneumonia and positive for Influenza A  -blood cx ngtd  -ua looks more like contaminant  -Rapid covid negative  -iv abx while inpt --can change to po abx when ready for discharge (if not already completed course by then)  -pulmonary edema persists but improved with diuresis  -supportive measures  -need outpatient followup with pulmonary service    11/20: Crackles/course breath sounds at bedside, O2 requirements unchanged, ordered cxr to assess, awake, readjusted bed that seemed to have improved along with diuresis    Acute respiratory failure with hypoxia--now better  History of PE on home Eliquis  In setting of suspected Aspiration Pneumonia and positive for Influenza A  -tamiflu  -Supplemental oxygen, wean as tolerated  -Titrate oxygen to keep O2 saturation greater than 94%  -COVID-19 test negative  -lasix as needed  -home eliquis  -Appreciate Pulmonary service  -iv abx while inpt --can change to po abx when ready for discharge  -chest therapy  -scheduled nebs  -need outpatient followup with pulmonary service  -11/22 worsening cxr, ordered pulmonary consult for revisit     Hypokalemia: replace as needed  Uncontrolled type 2 diabetes mellitus with hyperglycemia: on SSI  Elevated creatinine: now resolved  LFT elevation: now resolved  Generalized weakness: PT/OT  Dementia  -Patient's baseline mental status which she is currently disoriented on exam  -head CT without any acute process    Frailty, failure to thrive  -no improvement of this pleasant 81 y/o patient  -ordered Palliative care consult     Code status: DNR  Prophylaxis: Eliquis  PTA: Krystian    Plan: Continue antibiotics and transition to po when ready for discharge, wean down/off supp oxygen as tolerated, would lean to keep supplemental O2 at [facility], 11/21 ordered palliative care consult given continued frailty/failure to thrive  Care Plan discussed with: patient/staff    Anticipated Disposition: Krystian when stable----11/22 repeat cxr looks worse and unexpected--->spoke with daughter who wishes pulmonary service to revisit--->>>11/23 spoke with daughter to review hospice/palliative care information and in the end, the daughter does Not want hospice at this time but rather have pt return back for therapy and pending how she does whether if she needs hospice then-->>CM to assist in logistics to return patient back to [facility] this week. Extended time spent      Hospital Problems  Date Reviewed: 12/21/2018            Codes Class Noted POA    Hypoxia ICD-10-CM: R09.02  ICD-9-CM: 799.02  11/14/2022 Unknown       Review of Systems:   A comprehensive review of systems was negative except for that written in the HPI. Vital Signs:    Last 24hrs VS reviewed since prior progress note. Most recent are:  Visit Vitals  BP (!) 141/90 (BP 1 Location: Right upper arm, BP Patient Position: At rest)   Pulse (!) 101   Temp 97.9 °F (36.6 °C)   Resp 22   Ht 5' (1.524 m)   Wt 47 kg (103 lb 9.9 oz)   SpO2 93%   BMI 20.24 kg/m²       No intake or output data in the 24 hours ending 11/23/22 1721       Physical Examination:     I had a face to face encounter with this patient and independently examined them on 11/23/2022 as outlined below:          Constitutional:  No acute distress   ENT:  Oral mucosa moist   Resp: No wheezing, improved crackles. No accessory muscle use. CV:  Regular rhythm, normal rate, no rubs    GI:  Soft, non distended, non tender. normoactive bowel sounds    Musculoskeletal:  No edema, warm, 2+ pulses throughout    Neurologic:  Moves all extremities. Awake, responds appropriately to questions asked.             Data Review:    Review and/or order of clinical lab test      Labs:     Recent Labs     11/23/22 0458   WBC 10.5   HGB 11.0*   HCT 34.3*          Recent Labs     11/23/22 0454 11/21/22  0143    140   K 3.3* 3.0*    103   CO2 29 32   BUN 9 6   CREA 0.52* 0.47*   GLU 86 90   CA 8.7 8.0*   MG 1.9 2.0         Medications Reviewed:     Current Facility-Administered Medications   Medication Dose Route Frequency    albuterol-ipratropium (DUO-NEB) 2.5 MG-0.5 MG/3 ML  3 mL Nebulization Q8H RT    azithromycin (ZITHROMAX) 500 mg in 0.9% sodium chloride 250 mL ([id number])  500 mg IntraVENous Q24H    oseltamivir (TAMIFLU) capsule 30 mg  30 mg Oral Q12H    furosemide (LASIX) tablet 20 mg  20 mg Oral DAILY    [Held by provider] furosemide (LASIX) injection 10 mg  10 mg IntraVENous BID    guaiFENesin-dextromethorphan (ROBITUSSIN DM) 100-10 mg/5 mL syrup 10 mL  10 mL Oral Q6H PRN    sodium chloride (NS) flush 5-10 mL  5-10 mL IntraVENous PRN    piperacillin-tazobactam (ZOSYN) 3.375 g in 0.9% sodium chloride (MBP/ADV) 100 mL MBP  3.375 g IntraVENous Q8H    sodium chloride (NS) flush 5-40 mL  5-40 mL IntraVENous Q8H    sodium chloride (NS) flush 5-40 mL  5-40 mL IntraVENous PRN    acetaminophen (TYLENOL) tablet 650 mg  650 mg Oral Q6H PRN    Or    acetaminophen (TYLENOL) suppository 650 mg  650 mg Rectal Q6H PRN    polyethylene glycol (MIRALAX) packet 17 g  17 g Oral DAILY PRN    ondansetron (ZOFRAN ODT) tablet 4 mg  4 mg Oral Q8H PRN    Or    ondansetron (ZOFRAN) injection 4 mg  4 mg IntraVENous Q6H PRN    memantine (NAMENDA) tablet 10 mg  10 mg Oral BID    modafiniL (PROVIGIL) tablet 100 mg  100 mg Oral DAILY    therapeutic multivitamin (THERAGRAN) tablet 1 Tablet  1 Tablet Oral DAILY    atorvastatin (LIPITOR) tablet 10 mg  10 mg Oral DAILY    apixaban (ELIQUIS) tablet 5 mg  5 mg Oral Q12H    sodium chloride (NS) flush 5-10 mL  5-10 mL IntraVENous PRN     ______________________________________________________________________  EXPECTED LENGTH OF STAY: 4d 19h  ACTUAL LENGTH OF STAY:          9                 Katy Ivey MD

## 2022-11-23 NOTE — PROGRESS NOTES
Problem: Mobility Impaired (Adult and Pediatric)  Goal: *Acute Goals and Plan of Care (Insert Text)  Description: FUNCTIONAL STATUS PRIOR TO ADMISSION: The patient utilized wheelchair and required maximum assistance for transfers to the chair. Per pt's daughter she participated with transfers using her UE to transfer to wheelchair and occasionally assisted with standing. HOME SUPPORT PRIOR TO ADMISSION: Patient admitted from [facility] and received PT services recently. She has a supportive daughter. Physical Therapy Goals  Initiated 11/16/2022  1. Patient will move from supine to sit and sit to supine  and roll side to side in bed with maximal assistance within 7 day(s). 2.  Patient will transfer from bed to chair and chair to bed with maximal assistance using the least restrictive device within 7 day(s). 3.  Patient will perform sit to stand with maximal assistance within 7 day(s). Outcome: Not Progressing Towards Goal   PHYSICAL THERAPY TREATMENT/DISCHARGE  Patient: Jason Alexander (79 y.o. female)  Date: 11/23/2022  Diagnosis: Hypoxia [R09.02] <principal problem not specified>      Precautions:    Chart, physical therapy assessment, plan of care and goals were reviewed. ASSESSMENT  Patient continues with skilled PT services and has not progressed towards goals. Patient has made no progress toward goals. She is supine in bed and sleeping. Noted patient is extremely rigid in the bed and legs are fully extended and feet are in extreme plantar flexion. Unable to range LEs and they feel very spastic/rigid/contracted. Patient needing totalA x 2 to come to EOB and totalA to maintain sitting. Patient is in near full body extension in sitting and unable to hold herself up or relax the extension. She has made no progress toward goals and per chart was maxA at baseline. Skilled PT is not indicated at this time. PT to signoff.   Suggest return to LTC and suggest PT/OT for positioning as tolerated in that setting. Please reconsult if patient status changes. Other factors to consider for discharge: none         PLAN :  Patient will be discharged from acute skilled physical therapy at this time. Rationale for discharge:  Chandan Calzada reached    Recommendation for discharge: (in order for the patient to meet his/her long term goals)  No skilled physical therapy/ follow up rehabilitation needs identified at this time. Return to LTC setting      IF patient discharges home will need the following DME: to be determined (TBD)       SUBJECTIVE:   Patient stated I can try.     OBJECTIVE DATA SUMMARY:   Critical Behavior:  Neurologic State: Alert, Confused  Orientation Level: Other (Comment) (did not verbalize)  Cognition: Decreased command following, Decreased attention/concentration  Safety/Judgement: Decreased awareness of environment, Decreased awareness of need for assistance, Decreased awareness of need for safety, Decreased insight into deficits  Functional Mobility Training:  Bed Mobility:  Rolling: Total assistance;Assist x2  Supine to Sit: Total assistance;Assist x2  Sit to Supine: Total assistance;Assist x2  Scooting: Total assistance;Assist x2        Transfers:   Not tested                                Balance:  Sitting: Impaired  Sitting - Static: Poor (constant support)  Sitting - Dynamic: Poor (constant support)  Ambulation/Gait Training:       Pain Rating:  No c/o pain    Activity Tolerance:   Fair and requires rest breaks    After treatment patient left in no apparent distress:   Supine in bed, Call bell within reach, Bed / chair alarm activated, and Side rails x 3    COMMUNICATION/COLLABORATION:   The patients plan of care was discussed with: Physical therapist, Occupational therapist, and Registered nurse.      Angelina Pretty, PT, DPT   Time Calculation: 9 mins

## 2022-11-23 NOTE — HOSPICE
[facility]  [phone]    Hospice Liaison Nursing Note   Patient Name: Jimmie Maldonado  YOB: 1929  Age: 80 y.o. Chart reviewed for updates in plan of care. Bedside patient. No family bedside. Pt appears to be sleeping, she is on oxygen NC. Noticed jerking movements that seem to startle her. Bedside nurse reports that pt is not communicating, or moving on her own; pt was able to tolerate oral medications crushed with applesauce, but is not eating or drinking. Pt has not received medications for uncontrolled symptoms, and per bedside team, has not appeared to be in distress. Plan: Call patient's daughter, Javed Coy to continue with Hospice support. Please call Hospice team to offer support for patient, family or staff. Thank you for your coordination with the hospice plan of care. 13:00: Message received from Palliative care team; Pt/family want to hold off on Hospice and are requesting rehab at discharge. Plan to round on patient and family this afternoon. 15:00: Called Javed Coy; provided Hospice support and education. Reviewed medications that Marco Antonio is concerned about under Hospice care; including Namenda, Provigil and Eliquis. Marco Antonio states that she understands after review. Provided update as to patient's current condition, and discussed if pt was going to benefit from rehab; could she participate. Marco Antonio shared that she is unsure. Educated Marco Antonio that [address] [place] [address] has patients at [facility], and could provide Hospice services to pt as soon as she is ready to discharge. Marco Antonio has contact information to reach liaison with other questions.       Gerhardt Amato, RN, [place] [address] Nurse Liaison  [phone] [place]  [phone] Office

## 2022-11-23 NOTE — PROGRESS NOTES
SPEECH PATHOLOGY BEDSIDE SWALLOW EVALUATION/DISCHARGE  Patient: Almas Tapia (1 y.o. female)  Date: 11/23/2022  Primary Diagnosis: Hypoxia [R09.02]       Precautions: aspiration       ASSESSMENT :  Note need for SLP evaluation was first documented 11/15, however SLP was not consulted until 11/23. Patient with severe dementia per palliative note, and note family does not want a feeding tube (which is appropriate). Based on the objective data described below, the patient presents with variable bolus acceptance consistent with dementia, with patient sometimes not opening her mouth and sometimes blowing on straw, however other times accepting bolus appropriately. Functional mastication and oral prep noted with soft and bite sized solids, and no overt s/s aspiration observed. Of note, coughing noted after a delay after PO trials completed, and therefore do not suspect related to prandial aspiration. However, patient will always be at risk for aspiration secondary to dementia, variable mental status and bolus acceptance, and dependence for PO intake. Recommend continue current diet with aspiration precautions as below. Skilled acute therapy provided by a speech-language pathologist is not indicated at this time. PLAN :  Recommendations:  -Soft and bite sized/thin liquid diet, 1:1 supervision and assistance with PO intake, slow rate of intake, small/single bites and sips. Recommend PO only when patient is actively accepting, and if patient is not actively accepting, hold PO and re-offer later  Discharge Recommendations: None     SUBJECTIVE:   Patient stated I'm about finished which was her only verbalization during the session. OBJECTIVE:     Past Medical History:   Diagnosis Date    Dementia ([facility] [address]     Hypercholesteremia 5/27/2010    Hypertension     IBS (irritable bowel syndrome) 5/27/2010    OA (osteoarthritis) 5/27/2010    Osteoporosis 5/27/2010    Pulmonary embolism ([facility] [address]    History reviewed.  No pertinent surgical history. Prior Level of Function/Home Situation:   Home Situation  Home Environment: [address] [facility] Name: [address]: [facility]  Patient Expects to be Discharged to[de-identified] Skilled nursing facility  Diet prior to admission: unknown  Current Diet:  soft and bite sized/thin   Cognitive and Communication Status:  Neurologic State: Alert, Confused  Orientation Level: Other (Comment) (did not verbalize)  Cognition: Decreased command following, Decreased attention/concentration  Perception: Appears intact  Perseveration: No perseveration noted  Safety/Judgement: Decreased awareness of environment, Decreased awareness of need for assistance, Decreased awareness of need for safety, Decreased insight into deficits  Oral Assessment:  Oral Assessment  Labial: Other (comment) (unable to assess; reduced command following)  P.O. Trials:  Patient Position: up in bed  Vocal quality prior to P.O.: No impairment  Consistency Presented: Thin liquid;Puree;Mechanical soft; Other (comment) (breakfast tray)  How Presented: SLP-fed/presented;Spoon;Straw     Bolus Acceptance: Impaired  Bolus Formation/Control: No impairment     Propulsion: No impairment  Oral Residue: None        Aspiration Signs/Symptoms: None (delayed cough after PO)  Pharyngeal Phase Characteristics: No impairment, issues, or problems                    NOMS:   The NOMS functional outcome measure was used to quantify this patient's level of swallowing impairment. Based on the NOMS, the patient was determined to be at level 5 for swallow function     NOMS Swallowing Levels:  Level 1 (CN): NPO  Level 2 (CM): NPO but takes consistency in therapy  Level 3 (CL): Takes less than 50% of nutrition p.o. and continues with nonoral feedings; and/or safe with mod cues; and/or max diet restriction  Level 4 (CK):  Safe swallow but needs mod cues; and/or mod diet restriction; and/or still requires some nonoral feeding/supplements  Level 5 (CJ): Safe swallow with min diet restriction; and/or needs min cues  Level 6 (CI): Independent with p.o.; rare cues; usually self cues; may need to avoid some foods or needs extra time  Level 7 ([address] [place] [address]): Independent for all p.o.  MONE. (2003). National Outcomes Measurement System (NOMS): Adult Speech-Language Pathology User's Guide. Pain:  Pain Scale 1: FACES  Pain Intensity 1: 0     After treatment:   Patient left in no apparent distress in bed, Call bell within reach, and Nursing notified    COMMUNICATION/EDUCATION:   Patient was too confused for education. The patient's plan of care including recommendations, planned interventions, and recommended diet changes were discussed with: Registered nurse.      Thank you for this referral.  CROW [place]  Time Calculation: 15 mins

## 2022-11-23 NOTE — PROGRESS NOTES
Comprehensive Nutrition Assessment    Type and Reason for Visit: Initial    Nutrition Recommendations/Plan:   Continue Soft and Bite sized diet per SLP  Monitor plan of care - rehab vs hospice? Malnutrition Assessment:  Malnutrition Status: Moderate malnutrition (11/23/22 1315)    Context:  Chronic illness     Findings of the 6 clinical characteristics of malnutrition:   Energy Intake:  Mild decrease in energy intake (specify)  Weight Loss:  Mild weight loss (specify amount and time period)     Body Fat Loss:  Mild body fat loss,     Muscle Mass Loss:  Mild muscle mass loss,    Fluid Accumulation:  No significant fluid accumulation,     Strength:  Not performed     Nutrition Assessment:         Pt admitted with Hypoxia [R09.02]    Past Medical History:   Diagnosis Date    Dementia ([facility] [address]     Hypercholesteremia 5/27/2010    Hypertension     IBS (irritable bowel syndrome) 5/27/2010    OA (osteoarthritis) 5/27/2010    Osteoporosis 5/27/2010    Pulmonary embolism ([facility] [address]      Pt screened for LOS. Pt had been eating 1-25% but documented as eating >76% of breakfast today. Pt seen by SLP today and diet stayed the same at Soft and Bite sized/ thin liquids. Palliative following and hospice information has been given. Daughter awaiting to make decision. If PO >50%, pt likely meeting estimated needs. Pt was not alert/oriented today for lunch though. Monitor PO intake tonight and moving forward. Added Ensure compact BID and magic cup for trial. Monitor plan of care. No known food allergies. Noted pt previous wt ~22 months ago was 152 lbs. Currently documented as 103 lbs. Unsure time frame of wt loss at this time, if gradual or recent.          Last BM: 11/22/22, Soft    PO intake: Patient Vitals for the past 168 hrs:   % Diet Eaten   11/22/22 0943 76 - 100%   11/20/22 1227 1 - 25%   11/20/22 0828 1 - 25%   11/19/22 1456 1 - 25%   11/19/22 0828 1 - 25%   11/18/22 0900 1 - 25%       Wt Readings from Last 30 Encounters: 11/23/22 47 kg (103 lb 9.9 oz)   02/13/21 68.9 kg (152 lb)   06/09/20 72.9 kg (160 lb 11.5 oz)   12/18/18 72.7 kg (160 lb 4.4 oz)   08/26/18 74.7 kg (164 lb 10.9 oz)   01/11/17 68 kg (150 lb)           Nutrition Related Findings:      Wound Type: None    Current Nutrition Intake & Therapies:  Average Meal Intake: 1-25%     ADULT DIET Dysphagia - Soft & Bite Sized  DIET ONE TIME MESSAGE    Anthropometric Measures:  Height: 5' (152.4 cm)  Ideal Body Weight (IBW): 100 lbs (45 kg)     Current Body Wt:  46.7 kg (103 lb), 103 % IBW.  Bed scale  Current BMI (kg/m2): 20.1        Weight Adjustment: No adjustment                 BMI Category: Underweight (BMI less than 22) age over 72    Estimated Daily Nutrient Needs:  Energy Requirements Based On: Kcal/kg     Energy (kcal/day): 1175  Weight Used for Protein Requirements: Current  Protein (g/day): 47-56  Method Used for Fluid Requirements: 1 ml/kcal  Fluid (ml/day): 1500 for elderly    Nutrition Diagnosis:   Inadequate protein-energy intake related to cognitive or neurological impairment as evidenced by BMI, intake 0-25%    Nutrition Interventions:   Food and/or Nutrient Delivery: Continue current diet, Start oral nutrition supplement  Nutrition Education/Counseling: No recommendations at this time  Coordination of Nutrition Care: Continue to monitor while inpatient       Goals:     Goals: PO intake 50% or greater, by next RD assessment       Nutrition Monitoring and Evaluation:   Behavioral-Environmental Outcomes: None identified  Food/Nutrient Intake Outcomes: Food and nutrient intake, Supplement intake  Physical Signs/Symptoms Outcomes: Biochemical data, Weight, Meal time behavior    Discharge Planning:    Continue oral nutrition supplement, Continue current diet    Arlette Snyder, [address]: [phone]

## 2022-11-23 NOTE — PROGRESS NOTES
Palliative Medicine Consult  Jose: [phone]    Patient Name: Krista Oliva  YOB: 1929    Date of Initial Consult: 2022  Reason for Consult: overwhelming symptoms  Requesting Provider: Ruperto Yeager   Primary Care Physician: Juancho Emerson MD     SUMMARY:   Krista lOiva is a 80 y.o. admitted on 2022 from [address] [place] [address] to the ED with SOB, cough, generalized weakness for 2 days. Admitted with a diagnosis of acute respiratory failure with hypoxia, severe sepsis, lactic acidosis, suspected aspiration pneumonia, uncontrolled DM, elevated creatinine and LFTs. PMH:  HTN, hyperlipidemia, dementia, IBS, DM, OA, osteoporosis, PE and pulmonary edema    Prior hospitalizations:     2021-2021:   fall and right intertrochanteric fx     Current medical issues leading to Palliative Medicine involvement include:  returning to SNF soon, on oxygen, frailty and FTT. Patient does have DDNR    Social:  Spouse  a number of years ago. She did not remarry. One dtr. Joshua Vargas. Patient has been living at [address] [place] [address] facility since she was 66. She was in independent living. She did move to assisted living in 2017 and soon after she was in AL, she moved to the memory care unit. 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 She liked to read, knit and travel in the past.  She often went on many of the outings af [facility]. Koby Pedersen is well known at [facility]    Patient has DDNR, has a living will stating no prolonged interventions if condition is terminal, and has financial POA documents on the chart. Joshua Vargas is the legal next of kin.        PALLIATIVE DIAGNOSES:   Palliative care encounter  Dementia, severe  Weight loss  Shortness of breath   Aspiration pneumonia     PLAN:   Met with Magalys Benoit to assess for any concerns and to provide support  Magalys Benoit said yesterday she thought her mother may be in pain when she coughs. I asked the RN to try tylenol to see if this helps  Trey Mcmillan in to try to feed patient her lunch. Patient is not as alert today so Trey Mcmillan stopped trying to feed her   I reviewed with Trey Mcmillan the input from pulmonary and from speech She is aware the echo results are pending. Trey Mcmillan would like to talk to Dr. Rajesh Shell. RN has messaged Dr. Anthony Rogers has a few questions about meds if her mother goes on hospice Message to Alexis Juan     11/22/2022   Met with Hay Kline, patient's dtr and legal NOK. Patient has a living will and DDNR on file. Reviewed the patient's current medical issues. Dtr concerned about having her mother stable from the current respiratory condition/flu before she returns to [address] [place] [address] the patient's progression with dementia. Diagnosed in 2017. Went to memory care at that time. Seen by Dr. Crystal Mcknight in December 2018 during a hospitalization and at that time, patient was a 5 on the FAST scale for dementia. Daughter reports there has been a progressive decline in her mother's function since she fell and fractured her hip in 2021. She was hospitalized from 2/9/2022-2/16/2022. Patient did go to skilled care at that time and had PT/OT. She has mostly been in a wheelchair for the past year. FAST scale at this time 7a  severe dementia   Patient has to be fed. She has been declining with eating for some time per the daughter. Noted her weight in Feb 2021 was 152 pounds. Trey Rebned reports her weight has been 110 recently  Trey Mcmillan often will go to [facility] to make sure her mother eats. The aides to assist with feeding her. Trey Mcmillan is usually able to get her mother to eat more. Warrensburglakshmi Mcmillan did state her mother would not want a feeding tube. Trey Mcmillan and I then discussed the fact that the patient's dementia is now in the moderately severe to severe area. The patient is also not eating well and is frail. We talked about what frailty means.     Trey Mcmillan had said earlier she wants her mother to return to [facility] and to have PT  Everton Vazquez did have questions about comfort care and hospice  She said at first she thought Palliative was consulted because it meant the patient was dying soon   I explained the differences/similarities in palliative medicine and hospice. We talked more about what hospice includes. At this time, the patient's granddaughter, Ramona Hitchcock was present. Everton Vazquez had concerns about not continuing with PT if they opt for hospice. Everton Vazquez also had questions about continuing some of her medications including the Xarelto. Everton Vazquez and Kristie Bound open to informational session with hospice   Hospice consult placed  Update to Dr. Shira Nix. The daughter requested that Dr. Shira Nix come and talk with her   11/21/2022  Examined patient. She is resting in bed. Now on 1 LPM. She is unable to answer most questions. She is pleasant and does respond. Was not aware she has the flu. She was unable to tell me her daughter's name   Chart reviewed.     Called and left message for her daughterMarkus   Left message for her to call me back  Initial consult note routed to primary continuity provider and/or primary health care team members  Communicated plan of care with: Palliative IDTMejia 192 Team     GOALS OF CARE / TREATMENT PREFERENCES:     GOALS OF CARE:  Patient/Health Care Proxy Stated Goals: Rehabilitation    TREATMENT PREFERENCES:   Code Status: DNR,  DDNR on file     Patient and family's personal goals include: dtr would like to consider focusing on comfort     Important upcoming milestones or family events:  none reported     The patient identifies the following as important for living well:  [x] Unable to obtain this information      Advance Care Planning:  [x] [facility] Interdisciplinary Team has updated the ACP Navigator with [address] and Patient Capacity      Advance Care Planning 11/22/2022   Patient's Healthcare Decision Maker is: Legal Next of Kin   Confirm Advance Directive (No Data)   Does the patient have other document types Do Not Resuscitate     Markus Singh (dtr)   [phone]  Medical Interventions: Limited additional interventions       Other:    As far as possible, the palliative care team has discussed with patient / health care proxy about goals of care / treatment preferences for patient. HISTORY:     History obtained from: chart, team, dtr    CHIEF COMPLAINT: Pt admitted with cough and SOB     HPI/SUBJECTIVE:    The patient is:   [] Verbal and participatory  [x] Non-participatory due to:  dementia        Clinical Pain Assessment (nonverbal scale for severity on nonverbal patients): Activity (Movement): Lying quietly, normal position    Duration: for how long has pt been experiencing pain (e.g., 2 days, 1 month, years)  Frequency: how often pain is an issue (e.g., several times per day, once every few days, constant)     FUNCTIONAL ASSESSMENT:     Palliative Performance Scale (PPS):  PPS: 30       PSYCHOSOCIAL/SPIRITUAL SCREENING:     Palliative IDT has assessed this patient for cultural preferences / practices and a referral made as appropriate to needs (Cultural Services, Patient Advocacy, Ethics, etc.)    Any spiritual / Samaritan concerns:  [] Yes /  [x] No  [] Unable to obtain this information  If \"Yes\" to discuss with pastoral care during IDT     Does caregiver feel burdened by caring for their loved one:   [] Yes /  [x] No /  [] No Caregiver Present/Available [] No Caregiver [] Pt Lives at Facility  If \"Yes\" to discuss with social work during IDT    Anticipatory grief assessment:   [x] Normal  / [] Maladaptive   [] Unable to obtain this information  [] n/a  If \"Maladaptive\" to discuss with social work during IDT    ESAS Anxiety: Anxiety: 0    ESAS Depression:          REVIEW OF SYSTEMS:     Positive and pertinent negative findings in ROS are noted above in HPI.   The following systems were [x] reviewed / [] unable to be reviewed as noted in HPI  Other findings are noted below. Systems: constitutional, ears/nose/mouth/throat, respiratory, gastrointestinal, genitourinary, musculoskeletal, integumentary, neurologic, psychiatric, endocrine. Positive findings noted below. Modified ESAS Completed by: provider   Fatigue: 5 Drowsiness: 5         Anxiety: 0     Anorexia: 4 Dyspnea: 0     Constipation: No     Stool Occurrence(s): 1        PHYSICAL EXAM:     From RN flowsheet:  Wt Readings from Last 3 Encounters:   11/23/22 103 lb 9.9 oz (47 kg)   02/13/21 152 lb (68.9 kg)   06/09/20 160 lb 11.5 oz (72.9 kg)     Blood pressure (!) 140/84, pulse 100, temperature 98.6 °F (37 °C), resp. rate 28, height 5' (1.524 m), weight 103 lb 9.9 oz (47 kg), SpO2 (!) 89 %. Pain Scale 1: FACES  Pain Intensity 1: 0                 Last bowel movement, if known: 11/23    Constitutional: patient resting in bed, no acute distress, not as alert today   Eyes: pupils equal, anicteric  ENMT: no nasal discharge, moist mucous membranes  Cardiovascular: regular rhythm,  Respiratory:   breathing:   symmetric, O2 1LPM  Gastrointestinal: soft non-tender,   Musculoskeletal: no deformity, no tenderness to palpation  Skin: warm, dry  Neurologic:  moving all extremities  Psychiatric:  no hallucinations  Other:       HISTORY:     Active Problems:    Hypoxia (11/14/2022)    Past Medical History:   Diagnosis Date    D[facility]     Hypercholesteremia 5/27/2010    Hypertension     IBS (irritable bowel syndrome) 5/27/2010    OA (osteoarthritis) 5/27/2010    Osteoporosis 5/27/2010    Pulmonary embolism ([facility]       History reviewed. No pertinent surgical history. History reviewed. No pertinent family history. History reviewed, no pertinent family history.   Social History     Tobacco Use    Smoking status: Never    Smokeless tobacco: Never   Substance Use Topics    Alcohol use: Not Currently     Allergies   Allergen Reactions    Aricept [Donepezil] Other (comments)     GI upset    Flexeril [Cyclobenzaprine] Other (comments)     delirium      Current Facility-Administered Medications   Medication Dose Route Frequency    albuterol-ipratropium (DUO-NEB) 2.5 MG-0.5 MG/3 ML  3 mL Nebulization Q8H RT    azithromycin (ZITHROMAX) 500 mg in 0.9% sodium chloride 250 mL ([id number])  500 mg IntraVENous Q24H    oseltamivir (TAMIFLU) capsule 30 mg  30 mg Oral Q12H    furosemide (LASIX) tablet 20 mg  20 mg Oral DAILY    [Held by provider] furosemide (LASIX) injection 10 mg  10 mg IntraVENous BID    guaiFENesin-dextromethorphan (ROBITUSSIN DM) 100-10 mg/5 mL syrup 10 mL  10 mL Oral Q6H PRN    sodium chloride (NS) flush 5-10 mL  5-10 mL IntraVENous PRN    piperacillin-tazobactam (ZOSYN) 3.375 g in 0.9% sodium chloride (MBP/ADV) 100 mL MBP  3.375 g IntraVENous Q8H    sodium chloride (NS) flush 5-40 mL  5-40 mL IntraVENous Q8H    sodium chloride (NS) flush 5-40 mL  5-40 mL IntraVENous PRN    acetaminophen (TYLENOL) tablet 650 mg  650 mg Oral Q6H PRN    Or    acetaminophen (TYLENOL) suppository 650 mg  650 mg Rectal Q6H PRN    polyethylene glycol (MIRALAX) packet 17 g  17 g Oral DAILY PRN    ondansetron (ZOFRAN ODT) tablet 4 mg  4 mg Oral Q8H PRN    Or    ondansetron (ZOFRAN) injection 4 mg  4 mg IntraVENous Q6H PRN    memantine (NAMENDA) tablet 10 mg  10 mg Oral BID    modafiniL (PROVIGIL) tablet 100 mg  100 mg Oral DAILY    therapeutic multivitamin (THERAGRAN) tablet 1 Tablet  1 Tablet Oral DAILY    atorvastatin (LIPITOR) tablet 10 mg  10 mg Oral DAILY    apixaban (ELIQUIS) tablet 5 mg  5 mg Oral Q12H    sodium chloride (NS) flush 5-10 mL  5-10 mL IntraVENous PRN          LAB AND IMAGING FINDINGS:     Lab Results   Component Value Date/Time    WBC 10.5 11/23/2022 04:58 AM    HGB 11.0 (L) 11/23/2022 04:58 AM    PLATELET 694 55/61/7378 04:58 AM     Lab Results   Component Value Date/Time    Sodium 142 11/23/2022 04:58 AM    Potassium 3.3 (L) 11/23/2022 04:58 AM    Chloride 105 11/23/2022 04:58 AM    CO2 29 11/23/2022 04:58 AM    BUN 9 11/23/2022 04:58 AM    Creatinine 0.52 (L) 11/23/2022 04:58 AM    Calcium 8.7 11/23/2022 04:58 AM    Magnesium 1.9 11/23/2022 04:58 AM    Phosphorus 3.2 11/19/2022 12:59 AM      Lab Results   Component Value Date/Time    Alk. phosphatase 633 (H) 11/16/2022 12:54 AM    Protein, total 5.9 (L) 11/16/2022 12:54 AM    Albumin 2.0 (L) 11/16/2022 12:54 AM    Globulin 3.9 11/16/2022 12:54 AM     Lab Results   Component Value Date/Time    INR 1.1 11/16/2022 12:54 AM    Prothrombin time 11.9 (H) 11/16/2022 12:54 AM    aPTT 41.1 (H) 11/16/2022 12:54 AM      No results found for: IRON, FE, TIBC, IBCT, PSAT, FERR   No results found for: PH, PCO2, PO2  No components found for: [place]   Lab Results   Component Value Date/Time     08/22/2018 12:38 PM    CK - MB 1.9 01/11/2017 11:07 PM                Total time:   25 minutes  Counseling / coordination time, spent as noted above:   20 minutes  > 50% counseling / coordination?: yes    Prolonged service was provided for  []30 min   []75 min in face to face time in the presence of the patient, spent as noted above. Time Start:   Time End:   Note: this can only be billed with 21479 (initial) or 47603 (follow up). If multiple start / stop times, list each separately.

## 2022-11-23 NOTE — PROGRESS NOTES
[facility]  Pulmonary, Critical Care, and Sleep Medicine    Initial Patient Consult    Name: Wilberto Snyder MRN: [id number]   : 10/14/1929 Hospital: [facility]   Date: 2022        IMPRESSION:   Acute hypoxic resp failure - better and now on 1liters nc with sats 96 %. Flu +   Suspected aspiration pna and recurrent aspiration   Decreased   respiratory secretion clearance with retained secretions in the basilar airways/ occlusive and decreased cough effort noted   Advanced age  Hypotensive on admit - resolved with ivf   Lll atx - progression of infiltrates in the interim since last seen  with pulm edema and some effusions-- waxing and waning and  per notes responded  to diuretic.no eosinophilia. No meds on file that would be expected to cause infiltrates. Hx of dementia   Hx of uti   Per chart hx of pte  and chronic eliquis   Dm  Hx of ibs   Debility - very frail   Malnutrition with low albumin  - could be expected to have have tendency for 3rd spacing. Sinus tachy       RECOMMENDATIONS:   Agree with ab - currently on zosyn and  zithromax-- - day 8 of  zosyn and zithro just added   Speech eval / diet adjust as per there recs- did not see speech eval in chart   Decreased frequency  of the nebs - due to tachycardia   Watch volume status   On tamiflu   Dementia- does not engage in conversation nor follow commands for me   Continue eliquis   Nebs to help with secretion clearance= as above    Echo pending     She was covid neg   Cxr tomorrow   Started a low dose oral lasix and rec trend cxr imaging      Subjective:     Resting in bed       This patient has been seen and evaluated at the request of Dr. Carla Mayo for aspiration pna . Patient is a 80 y.o. female who was transferred from a snf to [facility] with 2 days of increased chest congestion . She arrived hypotensive and hypoxic . She was place on o2 and her bp responded to ivf .     Pt reportedly has hx of dementia and upon my arrival acknowledged me , said hi and did ask if \" I am in the right place. \"  She was not able to provide any other hx . Cough was present  but very congested and weak. She was in no resp  distress. -- 11/22  reconsulted by Dr Jose Yeager to see this lady again for abnl chest xray with what is described as waxing and waning  infitlrates - worse left perihilar area and bilateral and at  times suggesiton of pleural effusions. Pt actually look much better that when I first saw her but she remains frial and with  shallow resp . Congestion has sig improved. She could not turn nor sit up for post exam but much clearer on ant/lat . Now on 1 liter nc with sats 96 %. Past Medical History:   Diagnosis Date    Dementia ([address] [facility]     Hypercholesteremia 5/27/2010    Hypertension     IBS (irritable bowel syndrome) 5/27/2010    OA (osteoarthritis) 5/27/2010    Osteoporosis 5/27/2010    Pulmonary embolism ([facility])     Hx of  pulm edema  and nl echo   History reviewed. No pertinent surgical history. Prior to Admission medications    Medication Sig Start Date End Date Taking? Authorizing Provider   guaiFENesin (ROBITUSSIN) 100 mg/5 mL liquid Take 200 mg by mouth every four (4) hours as needed for Cough. Yes Rusty, MD Kendall   atorvastatin (Lipitor) 10 mg tablet Take 10 mg by mouth daily. Yes Rusty, MD Kendall   modafiniL (ProvigiL) 100 mg tablet Take 100 mg by mouth every morning. Yes Rusty, MD Kendall   loratadine (Claritin) 10 mg tablet Take 10 mg by mouth daily. Yes Other, MD Kendall   coal tar (NEUTROGENA T-GEL) 0.5 % shampoo Apply 1 Bottle to affected area nightly as needed (two times per week for dandruff). Yes Rusty, MD Kendall   cholecalciferol (Vitamin D3) (5000 Units/125 mcg) tab tablet Take 5,000 Units by mouth daily. Yes Rusty, MD Kendall   apixaban (ELIQUIS) 5 mg tablet Take 5 mg by mouth two (2) times a day.  Indications: a clot in the lung   Yes Rusty, MD Kendall   acetaminophen (TYLENOL) 500 mg tablet Take 500 mg by mouth two (2) times daily as needed for Pain. Yes Provider, Historical   acetaminophen (TYLENOL) 325 mg tablet Take 650 mg by mouth two (2) times a day. Yes Provider, Historical   calcium-vitamin D (OS-LUCÍA +D3) 500 mg-200 unit per tablet Take 1 Tab by mouth daily (with breakfast). Yes Other, MD Kendall   multivitamin (ONE A DAY) tablet Take 1 Tab by mouth daily. Yes Other, MD Kendall   losartan (COZAAR) 50 mg tablet Take 50 mg by mouth daily. Yes Other, MD Kendall   memantine (NAMENDA) 10 mg tablet Take 10 mg by mouth two (2) times a day. Yes Other, MD Kendall     Allergies   Allergen Reactions    Aricept [Donepezil] Other (comments)     GI upset    Flexeril [Cyclobenzaprine] Other (comments)     delirium      Social History     Tobacco Use    Smoking status: Never    Smokeless tobacco: Never   Substance Use Topics    Alcohol use: Not Currently      History reviewed. No pertinent family history.    Came from [facility]     Current Facility-Administered Medications   Medication Dose Route Frequency    albuterol-ipratropium (DUO-NEB) 2.5 MG-0.5 MG/3 ML  3 mL Nebulization Q8H RT    potassium chloride 10 mEq in 100 ml IVPB  10 mEq IntraVENous Q1H    azithromycin (ZITHROMAX) 500 mg in 0.9% sodium chloride 250 mL ([id number])  500 mg IntraVENous Q24H    oseltamivir (TAMIFLU) capsule 30 mg  30 mg Oral Q12H    furosemide (LASIX) tablet 20 mg  20 mg Oral DAILY    [Held by provider] furosemide (LASIX) injection 10 mg  10 mg IntraVENous BID    piperacillin-tazobactam (ZOSYN) 3.375 g in 0.9% sodium chloride (MBP/ADV) 100 mL MBP  3.375 g IntraVENous Q8H    sodium chloride (NS) flush 5-40 mL  5-40 mL IntraVENous Q8H    memantine (NAMENDA) tablet 10 mg  10 mg Oral BID    modafiniL (PROVIGIL) tablet 100 mg  100 mg Oral DAILY    therapeutic multivitamin (THERAGRAN) tablet 1 Tablet  1 Tablet Oral DAILY    atorvastatin (LIPITOR) tablet 10 mg  10 mg Oral DAILY    apixaban (ELIQUIS) tablet 5 mg  5 mg Oral Q12H       Review of Systems:  Review of systems not obtained due to patient factors. Objective:   Vital Signs:    Visit Vitals  BP (!) 118/95 (BP 1 Location: Right upper arm, BP Patient Position: At rest)   Pulse 94   Temp 99 °F (37.2 °C)   Resp 25   Ht 5' (1.524 m)   Wt 47.1 kg (103 lb 13.4 oz)   SpO2 96%   BMI 20.28 kg/m²       O2 Device: Nasal cannula   O2 Flow Rate (L/min): 2 l/min   Temp (24hrs), Av.9 °F (37.2 °C), Min:98.7 °F (37.1 °C), Max:99.2 °F (37.3 °C)       Intake/Output:   Last shift:      No intake/output data recorded. Last 3 shifts: No intake/output data recorded. No intake or output data in the 24 hours ending 22     Physical Exam:   General:  Alert, elderly lady . NO resp distress , very thin   Head:   Temporal muscle wasting    Eyes:  Conjunctivae/corneas clear. PERRL, EOMs intact. Nose: Nares normal.   Throat: Did not open mouth    Neck: Supple, symmetrical   Back:   Did not turn nor sit up for me    Lungs:   Clear ant/ few  rales lat - limited exam ,. No distress , shallow but unlabored respirations   Chest wall:  No tenderness or deformity. Heart:  Regular rate and rhythm. Sinus tachy    Abdomen:   Soft, non-tender    Extremities: Extremities normal, atraumatic, no cyanosis or edema. , msk wasting    Pulses: Not examined    Skin: Skin color, texture, turgor normal. No rashes or lesions   Lymph nodes: Cervical, supraclavicular, nodes normal.   Neurologic: Looks at me .  Does not speak , tremors of hands      Data review:     Recent Results (from the past 24 hour(s))   METABOLIC PANEL, BASIC    Collection Time: 22  4:58 AM   Result Value Ref Range    Sodium 142 136 - 145 mmol/L    Potassium 3.3 (L) 3.5 - 5.1 mmol/L    Chloride 105 97 - 108 mmol/L    CO2 29 21 - 32 mmol/L    Anion gap 8 5 - 15 mmol/L    Glucose 86 65 - 100 mg/dL    BUN 9 6 - 20 MG/DL    Creatinine 0.52 (L) 0.55 - 1.02 MG/DL    BUN/Creatinine ratio 17 12 - 20      eGFR >60 >60 ml/min/1.73m2    Calcium 8.7 8.5 - 10.1 MG/DL   MAGNESIUM Collection Time: 11/23/22  4:58 AM   Result Value Ref Range    Magnesium 1.9 1.6 - 2.4 mg/dL   CBC WITH AUTOMATED DIFF    Collection Time: 11/23/22  4:58 AM   Result Value Ref Range    WBC 10.5 3.6 - 11.0 K/uL    RBC 3.73 (L) 3.80 - 5.20 M/uL    HGB 11.0 (L) 11.5 - 16.0 g/dL    HCT 34.3 (L) 35.0 - 47.0 %    MCV 92.0 80.0 - 99.0 FL    MCH 29.5 26.0 - 34.0 PG    MCHC 32.1 30.0 - 36.5 g/dL    RDW 13.1 11.5 - 14.5 %    PLATELET 867 317 - 767 K/uL    MPV 10.5 8.9 - 12.9 FL    NRBC 0.0 0  WBC    ABSOLUTE NRBC 0.00 0.00 - 0.01 K/uL    NEUTROPHILS 81 (H) 32 - 75 %    LYMPHOCYTES 8 (L) 12 - 49 %    MONOCYTES 7 5 - 13 %    EOSINOPHILS 4 0 - 7 %    BASOPHILS 0 0 - 1 %    IMMATURE GRANULOCYTES 0 0.0 - 0.5 %    ABS. NEUTROPHILS 8.4 (H) 1.8 - 8.0 K/UL    ABS. LYMPHOCYTES 0.9 0.8 - 3.5 K/UL    ABS. MONOCYTES 0.8 0.0 - 1.0 K/UL    ABS. EOSINOPHILS 0.4 0.0 - 0.4 K/UL    ABS. BASOPHILS 0.0 0.0 - 0.1 K/UL    ABS. IMM. GRANS. 0.0 0.00 - 0.04 K/UL    DF AUTOMATED     NT-PRO BNP    Collection Time: 11/23/22  4:58 AM   Result Value Ref Range    NT pro- (H) <450 PG/ML         Imaging:  I have personally reviewed the patients radiographs and have reviewed the reports:  Xrays viewed   since last seen.          Sujey Torres PA-C

## 2022-11-23 NOTE — PROGRESS NOTES
Physical Therapy  Attempted PT session. Patient currently undergoing bedside testing. Will defer and continue to follow.   Juan Luis Michael, PT, DPT

## 2022-11-23 NOTE — PROGRESS NOTES
Bedside and Verbal shift change report given to Danuta Wells  (oncoming nurse) by Rubens Daniel and Lydia Mendez (offgoing nurse). Report included the following information SBAR, Kardex, Intake/Output, and MAR.

## 2022-11-23 NOTE — PROGRESS NOTES
Occupational Therapy  11/23/22     Patient currently on OT caseload. OT tx attempted at 9:06 AM however patient is undergoing bedside testing at this time. Will continue to follow patient and attempt OT at a later time.      Thank you,  ALLEN Dean/L

## 2022-11-24 ENCOUNTER — APPOINTMENT (OUTPATIENT)
Dept: GENERAL RADIOLOGY | Age: 87
DRG: 871 | End: 2022-11-24
Attending: PHYSICIAN ASSISTANT
Payer: MEDICARE

## 2022-11-24 LAB
ANION GAP SERPL CALC-SCNC: 7 MMOL/L (ref 5–15)
BUN SERPL-MCNC: 8 MG/DL (ref 6–20)
BUN/CREAT SERPL: 15 (ref 12–20)
CALCIUM SERPL-MCNC: 8.6 MG/DL (ref 8.5–10.1)
CHLORIDE SERPL-SCNC: 106 MMOL/L (ref 97–108)
CO2 SERPL-SCNC: 29 MMOL/L (ref 21–32)
CREAT SERPL-MCNC: 0.55 MG/DL (ref 0.55–1.02)
GLUCOSE BLD STRIP.AUTO-MCNC: 76 MG/DL (ref 65–117)
GLUCOSE SERPL-MCNC: 76 MG/DL (ref 65–100)
POTASSIUM SERPL-SCNC: 3.7 MMOL/L (ref 3.5–5.1)
SERVICE CMNT-IMP: NORMAL
SODIUM SERPL-SCNC: 142 MMOL/L (ref 136–145)

## 2022-11-24 PROCEDURE — 74011250637 HC RX REV CODE- 250/637: Performed by: INTERNAL MEDICINE

## 2022-11-24 PROCEDURE — 71045 X-RAY EXAM CHEST 1 VIEW: CPT

## 2022-11-24 PROCEDURE — 74011000258 HC RX REV CODE- 258: Performed by: FAMILY MEDICINE

## 2022-11-24 PROCEDURE — 74011250636 HC RX REV CODE- 250/636: Performed by: FAMILY MEDICINE

## 2022-11-24 PROCEDURE — 36415 COLL VENOUS BLD VENIPUNCTURE: CPT

## 2022-11-24 PROCEDURE — 74011250636 HC RX REV CODE- 250/636: Performed by: INTERNAL MEDICINE

## 2022-11-24 PROCEDURE — 94640 AIRWAY INHALATION TREATMENT: CPT

## 2022-11-24 PROCEDURE — 74011250637 HC RX REV CODE- 250/637: Performed by: HOSPITALIST

## 2022-11-24 PROCEDURE — 74011000250 HC RX REV CODE- 250: Performed by: INTERNAL MEDICINE

## 2022-11-24 PROCEDURE — 74011000250 HC RX REV CODE- 250: Performed by: FAMILY MEDICINE

## 2022-11-24 PROCEDURE — 65270000046 HC RM TELEMETRY

## 2022-11-24 PROCEDURE — 80048 BASIC METABOLIC PNL TOTAL CA: CPT

## 2022-11-24 PROCEDURE — 82962 GLUCOSE BLOOD TEST: CPT

## 2022-11-24 RX ORDER — INSULIN LISPRO 100 [IU]/ML
INJECTION, SOLUTION INTRAVENOUS; SUBCUTANEOUS
Status: DISCONTINUED | OUTPATIENT
Start: 2022-11-24 | End: 2022-11-25 | Stop reason: HOSPADM

## 2022-11-24 RX ORDER — POTASSIUM CHLORIDE 20MEQ/15ML
40 LIQUID (ML) ORAL DAILY
Status: DISCONTINUED | OUTPATIENT
Start: 2022-11-24 | End: 2022-11-24

## 2022-11-24 RX ORDER — MAGNESIUM SULFATE 100 %
4 CRYSTALS MISCELLANEOUS AS NEEDED
Status: DISCONTINUED | OUTPATIENT
Start: 2022-11-24 | End: 2022-11-25 | Stop reason: HOSPADM

## 2022-11-24 RX ADMIN — IPRATROPIUM BROMIDE AND ALBUTEROL SULFATE 3 ML: .5; 3 SOLUTION RESPIRATORY (INHALATION) at 07:32

## 2022-11-24 RX ADMIN — PIPERACILLIN AND TAZOBACTAM 3.38 G: 3; .375 INJECTION, POWDER, FOR SOLUTION INTRAVENOUS at 01:29

## 2022-11-24 RX ADMIN — ATORVASTATIN CALCIUM 10 MG: 10 TABLET, FILM COATED ORAL at 09:42

## 2022-11-24 RX ADMIN — THERA TABS 1 TABLET: TAB at 09:42

## 2022-11-24 RX ADMIN — APIXABAN 5 MG: 5 TABLET, FILM COATED ORAL at 09:42

## 2022-11-24 RX ADMIN — MEMANTINE 10 MG: 10 TABLET ORAL at 18:31

## 2022-11-24 RX ADMIN — SODIUM CHLORIDE, PRESERVATIVE FREE 10 ML: 5 INJECTION INTRAVENOUS at 06:22

## 2022-11-24 RX ADMIN — IPRATROPIUM BROMIDE AND ALBUTEROL SULFATE 3 ML: .5; 3 SOLUTION RESPIRATORY (INHALATION) at 00:27

## 2022-11-24 RX ADMIN — MODAFINIL 100 MG: 100 TABLET ORAL at 09:42

## 2022-11-24 RX ADMIN — MEMANTINE 10 MG: 10 TABLET ORAL at 09:43

## 2022-11-24 RX ADMIN — PIPERACILLIN AND TAZOBACTAM 3.38 G: 3; .375 INJECTION, POWDER, FOR SOLUTION INTRAVENOUS at 09:43

## 2022-11-24 RX ADMIN — OSELTAMIVIR PHOSPHATE 30 MG: 30 CAPSULE ORAL at 21:46

## 2022-11-24 RX ADMIN — PIPERACILLIN AND TAZOBACTAM 3.38 G: 3; .375 INJECTION, POWDER, FOR SOLUTION INTRAVENOUS at 18:31

## 2022-11-24 RX ADMIN — OSELTAMIVIR PHOSPHATE 30 MG: 30 CAPSULE ORAL at 09:42

## 2022-11-24 RX ADMIN — APIXABAN 5 MG: 5 TABLET, FILM COATED ORAL at 21:46

## 2022-11-24 RX ADMIN — SODIUM CHLORIDE, PRESERVATIVE FREE 10 ML: 5 INJECTION INTRAVENOUS at 18:31

## 2022-11-24 RX ADMIN — AZITHROMYCIN DIHYDRATE 500 MG: 500 INJECTION, POWDER, LYOPHILIZED, FOR SOLUTION INTRAVENOUS at 16:39

## 2022-11-24 RX ADMIN — IPRATROPIUM BROMIDE AND ALBUTEROL SULFATE 3 ML: .5; 3 SOLUTION RESPIRATORY (INHALATION) at 16:01

## 2022-11-24 RX ADMIN — SODIUM CHLORIDE, PRESERVATIVE FREE 10 ML: 5 INJECTION INTRAVENOUS at 21:46

## 2022-11-24 RX ADMIN — FUROSEMIDE 20 MG: 20 TABLET ORAL at 09:42

## 2022-11-24 NOTE — PROGRESS NOTES
[facility] Adult  Hospitalist Group                                                                                          Hospitalist Progress Note  Neli Smith MD  Answering service: [phone] OR [phone] from in house phone        Date of Service:  2022  NAME:  Austin Tobar  :  10/14/1929  MRN:  [id number]      Admission Summary: Emre Martinez is a 80 y.o. female with past medical history of hypertension, hyperlipidemia, dementia, IBS, osteoarthritis, osteoporosis, pulmonary edema presented as a direct admission/transfer [facility] to [facility] with chief complaint of shortness of breath. Patient is a limited historian. Majority of history was obtained per review of ED and electronic medical records. Per these reports, patient lives at [facility] facility where staff noted the patient was having symptoms of cough, generalized weakness over the past 2 days. Symptoms noted remain constant, without specific alleviating factors and there was concern for possible sepsis. Patient was initially transferred to [facility] with initial recorded vital signs temperature 98.9 °F, /68, heart rate 111, respiratory 20, O2 saturation 90% on room air. Oxygen saturation decreased to 88%. Patient was placed on 2 L oxygen via nasal cannula. Patient's blood pressure decreased to 81/55. Abnormal labs included blood glucose 239, creatinine 1.13, GFR 45, albumin 2.7, AST 40, alkaline phosphatase 942, lactic acid 3.7. Initial high-sensitivity troponin 23. Chest x-ray portable showed left lower lobe atelectasis. CTA chest was negative for PE however showed large amount of near occlusive debris and dependent airways and bilateral lower lobes with mild dependent atelectasis. Patient was then transferred to [facility] for admission. \"          Interval history / Subjective:    Seen and examined. Alert but confused, not in distress.   She offers no complaints. Vitals during my exam /80, heart rate 104, respiratory 20, SPO2 94% on 3 L/min. Assessment & Plan:     Acute hypoxic respiratory failure secondary to aspiration Pneumonia and positive for Influenza A  Severe sepsis(leukocytosis, tachycardia, lactic acidosis) SIRS features resolved. -CT angiogram of the chest showed large amount of near occlusive debris in the dependent airways in the bilateral lower lobes. No PE.  COVID-negative. Blood culture negative. On Tamiflu.  -Continue azithromycin, Zosyn, Lasix. -Aspiration precaution  -Pulmonary following        11/20: Crackles/course breath sounds at bedside, O2 requirements unchanged, ordered cxr to assess, awake, readjusted bed that seemed to have improved along with diuresis  -CXR 11/24, improved pulmonary edema. Continue oral Lasix  -TTE :diastolic dysfunction present with normal LV EF. History of PE on home Eliquis  -home eliquis       Hypokalemia: replace as needed  Uncontrolled type 2 diabetes mellitus with hyperglycemia: on SSI  -No Accu-Cheks on file, ordered  -Hypoglycemic protocol in place. Elevated creatinine: now resolved  LFT elevation: now resolved  Generalized weakness: PT/OT  Dementia  -Patient's baseline mental status which she is currently disoriented on exam  -head CT without any acute process    Frailty, failure to thrive  -no improvement of this pleasant 79 y/o patient    Hypokalemia due to diuresis: Replace       Code status: DNR  Prophylaxis: Eliquis  PTA: [address] discussed with: patient/staff    Anticipated Disposition: [place] when stable  - Per previous attending's conversation with daughter,Daughter does not want hospice at this time but rather have pt return back for therapy and pending how she does whether if she needs hospice then-->>CM to assist in logistics to return patient back to [facility] this week.        Hospital Problems  Date Reviewed: 12/21/2018            Codes Class Noted POA    Hypoxia ICD-10-CM: R09.02  ICD-9-CM: 799.02  11/14/2022 Unknown       Review of Systems:   A comprehensive review of systems was negative except for that written in the HPI. Vital Signs:    Last 24hrs VS reviewed since prior progress note. Most recent are:  Visit Vitals  /74 (BP 1 Location: Right upper arm, BP Patient Position: At rest)   Pulse (!) 108   Temp 98.1 °F (36.7 °C)   Resp 28   Ht 5' (1.524 m)   Wt 44.3 kg (97 lb 10.6 oz)   SpO2 96%   BMI 19.07 kg/m²       No intake or output data in the 24 hours ending 11/24/22 1708       Physical Examination:     I had a face to face encounter with this patient and independently examined them on 11/24/2022 as outlined below:          Constitutional: Frail elderly, alert, not in distress. ENT:  Oral mucosa moist   Resp: On oxygen via nasal cannula. Symmetrical entry bilaterally. No accessory muscle use. CV:  Regular rhythm, normal rate, no rubs    GI:  Soft, non distended, non tender. normoactive bowel sounds    Musculoskeletal:  No edema, warm, 2+ pulses throughout    Neurologic: Alert but confused. Nonfocal exam.            Data Review:    Review and/or order of clinical lab test      Labs:     Recent Labs     11/23/22 0458   WBC 10.5   HGB 11.0*   HCT 34.3*            Recent Labs     11/23/22 0458      K 3.3*      CO2 29   BUN 9   CREA 0.52*   GLU 86   CA 8.7   MG 1.9         Medications Reviewed:     Current Facility-Administered Medications   Medication Dose Route Frequency    albuterol-ipratropium (DUO-NEB) 2.5 MG-0.5 MG/3 ML  3 mL Nebulization Q8H RT    azithromycin (ZITHROMAX) 500 mg in 0.9% sodium chloride 250 mL ([id number])  500 mg IntraVENous Q24H    oseltamivir (TAMIFLU) capsule 30 mg  30 mg Oral Q12H    furosemide (LASIX) tablet 20 mg  20 mg Oral DAILY    [Held by provider] furosemide (LASIX) injection 10 mg  10 mg IntraVENous BID    guaiFENesin-dextromethorphan (ROBITUSSIN DM) 100-10 mg/5 mL syrup 10 mL  10 mL Oral Q6H PRN    sodium chloride (NS) flush 5-10 mL  5-10 mL IntraVENous PRN    piperacillin-tazobactam (ZOSYN) 3.375 g in 0.9% sodium chloride (MBP/ADV) 100 mL MBP  3.375 g IntraVENous Q8H    sodium chloride (NS) flush 5-40 mL  5-40 mL IntraVENous Q8H    sodium chloride (NS) flush 5-40 mL  5-40 mL IntraVENous PRN    acetaminophen (TYLENOL) tablet 650 mg  650 mg Oral Q6H PRN    Or    acetaminophen (TYLENOL) suppository 650 mg  650 mg Rectal Q6H PRN    polyethylene glycol (MIRALAX) packet 17 g  17 g Oral DAILY PRN    ondansetron (ZOFRAN ODT) tablet 4 mg  4 mg Oral Q8H PRN    Or    ondansetron (ZOFRAN) injection 4 mg  4 mg IntraVENous Q6H PRN    memantine (NAMENDA) tablet 10 mg  10 mg Oral BID    modafiniL (PROVIGIL) tablet 100 mg  100 mg Oral DAILY    therapeutic multivitamin (THERAGRAN) tablet 1 Tablet  1 Tablet Oral DAILY    atorvastatin (LIPITOR) tablet 10 mg  10 mg Oral DAILY    apixaban (ELIQUIS) tablet 5 mg  5 mg Oral Q12H    sodium chloride (NS) flush 5-10 mL  5-10 mL IntraVENous PRN     ______________________________________________________________________  EXPECTED LENGTH OF STAY: 4d 19h  ACTUAL LENGTH OF STAY:          10                 Lydia Molina MD

## 2022-11-24 NOTE — PROGRESS NOTES
Bedside and Verbal shift change report given to Kenzie Jackson RN (oncoming nurse) by Milta Harada, RN (offgoing nurse). Report included the following information SBAR, Kardex, Intake/Output, MAR, Recent Results, Cardiac Rhythm of NSR interchanging with with NSR, and Alarm Parameters .

## 2022-11-25 VITALS
RESPIRATION RATE: 20 BRPM | WEIGHT: 97.44 LBS | HEART RATE: 101 BPM | HEIGHT: 60 IN | OXYGEN SATURATION: 94 % | TEMPERATURE: 98.8 F | BODY MASS INDEX: 19.13 KG/M2 | SYSTOLIC BLOOD PRESSURE: 116 MMHG | DIASTOLIC BLOOD PRESSURE: 76 MMHG

## 2022-11-25 LAB
ANION GAP SERPL CALC-SCNC: 6 MMOL/L (ref 5–15)
BUN SERPL-MCNC: 10 MG/DL (ref 6–20)
BUN/CREAT SERPL: 19 (ref 12–20)
CALCIUM SERPL-MCNC: 8.7 MG/DL (ref 8.5–10.1)
CHLORIDE SERPL-SCNC: 106 MMOL/L (ref 97–108)
CO2 SERPL-SCNC: 29 MMOL/L (ref 21–32)
COVID-19 RAPID TEST, COVR: NOT DETECTED
CREAT SERPL-MCNC: 0.52 MG/DL (ref 0.55–1.02)
GLUCOSE BLD STRIP.AUTO-MCNC: 76 MG/DL (ref 65–117)
GLUCOSE BLD STRIP.AUTO-MCNC: 82 MG/DL (ref 65–117)
GLUCOSE SERPL-MCNC: 70 MG/DL (ref 65–100)
POTASSIUM SERPL-SCNC: 4 MMOL/L (ref 3.5–5.1)
SERVICE CMNT-IMP: NORMAL
SERVICE CMNT-IMP: NORMAL
SODIUM SERPL-SCNC: 141 MMOL/L (ref 136–145)
SOURCE, COVRS: NORMAL

## 2022-11-25 PROCEDURE — 36415 COLL VENOUS BLD VENIPUNCTURE: CPT

## 2022-11-25 PROCEDURE — 94640 AIRWAY INHALATION TREATMENT: CPT

## 2022-11-25 PROCEDURE — 74011250637 HC RX REV CODE- 250/637: Performed by: INTERNAL MEDICINE

## 2022-11-25 PROCEDURE — 74011000250 HC RX REV CODE- 250: Performed by: INTERNAL MEDICINE

## 2022-11-25 PROCEDURE — 74011000258 HC RX REV CODE- 258: Performed by: FAMILY MEDICINE

## 2022-11-25 PROCEDURE — 87635 SARS-COV-2 COVID-19 AMP PRB: CPT

## 2022-11-25 PROCEDURE — 82962 GLUCOSE BLOOD TEST: CPT

## 2022-11-25 PROCEDURE — 74011250637 HC RX REV CODE- 250/637: Performed by: HOSPITALIST

## 2022-11-25 PROCEDURE — 80048 BASIC METABOLIC PNL TOTAL CA: CPT

## 2022-11-25 PROCEDURE — 74011000250 HC RX REV CODE- 250: Performed by: FAMILY MEDICINE

## 2022-11-25 PROCEDURE — 74011250636 HC RX REV CODE- 250/636: Performed by: FAMILY MEDICINE

## 2022-11-25 PROCEDURE — 74011000258 HC RX REV CODE- 258: Performed by: HOSPITALIST

## 2022-11-25 RX ORDER — AZITHROMYCIN 250 MG/1
250 TABLET, FILM COATED ORAL DAILY
Qty: 2 TABLET | Refills: 0 | Status: SHIPPED | OUTPATIENT
Start: 2022-11-25 | End: 2022-11-27

## 2022-11-25 RX ORDER — OSELTAMIVIR PHOSPHATE 30 MG/1
30 CAPSULE ORAL EVERY 12 HOURS
Qty: 4 CAPSULE | Refills: 0 | Status: SHIPPED | OUTPATIENT
Start: 2022-11-25 | End: 2022-11-27

## 2022-11-25 RX ORDER — FUROSEMIDE 20 MG/1
20 TABLET ORAL DAILY
Qty: 30 TABLET | Refills: 0 | Status: SHIPPED
Start: 2022-11-25 | End: 2022-12-25

## 2022-11-25 RX ORDER — DEXTROSE MONOHYDRATE 50 MG/ML
250 INJECTION, SOLUTION INTRAVENOUS ONCE
Status: COMPLETED | OUTPATIENT
Start: 2022-11-25 | End: 2022-11-25

## 2022-11-25 RX ORDER — IPRATROPIUM BROMIDE AND ALBUTEROL SULFATE 2.5; .5 MG/3ML; MG/3ML
3 SOLUTION RESPIRATORY (INHALATION)
Qty: 30 EACH | Refills: 0 | Status: SHIPPED
Start: 2022-11-25 | End: 2022-12-25

## 2022-11-25 RX ADMIN — MEMANTINE 10 MG: 10 TABLET ORAL at 09:16

## 2022-11-25 RX ADMIN — APIXABAN 5 MG: 5 TABLET, FILM COATED ORAL at 09:15

## 2022-11-25 RX ADMIN — FUROSEMIDE 20 MG: 20 TABLET ORAL at 09:15

## 2022-11-25 RX ADMIN — MODAFINIL 100 MG: 100 TABLET ORAL at 09:15

## 2022-11-25 RX ADMIN — SODIUM CHLORIDE, PRESERVATIVE FREE 10 ML: 5 INJECTION INTRAVENOUS at 06:36

## 2022-11-25 RX ADMIN — THERA TABS 1 TABLET: TAB at 09:16

## 2022-11-25 RX ADMIN — PIPERACILLIN AND TAZOBACTAM 3.38 G: 3; .375 INJECTION, POWDER, FOR SOLUTION INTRAVENOUS at 01:15

## 2022-11-25 RX ADMIN — IPRATROPIUM BROMIDE AND ALBUTEROL SULFATE 3 ML: .5; 3 SOLUTION RESPIRATORY (INHALATION) at 01:17

## 2022-11-25 RX ADMIN — DEXTROSE MONOHYDRATE 250 ML: 50 INJECTION, SOLUTION INTRAVENOUS at 04:13

## 2022-11-25 RX ADMIN — PIPERACILLIN AND TAZOBACTAM 3.38 G: 3; .375 INJECTION, POWDER, FOR SOLUTION INTRAVENOUS at 09:16

## 2022-11-25 RX ADMIN — ATORVASTATIN CALCIUM 10 MG: 10 TABLET, FILM COATED ORAL at 09:16

## 2022-11-25 RX ADMIN — IPRATROPIUM BROMIDE AND ALBUTEROL SULFATE 3 ML: .5; 3 SOLUTION RESPIRATORY (INHALATION) at 07:26

## 2022-11-25 RX ADMIN — OSELTAMIVIR PHOSPHATE 30 MG: 30 CAPSULE ORAL at 09:16

## 2022-11-25 NOTE — PROGRESS NOTES
Transition of Care  RUR 11% Low  Disposition [facility]  DME Wheelchair  Transportation [facility] phone [phone], 12 noon  Follow Up PCP, Specialist    NATASHA received phone call from [facility] admissions regarding patient DC-requesting rapid Екатерина. CM messaged attending. [facility] transport pending for 12. Medicare pt has received, reviewed, and signed 2nd IM letter informing them of their right to appeal the discharge. Signed copy has been placed on pt bedside chart. Jin Clark MS    10:00 CM returned phone call to daughter Omer Zuluaga, [phone] who wanted to confirm DC back to [facility] today. CM informed transport is set for 12 noon and DC order pending with MD. CM contacted [address] [facility] [address] in admissions, [phone] to confirm planned DC to [facility] today. Nurse to call report to . CM to fax medicals as requested to [phone].      Jin Clark, MS

## 2022-11-25 NOTE — PROGRESS NOTES
Bedside and Verbal shift change report given to MABLE Jensen (oncoming nurse) by Afua Angel RN (offgoing nurse). Report included the following information SBAR, Kardex, MAR, and Recent Results.

## 2022-11-25 NOTE — PROGRESS NOTES
Patient's blood glucose at 2138 was 76. She is ACHS and that value of course, didn't require any insulin coverage. RN tried to get her to eat or drink something but she wont. She would hardly finished 2 bites of either apple sauce or pudding with her meds. RN attempted multiple times but patient kept declining. RN rechecked blood glucose fingerstick at 0145, just to make sure she didn't drop too low and the value was the same as previous value; 76. Anything the could do to keep her either hydrated or have some nutrients? ... On-call shay and Dr. Hayden Reyes ordered D5 250 mL.  This was administered

## 2022-11-25 NOTE — PROGRESS NOTES
[facility] Adult  Hospitalist Group                                                                                          Hospitalist Progress Note  Avril Hennessy MD  Answering service: [phone] OR [phone] from in house phone        Date of Service:  2022  NAME:  Catalina Mauricio  :  10/14/1929  MRN:  [id number]      Admission Summary: Michaela Gasca is a 80 y.o. female with past medical history of hypertension, hyperlipidemia, dementia, IBS, osteoarthritis, osteoporosis, pulmonary edema presented as a direct admission/transfer [facility] with chief complaint of shortness of breath. Patient is a limited historian. Majority of history was obtained per review of ED and electronic medical records. Per these reports, patient lives at [facility] living facility where staff noted the patient was having symptoms of cough, generalized weakness over the past 2 days. Symptoms noted remain constant, without specific alleviating factors and there was concern for possible sepsis. Patient was initially transferred to [address] [place] [address] with initial recorded vital signs temperature 98.9 °F, /68, heart rate 111, respiratory 20, O2 saturation 90% on room air. Oxygen saturation decreased to 88%. Patient was placed on 2 L oxygen via nasal cannula. Patient's blood pressure decreased to 81/55. Abnormal labs included blood glucose 239, creatinine 1.13, GFR 45, albumin 2.7, AST 40, alkaline phosphatase 942, lactic acid 3.7. Initial high-sensitivity troponin 23. Chest x-ray portable showed left lower lobe atelectasis. CTA chest was negative for PE however showed large amount of near occlusive debris and dependent airways and bilateral lower lobes with mild dependent atelectasis. Patient was then transferred to [facility] for admission. \"          Interval history / Subjective:    Seen and examined. Awake ,responsive,oriented to self and person  Vitals during my exam /76,spo2 97% on 2.5 l/min via NC,RR 24 HR 96. Assessment & Plan:     Acute hypoxic respiratory failure secondary to aspiration Pneumonia and positive for Influenza A  Severe sepsis(leukocytosis, tachycardia, lactic acidosis) SIRS features resolved. -CT angiogram of the chest showed large amount of near occlusive debris in the dependent airways in the bilateral lower lobes. No PE.  COVID-negative. Blood culture negative. On Tamiflu. Completed 10 days of zosyn,3/5 of Zithromax.  -Aspiration precaution  -Pulmonary following        11/20: Crackles/course breath sounds at bedside, O2 requirements unchanged, ordered cxr to assess, awake, readjusted bed that seemed to have improved along with diuresis  -CXR 11/24, improved pulmonary edema. Continue oral Lasix  -TTE :diastolic dysfunction present with normal LV EF. History of PE on home Eliquis  -home eliquis       Hypokalemia: replace as needed  Uncontrolled type 2 diabetes mellitus with hyperglycemia: on SSI  -Accu-Cheks on file. -Hypoglycemic protocol in place. Elevated creatinine: now resolved  LFT elevation: now resolved  Generalized weakness: PT/OT  Dementia  -Patient's baseline mental status which she is currently disoriented on exam  -head CT without any acute process    Frailty, failure to thrive  -no improvement of this pleasant 79 y/o patient    Hypokalemia due to diuresis: Replace       Code status: DNR  Prophylaxis: Eliquis  PTA: [address] discussed with: patient/staff    Anticipated Disposition: [place] when stable  - Per previous attending's conversation with daughter,Daughter does not want hospice at this time but rather have pt return back for therapy and pending how she does whether if she needs hospice then-->>CM to assist in logistics to return patient back to [facility] this week.        Hospital Problems  Date Reviewed: 12/21/2018            Codes Class Noted POA    Hypoxia ICD-10-CM: R09.02  ICD-9-CM: 799.02  11/14/2022 Unknown       Review of Systems:   A comprehensive review of systems was negative except for that written in the HPI. Vital Signs:    Last 24hrs VS reviewed since prior progress note. Most recent are:  Visit Vitals  /76 (BP 1 Location: Right arm, BP Patient Position: At rest)   Pulse 99   Temp 98.8 °F (37.1 °C)   Resp 20   Ht 5' (1.524 m)   Wt 44.2 kg (97 lb 7.1 oz)   SpO2 94%   BMI 19.03 kg/m²       No intake or output data in the 24 hours ending 11/25/22 1036       Physical Examination:     I had a face to face encounter with this patient and independently examined them on 11/25/2022 as outlined below:          Constitutional: Frail elderly, alert, not in distress. ENT:  Oral mucosa moist   Resp: On oxygen via nasal cannula. Ronchi. Symmetrical entry bilaterally. No accessory muscle use. CV:  Regular rhythm, normal rate, no rubs    GI:  Soft, non distended, non tender. normoactive bowel sounds    Musculoskeletal:  No edema, warm, 2+ pulses throughout    Neurologic: Alert to self, person but confused. Nonfocal exam.            Data Review:    Review and/or order of clinical lab test      Labs:     Recent Labs     11/23/22  0458   WBC 10.5   HGB 11.0*   HCT 34.3*            Recent Labs     11/25/22  0138 11/24/22  1840 11/23/22  0458    142 142   K 4.0 3.7 3.3*    106 105   CO2 29 29 29   BUN 10 8 9   CREA 0.52* 0.55 0.52*   GLU 70 76 86   CA 8.7 8.6 8.7   MG  --   --  1.9         Medications Reviewed:     Current Facility-Administered Medications   Medication Dose Route Frequency    insulin lispro (HUMALOG) injection   SubCUTAneous AC&HS    glucose chewable tablet 16 g  4 Tablet Oral PRN    glucagon (GLUCAGEN) injection 1 mg  1 mg IntraMUSCular PRN    dextrose 10 % infusion 0-250 mL  0-250 mL IntraVENous PRN    albuterol-ipratropium (DUO-NEB) 2.5 MG-0.5 MG/3 ML  3 mL Nebulization Q8H RT    azithromycin (ZITHROMAX) 500 mg in 0.9% sodium chloride 250 mL ([id number])  500 mg IntraVENous Q24H    oseltamivir (TAMIFLU) capsule 30 mg  30 mg Oral Q12H    furosemide (LASIX) tablet 20 mg  20 mg Oral DAILY    guaiFENesin-dextromethorphan (ROBITUSSIN DM) 100-10 mg/5 mL syrup 10 mL  10 mL Oral Q6H PRN    sodium chloride (NS) flush 5-10 mL  5-10 mL IntraVENous PRN    sodium chloride (NS) flush 5-40 mL  5-40 mL IntraVENous Q8H    sodium chloride (NS) flush 5-40 mL  5-40 mL IntraVENous PRN    acetaminophen (TYLENOL) tablet 650 mg  650 mg Oral Q6H PRN    Or    acetaminophen (TYLENOL) suppository 650 mg  650 mg Rectal Q6H PRN    polyethylene glycol (MIRALAX) packet 17 g  17 g Oral DAILY PRN    ondansetron (ZOFRAN ODT) tablet 4 mg  4 mg Oral Q8H PRN    Or    ondansetron (ZOFRAN) injection 4 mg  4 mg IntraVENous Q6H PRN    memantine (NAMENDA) tablet 10 mg  10 mg Oral BID    modafiniL (PROVIGIL) tablet 100 mg  100 mg Oral DAILY    therapeutic multivitamin (THERAGRAN) tablet 1 Tablet  1 Tablet Oral DAILY    atorvastatin (LIPITOR) tablet 10 mg  10 mg Oral DAILY    apixaban (ELIQUIS) tablet 5 mg  5 mg Oral Q12H    sodium chloride (NS) flush 5-10 mL  5-10 mL IntraVENous PRN     ______________________________________________________________________  EXPECTED LENGTH OF STAY: 4d 19h  ACTUAL LENGTH OF STAY:          11                 Amy Pereira MD

## 2022-11-25 NOTE — PROGRESS NOTES
Bedside and Verbal shift change report given to Tika Fuchs RN (oncoming nurse) by Oscar Goldstein RN (offgoing nurse). Report included the following information SBAR, Kardex, Intake/Output, MAR, Recent Results, Cardiac Rhythm of sinus tachycardia, and Alarm Parameters .

## 2022-11-25 NOTE — DISCHARGE INSTRUCTIONS
Hospital course and discharge Instructions       PATIENT ID: Dianna Steele  MRN: [id number]   YOB: 1929    DATE OF ADMISSION: 11/14/2022  DATE OF DISCHARGE: 11/25/2022  DISCHARGING PROVIDER: Celestino Robins MD     To contact this individual call [phone] and ask the  to page. If unavailable ask to be transferred the Adult Hospitalist Department. [address] COURSE:   Dianna Steele is a 80 y.o. female with past medical history of hypertension, hyperlipidemia, dementia, IBS, osteoarthritis, osteoporosis, pulmonary edema initially presented to [facility] ED (SPED) to [facility] with chief complaint of shortness of breath. On initial evaluation vital signs were  temperature 98.9 °F, /68, heart rate 111, respiratory 20, O2 saturation 90% on room air. Oxygen saturation decreased to 88%. Patient was placed on 2 L oxygen via nasal cannula. Patient's blood pressure decreased to 81/55. Abnormal labs included blood glucose 239, creatinine 1.13, GFR 45, albumin 2.7, AST 40, alkaline phosphatase 942, lactic acid 3.7. Initial high-sensitivity troponin 23. Chest x-ray portable showed left lower lobe atelectasis. CTA chest was negative for PE however showed large amount of near occlusive debris and dependent airways and bilateral lower lobes with mild dependent atelectasis. Patient was then transferred to [facility] for admission and ongoing management. DISCHARGE DIAGNOSES / PLAN:    Acute hypoxic respiratory failure secondary to aspiration Pneumonia and positive for Influenza A  Severe sepsis(leukocytosis, tachycardia, lactic acidosis) SIRS features resolved. Dementia, failure to thrive. -CT angiogram of the chest showed large amount of near occlusive debris in the dependent airways in the bilateral lower lobes. No PE.  COVID-negative. Blood culture negative. On Tamiflu. - Completed 10 days of IV Zosyn.   Azithromycin was started 3 days ago to complete a total of 5 days, 2 more doses left. --Patient had evidence of pulmonary edema on her chest x-ray which improved with IV furosemide. Patient at risk for third spacing due to the malnutrition hypoalbuminemia, continue low-dose oral Lasix as long as blood pressure tolerates. Echocardiogram showed diastolic dysfunction with normal EF.  - Patient is followed by pulmonology services. --Continue supplemental oxygen to keep SPO2> 92%  **Speech therapist evaluated and forwarded the following recommendations:  --Soft and bite sized/thin liquid diet, 1:1 supervision and assistance with PO intake, slow rate of intake, small/single bites and sips. Recommend PO only when patient is actively accepting, and if patient is not actively accepting, hold PO and re-offer later  -- Patient is felt to be at risk for ongoing and recurrent aspiration. --Goals of care and prognosis discussed with patient's daughter, palliative medicine were involved. Daughter has also met with [facility] and had information session. Plan is for now for patient to return to [facility] with therapy services in place with current medical care. She understands patient will probably continue to decline and knows to contact [facility] which covers the facility if chooses to transition to comfort/hospice care. 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 Replace as needed  Type 2 diabetes. This frail elderly with poor oral intake is prone to hypoglycemia therefore not safe for diabetic medications. .  Monitor blood sugar with AC&HS Accu-Cheks. Elevated creatinine LFTs have now resolved. Dementia  -Patient's baseline mental status. Head CT negative for acute processes.             PENDING TEST RESULTS:   At the time of discharge the following test results are still pending: None      ADDITIONAL CARE RECOMMENDATIONS:     DIET:   Recommendations:  -Soft and bite sized/thin liquid diet, 1:1 supervision and assistance with PO intake, slow rate of intake, small/single bites and sips. Recommend PO only when patient is actively accepting, and if patient is not actively accepting, hold PO and re-offer later      OXYGEN / BiPAP SETTINGS: Oxygen via nasal cannula to keep SPO2> 92%    ACTIVITY: Activity as tolerated and PT/OT Eval and Treat        DISCHARGE MEDICATIONS:     My Medications        START taking these medications        Instructions Each Dose to Equal Morning Noon Evening Bedtime   albuterol-ipratropium 2.5 mg-0.5 mg/3 ml Nebu  Commonly known as: DUO-NEB    Your last dose was: Your next dose is:         3 mL by Nebulization route every six (6) hours as needed for Wheezing for up to 30 days. 3 mL                 azithromycin 250 mg tablet  Commonly known as: Zithromax    Your last dose was: Your next dose is: Take 1 Tablet by mouth daily for 2 days. 250 mg                 furosemide 20 mg tablet  Commonly known as: LASIX    Your last dose was: Your next dose is: Take 1 Tablet by mouth daily for 30 days. 20 mg                 oseltamivir 30 mg capsule  Commonly known as: TAMIFLU    Your last dose was: Your next dose is: Take 1 Capsule by mouth every twelve (12) hours for 4 doses. 30 mg                        CONTINUE taking these medications        Instructions Each Dose to Equal Morning Noon Evening Bedtime   * acetaminophen 325 mg tablet  Commonly known as: TYLENOL    Your last dose was: Your next dose is: Take 650 mg by mouth two (2) times a day. 650 mg                 * acetaminophen 500 mg tablet  Commonly known as: TYLENOL    Your last dose was: Your next dose is: Take 500 mg by mouth two (2) times daily as needed for Pain. 500 mg                 apixaban 5 mg tablet  Commonly known as: ELIQUIS    Your last dose was: Your next dose is: Take 5 mg by mouth two (2) times a day. Indications: a clot in the lung   5 mg                 calcium-vitamin D 500 mg-200 unit per tablet  Commonly known as: OS-LUCÍA +D3    Your last dose was: Your next dose is: Take 1 Tab by mouth daily (with breakfast). 1 Tablet                 Claritin 10 mg tablet  Generic drug: loratadine    Your last dose was: Your next dose is: Take 10 mg by mouth daily. 10 mg                 coal tar 0.5 % shampoo  Commonly known as: NEUTROGENA T-GEL    Your last dose was: Your next dose is:         Apply 1 Bottle to affected area nightly as needed (two times per week for dandruff). 1 Bottle                 guaiFENesin 100 mg/5 mL liquid  Commonly known as: ROBITUSSIN    Your last dose was: Your next dose is: Take 200 mg by mouth every four (4) hours as needed for Cough. 200 mg                 Lipitor 10 mg tablet  Generic drug: atorvastatin    Your last dose was: Your next dose is: Take 10 mg by mouth daily. 10 mg                 memantine 10 mg tablet  Commonly known as: NAMENDA    Your last dose was: Your next dose is: Take 10 mg by mouth two (2) times a day. 10 mg                 multivitamin tablet  Commonly known as: ONE A DAY    Your last dose was: Your next dose is: Take 1 Tab by mouth daily. 1 Tablet                 ProvigiL 100 mg tablet  Generic drug: modafiniL    Your last dose was: Your next dose is: Take 100 mg by mouth every morning. 100 mg                 Vitamin D3 (5000 Units/125 mcg) Tab tablet  Generic drug: cholecalciferol    Your last dose was: Your next dose is: Take 5,000 Units by mouth daily. 5,000 Units                       * This list has 2 medication(s) that are the same as other medications prescribed for you. Read the directions carefully, and ask your doctor or other care provider to review them with you.                 STOP taking these medications      losartan 50 mg tablet  Commonly known as: COZAAR                  Where to Get Your Medications        Information on where to get these meds will be given to you by the nurse or doctor. Ask your nurse or doctor about these medications  albuterol-ipratropium 2.5 mg-0.5 mg/3 ml Nebu  azithromycin 250 mg tablet  furosemide 20 mg tablet  oseltamivir 30 mg capsule             NOTIFY YOUR PHYSICIAN FOR ANY OF THE FOLLOWING:   Fever over 101 degrees for 24 hours. Chest pain, shortness of breath, fever, chills, nausea, vomiting, diarrhea, change in mentation, falling, weakness, bleeding. Severe pain or pain not relieved by medications. Or, any other signs or symptoms that you may have questions about. DISPOSITION:    Home With:   OT  PT  HH  RN      x SNF/Inpatient Rehab/LTAC    Independent/assisted living    Hospice    Other:       PATIENT CONDITION AT DISCHARGE:     Functional status   x Poor    x Deconditioned     Independent      Cognition     Lucid     Forgetful    x Dementia      Catheters/lines (plus indication)    Carey     PICC     PEG    x None      Code status     Full code    x DNR      PHYSICAL EXAMINATION AT DISCHARGE:  Constitutional: Frail elderly, alert, not in distress. ENT:  Oral mucosa moist   Resp: On oxygen via nasal cannula. Ronchi. Symmetrical entry bilaterally. No accessory muscle use. CV:  Regular rhythm, normal rate, no rubs    GI:  Soft, non distended, non tender. normoactive bowel sounds    Musculoskeletal:  No edema, warm, 2+ pulses throughout    Neurologic: Alert to self, person but confused. Nonfocal exam.                                                                                                    Signed:    Neli Smith MD  11/25/2022  10:00 AM

## 2022-11-25 NOTE — DISCHARGE SUMMARY
Physician Discharge Summary         PATIENT ID: Josey Ayers  MRN: [id number]   YOB: 1929    DATE OF ADMISSION: 11/14/2022  DATE OF DISCHARGE: 11/25/2022  DISCHARGING PROVIDER: Sid Mckoy MD     To contact this individual call [phone] and ask the  to page. If unavailable ask to be transferred the Adult Hospitalist Department. [address] COURSE:   Josey Ayers is a 80 y.o. female with past medical history of hypertension, hyperlipidemia, dementia, IBS, osteoarthritis, osteoporosis, pulmonary edema initially presented to [facility] ED (SPED) to [facility] with chief complaint of shortness of breath. On initial evaluation vital signs were  temperature 98.9 °F, /68, heart rate 111, respiratory 20, O2 saturation 90% on room air. Oxygen saturation decreased to 88%. Patient was placed on 2 L oxygen via nasal cannula. Patient's blood pressure decreased to 81/55. Abnormal labs included blood glucose 239, creatinine 1.13, GFR 45, albumin 2.7, AST 40, alkaline phosphatase 942, lactic acid 3.7. Initial high-sensitivity troponin 23. Chest x-ray portable showed left lower lobe atelectasis. CTA chest was negative for PE however showed large amount of near occlusive debris and dependent airways and bilateral lower lobes with mild dependent atelectasis. Patient was then transferred to [facility] for admission and ongoing management. DISCHARGE DIAGNOSES / PLAN:    Acute hypoxic respiratory failure secondary to aspiration Pneumonia and positive for Influenza A  Severe sepsis(leukocytosis, tachycardia, lactic acidosis) SIRS features resolved. Dementia, failure to thrive. -CT angiogram of the chest showed large amount of near occlusive debris in the dependent airways in the bilateral lower lobes. No PE.  COVID-negative. Blood culture negative. On Tamiflu. - Completed 10 days of IV Zosyn. Azithromycin was started 3 days ago to complete a total of 5 days, 2 more doses left. --Patient had evidence of pulmonary edema on her chest x-ray which improved with IV furosemide. Patient at risk for third spacing due to the malnutrition hypoalbuminemia, continue low-dose oral Lasix as long as blood pressure tolerates. Echocardiogram showed diastolic dysfunction with normal EF.  - Patient is followed by pulmonology services. --Continue supplemental oxygen to keep SPO2> 92%  **Speech therapist evaluated and forwarded the following recommendations:  --Soft and bite sized/thin liquid diet, 1:1 supervision and assistance with PO intake, slow rate of intake, small/single bites and sips. Recommend PO only when patient is actively accepting, and if patient is not actively accepting, hold PO and re-offer later  -- Patient is felt to be at risk for ongoing and recurrent aspiration. --Goals of care and prognosis discussed with patient's daughter, palliative medicine were involved. Daughter has also met with [address] [place] [address] hospice team and had information session. Plan is for now for patient to return to [facility] with therapy services in place with current medical care. Daughter knows to contact [facility] which covers the facility if chooses to transition to comfort/hospice care. 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 Replace as needed  Type 2 diabetes. This frail elderly with poor oral intake is prone to hypoglycemia therefore not safe for diabetic medications. .  Monitor blood sugar with AC&HS Accu-Cheks. Elevated creatinine LFTs have now resolved. Dementia  -Patient's baseline mental status. Head CT negative for acute processes.             PENDING TEST RESULTS:   At the time of discharge the following test results are still pending: None      ADDITIONAL CARE RECOMMENDATIONS:     DIET:   Recommendations:  -Soft and bite sized/thin liquid diet, 1:1 supervision and assistance with PO intake, slow rate of intake, small/single bites and sips. Recommend PO only when patient is actively accepting, and if patient is not actively accepting, hold PO and re-offer later      OXYGEN / BiPAP SETTINGS: Oxygen via nasal cannula to keep SPO2> 92%    ACTIVITY: Activity as tolerated and PT/OT Eval and Treat        DISCHARGE MEDICATIONS:     My Medications        START taking these medications        Instructions Each Dose to Equal Morning Noon Evening Bedtime   albuterol-ipratropium 2.5 mg-0.5 mg/3 ml Nebu  Commonly known as: DUO-NEB    Your last dose was: Your next dose is:         3 mL by Nebulization route every six (6) hours as needed for Wheezing for up to 30 days. 3 mL                 azithromycin 250 mg tablet  Commonly known as: Zithromax    Your last dose was: Your next dose is: Take 1 Tablet by mouth daily for 2 days. 250 mg                 furosemide 20 mg tablet  Commonly known as: LASIX    Your last dose was: Your next dose is: Take 1 Tablet by mouth daily for 30 days. 20 mg                 oseltamivir 30 mg capsule  Commonly known as: TAMIFLU    Your last dose was: Your next dose is: Take 1 Capsule by mouth every twelve (12) hours for 4 doses. 30 mg                        CONTINUE taking these medications        Instructions Each Dose to Equal Morning Noon Evening Bedtime   * acetaminophen 325 mg tablet  Commonly known as: TYLENOL    Your last dose was: Your next dose is: Take 650 mg by mouth two (2) times a day. 650 mg                 * acetaminophen 500 mg tablet  Commonly known as: TYLENOL    Your last dose was: Your next dose is: Take 500 mg by mouth two (2) times daily as needed for Pain. 500 mg                 apixaban 5 mg tablet  Commonly known as: ELIQUIS    Your last dose was: Your next dose is: Take 5 mg by mouth two (2) times a day.  Indications: a clot in the lung   5 mg                 calcium-vitamin D 500 mg-200 unit per tablet  Commonly known as: OS-LUCÍA +D3    Your last dose was: Your next dose is: Take 1 Tab by mouth daily (with breakfast). 1 Tablet                 Claritin 10 mg tablet  Generic drug: loratadine    Your last dose was: Your next dose is: Take 10 mg by mouth daily. 10 mg                 coal tar 0.5 % shampoo  Commonly known as: NEUTROGENA T-GEL    Your last dose was: Your next dose is:         Apply 1 Bottle to affected area nightly as needed (two times per week for dandruff). 1 Bottle                 guaiFENesin 100 mg/5 mL liquid  Commonly known as: ROBITUSSIN    Your last dose was: Your next dose is: Take 200 mg by mouth every four (4) hours as needed for Cough. 200 mg                 Lipitor 10 mg tablet  Generic drug: atorvastatin    Your last dose was: Your next dose is: Take 10 mg by mouth daily. 10 mg                 memantine 10 mg tablet  Commonly known as: NAMENDA    Your last dose was: Your next dose is: Take 10 mg by mouth two (2) times a day. 10 mg                 multivitamin tablet  Commonly known as: ONE A DAY    Your last dose was: Your next dose is: Take 1 Tab by mouth daily. 1 Tablet                 ProvigiL 100 mg tablet  Generic drug: modafiniL    Your last dose was: Your next dose is: Take 100 mg by mouth every morning. 100 mg                 Vitamin D3 (5000 Units/125 mcg) Tab tablet  Generic drug: cholecalciferol    Your last dose was: Your next dose is: Take 5,000 Units by mouth daily. 5,000 Units                       * This list has 2 medication(s) that are the same as other medications prescribed for you. Read the directions carefully, and ask your doctor or other care provider to review them with you.                 STOP taking these medications      losartan 50 mg tablet  Commonly known as: COZAAR Where to Get Your Medications        Information on where to get these meds will be given to you by the nurse or doctor. Ask your nurse or doctor about these medications  albuterol-ipratropium 2.5 mg-0.5 mg/3 ml Nebu  azithromycin 250 mg tablet  furosemide 20 mg tablet  oseltamivir 30 mg capsule             NOTIFY YOUR PHYSICIAN FOR ANY OF THE FOLLOWING:   Fever over 101 degrees for 24 hours. Chest pain, shortness of breath, fever, chills, nausea, vomiting, diarrhea, change in mentation, falling, weakness, bleeding. Severe pain or pain not relieved by medications. Or, any other signs or symptoms that you may have questions about. DISPOSITION:    Home With:   OT  PT  HH  RN      x SNF/Inpatient Rehab/LTAC    Independent/assisted living    Hospice    Other:       PATIENT CONDITION AT DISCHARGE:     Functional status   x Poor    x Deconditioned     Independent      Cognition     Lucid     Forgetful    x Dementia      Catheters/lines (plus indication)    Carey     PICC     PEG    x None      Code status     Full code    x DNR      PHYSICAL EXAMINATION AT DISCHARGE:  Constitutional: Frail elderly, alert, not in distress. ENT:  Oral mucosa moist   Resp: On oxygen via nasal cannula. Ronchi. Symmetrical entry bilaterally. No accessory muscle use. CV:  Regular rhythm, normal rate, no rubs    GI:  Soft, non distended, non tender. normoactive bowel sounds    Musculoskeletal:  No edema, warm, 2+ pulses throughout    Neurologic: Alert to self, person but confused. Nonfocal exam.                                                                                                    Signed:    Orlin Newell MD  11/25/2022  10:00 AM      Patient ID:    Nan Aviles  [id number]  24 y.o.  10/14/1929    Admit date: 11/14/2022    Discharge date and time: 11/25/2022    Hospital Diagnoses and Treatment Rendered:             Chronic Diagnoses:    Problem List as of 11/25/2022 Date Reviewed: 12/21/2018            Codes Class Noted - Resolved    Hypoxia ICD-10-CM: R09.02  ICD-9-CM: 799.02  11/14/2022 - Present        Closed hip fracture ([facility] ICD-10-CM: Y99.095I  ICD-9-CM: 820.8  2/9/2021 - Present        Fall ICD-10-CM: W19. [place] [employer]  ICD-9-CM: E888.9  8/23/2018 - Present        Unwitnessed fall ICD-10-CM: R29.6  ICD-9-CM: [place] Brito  8/22/2018 - Present        Hypercholesteremia ICD-10-CM: E78.00  ICD-9-CM: 272.0  5/27/2010 - Present    Overview Signed 5/27/2010 12:56 PM by David Haddad     1980's             Osteoporosis ICD-10-CM: M81.0  ICD-9-CM: 733.00  5/27/2010 - Present        IBS (irritable bowel syndrome) ICD-10-CM: K58.9  ICD-9-CM: 564.1  5/27/2010 - Present        OA (osteoarthritis) ICD-10-CM: M19.90  ICD-9-CM: 715.90  5/27/2010 - Present        RESOLVED: Syncope ICD-10-CM: R55  ICD-9-CM: 780.2  12/15/2018 - 12/21/2018        RESOLVED: UTI (urinary tract infection) ICD-10-CM: N39.0  ICD-9-CM: 599.0  12/15/2018 - 12/21/2018        RESOLVED: KOLBY (acute kidney injury) ([facility] ICD-10-CM: N17.9  ICD-9-CM: 584.9  12/15/2018 - 12/21/2018        RESOLVED: Hypoxia ICD-10-CM: R09.02  ICD-9-CM: 799.02  8/22/2018 - 12/21/2018           Discharge Medications:   Current Discharge Medication List        START taking these medications    Details   albuterol-ipratropium (DUO-NEB) 2.5 mg-0.5 mg/3 ml nebu 3 mL by Nebulization route every six (6) hours as needed for Wheezing for up to 30 days. Qty: 30 Each, Refills: 0  Start date: 11/25/2022, End date: 12/25/2022      furosemide (LASIX) 20 mg tablet Take 1 Tablet by mouth daily for 30 days. Qty: 30 Tablet, Refills: 0  Start date: 11/25/2022, End date: 12/25/2022      oseltamivir (TAMIFLU) 30 mg capsule Take 1 Capsule by mouth every twelve (12) hours for 4 doses. Qty: 4 Capsule, Refills: 0  Start date: 11/25/2022, End date: 11/27/2022      azithromycin (Zithromax) 250 mg tablet Take 1 Tablet by mouth daily for 2 days.   Qty: 2 Tablet, Refills: 0  Start date: 11/25/2022, End date: 11/27/2022 CONTINUE these medications which have NOT CHANGED    Details   guaiFENesin (ROBITUSSIN) 100 mg/5 mL liquid Take 200 mg by mouth every four (4) hours as needed for Cough. atorvastatin (Lipitor) 10 mg tablet Take 10 mg by mouth daily. modafiniL (ProvigiL) 100 mg tablet Take 100 mg by mouth every morning. loratadine (Claritin) 10 mg tablet Take 10 mg by mouth daily. coal tar (NEUTROGENA T-GEL) 0.5 % shampoo Apply 1 Bottle to affected area nightly as needed (two times per week for dandruff). cholecalciferol (Vitamin D3) (5000 Units/125 mcg) tab tablet Take 5,000 Units by mouth daily. apixaban (ELIQUIS) 5 mg tablet Take 5 mg by mouth two (2) times a day. Indications: a clot in the lung      !! acetaminophen (TYLENOL) 500 mg tablet Take 500 mg by mouth two (2) times daily as needed for Pain. !! acetaminophen (TYLENOL) 325 mg tablet Take 650 mg by mouth two (2) times a day. calcium-vitamin D (OS-LUCÍA +D3) 500 mg-200 unit per tablet Take 1 Tab by mouth daily (with breakfast). multivitamin (ONE A DAY) tablet Take 1 Tab by mouth daily. memantine (NAMENDA) 10 mg tablet Take 10 mg by mouth two (2) times a day. !! - Potential duplicate medications found. Please discuss with provider. STOP taking these medications       losartan (COZAAR) 50 mg tablet Comments:   Reason for Stopping: Follow up Care: Follow-up Information       Follow up With Specialties Details Why [place] [address]  Follow up [facility] [address] [facility] [address] [facility]    [address] [id number]  [phone]            1. Annette Norris MD in 1-2 weeks. Please call to set up an appointment shortly after discharge. Greater than 30 minutes were spent with the patient on counseling and coordination of care      Signed:   Quentin Wu MD  11/25/2022  10:56 AM

## 2022-11-28 ENCOUNTER — PATIENT OUTREACH (OUTPATIENT)
Dept: CASE MANAGEMENT | Age: 87
End: 2022-11-28

## 2022-11-28 NOTE — PROGRESS NOTES
No transition of care outreach indicated due to patient discharge to a [address] [place] [address], / Ludin Damon per inpatient case management note dated 11-.

## (undated) DEVICE — BNDG ELAS HK LOOP 6X5YD NS -- MATRIX

## (undated) DEVICE — TOWEL SURG W17XL27IN STD BLU COT NONFENESTRATED PREWASHED

## (undated) DEVICE — Z DUP USE 2428662 DRESSING POSTOP AG PRISMASEAL 3.5X14IN

## (undated) DEVICE — 6619 2 PTNT ISO SYS INCISE AREA&LT;(&GT;&&LT;)&GT;P: Brand: STERI-DRAPE™ IOBAN™ 2

## (undated) DEVICE — GDWIRE THRD TIP 3.2X460MM COCR --

## (undated) DEVICE — STERILE POLYISOPRENE POWDER-FREE SURGICAL GLOVES WITH EMOLLIENT COATING: Brand: PROTEXIS

## (undated) DEVICE — SURGICAL PROCEDURE PACK BASIN MAJ SET CUST NO CAUT

## (undated) DEVICE — PADDING CST 4INX4YD --

## (undated) DEVICE — REM POLYHESIVE ADULT PATIENT RETURN ELECTRODE: Brand: VALLEYLAB

## (undated) DEVICE — SUTURE VCRL 2-0 L27IN ABSRB CT BRAID COAT UD J275H

## (undated) DEVICE — Device

## (undated) DEVICE — STRAP,POSITIONING,KNEE/BODY,FOAM,4X60": Brand: MEDLINE

## (undated) DEVICE — PACK,BASIC,SIRUS,V: Brand: MEDLINE

## (undated) DEVICE — SPONGE GZ W4XL4IN COT 12 PLY TYP VII WVN C FLD DSGN

## (undated) DEVICE — PADDING CAST SPEC 6INX4YD COT --

## (undated) DEVICE — SUTURE VCRL SZ 1 L27IN ABSRB UD L36MM CP-1 1/2 CIR REV CUT J268H

## (undated) DEVICE — BIT DRL L365MM DIA4.3MM CALIB NONRADIOLUCENT W/O STP DISP

## (undated) DEVICE — SOL IRR SOD CL 0.9% 1000ML BTL --

## (undated) DEVICE — ROCKER SWITCH PENCIL BLADE ELECTRODE, HOLSTER: Brand: EDGE

## (undated) DEVICE — STERILE POLYISOPRENE POWDER-FREE SURGICAL GLOVES: Brand: PROTEXIS